# Patient Record
Sex: FEMALE | Race: BLACK OR AFRICAN AMERICAN | Employment: OTHER | URBAN - METROPOLITAN AREA
[De-identification: names, ages, dates, MRNs, and addresses within clinical notes are randomized per-mention and may not be internally consistent; named-entity substitution may affect disease eponyms.]

---

## 2017-01-12 PROBLEM — R76.8 POSITIVE SM/RNP ANTIBODY: Status: ACTIVE | Noted: 2017-01-12

## 2017-01-12 PROBLEM — M35.00 SJOGREN'S SYNDROME (HCC): Status: ACTIVE | Noted: 2017-01-12

## 2017-01-27 PROBLEM — Z79.4 CONTROLLED TYPE 2 DIABETES MELLITUS WITHOUT COMPLICATION, WITH LONG-TERM CURRENT USE OF INSULIN (HCC): Status: ACTIVE | Noted: 2017-01-27

## 2017-01-27 PROBLEM — E11.9 CONTROLLED TYPE 2 DIABETES MELLITUS WITHOUT COMPLICATION, WITH LONG-TERM CURRENT USE OF INSULIN (HCC): Status: ACTIVE | Noted: 2017-01-27

## 2017-02-28 ENCOUNTER — HOSPITAL ENCOUNTER (OUTPATIENT)
Dept: DIABETES SERVICES | Age: 72
Discharge: HOME | End: 2017-02-28
Attending: INTERNAL MEDICINE
Payer: MEDICARE

## 2017-02-28 VITALS — BODY MASS INDEX: 29.84 KG/M2 | HEIGHT: 59 IN | WEIGHT: 148 LBS

## 2017-02-28 PROCEDURE — G0108 DIAB MANAGE TRN  PER INDIV: HCPCS

## 2017-02-28 NOTE — PROGRESS NOTES
Came for diabetes educational assessment today. Provided basic information on carbohydrates, proteins and fats. Educational need/plan: Will attend 2 nutrition/2 diabetes sessions to address the following: diabetes disease process, nutritional management, physical activity, using medications, preventing complications, psychosocial adjustment, goal setting, problem solving, monitoring, behavior change strategies. During appointment pt admits she took inhalers yesterday but Toujeo she was taken  one and half weeks ago. Her cardiac medications and other medications on  2-21-17. Trulicilty injection taken last week as well. She did report a low blood glucose last week of 80 mg/dl. Pt passed the PAT Depression scale despite reporting issues with her ,financial concerns, and a strong desire to have children when younger, but does not have any of her own. Suggested a pill box to help remember medications. Pt admits to memory issues and says she does tend to forget at times to take medications and check her blood sugar. Forgetfulness was mentioned several times by client during the hour assessment. 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 It was noted her diet is poor and reported her last true meal was on Sunday 2-26-17. Reports she was taken out to eat by who she calls an \"adopted son\" to [employer].  She reported she  tends to eat fruit and snack rather than eat meals. Primarily fruit. She admits to eating fifty grapes at a single sitting  or until  satisfied. She has a 6th grade education and reports she is not good with numbers. Initial mini nutritional instruction today was by plate method until her first  nutritional session with dietician and not by number of CHO. Her teachings will all be one on one sessions to reemphasize food and diabetic education not based on a carbohydrate count but plate and appearance of foods with visual instruction.  She will receive entire diabetes program tailored to her learning style.

## 2017-03-10 ENCOUNTER — HOSPITAL ENCOUNTER (OUTPATIENT)
Dept: DIABETES SERVICES | Age: 72
Discharge: HOME | End: 2017-03-10
Attending: INTERNAL MEDICINE
Payer: MEDICARE

## 2017-03-10 PROCEDURE — G0108 DIAB MANAGE TRN  PER INDIV: HCPCS

## 2017-03-10 NOTE — PROGRESS NOTES
Participant attended Diabetes #1 session today. Topics included: Characteristics/pathophysiology type 1/type 2 diabetes; Goal/acceptable blood glucose ranges/Hgb A1C/interpreting/using results; Using medications safely; Sick day management; Prevention/detection/treatment of acute complications. - Verbalized understanding of material covered. -Goal for next session attend Nutrition One   -Anticipated adherence is   Average   -Problems/barriers may be   anticipated comments: Pt mentioned her glucometer is broken. She has changed HCP and will call about getting another one. Not sure which one her insurance will pay for. Reports she could not afford the strips if used the meters we give here. [facility] alternative [facility] that is  self pay and no prescription required. Meter is $9.99, and  $6.99 for a box of 50 strips. She will call her new HCP and see if she can get one ordered.

## 2017-03-14 ENCOUNTER — HOSPITAL ENCOUNTER (OUTPATIENT)
Dept: DIABETES SERVICES | Age: 72
Discharge: HOME | End: 2017-03-14
Attending: INTERNAL MEDICINE
Payer: MEDICARE

## 2017-03-14 PROCEDURE — G0108 DIAB MANAGE TRN  PER INDIV: HCPCS

## 2017-03-14 NOTE — PROGRESS NOTES
Attended nutrition diabetes #1 individual session today. Topics included:  carbohydrate choices (emphasizing high fiber carbohydrates using food models); proteins (emphasizing heart healthy choices) and fat food choices (emphasizing unsaturated fats); free foods;  nutrition tips for persons with diabetes; snack ideas; resources for diabetes management. Plate method for meal planning was discussed. Emphasis was on not going over 5 hours during the waking hours without eating and having a protein with every meal and snack. Reviewed budget friendly foods. Voiced /demonstrated partial understanding of material covered. Goal for next session is: have a protein food with every meal and snack. Anticipated adherence is good. Problems/barriers may be: 6th grade reading level therefore used more food models to show amounts of foods allowed.

## 2017-03-21 ENCOUNTER — HOSPITAL ENCOUNTER (OUTPATIENT)
Dept: DIABETES SERVICES | Age: 72
Discharge: HOME | End: 2017-03-21
Attending: INTERNAL MEDICINE
Payer: MEDICARE

## 2017-03-21 PROCEDURE — G0108 DIAB MANAGE TRN  PER INDIV: HCPCS

## 2017-03-21 NOTE — PROGRESS NOTES
Attended nutrition diabetes #2 1:1 session today. Topics included: plate method for portion control; fiber and sodium guidelines; sugar substitutes; eating out; recipe modification; label reading. Voiced/demonstrated understanding of material covered. Pt could verbalize to have a protein with every meal and snack. Anticipated adherence is good. Pt has already changed to mostly high fiber carbohydrates and unsaturated fats. Problems/barriers: 6th grade education level. Pt's nutrition goal is to not skip meals and eat a protein with meals and snacks. Plan for follow up is attend 1:1 diabetes medical session on 3/30/17 and grocery shopping tour on 4/10/17.

## 2017-03-30 ENCOUNTER — HOSPITAL ENCOUNTER (OUTPATIENT)
Dept: DIABETES SERVICES | Age: 72
Discharge: HOME | End: 2017-03-30
Attending: INTERNAL MEDICINE
Payer: MEDICARE

## 2017-03-30 PROCEDURE — G0108 DIAB MANAGE TRN  PER INDIV: HCPCS

## 2017-03-30 NOTE — PROGRESS NOTES
Participant attended Diabetes #2 session today. Topics included: Prevention/detection/treatment of chronic complications; sleep apnea; Developing strategies to promote health/change behavior/recommended screenings; Developing strategies to address psychosocial issues; Goal setting. Participants goal/support plan includes Nutritional Goal: To improve diet:  I will not skip meals and I will have a protein with most meals and snack beginning 3-30-17 and reevaluate in 2 weeks. Medical Goal:  To treat low blood sugar. I will carry a CHO source to treat low blood sugar. Jelly packets. Beginning 3-10-17 and reevaluate in 2 weeks. Instructed her to make sure she has at least 3 packets with her at all times. Problems/barriers may be:financial comments: Pt mentioned limited budget and her ability to have proteins available is based upon food stamps and when they are gone, she has to do without until she gets more. She can get food at some kind of food bank, but she reports it is not the protein that she needs. Plan for follow up/Recommendations: mail follow up survey in 3 months.

## 2017-11-15 ENCOUNTER — HOSPITAL ENCOUNTER (OUTPATIENT)
Dept: CT IMAGING | Age: 72
Discharge: HOME | End: 2017-11-15
Attending: INTERNAL MEDICINE
Payer: COMMERCIAL

## 2017-11-15 DIAGNOSIS — J32.9 OTHER SINUSITIS, UNSPECIFIED CHRONICITY: ICD-10-CM

## 2017-11-15 PROCEDURE — 70486 CT MAXILLOFACIAL W/O DYE: CPT

## 2017-12-12 ENCOUNTER — HOSPITAL ENCOUNTER (OUTPATIENT)
Dept: MRI IMAGING | Age: 72
Discharge: HOME | End: 2017-12-12
Attending: INTERNAL MEDICINE
Payer: MEDICARE

## 2017-12-12 DIAGNOSIS — R51.9 LEFT TEMPORAL HEADACHE: ICD-10-CM

## 2017-12-12 LAB — CREAT BLD-MCNC: 0.7 MG/DL (ref 0.8–1.5)

## 2017-12-12 PROCEDURE — 74011250636 HC RX REV CODE- 250/636: Performed by: INTERNAL MEDICINE

## 2017-12-12 PROCEDURE — 70553 MRI BRAIN STEM W/O & W/DYE: CPT

## 2017-12-12 PROCEDURE — A9577 INJ MULTIHANCE: HCPCS | Performed by: INTERNAL MEDICINE

## 2017-12-12 PROCEDURE — 82565 ASSAY OF CREATININE: CPT

## 2017-12-12 RX ORDER — SODIUM CHLORIDE 0.9 % (FLUSH) 0.9 %
10 SYRINGE (ML) INJECTION
Status: COMPLETED | OUTPATIENT
Start: 2017-12-12 | End: 2017-12-12

## 2017-12-12 RX ADMIN — GADOBENATE DIMEGLUMINE 14 ML: 529 INJECTION, SOLUTION INTRAVENOUS at 15:00

## 2017-12-12 RX ADMIN — Medication 10 ML: at 15:00

## 2018-01-18 PROBLEM — E11.40 TYPE 2 DIABETES MELLITUS WITH DIABETIC NEUROPATHY (HCC): Status: ACTIVE | Noted: 2018-01-18

## 2018-01-24 ENCOUNTER — HOSPITAL ENCOUNTER (EMERGENCY)
Age: 73
Discharge: HOME | End: 2018-01-24
Attending: EMERGENCY MEDICINE
Payer: MEDICARE

## 2018-01-24 ENCOUNTER — APPOINTMENT (OUTPATIENT)
Dept: GENERAL RADIOLOGY | Age: 73
End: 2018-01-24
Attending: EMERGENCY MEDICINE
Payer: MEDICARE

## 2018-01-24 VITALS
TEMPERATURE: 97.8 F | DIASTOLIC BLOOD PRESSURE: 84 MMHG | HEIGHT: 59 IN | HEART RATE: 79 BPM | BODY MASS INDEX: 29.84 KG/M2 | OXYGEN SATURATION: 96 % | RESPIRATION RATE: 18 BRPM | SYSTOLIC BLOOD PRESSURE: 143 MMHG | WEIGHT: 148 LBS

## 2018-01-24 DIAGNOSIS — M54.31 SCIATICA OF RIGHT SIDE: Primary | ICD-10-CM

## 2018-01-24 PROCEDURE — 96372 THER/PROPH/DIAG INJ SC/IM: CPT | Performed by: EMERGENCY MEDICINE

## 2018-01-24 PROCEDURE — 74011250636 HC RX REV CODE- 250/636: Performed by: EMERGENCY MEDICINE

## 2018-01-24 PROCEDURE — 72100 X-RAY EXAM L-S SPINE 2/3 VWS: CPT

## 2018-01-24 PROCEDURE — 81003 URINALYSIS AUTO W/O SCOPE: CPT | Performed by: EMERGENCY MEDICINE

## 2018-01-24 PROCEDURE — 99282 EMERGENCY DEPT VISIT SF MDM: CPT | Performed by: EMERGENCY MEDICINE

## 2018-01-24 RX ORDER — TRAMADOL HYDROCHLORIDE 50 MG/1
100 TABLET ORAL
Qty: 19 TAB | Refills: 0 | Status: SHIPPED | OUTPATIENT
Start: 2018-01-24 | End: 2018-03-09

## 2018-01-24 RX ORDER — KETOROLAC TROMETHAMINE 30 MG/ML
30 INJECTION, SOLUTION INTRAMUSCULAR; INTRAVENOUS
Status: COMPLETED | OUTPATIENT
Start: 2018-01-24 | End: 2018-01-24

## 2018-01-24 RX ORDER — METHOCARBAMOL 750 MG/1
1500 TABLET, FILM COATED ORAL 4 TIMES DAILY
Qty: 40 TAB | Refills: 0 | Status: SHIPPED | OUTPATIENT
Start: 2018-01-24 | End: 2018-03-09

## 2018-01-24 RX ORDER — PREDNISONE 20 MG/1
40 TABLET ORAL DAILY
Qty: 10 TAB | Refills: 0 | Status: SHIPPED | OUTPATIENT
Start: 2018-01-24 | End: 2018-01-29

## 2018-01-24 RX ADMIN — METHYLPREDNISOLONE SODIUM SUCCINATE 125 MG: 125 INJECTION, POWDER, FOR SOLUTION INTRAMUSCULAR; INTRAVENOUS at 09:05

## 2018-01-24 RX ADMIN — KETOROLAC TROMETHAMINE 30 MG: 30 INJECTION, SOLUTION INTRAMUSCULAR at 09:05

## 2018-01-24 NOTE — ED PROVIDER NOTES
HPI Comments: 65-year-old female presents with low back pain. Ongoing for one week. Saw her primary care doctor review of the chart does not show that any interventions were undertaken after that visit. Pain has been persistent. No fever vomiting or diarrhea  Patient has urinary frequency but this is a persistent problem. She is not having any incontinence. She has no dysuria. Pain distally worse with moving changing positions. Denies any cough or shortness of breath. Patient is a 67 y.o. female presenting with back pain. The history is provided by the patient and a relative. Back Pain    This is a recurrent problem. The current episode started more than 1 week ago. The problem has been gradually worsening. The problem occurs constantly. Patient reports not work related injury. 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 The pain is present in the lower back, right side and left side. The quality of the pain is described as aching and stabbing. The pain radiates to the right thigh. The pain is moderate. The pain is the same all the time. Pertinent negatives include no chest pain, no fever, no weight loss, no headaches, no abdominal pain, no abdominal swelling, no dysuria, no pelvic pain, no paresis, no tingling and no weakness. Treatments tried: patient currently on methotrexate therapy. The treatment provided no relief. Risk factors include a sedentary lifestyle. The patient's surgical history non-contributory        Past Medical History:   Diagnosis Date    Arrhythmia 3 - 4 weeks ago (9/2010)    Dr. Lottie Ritter?  ([facility]) ran tests for heart speeding up/slowing down    Arthritis     Asthma     first told in 2000    Bladder incontinence 10/24/2016    Dermatophytosis     Diabetes ([facility] [address]     checks sugar at home 142 - 0    GERD (gastroesophageal reflux disease) 7/23/2013    Heart failure ([facility]     Hypercholesterolemia     Hypertension     Hypertensive heart disease without HF (heart failure)     Lupus (systemic lupus erythematosus) ([facility] [address] [facility] 2005    Non compliance with medical treatment     Obesity     ANATOLY (obstructive sleep apnea) 7/23/2013    Seizures (HCC)     seizures for 10 years, PATIENT stopped dilantin 1 1/2 yrs ago    Stroke ([facility] [facility] 1970S    MINI STROKE    Systemic lupus erythematosus ([facility] [address] [facility] 7/23/2013    Type 2 diabetes mellitus with hyperglycemia ([facility] [address] [facility] 10/24/2016       Past Surgical History:   Procedure Laterality Date    HX APPENDECTOMY      HX BREAST LUMPECTOMY  1998    benign    HX HAMMER TOE REPAIR  2010    left    HX HEENT      cataract removal right eye and skin removal off of lids    HX HYSTERECTOMY  1974    REV UPPER EYELID W EXCESS SKIN           Family History:   Problem Relation Age of Onset    Heart Disease Father     Bleeding Prob Brother     Diabetes Brother     Heart Disease Brother     Diabetes Brother     Cancer Brother     Heart Disease Mother     Diabetes Sister     Asthma Brother     Cancer Brother     Malignant Hyperthermia Neg Hx     Pseudocholinesterase Deficiency Neg Hx     Delayed Awakening Neg Hx     Post-op Nausea/Vomiting Neg Hx     Emergence Delirium Neg Hx     Post-op Cognitive Dysfunction Neg Hx     Other Neg Hx        Social History     Social History    Marital status: SINGLE     Spouse name: N/A    Number of children: 0    Years of education: N/A     Occupational History    [employer] Not Employed     Denies industrial toxins    Textiles      6 yrs     Social History Main Topics    Smoking status: Former Smoker     Packs/day: 1.00     Years: 12.00     Quit date: 10/14/1977    Smokeless tobacco: Never Used    Alcohol use No    Drug use: No    Sexual activity: No      Comment: sleeps alone for past 30 years,  in seperate bed      Other Topics Concern    Not on file     Social History Narrative    12-pack-year history of cigarette smoking, stopped smoking in 1975. Worked in [place] for 6 years and at [employer] for 2 years where she states she was not exposed to industrial toxins. , no children. Has always live in [address]. No pets. ALLERGIES: Penicillins    Review of Systems   Constitutional: Negative for chills, fever and weight loss. HENT: Positive for nosebleeds. Negative for congestion, ear pain and rhinorrhea. Eyes: Negative for photophobia and discharge. Respiratory: Negative for cough and shortness of breath. Cardiovascular: Negative for chest pain and palpitations. Gastrointestinal: Negative for abdominal pain, constipation, diarrhea and vomiting. Endocrine: Negative for cold intolerance and heat intolerance. Genitourinary: Negative for dysuria, flank pain and pelvic pain. Musculoskeletal: Positive for back pain. Negative for arthralgias, myalgias and neck pain. Skin: Negative for rash and wound. Allergic/Immunologic: Negative for environmental allergies and food allergies. Neurological: Negative for tingling, syncope, weakness and headaches. Hematological: Negative for adenopathy. Does not bruise/bleed easily. Psychiatric/Behavioral: Negative for dysphoric mood. The patient is not nervous/anxious. All other systems reviewed and are negative. Vitals:    01/24/18 0833   BP: (!) 159/98   Pulse: 83   Resp: 18   Temp: 97.8 °F (36.6 °C)   SpO2: 96%   Weight: 67.1 kg (148 lb)   Height: 4' 11\" (1.499 m)            Physical Exam   Constitutional: She is oriented to person, place, and time. She appears well-developed and well-nourished. She appears distressed. HENT:   Head: Normocephalic and atraumatic. Mouth/Throat: Oropharynx is clear and moist.   Eyes: Conjunctivae and EOM are normal. Pupils are equal, round, and reactive to light. Right eye exhibits no discharge. Left eye exhibits no discharge. No scleral icterus. Neck: Normal range of motion. Neck supple. No thyromegaly present. Cardiovascular: Normal rate, regular rhythm, normal heart sounds and intact distal pulses. Exam reveals no gallop and no friction rub. No murmur heard. Pulmonary/Chest: Effort normal and breath sounds normal. No respiratory distress. She has no wheezes. She exhibits no tenderness. Abdominal: Soft. Bowel sounds are normal. She exhibits no distension. There is no hepatosplenomegaly. There is no tenderness. There is no rebound and no guarding. No hernia. Musculoskeletal: Normal range of motion. She exhibits no edema or tenderness. Back:    Neurological: She is alert and oriented to person, place, and time. No cranial nerve deficit. She exhibits normal muscle tone. Skin: Skin is warm. No rash noted. She is not diaphoretic. No erythema. Psychiatric: She has a normal mood and affect. Her behavior is normal. Judgment and thought content normal.   Nursing note and vitals reviewed. MDM  Number of Diagnoses or Management Options  Sciatica of right side: established and worsening  Diagnosis management comments: Differential  Diagnosis:  Lumbar strain, UTI, pyelonephritis, muscle strain, overuse syndrome       Amount and/or Complexity of Data Reviewed  Clinical lab tests: ordered and reviewed  Tests in the radiology section of CPT®: ordered and reviewed  Tests in the medicine section of CPT®: ordered and reviewed  Review and summarize past medical records: yes    Risk of Complications, Morbidity, and/or Mortality  Presenting problems: moderate  Diagnostic procedures: moderate  Management options: moderate  General comments: Elements of this note have been dictated via voice recognition software. Text and phrases may be limited by the accuracy of the software. The chart has been reviewed, but errors may still be present.       Patient Progress  Patient progress: stable    ED Course       Procedures

## 2018-01-24 NOTE — ED NOTES
I have reviewed discharge instructions with the patient and caregiver. The patient and caregiver verbalized understanding. Patient left ED via Discharge Method: ambulatory to Home with (insert name of family/friend, self,). Opportunity for questions and clarification provided. Patient given 3 scripts. To continue your aftercare when you leave the hospital, you may receive an automated call from our care team to check in on how you are doing. This is a free service and part of our promise to provide the best care and service to meet your aftercare needs.  If you have questions, or wish to unsubscribe from this service please call [phone]. Thank you for Choosing our [address] [place] [address] Emergency Department.

## 2018-01-24 NOTE — DISCHARGE INSTRUCTIONS
Back Pain: Care Instructions  Your Care Instructions    Back pain has many possible causes. It is often related to problems with muscles and ligaments of the back. It may also be related to problems with the nerves, discs, or bones of the back. Moving, lifting, standing, sitting, or sleeping in an awkward way can strain the back. Sometimes you don't notice the injury until later. Arthritis is another common cause of back pain. Although it may hurt a lot, back pain usually improves on its own within several weeks. Most people recover in 12 weeks or less. Using good home treatment and being careful not to stress your back can help you feel better sooner. Follow-up care is a key part of your treatment and safety. Be sure to make and go to all appointments, and call your doctor if you are having problems. It's also a good idea to know your test results and keep a list of the medicines you take. How can you care for yourself at home? · Sit or lie in positions that are most comfortable and reduce your pain. Try one of these positions when you lie down:  ¨ Lie on your back with your knees bent and supported by large pillows. ¨ Lie on the floor with your legs on the seat of a sofa or chair. Aureliano Harries on your side with your knees and hips bent and a pillow between your legs. ¨ Lie on your stomach if it does not make pain worse. · Do not sit up in bed, and avoid soft couches and twisted positions. Bed rest can help relieve pain at first, but it delays healing. Avoid bed rest after the first day of back pain. · Change positions every 30 minutes. If you must sit for long periods of time, take breaks from sitting. Get up and walk around, or lie in a comfortable position. · Try using a heating pad on a low or medium setting for 15 to 20 minutes every 2 or 3 hours. Try a warm shower in place of one session with the heating pad. · You can also try an ice pack for 10 to 15 minutes every 2 to 3 hours.  Put a thin cloth between the ice pack and your skin. · Take pain medicines exactly as directed. ¨ If the doctor gave you a prescription medicine for pain, take it as prescribed. ¨ If you are not taking a prescription pain medicine, ask your doctor if you can take an over-the-counter medicine. · Take short walks several times a day. You can start with 5 to 10 minutes, 3 or 4 times a day, and work up to longer walks. Walk on level surfaces and avoid hills and stairs until your back is better. · Return to work and other activities as soon as you can. Continued rest without activity is usually not good for your back. · To prevent future back pain, do exercises to stretch and strengthen your back and stomach. Learn how to use good posture, safe lifting techniques, and proper body mechanics. When should you call for help? Call your doctor now or seek immediate medical care if:  ? · You have new or worsening numbness in your legs. ? · You have new or worsening weakness in your legs. (This could make it hard to stand up.)   ? · You lose control of your bladder or bowels. ? Watch closely for changes in your health, and be sure to contact your doctor if:  ? · Your pain gets worse. ? · You are not getting better after 2 weeks. Where can you learn more? Go to http://shani-sully.info/. Enter G174 in the search box to learn more about \"Back Pain: Care Instructions. \"  Current as of: March 21, 2017  Content Version: 11.4  © 3519-2752 [employer]. Care instructions adapted under license by [employer] (which disclaims liability or warranty for this information). If you have questions about a medical condition or this instruction, always ask your healthcare professional. [place] [address] any warranty or liability for your use of this information.          Sciatica: Care Instructions  Your Care Instructions    Sciatica (say \"bbt-UO-oq-kuh\") is an irritation of one of the sciatic nerves, which come from the spinal cord in the lower back. The sciatic nerves and their branches extend down through the buttock to the foot. Sciatica can develop when an injured disc in the back presses against a spinal nerve root. Its main symptom is pain, numbness, or weakness that is often worse in the leg or foot than in the back. Sciatica often will improve and go away with time. Early treatment usually includes medicines and exercises to relieve pain. Follow-up care is a key part of your treatment and safety. Be sure to make and go to all appointments, and call your doctor if you are having problems. It's also a good idea to know your test results and keep a list of the medicines you take. How can you care for yourself at home? · Take pain medicines exactly as directed. ¨ If the doctor gave you a prescription medicine for pain, take it as prescribed. ¨ If you are not taking a prescription pain medicine, ask your doctor if you can take an over-the-counter medicine. · Use heat or ice to relieve pain. ¨ To apply heat, put a warm water bottle, heating pad set on low, or warm cloth on your back. Do not go to sleep with a heating pad on your skin. ¨ To use ice, put ice or a cold pack on the area for 10 to 20 minutes at a time. Put a thin cloth between the ice and your skin. · Avoid sitting if possible, unless it feels better than standing. · Alternate lying down with short walks. Increase your walking distance as you are able to without making your symptoms worse. · Do not do anything that makes your symptoms worse. When should you call for help? Call 911 anytime you think you may need emergency care. For example, call if:  · You are unable to move a leg at all. Call your doctor now or seek immediate medical care if:  · You have new or worse symptoms in your legs or buttocks. Symptoms may include:  ¨ Numbness or tingling. ¨ Weakness. ¨ Pain. · You lose bladder or bowel control.   Watch closely for changes in your health, and be sure to contact your doctor if:  · You are not getting better as expected. Where can you learn more? Go to http://shani-sully.info/. Enter [phone] in the search box to learn more about \"Sciatica: Care Instructions. \"  Current as of: March 21, 2017  Content Version: 11.4  © 4122-4326 [employer]. Care instructions adapted under license by [employer] (which disclaims liability or warranty for this information). If you have questions about a medical condition or this instruction, always ask your healthcare professional. [place] [address] any warranty or liability for your use of this information.

## 2018-01-24 NOTE — ED TRIAGE NOTES
Pt reports last Monday she was helping a family onto the bedside toilet and hurt her back and now the pain is going around the left side.

## 2018-03-08 ENCOUNTER — HOSPITAL ENCOUNTER (OUTPATIENT)
Dept: SURGERY | Age: 73
Discharge: HOME | End: 2018-03-08

## 2018-03-09 ENCOUNTER — HOSPITAL ENCOUNTER (OUTPATIENT)
Dept: SURGERY | Age: 73
Discharge: HOME | End: 2018-03-09
Attending: OTOLARYNGOLOGY
Payer: MEDICARE

## 2018-03-09 VITALS
BODY MASS INDEX: 29.69 KG/M2 | HEIGHT: 59 IN | OXYGEN SATURATION: 98 % | SYSTOLIC BLOOD PRESSURE: 133 MMHG | WEIGHT: 147.3 LBS | DIASTOLIC BLOOD PRESSURE: 92 MMHG | HEART RATE: 87 BPM | TEMPERATURE: 96.6 F

## 2018-03-09 LAB — GLUCOSE BLD STRIP.AUTO-MCNC: 111 MG/DL (ref 65–100)

## 2018-03-09 PROCEDURE — 82962 GLUCOSE BLOOD TEST: CPT

## 2018-03-09 RX ORDER — BISMUTH SUBSALICYLATE 262 MG
1 TABLET,CHEWABLE ORAL DAILY
COMMUNITY
End: 2018-08-23

## 2018-03-09 NOTE — PERIOP NOTES
Patient verified name, , and surgery as listed in [facility]. Type 1B surgery, walk in assessment complete. Labs per surgeon: none needed. Labs per anesthesia protocol: none needed. [id number]  EKG: not needed per [facility] protocols. Had normal stress test in 2017. Record available in EMR for reference if needed. Hibiclens and instructions given per hospital policy. Patient provided with and instructed on educational handouts including Guide to Surgery, Pain Management, Hand Hygiene, Blood Transfusion Education, and [facility] Brochure. Patient answered medical/surgical history questions at their best of ability. All prior to admission medications documented in [facility]. Original medication prescription bottles not visualized during patient appointment. Patient instructed to hold all vitamins 7 days prior to surgery and NSAIDS 5 days prior to surgery, patient verbalized understanding. Medications to be held: meloxicam, vitamins. Patient instructed to continue previous medications as prescribed prior to surgery and to take the following medications the day of surgery according to anesthesia guidelines with a small sip of water: albuterol inhaler, 81 mg aspirin, flovent, gabapentin, proair if needed. Instructed to use 28 units of Toujeo the night before surgery and to bring inhaler. Patient teach back successful and patient demonstrates knowledge of instructions.

## 2018-03-14 ENCOUNTER — ANESTHESIA EVENT (OUTPATIENT)
Dept: SURGERY | Age: 73
End: 2018-03-14
Payer: MEDICARE

## 2018-03-15 ENCOUNTER — HOSPITAL ENCOUNTER (OUTPATIENT)
Age: 73
Setting detail: OUTPATIENT SURGERY
Discharge: HOME | End: 2018-03-15
Attending: OTOLARYNGOLOGY | Admitting: OTOLARYNGOLOGY
Payer: MEDICARE

## 2018-03-15 ENCOUNTER — ANESTHESIA (OUTPATIENT)
Dept: SURGERY | Age: 73
End: 2018-03-15
Payer: MEDICARE

## 2018-03-15 VITALS
HEART RATE: 82 BPM | SYSTOLIC BLOOD PRESSURE: 134 MMHG | DIASTOLIC BLOOD PRESSURE: 79 MMHG | TEMPERATURE: 97.7 F | OXYGEN SATURATION: 92 % | RESPIRATION RATE: 14 BRPM | WEIGHT: 149.13 LBS | BODY MASS INDEX: 30.12 KG/M2

## 2018-03-15 LAB — GLUCOSE BLD STRIP.AUTO-MCNC: 115 MG/DL (ref 65–100)

## 2018-03-15 PROCEDURE — 74011000250 HC RX REV CODE- 250

## 2018-03-15 PROCEDURE — 77030008477 HC STYL SATN SLP COVD -A: Performed by: ANESTHESIOLOGY

## 2018-03-15 PROCEDURE — 74011250636 HC RX REV CODE- 250/636

## 2018-03-15 PROCEDURE — 74011250636 HC RX REV CODE- 250/636: Performed by: ANESTHESIOLOGY

## 2018-03-15 PROCEDURE — 74011250637 HC RX REV CODE- 250/637: Performed by: OTOLARYNGOLOGY

## 2018-03-15 PROCEDURE — 77030008703 HC TU ET UNCUF COVD -A: Performed by: ANESTHESIOLOGY

## 2018-03-15 PROCEDURE — 76060000032 HC ANESTHESIA 0.5 TO 1 HR: Performed by: OTOLARYNGOLOGY

## 2018-03-15 PROCEDURE — 76010000138 HC OR TIME 0.5 TO 1 HR: Performed by: OTOLARYNGOLOGY

## 2018-03-15 PROCEDURE — 77030018836 HC SOL IRR NACL ICUM -A: Performed by: OTOLARYNGOLOGY

## 2018-03-15 PROCEDURE — 74011000250 HC RX REV CODE- 250: Performed by: ANESTHESIOLOGY

## 2018-03-15 PROCEDURE — 82962 GLUCOSE BLOOD TEST: CPT

## 2018-03-15 PROCEDURE — 74011000250 HC RX REV CODE- 250: Performed by: OTOLARYNGOLOGY

## 2018-03-15 PROCEDURE — 76210000016 HC OR PH I REC 1 TO 1.5 HR: Performed by: OTOLARYNGOLOGY

## 2018-03-15 PROCEDURE — 77030020782 HC GWN BAIR PAWS FLX 3M -B: Performed by: ANESTHESIOLOGY

## 2018-03-15 PROCEDURE — 88305 TISSUE EXAM BY PATHOLOGIST: CPT | Performed by: OTOLARYNGOLOGY

## 2018-03-15 PROCEDURE — 76210000020 HC REC RM PH II FIRST 0.5 HR: Performed by: OTOLARYNGOLOGY

## 2018-03-15 RX ORDER — ONDANSETRON 2 MG/ML
INJECTION INTRAMUSCULAR; INTRAVENOUS AS NEEDED
Status: DISCONTINUED | OUTPATIENT
Start: 2018-03-15 | End: 2018-03-15 | Stop reason: HOSPADM

## 2018-03-15 RX ORDER — LIDOCAINE HYDROCHLORIDE 10 MG/ML
INJECTION INFILTRATION; PERINEURAL AS NEEDED
Status: DISCONTINUED | OUTPATIENT
Start: 2018-03-15 | End: 2018-03-15 | Stop reason: HOSPADM

## 2018-03-15 RX ORDER — OXYCODONE HYDROCHLORIDE 5 MG/1
5 TABLET ORAL
Status: DISCONTINUED | OUTPATIENT
Start: 2018-03-15 | End: 2018-03-15 | Stop reason: HOSPADM

## 2018-03-15 RX ORDER — ROCURONIUM BROMIDE 10 MG/ML
INJECTION, SOLUTION INTRAVENOUS AS NEEDED
Status: DISCONTINUED | OUTPATIENT
Start: 2018-03-15 | End: 2018-03-15 | Stop reason: HOSPADM

## 2018-03-15 RX ORDER — LIDOCAINE HYDROCHLORIDE 20 MG/ML
INJECTION, SOLUTION EPIDURAL; INFILTRATION; INTRACAUDAL; PERINEURAL AS NEEDED
Status: DISCONTINUED | OUTPATIENT
Start: 2018-03-15 | End: 2018-03-15 | Stop reason: HOSPADM

## 2018-03-15 RX ORDER — DIPHENHYDRAMINE HYDROCHLORIDE 50 MG/ML
12.5 INJECTION, SOLUTION INTRAMUSCULAR; INTRAVENOUS
Status: DISCONTINUED | OUTPATIENT
Start: 2018-03-15 | End: 2018-03-15 | Stop reason: HOSPADM

## 2018-03-15 RX ORDER — OXYCODONE HYDROCHLORIDE 5 MG/1
10 TABLET ORAL
Status: DISCONTINUED | OUTPATIENT
Start: 2018-03-15 | End: 2018-03-15 | Stop reason: HOSPADM

## 2018-03-15 RX ORDER — HYDROMORPHONE HYDROCHLORIDE 2 MG/ML
0.5 INJECTION, SOLUTION INTRAMUSCULAR; INTRAVENOUS; SUBCUTANEOUS
Status: DISCONTINUED | OUTPATIENT
Start: 2018-03-15 | End: 2018-03-15 | Stop reason: HOSPADM

## 2018-03-15 RX ORDER — LIDOCAINE HYDROCHLORIDE 10 MG/ML
0.1 INJECTION INFILTRATION; PERINEURAL AS NEEDED
Status: DISCONTINUED | OUTPATIENT
Start: 2018-03-15 | End: 2018-03-15 | Stop reason: HOSPADM

## 2018-03-15 RX ORDER — SUCCINYLCHOLINE CHLORIDE 20 MG/ML
INJECTION INTRAMUSCULAR; INTRAVENOUS AS NEEDED
Status: DISCONTINUED | OUTPATIENT
Start: 2018-03-15 | End: 2018-03-15 | Stop reason: HOSPADM

## 2018-03-15 RX ORDER — FENTANYL CITRATE 50 UG/ML
INJECTION, SOLUTION INTRAMUSCULAR; INTRAVENOUS AS NEEDED
Status: DISCONTINUED | OUTPATIENT
Start: 2018-03-15 | End: 2018-03-15 | Stop reason: HOSPADM

## 2018-03-15 RX ORDER — SODIUM CHLORIDE, SODIUM LACTATE, POTASSIUM CHLORIDE, CALCIUM CHLORIDE 600; 310; 30; 20 MG/100ML; MG/100ML; MG/100ML; MG/100ML
75 INJECTION, SOLUTION INTRAVENOUS CONTINUOUS
Status: DISCONTINUED | OUTPATIENT
Start: 2018-03-15 | End: 2018-03-15 | Stop reason: HOSPADM

## 2018-03-15 RX ORDER — EPINEPHRINE NASAL SOLUTION 1 MG/ML
SOLUTION NASAL AS NEEDED
Status: DISCONTINUED | OUTPATIENT
Start: 2018-03-15 | End: 2018-03-15 | Stop reason: HOSPADM

## 2018-03-15 RX ORDER — NALOXONE HYDROCHLORIDE 0.4 MG/ML
0.1 INJECTION, SOLUTION INTRAMUSCULAR; INTRAVENOUS; SUBCUTANEOUS
Status: DISCONTINUED | OUTPATIENT
Start: 2018-03-15 | End: 2018-03-15 | Stop reason: HOSPADM

## 2018-03-15 RX ORDER — FLUMAZENIL 0.1 MG/ML
0.2 INJECTION INTRAVENOUS AS NEEDED
Status: DISCONTINUED | OUTPATIENT
Start: 2018-03-15 | End: 2018-03-15 | Stop reason: HOSPADM

## 2018-03-15 RX ORDER — PROPOFOL 10 MG/ML
INJECTION, EMULSION INTRAVENOUS AS NEEDED
Status: DISCONTINUED | OUTPATIENT
Start: 2018-03-15 | End: 2018-03-15 | Stop reason: HOSPADM

## 2018-03-15 RX ORDER — LABETALOL HYDROCHLORIDE 5 MG/ML
INJECTION, SOLUTION INTRAVENOUS AS NEEDED
Status: DISCONTINUED | OUTPATIENT
Start: 2018-03-15 | End: 2018-03-15 | Stop reason: HOSPADM

## 2018-03-15 RX ORDER — DEXAMETHASONE SODIUM PHOSPHATE 4 MG/ML
INJECTION, SOLUTION INTRA-ARTICULAR; INTRALESIONAL; INTRAMUSCULAR; INTRAVENOUS; SOFT TISSUE AS NEEDED
Status: DISCONTINUED | OUTPATIENT
Start: 2018-03-15 | End: 2018-03-15 | Stop reason: HOSPADM

## 2018-03-15 RX ADMIN — LIDOCAINE HYDROCHLORIDE 0.1 ML: 10 INJECTION, SOLUTION INFILTRATION; PERINEURAL at 09:48

## 2018-03-15 RX ADMIN — SODIUM CHLORIDE, SODIUM LACTATE, POTASSIUM CHLORIDE, AND CALCIUM CHLORIDE 75 ML/HR: 600; 310; 30; 20 INJECTION, SOLUTION INTRAVENOUS at 09:42

## 2018-03-15 RX ADMIN — PROPOFOL 100 MG: 10 INJECTION, EMULSION INTRAVENOUS at 10:54

## 2018-03-15 RX ADMIN — LIDOCAINE HYDROCHLORIDE 60 MG: 20 INJECTION, SOLUTION EPIDURAL; INFILTRATION; INTRACAUDAL; PERINEURAL at 10:53

## 2018-03-15 RX ADMIN — LABETALOL HYDROCHLORIDE 5 MG: 5 INJECTION, SOLUTION INTRAVENOUS at 11:10

## 2018-03-15 RX ADMIN — ONDANSETRON 4 MG: 2 INJECTION INTRAMUSCULAR; INTRAVENOUS at 11:04

## 2018-03-15 RX ADMIN — SUCCINYLCHOLINE CHLORIDE 120 MG: 20 INJECTION INTRAMUSCULAR; INTRAVENOUS at 10:56

## 2018-03-15 RX ADMIN — DEXAMETHASONE SODIUM PHOSPHATE 10 MG: 4 INJECTION, SOLUTION INTRA-ARTICULAR; INTRALESIONAL; INTRAMUSCULAR; INTRAVENOUS; SOFT TISSUE at 11:03

## 2018-03-15 RX ADMIN — FENTANYL CITRATE 100 MCG: 50 INJECTION, SOLUTION INTRAMUSCULAR; INTRAVENOUS at 10:53

## 2018-03-15 RX ADMIN — ROCURONIUM BROMIDE 5 MG: 10 INJECTION, SOLUTION INTRAVENOUS at 10:53

## 2018-03-15 NOTE — ANESTHESIA PREPROCEDURE EVALUATION
Anesthetic History   No history of anesthetic complications            Review of Systems / Medical History  Patient summary reviewed and pertinent labs reviewed    Pulmonary        Sleep apnea: No treatment    Asthma : well controlled       Neuro/Psych     seizures (no meds)    TIA (bASA)     Cardiovascular    Hypertension: well controlled          Hyperlipidemia    Exercise tolerance: >4 METS     GI/Hepatic/Renal     GERD: well controlled           Endo/Other    Diabetes: well controlled, type 2, using insulin    Obesity and arthritis     Other Findings              Physical Exam    Airway  Mallampati: II  TM Distance: 4 - 6 cm  Neck ROM: normal range of motion   Mouth opening: Normal     Cardiovascular    Rhythm: regular  Rate: normal         Dental    Dentition: Full upper dentures and Full lower dentures     Pulmonary  Breath sounds clear to auscultation               Abdominal         Other Findings            Anesthetic Plan    ASA: 3  Anesthesia type: general            Anesthetic plan and risks discussed with: Patient and Family

## 2018-03-15 NOTE — BRIEF OP NOTE
BRIEF OPERATIVE NOTE    Date of Procedure: 3/15/2018   Preoperative Diagnosis: Hoarseness [R49.0]  Postoperative Diagnosis: Hoarseness [R49.0]    Procedure(s):  DIRECT LARYNGOSCOPY /TELESCOPIC DIRECT  LARYNGOSCOPY  TELESCOPIC BRONCHOSCOPY /ESOPHAGOSCOPY  MICRO DIRECT LARYNGOSCOPY WITH BILATERAL TRUE VOCAL CORD STRIPPING AND RIGHT LATERAL AE FOLD BX   Surgeon(s) and Role:     * Go Treviño MD - Primary         Assistant Staff: None      Surgical Staff:  Circ-1: Ying Murcia RN  Scrub Tech-1: Manuela Loya  Event Time In   Incision Start 1058   Incision Close 1116     Anesthesia: General   Estimated Blood Loss: < 1 cc  Specimens:   ID Type Source Tests Collected by Time Destination   1 : Right true vocal cord bx Preservative Vocal Cord(s)  Go Treviño MD 3/15/2018 1106 Pathology   2 : Left true vocal cord bx Preservative Vocal Cord(s)  Go Treviño MD 3/15/2018 1107 Pathology   3 : 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

## 2018-03-15 NOTE — ANESTHESIA POSTPROCEDURE EVALUATION
Post-Anesthesia Evaluation and Assessment    Patient: Marcial Slaughter MRN: [id number]  SSN: [id number]    YOB: 1945  Age: 67 y.o. Sex: female       Cardiovascular Function/Vital Signs  Visit Vitals    /90 (BP 1 Location: Right arm, BP Patient Position: At rest)    Pulse 81    Temp 36.5 °C (97.7 °F)    Resp 14    Wt 67.6 kg (149 lb 2 oz)    SpO2 94%    BMI 30.12 kg/m2       Patient is status post general anesthesia for Procedure(s):  DIRECT/TELESCOPIC DIRECT  LARYNGOSCOPY  TELESCOPIC BRONCHOSCOPY /ESOPHAGOSCOPY  LARYNGOSCOPY TRUE VOCAL CORD STRIPPING. Nausea/Vomiting: None    Postoperative hydration reviewed and adequate. Pain:  Pain Scale 1: Visual (03/15/18 1134)  Pain Intensity 1: 0 (03/15/18 1134)   Managed    Neurological Status:   Neuro (WDL): Exceptions to WDL (03/15/18 1134)  Neuro  Neurologic State: Drowsy (03/15/18 1134)  Cognition: Follows commands (03/15/18 1134)  LUE Motor Response: Purposeful (03/15/18 1134)  LLE Motor Response: Purposeful (03/15/18 1134)  RUE Motor Response: Purposeful (03/15/18 1134)  RLE Motor Response: Purposeful (03/15/18 1134)   At baseline    Mental Status and Level of Consciousness: Arousable    Pulmonary Status:   O2 Device: 02 face tent (03/15/18 1149)   Adequate oxygenation and airway patent    Complications related to anesthesia: None    Post-anesthesia assessment completed.  No concerns    Signed By: Nasra Archibald MD     March 15, 2018

## 2018-03-15 NOTE — OP NOTES
[facility] REPORT    Mehran Moore  MR#: [id number]  : 1945  ACCOUNT #: [de-identified]   DATE OF SERVICE: 03/15/2018    PREOPERATIVE DIAGNOSES:  Hoarseness, polypoid true vocal cord change, right lateral aryepiglottic fold mass. POSTOPERATIVE DIAGNOSES:  Hoarseness, polypoid true vocal cord change, right lateral aryepiglottic fold mass. PROCEDURES PERFORMED:  Direct laryngoscopy, telescopic direct laryngoscopy, telescopic bronchoscopy, esophagoscopy, micro direct laryngoscopy with bilateral true vocal cord stripping, and biopsy of a right lateral aryepiglottic fold mass. SURGEON:  XANDER Wood MD    ASSISTANT:  none     ANESTHESIA:  General.     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 She also had a cystic mass of the right lateral aryepiglottic fold. Mass was approximately 1 cm. OPERATION:  The patient was placed in supine position. General anesthesia was induced. Direct laryngoscopy was performed with the [facility] scope. There was no lesion of the base of tongue, vallecula, epiglottis, posterior or lateral pharyngeal walls. The laryngeal area was sprinkled with 1% plain Xylocaine and telescopic laryngoscopy was performed. The above noted findings were identified. Then, telescopic bronchoscopy was performed. The trachea appeared normal with no edema, erythema or cobblestoning. The patient was intubated by myself. Upper cervical esophagoscopy was performed without difficulty. No lesions were identified. Then, suspension laryngoscopy was performed. The microlaryngoscopy was then performed and the medial surface of the true vocal cords were stripped. Beginning about 4 mm from the anterior commissure, this was performed bilaterally.   Then, the lesion on the lateral surface of the right aryepiglottic fold was unroofed with multiple biopsies. This appeared to be a cystic type mass. Patient was then awakened and transferred to recovery room in good condition. ESTIMATED BLOOD LOSS:  Less than 1 mL.       MD Clement Willoughby / RN  D: 03/15/2018 11:28     T: 03/15/2018 13:08  JOB #: [id number]

## 2018-03-15 NOTE — DISCHARGE INSTRUCTIONS
Bronchoscopy: What to Expect at [facility]    Bronchoscopy lets your doctor look at your airway through a tube called a bronchoscope. Afterward, you may feel tired for 1 or 2 days. Your mouth may feel very dry for several hours after the procedure. You may also have a sore throat and a hoarse voice for a few days. Sucking on throat lozenges or gargling with warm salt water may help soothe your sore throat. If a sample of tissue (biopsy) was taken, you may spit up a small amount of blood or have bloody saliva. This is normal.  This care sheet gives you a general idea about how long it will take for you to recover. But each person recovers at a different pace. Follow the steps below to get better as quickly as possible. How can you care for yourself at home? Activity  ? · Do not eat anything for 2 hours after the procedure. ? · Rest when you feel tired. Getting enough sleep will help you recover. ? · Avoid strenuous activities, such as bicycle riding, jogging, weight lifting, or aerobic exercise, until your doctor says it is okay. ? · Ask your doctor when you can drive again. Diet  ? · You can eat your normal diet. If your stomach is upset, try bland, low-fat foods like plain rice, broiled chicken, toast, and yogurt. ? · If it is painful to swallow, start out with cold drinks, flavored ice pops, and ice cream. Next, try soft foods like pudding, yogurt, canned or cooked fruit, scrambled eggs, and mashed potatoes. Avoid eating hard or scratchy foods like chips or raw vegetables. Avoid orange or tomato juice and other acidic foods that can sting the throat. ? · Drink plenty of fluids to avoid becoming dehydrated (unless your doctor tells you not to). Medicines  ? · Take pain medicines exactly as directed. ¨ If the doctor gave you a prescription medicine for pain, take it as prescribed.   ¨ If you are not taking a prescription pain medicine, ask your doctor if you can take an over-the-counter medicine. ? · If you think your pain medicine is making you sick to your stomach:  ¨ Take your medicine after meals (unless your doctor has told you not to). ¨ Ask your doctor for a different pain medicine. ? · If your doctor prescribed antibiotics, take them as directed. Do not stop taking them just because you feel better. You need to take the full course of antibiotics. Follow-up care is a key part of your treatment and safety. Be sure to make and go to all appointments, and call your doctor if you are having problems. It's also a good idea to know your test results and keep a list of the medicines you take. When should you call for help? Call 911 anytime you think you may need emergency care. For example, call if:  ? · You passed out (lost consciousness). ? · You have sudden chest pain and shortness of breath. ? · You cough up large amounts of bright red blood. ? · You have severe pain in your chest.   ? · You have severe trouble breathing. ?Call your doctor now or seek immediate medical care if:  ? · You cough up more than a few tablespoons of blood. ? · You have pain that does not get better after you take pain medicine. ? · You have a fever over 100°F.   ? · You still sound hoarse after a few days. ? · You have bubbles under the skin around the collarbone. These may crackle and pop when you press on them. ? Watch closely for changes in your health, and be sure to contact your doctor if you have any problems. Where can you learn more? Go to http://shani-sully.info/. Enter K273 in the search box to learn more about \"Bronchoscopy: What to Expect at Home. \"  Current as of: May 12, 2017  Content Version: 11.4  © 6672-2620 [employer]. Care instructions adapted under license by [employer] (which disclaims liability or warranty for this information).  If you have questions about a medical condition or this instruction, always ask your healthcare professional. [place] [address] any warranty or liability for your use of this information.

## 2018-03-15 NOTE — IP AVS SNAPSHOT
[address] [facility] [address] [address] 
[phone] Patient: Joseph Ibarra MRN: [id number] :1945 About your hospitalization You were admitted on:  March 15, 2018 You last received care in the:  [facility] PACU You were discharged on:  March 15, 2018 Why you were hospitalized Your primary diagnosis was:  Not on File Follow-up Information Follow up With Details Comments Contact Info Brooklyn Odom MD   [address] [address] 
[phone] Latisha Birmingham MD   [address] [address] 
[phone] Your Scheduled Appointments   9:00 AM EDT  
POST OP with MD Manuel Lizama [address] E[facility] [address] [facility] ENT) [address]  
[phone] 2018 11:20 AM EDT  
first return with Liz Terry MD  
[address] ([address] [facility] [address]) [address] [address] [phone] 2018 10:45 AM EDT Follow Up with Brooklyn Odom MD  
[facility] ([address] [facility] [address] [facility] [address]) [address] [facility]  
[phone] Discharge Orders None A check lexie indicates which time of day the medication should be taken. My Medications ASK your doctor about these medications Instructions Each Dose to Equal  
 Morning Noon Evening Bedtime  
 amLODIPine 5 mg tablet Commonly known as:  Suhas Garcia Your last dose was: Your next dose is: Take 1 Tab by mouth daily. 5 mg  
    
   
   
   
  
 aspirin delayed-release 81 mg tablet Your last dose was: Your next dose is: Take 81 mg by mouth daily. Take / use AM day of surgery  per anesthesia protocols. 81 mg  
    
   
   
   
  
 dulaglutide 1.5 mg/0.5 mL sub-q pen Commonly known as:  TRULICITY Your last dose was: Your next dose is: INJECT ONE UNIT DOSE(0.5 ML) SUBCUTANEOUSLY ONCE EVERY 7 DAYS  
     
   
   
   
  
 fluticasone 110 mcg/actuation inhaler Commonly known as:  FLOVENT HFA Your last dose was: Your next dose is: Take 1 Puff by inhalation every twelve (12) hours. 1 Puff  
    
   
   
   
  
 folic acid 1 mg tablet Commonly known as:  Google Your last dose was: Your next dose is: Take 1 Tab by mouth daily. 1 mg  
    
   
   
   
  
 gabapentin 300 mg capsule Commonly known as:  NEURONTIN Your last dose was: Your next dose is: Take 1 Cap by mouth three (3) times daily. 300 mg  
    
   
   
   
  
 insulin glargine 300 unit/mL (1.5 mL) Inpn Commonly known as:  TOUJEO SOLOSTAR U-300 INSULIN Your last dose was: Your next dose is:    
   
   
 35 Units by SubCUTAneous route daily. 35 Units KLOR-CON M20 20 mEq tablet Generic drug:  potassium chloride Your last dose was: Your next dose is: Take 1 Tab by mouth daily. 20 mEq  
    
   
   
   
  
 losartan 50 mg tablet Commonly known as:  COZAAR Your last dose was: Your next dose is: Take 1 Tab by mouth daily. 50 mg  
    
   
   
   
  
 meloxicam 15 mg tablet Commonly known as:  MOBIC Your last dose was: Your next dose is: Take 1 pill once a day after food. multivitamin tablet Commonly known as:  ONE A DAY Your last dose was: Your next dose is: Take 1 Tab by mouth daily. 1 Tab * PROAIR HFA 90 mcg/actuation inhaler Generic drug:  albuterol Your last dose was: Your next dose is: Take 2 Puffs by inhalation every four (4) hours as needed for Wheezing. 2 Puff * albuterol 2.5 mg /3 mL (0.083 %) nebulizer solution Commonly known as:  PROVENTIL VENTOLIN Your last dose was: Your next dose is:    
   
   
 3 mL by Nebulization route every four (4) hours as needed for Wheezing. 2.5 mg  
    
   
   
   
  
 vitamin e 1,000 unit capsule Commonly known as:  E GEMS Your last dose was: Your next dose is: Take 1,000 Units by mouth daily. 1000 Units * Notice: This list has 2 medication(s) that are the same as other medications prescribed for you. Read the directions carefully, and ask your doctor or other care provider to review them with you. Discharge Instructions Bronchoscopy: What to Expect at [facility] Your Recovery Bronchoscopy lets your doctor look at your airway through a tube called a bronchoscope. Afterward, you may feel tired for 1 or 2 days. Your mouth may feel very dry for several hours after the procedure. You may also have a sore throat and a hoarse voice for a few days. Sucking on throat lozenges or gargling with warm salt water may help soothe your sore throat. If a sample of tissue (biopsy) was taken, you may spit up a small amount of blood or have bloody saliva. This is normal. 
This care sheet gives you a general idea about how long it will take for you to recover. But each person recovers at a different pace. Follow the steps below to get better as quickly as possible. How can you care for yourself at home? Activity ? · Do not eat anything for 2 hours after the procedure. ? · Rest when you feel tired. Getting enough sleep will help you recover. ? · Avoid strenuous activities, such as bicycle riding, jogging, weight lifting, or aerobic exercise, until your doctor says it is okay. ? · Ask your doctor when you can drive again. Diet ? · You can eat your normal diet. If your stomach is upset, try bland, low-fat foods like plain rice, broiled chicken, toast, and yogurt. ? · If it is painful to swallow, start out with cold drinks, flavored ice pops, and ice cream. Next, try soft foods like pudding, yogurt, canned or cooked fruit, scrambled eggs, and mashed potatoes. Avoid eating hard or scratchy foods like chips or raw vegetables. Avoid orange or tomato juice and other acidic foods that can sting the throat. ? · Drink plenty of fluids to avoid becoming dehydrated (unless your doctor tells you not to). Medicines ? · Take pain medicines exactly as directed. ¨ If the doctor gave you a prescription medicine for pain, take it as prescribed. ¨ If you are not taking a prescription pain medicine, ask your doctor if you can take an over-the-counter medicine. ? · If you think your pain medicine is making you sick to your stomach: 
¨ Take your medicine after meals (unless your doctor has told you not to). ¨ Ask your doctor for a different pain medicine. ? · If your doctor prescribed antibiotics, take them as directed. Do not stop taking them just because you feel better. You need to take the full course of antibiotics. Follow-up care is a key part of your treatment and safety. Be sure to make and go to all appointments, and call your doctor if you are having problems. It's also a good idea to know your test results and keep a list of the medicines you take. When should you call for help? Call 911 anytime you think you may need emergency care. For example, call if: 
? · You passed out (lost consciousness). ? · You have sudden chest pain and shortness of breath. ? · You cough up large amounts of bright red blood. ? · You have severe pain in your chest.  
? · You have severe trouble breathing. ?Call your doctor now or seek immediate medical care if: 
? · You cough up more than a few tablespoons of blood. ? · You have pain that does not get better after you take pain medicine. ? · You have a fever over 100°F.  
? · You still sound hoarse after a few days. ? · You have bubbles under the skin around the collarbone. These may crackle and pop when you press on them. ? Watch closely for changes in your health, and be sure to contact your doctor if you have any problems. Where can you learn more? Go to http://shani-sully.info/. Enter E899 in the search box to learn more about \"Bronchoscopy: What to Expect at Home. \" Current as of: May 12, 2017 Content Version: 11.4 © 3726-8675 [employer]. Care instructions adapted under license by [employer] (which disclaims liability or warranty for this information). If you have questions about a medical condition or this instruction, always ask your healthcare professional. [place] [address] any warranty or liability for your use of this information. Providers Seen During Your Hospitalization Provider Specialty Primary office phone Baljeet Paredes MD Otolaryngology [phone] Your Primary Care Physician (PCP) Primary Care Physician Office Phone Office Fax [address] [facility] [phone] You are allergic to the following Allergen Reactions Penicillins Rash Recent Documentation Weight BMI OB Status Smoking Status 67.6 kg 30.12 kg/m2 Hysterectomy Former Smoker Emergency Contacts Name Discharge Info Relation Home Work Mobile Abdirashid Matias DISCHARGE CAREGIVER [3] Son [22]   [phone] Gordon Silva CAREGIVER [3] Sister [23]   [phone] Patient Belongings The following personal items are in your possession at time of discharge: 
  Dental Appliances: Uppers, Lowers Please provide this summary of care documentation to your next provider. Signatures-by signing, you are acknowledging that this After Visit Summary has been reviewed with you and you have received a copy. Patient Signature:  ____________________________________________________________ Date:  ____________________________________________________________  
  
Merleen Spire Provider Signature:  ____________________________________________________________ Date:  ____________________________________________________________

## 2018-03-27 ENCOUNTER — TELEPHONE (OUTPATIENT)
Dept: DIABETES SERVICES | Age: 73
End: 2018-03-27

## 2018-03-27 NOTE — TELEPHONE ENCOUNTER
Client asking for a referral for a 2 hr follow up this year for diabetes and diet. If you agree, please send referral to [facility] diabetes for a 2 hr diabetes follow up. Thank you.

## 2018-04-02 PROBLEM — R00.2 PALPITATIONS: Status: ACTIVE | Noted: 2018-04-02

## 2018-06-06 ENCOUNTER — HOSPITAL ENCOUNTER (OUTPATIENT)
Dept: DIABETES SERVICES | Age: 73
Discharge: HOME | End: 2018-06-06
Payer: MEDICARE

## 2018-06-06 PROCEDURE — G0108 DIAB MANAGE TRN  PER INDIV: HCPCS

## 2018-06-06 NOTE — PROGRESS NOTES
Pt completed 2 hour follow up diabetes education today. Topics were client driven. Discussed heart healthy proteins, using high fiber carbohydrates, and unsaturated fats to use. Discussed wt loss strategies. Discussed better choices when eating out, sugar substitutes, medications, exercise guidelines, ways to lower blood pressure. Discussed avoiding blue light before bed, sleeping in a dark, cool room, keeping a pad and pencil by her bed to write down things she may have on her mind. Encouraged pt to discuss with MD if sleep does not improve. Affirmed that pt was using 30 calorie almond milk. Provided pt with a 1200 kcal meal plan. Helped plan a day's menu with pt. Encouraged pt to call her insurance company to find out which glucometer is covered at the highest percent and then call MD to get a prescription called in. Pt reports she was paying for all glucometer supplies. Pt has RD name and phone number for f/u questions. Barriers: Pt taking care of her mother who has alzheimers and has a physical injury preventing much exercise presently and pt not sleeping well. Diet recall shows skipping meals, low fluid intake, drinking regular sodas and sweet tea and going all day on some days without eating or drinking. Pt's glucometer broke 5 weeks ago and therefore not checking blood sugars.

## 2018-07-01 ENCOUNTER — HOSPITAL ENCOUNTER (EMERGENCY)
Age: 73
Discharge: HOME | End: 2018-07-01
Attending: EMERGENCY MEDICINE
Payer: MEDICARE

## 2018-07-01 VITALS
BODY MASS INDEX: 30.44 KG/M2 | TEMPERATURE: 98.4 F | SYSTOLIC BLOOD PRESSURE: 121 MMHG | HEART RATE: 89 BPM | DIASTOLIC BLOOD PRESSURE: 71 MMHG | WEIGHT: 151 LBS | RESPIRATION RATE: 18 BRPM | OXYGEN SATURATION: 98 % | HEIGHT: 59 IN

## 2018-07-01 DIAGNOSIS — T78.40XA ALLERGIC REACTION, INITIAL ENCOUNTER: Primary | ICD-10-CM

## 2018-07-01 LAB
ATRIAL RATE: 88 BPM
CALCULATED P AXIS, ECG09: 64 DEGREES
CALCULATED R AXIS, ECG10: -19 DEGREES
CALCULATED T AXIS, ECG11: 20 DEGREES
DIAGNOSIS, 93000: NORMAL
P-R INTERVAL, ECG05: 188 MS
Q-T INTERVAL, ECG07: 362 MS
QRS DURATION, ECG06: 76 MS
QTC CALCULATION (BEZET), ECG08: 438 MS
VENTRICULAR RATE, ECG03: 88 BPM

## 2018-07-01 PROCEDURE — 93005 ELECTROCARDIOGRAM TRACING: CPT | Performed by: EMERGENCY MEDICINE

## 2018-07-01 PROCEDURE — 74011000250 HC RX REV CODE- 250: Performed by: EMERGENCY MEDICINE

## 2018-07-01 PROCEDURE — 74011250636 HC RX REV CODE- 250/636: Performed by: EMERGENCY MEDICINE

## 2018-07-01 PROCEDURE — 96374 THER/PROPH/DIAG INJ IV PUSH: CPT | Performed by: EMERGENCY MEDICINE

## 2018-07-01 PROCEDURE — 99285 EMERGENCY DEPT VISIT HI MDM: CPT | Performed by: EMERGENCY MEDICINE

## 2018-07-01 PROCEDURE — 96375 TX/PRO/DX INJ NEW DRUG ADDON: CPT | Performed by: EMERGENCY MEDICINE

## 2018-07-01 RX ORDER — DIPHENHYDRAMINE HYDROCHLORIDE 50 MG/ML
25 INJECTION, SOLUTION INTRAMUSCULAR; INTRAVENOUS
Status: COMPLETED | OUTPATIENT
Start: 2018-07-01 | End: 2018-07-01

## 2018-07-01 RX ORDER — PREDNISONE 5 MG/1
TABLET ORAL
Qty: 21 TAB | Refills: 0 | Status: SHIPPED | OUTPATIENT
Start: 2018-07-01 | End: 2018-08-23

## 2018-07-01 RX ORDER — DEXAMETHASONE SODIUM PHOSPHATE 100 MG/10ML
5 INJECTION INTRAMUSCULAR; INTRAVENOUS
Status: COMPLETED | OUTPATIENT
Start: 2018-07-01 | End: 2018-07-01

## 2018-07-01 RX ORDER — FAMOTIDINE 10 MG/ML
20 INJECTION INTRAVENOUS
Status: COMPLETED | OUTPATIENT
Start: 2018-07-01 | End: 2018-07-01

## 2018-07-01 RX ADMIN — DIPHENHYDRAMINE HYDROCHLORIDE 25 MG: 50 INJECTION, SOLUTION INTRAMUSCULAR; INTRAVENOUS at 11:05

## 2018-07-01 RX ADMIN — DEXAMETHASONE SODIUM PHOSPHATE 5 MG: 10 INJECTION INTRAMUSCULAR; INTRAVENOUS at 11:05

## 2018-07-01 RX ADMIN — FAMOTIDINE 20 MG: 10 INJECTION, SOLUTION INTRAVENOUS at 11:06

## 2018-07-01 NOTE — ED NOTES
I have reviewed discharge instructions with the patient and spouse. The patient and spouse verbalized understanding. Patient left ED via Discharge Method: ambulatory to Home with self transport. The patient is ambulatory upon exit and appears in no acute distress. The patient has been provided discharge instructions, prescription, and follow up information. The patient does not have any questions at this time. Opportunity for questions and clarification provided. Patient given 1 scripts. To continue your aftercare when you leave the hospital, you may receive an automated call from our care team to check in on how you are doing. This is a free service and part of our promise to provide the best care and service to meet your aftercare needs.  If you have questions, or wish to unsubscribe from this service please call [phone]. Thank you for Choosing our [employer] [facility].

## 2018-07-01 NOTE — DISCHARGE INSTRUCTIONS

## 2018-07-01 NOTE — ED PROVIDER NOTES
HPI Comments: On Tuesday whet to ENT - they felt as if she had issues related to reflux. she was being begun on Prilosec. After taking Prilosec. She had some diffuse itching and then also some arthralgias to the shoulder and knee joints. Slight generalized body ache as well. No history of similar in the past.   Denies any difficulty swallowing,, wheezing or airway issue. Patient is a 67 y.o. female presenting with allergic reaction. The history is provided by the patient and a relative. Allergic Reaction    This is a new problem. The current episode started more than 2 days ago. The problem has not changed (persisting) since onset. Pertinent negatives include no shortness of breath. Past Medical History:   Diagnosis Date    Arrhythmia 3 - 4 weeks ago (9/2010)    Dr. Roxie Garrison?  ([facility]) ran tests for heart speeding up/slowing down; (has occasional heart palpitations; no cardiologist 3/9/18)   Rogers Eckert Arthritis     Asthma     first told in 2000; inhaler daily; nebs prn    Bladder incontinence 10/24/2016    Chronic pain     Dermatophytosis     Diabetes ([address]     type 2; insulin meds; hasn't been checking bs lately; last A1C=6.8 on 2/19/18    Former smoker     GERD (gastroesophageal reflux disease) 7/23/2013    Heart failure ([address]     no cardiologist     Hypercholesterolemia     Hypertension     on med for control     Hypertensive heart disease without HF (heart failure)     Lupus (systemic lupus erythematosus) ([address] 2005    recent blood work was negative (3/9/18)    Non compliance with medical treatment     Obesity     ANATOLY (obstructive sleep apnea) 07/23/2013    no c-pap; uses oxygen prn at HS 2L/NC    Seizures ([address]     last one about 2.5 yrs ago (noted 3/9/18); no meds    Stroke [facility]) 1979    MINI STROKE    Systemic lupus erythematosus ([address] 07/23/2013    recent blood work negative (3/9/18)    Type 2 diabetes mellitus with hyperglycemia ([address] 10/24/2016       Past Surgical History:   Procedure Laterality Date    HX APPENDECTOMY      HX BREAST LUMPECTOMY  1998    benign    HX HAMMER TOE REPAIR  2010    left    HX HEART CATHETERIZATION  03/22/1999    LHC:LV EF=75%. Normal coronary arteries.  HX HEENT      cataract removal right eye and skin removal off of lids    HX HYSTERECTOMY  1974    HX OTHER SURGICAL      had all teeth removed    HX OTHER SURGICAL  03/15/2018    Throat    REV UPPER EYELID W EXCESS SKIN           Family History:   Problem Relation Age of Onset    Heart Disease Father     Bleeding Prob Brother     Diabetes Brother     Heart Disease Brother     Diabetes Brother     Cancer Brother     Heart Disease Mother     Diabetes Sister     Asthma Brother     Cancer Brother     Malignant Hyperthermia Neg Hx     Pseudocholinesterase Deficiency Neg Hx     Delayed Awakening Neg Hx     Post-op Nausea/Vomiting Neg Hx     Emergence Delirium Neg Hx     Post-op Cognitive Dysfunction Neg Hx     Other Neg Hx        Social History     Social History    Marital status: SINGLE     Spouse name: N/A    Number of children: 0    Years of education: N/A     Occupational History    [employer] Not Employed     Denies industrial toxins    Textiles      6 yrs     Social History Main Topics    Smoking status: Former Smoker     Packs/day: 0.50     Years: 12.00     Quit date: 10/14/1977    Smokeless tobacco: Never Used    Alcohol use No    Drug use: No    Sexual activity: No      Comment: sleeps alone for past 30 years,  in [address]      Other Topics Concern    Not on file     Social History Narrative    12-pack-year history of cigarette smoking, stopped smoking in 1975. Worked in [employer] for 6 years and at [employer] for 2 years where she states she was not exposed to industrial toxins. , no children. Has always live in [facility] [address]. No pets.           ALLERGIES: Prilosec [omeprazole] and Penicillins    Review of Systems Constitutional: Negative for chills and fever. HENT: Negative for sneezing, sore throat and trouble swallowing. Respiratory: Negative for shortness of breath. Hematological: Negative. Psychiatric/Behavioral: Negative. All other systems reviewed and are negative. Vitals:    07/01/18 0953 07/01/18 1012   BP: 123/75 123/71   Pulse: 100 93   Resp: 18    Temp: 98.4 °F (36.9 °C)    SpO2: 97% 98%   Weight: 68.5 kg (151 lb)    Height: 4' 11\" (1.499 m)             Physical Exam   Constitutional: She appears well-developed and well-nourished. No distress. HENT:   Head: Atraumatic. Airway. Oropharynx is normal   Eyes: Conjunctivae are normal. No scleral icterus. Neck: Neck supple. Cardiovascular: Normal rate and intact distal pulses. Pulmonary/Chest: Effort normal. No stridor. No respiratory distress. She has no wheezes. Abdominal: Soft. Musculoskeletal: Normal range of motion. Neurological: She is alert. Skin: Skin is warm and dry. Rash noted. Has some generalized small areas of redness, but history was consistent with prior urticaria   Psychiatric: Thought content normal.   Nursing note and vitals reviewed. MDM  Number of Diagnoses or Management Options  Allergic reaction, initial encounter:   Diagnosis management comments: Allergic reaction to PPI/Prilosec.some improvement, though incomplete. She is diabetic, we'll follow her blood sugars closely while on steroids.   Patient is overall very compliant       Amount and/or Complexity of Data Reviewed  Decide to obtain previous medical records or to obtain history from someone other than the patient: yes  Obtain history from someone other than the patient: yes    Risk of Complications, Morbidity, and/or Mortality  Presenting problems: moderate  Diagnostic procedures: minimal  Management options: low    Patient Progress  Patient progress: improved        ED Course       Procedures

## 2018-07-01 NOTE — Clinical Note
Continue Benadryl (aalternately could use Claritin or Zyrtec) We have added Prilosec to your allergy list,avoid others of that category May use Pepcid or Zantac Prednisone taper, follow blood sugars closely

## 2018-07-01 NOTE — ED TRIAGE NOTES
Pt started prilosec 2 days prior and c\o swelling in both hands and both feet with itching.   Pt states left shoulder pain started last PM.

## 2018-07-04 ENCOUNTER — HOSPITAL ENCOUNTER (EMERGENCY)
Age: 73
Discharge: HOME | End: 2018-07-04
Attending: EMERGENCY MEDICINE
Payer: MEDICARE

## 2018-07-04 VITALS
SYSTOLIC BLOOD PRESSURE: 148 MMHG | TEMPERATURE: 98.3 F | BODY MASS INDEX: 30.84 KG/M2 | OXYGEN SATURATION: 97 % | RESPIRATION RATE: 16 BRPM | WEIGHT: 153 LBS | HEART RATE: 87 BPM | DIASTOLIC BLOOD PRESSURE: 85 MMHG | HEIGHT: 59 IN

## 2018-07-04 DIAGNOSIS — T78.40XS ALLERGIC REACTION, SEQUELA: Primary | ICD-10-CM

## 2018-07-04 PROCEDURE — 99283 EMERGENCY DEPT VISIT LOW MDM: CPT | Performed by: NURSE PRACTITIONER

## 2018-07-04 PROCEDURE — 74011250637 HC RX REV CODE- 250/637: Performed by: NURSE PRACTITIONER

## 2018-07-04 RX ORDER — HYDROXYZINE PAMOATE 25 MG/1
25 CAPSULE ORAL
Status: COMPLETED | OUTPATIENT
Start: 2018-07-04 | End: 2018-07-04

## 2018-07-04 RX ORDER — HYDROXYZINE PAMOATE 25 MG/1
50 CAPSULE ORAL
Qty: 30 CAP | Refills: 0 | Status: SHIPPED | OUTPATIENT
Start: 2018-07-04 | End: 2018-07-04

## 2018-07-04 RX ORDER — HYDROXYZINE PAMOATE 25 MG/1
25 CAPSULE ORAL
Qty: 30 CAP | Refills: 0 | Status: SHIPPED | OUTPATIENT
Start: 2018-07-04 | End: 2019-01-26 | Stop reason: DRUGHIGH

## 2018-07-04 RX ADMIN — HYDROXYZINE PAMOATE 25 MG: 25 CAPSULE ORAL at 13:02

## 2018-07-04 NOTE — ED TRIAGE NOTES
Pt states having swelling to the skin around her ears, and itchy rash and hives on her face and back. Pt was seen in the er 3 days ago and her md office yesterday for the same complaint. Pt states it is not getting any better. Pt was told to keep taking her prednisone and benadryl.

## 2018-07-04 NOTE — ED PROVIDER NOTES
HPI Comments: Patient states on 06/26/2018 she was started on prilosec. She states after taking medication she broke out into a rash. She was seen in this ED 2 days ago and was given IV benadryl and steroids. She states she was started on medrol dose pack and po benadryl. She states she was seen by her pcp yesterday because rash continues to spread. She states today rash has continued to spread and is not improving. The history is provided by the patient. Past Medical History:   Diagnosis Date    Arrhythmia 3 - 4 weeks ago (9/2010)    Dr. Meryl Pradhan? ([facility]) ran tests for heart speeding up/slowing down; (has occasional heart palpitations; no cardiologist 3/9/18)   [address] [place] [address] Arthritis     Asthma     first told in 2000; inhaler daily; nebs prn    Bladder incontinence 10/24/2016    Chronic pain     Dermatophytosis     Diabetes ([address]     type 2; insulin meds; hasn't been checking bs lately; last A1C=6.8 on 2/19/18    Former smoker     GERD (gastroesophageal reflux disease) 7/23/2013    Heart failure ([address]     no cardiologist     Hypercholesterolemia     Hypertension     on med for control     Hypertensive heart disease without HF (heart failure)     Lupus (systemic lupus erythematosus) ([address] 2005    recent blood work was negative (3/9/18)    Non compliance with medical treatment     Obesity     ANATOLY (obstructive sleep apnea) 07/23/2013    no c-pap; uses oxygen prn at HS 2L/NC    Seizures ([address]     last one about 2.5 yrs ago (noted 3/9/18); no meds    Stroke ([address] 1979    MINI STROKE    Systemic lupus erythematosus ([address] 07/23/2013    recent blood work negative (3/9/18)    Type 2 diabetes mellitus with hyperglycemia ([address] 10/24/2016       Past Surgical History:   Procedure Laterality Date    HX APPENDECTOMY      HX BREAST LUMPECTOMY  1998    benign    HX HAMMER TOE REPAIR  2010    left    HX HEART CATHETERIZATION  03/22/1999    LHC:LV EF=75%. Normal coronary arteries.     HX HEENT cataract removal right eye and skin removal off of lids    HX HYSTERECTOMY  1974    HX OTHER SURGICAL      had all teeth removed    HX OTHER SURGICAL  03/15/2018    Throat    REV UPPER EYELID W EXCESS SKIN           Family History:   Problem Relation Age of Onset    Heart Disease Father     Bleeding Prob Brother     Diabetes Brother     Heart Disease Brother     Diabetes Brother     Cancer Brother     Heart Disease Mother     Diabetes Sister     Asthma Brother     Cancer Brother     Malignant Hyperthermia Neg Hx     Pseudocholinesterase Deficiency Neg Hx     Delayed Awakening Neg Hx     Post-op Nausea/Vomiting Neg Hx     Emergence Delirium Neg Hx     Post-op Cognitive Dysfunction Neg Hx     Other Neg Hx        Social History     Social History    Marital status: SINGLE     Spouse name: N/A    Number of children: 0    Years of education: N/A     Occupational History    [employer] Not Employed     Denies industrial toxins    Textiles      6 yrs     Social History Main Topics    Smoking status: Former Smoker     Packs/day: 0.50     Years: 12.00     Quit date: 10/14/1977    Smokeless tobacco: Never Used    Alcohol use No    Drug use: No    Sexual activity: No      Comment: sleeps alone for past 30 years,  in [address]      Other Topics Concern    Not on file     Social History Narrative    12-pack-year history of cigarette smoking, stopped smoking in 1975. Worked in [employer] for 6 years and at [employer] for 2 years where she states she was not exposed to industrial toxins. , no children. Has always live in [address] [place] [address]. No pets. ALLERGIES: Prilosec [omeprazole] and Penicillins    Review of Systems   Constitutional: Negative for chills and fever. Respiratory: Negative for cough and shortness of breath. Cardiovascular: Negative for chest pain.    Gastrointestinal: Negative for abdominal pain, constipation, diarrhea, nausea and vomiting. Skin: Positive for rash. Vitals:    07/04/18 1208   BP: 148/85   Pulse: 87   Resp: 16   Temp: 98.3 °F (36.8 °C)   SpO2: 97%   Weight: 69.4 kg (153 lb)   Height: 4' 11\" (1.499 m)            Physical Exam   Constitutional: She is oriented to person, place, and time. She appears well-developed and well-nourished. No distress. Cardiovascular: Normal rate. Pulmonary/Chest: Effort normal. No respiratory distress. Neurological: She is alert and oriented to person, place, and time. Skin: Rash noted. Rash is urticarial. She is not diaphoretic. Psychiatric: She has a normal mood and affect. Her behavior is normal.   Nursing note and vitals reviewed. MDM  Number of Diagnoses or Management Options  Allergic reaction, sequela:   Diagnosis management comments: Patient encouraged to continue steroid dose pack. Patient given prescription for vistaril and to follow up with pcp.  Patient given po vistaril prior to discharge       Amount and/or Complexity of Data Reviewed  Tests in the medicine section of CPT®: ordered    Patient Progress  Patient progress: stable        ED Course       Procedures

## 2018-07-04 NOTE — ED NOTES
I have reviewed discharge instructions with the patient. The patient verbalized understanding. Patient left ED via Discharge Method: ambulatory to Home with son  Opportunity for questions and clarification provided. Patient given 1 scripts. To continue your aftercare when you leave the hospital, you may receive an automated call from our care team to check in on how you are doing. This is a free service and part of our promise to provide the best care and service to meet your aftercare needs.  If you have questions, or wish to unsubscribe from this service please call [phone]. Thank you for Choosing our [facility] Emergency Department.

## 2018-07-04 NOTE — DISCHARGE INSTRUCTIONS

## 2019-01-26 ENCOUNTER — APPOINTMENT (OUTPATIENT)
Dept: GENERAL RADIOLOGY | Age: 74
DRG: 202 | End: 2019-01-26
Attending: EMERGENCY MEDICINE
Payer: MEDICARE

## 2019-01-26 ENCOUNTER — HOSPITAL ENCOUNTER (INPATIENT)
Age: 74
LOS: 4 days | Discharge: HOME | DRG: 202 | End: 2019-01-30
Attending: EMERGENCY MEDICINE | Admitting: INTERNAL MEDICINE
Payer: MEDICARE

## 2019-01-26 DIAGNOSIS — M35.00 SJOGREN'S SYNDROME, WITH UNSPECIFIED ORGAN INVOLVEMENT (HCC): ICD-10-CM

## 2019-01-26 DIAGNOSIS — Z79.4 CONTROLLED TYPE 2 DIABETES MELLITUS WITHOUT COMPLICATION, WITH LONG-TERM CURRENT USE OF INSULIN (HCC): ICD-10-CM

## 2019-01-26 DIAGNOSIS — R09.02 HYPOXEMIA: ICD-10-CM

## 2019-01-26 DIAGNOSIS — E66.09 OBESITY DUE TO EXCESS CALORIES, UNSPECIFIED CLASSIFICATION, UNSPECIFIED WHETHER SERIOUS COMORBIDITY PRESENT: ICD-10-CM

## 2019-01-26 DIAGNOSIS — J21.9 ACUTE BRONCHIOLITIS DUE TO UNSPECIFIED ORGANISM: ICD-10-CM

## 2019-01-26 DIAGNOSIS — J45.51 SEVERE PERSISTENT ASTHMA WITH EXACERBATION: ICD-10-CM

## 2019-01-26 DIAGNOSIS — J44.1 COPD EXACERBATION (HCC): Primary | ICD-10-CM

## 2019-01-26 DIAGNOSIS — E11.9 CONTROLLED TYPE 2 DIABETES MELLITUS WITHOUT COMPLICATION, WITH LONG-TERM CURRENT USE OF INSULIN (HCC): ICD-10-CM

## 2019-01-26 PROBLEM — L50.9 HIVES: Status: ACTIVE | Noted: 2019-01-26

## 2019-01-26 PROBLEM — J45.901 ASTHMA EXACERBATION: Status: ACTIVE | Noted: 2019-01-26

## 2019-01-26 LAB
ALBUMIN SERPL-MCNC: 3.5 G/DL (ref 3.2–4.6)
ALBUMIN/GLOB SERPL: 1 {RATIO} (ref 1.2–3.5)
ALP SERPL-CCNC: 64 U/L (ref 50–136)
ALT SERPL-CCNC: 17 U/L (ref 12–65)
ANION GAP SERPL CALC-SCNC: 7 MMOL/L (ref 7–16)
AST SERPL-CCNC: 12 U/L (ref 15–37)
BASOPHILS # BLD: 0 K/UL (ref 0–0.2)
BASOPHILS NFR BLD: 0 % (ref 0–2)
BILIRUB SERPL-MCNC: 0.5 MG/DL (ref 0.2–1.1)
BUN SERPL-MCNC: 11 MG/DL (ref 8–23)
CALCIUM SERPL-MCNC: 8.7 MG/DL (ref 8.3–10.4)
CHLORIDE SERPL-SCNC: 105 MMOL/L (ref 98–107)
CO2 SERPL-SCNC: 27 MMOL/L (ref 21–32)
CREAT SERPL-MCNC: 0.85 MG/DL (ref 0.6–1)
DIFFERENTIAL METHOD BLD: NORMAL
EOSINOPHIL # BLD: 0.1 K/UL (ref 0–0.8)
EOSINOPHIL NFR BLD: 2 % (ref 0.5–7.8)
ERYTHROCYTE [DISTWIDTH] IN BLOOD BY AUTOMATED COUNT: 13.3 % (ref 11.9–14.6)
FLUAV AG NPH QL IA: NEGATIVE
FLUBV AG NPH QL IA: NEGATIVE
GLOBULIN SER CALC-MCNC: 3.6 G/DL (ref 2.3–3.5)
GLUCOSE BLD STRIP.AUTO-MCNC: 225 MG/DL (ref 65–100)
GLUCOSE BLD STRIP.AUTO-MCNC: 331 MG/DL (ref 65–100)
GLUCOSE SERPL-MCNC: 215 MG/DL (ref 65–100)
HCT VFR BLD AUTO: 43.2 % (ref 35.8–46.3)
HGB BLD-MCNC: 13.9 G/DL (ref 11.7–15.4)
IMM GRANULOCYTES # BLD AUTO: 0 K/UL (ref 0–0.5)
IMM GRANULOCYTES NFR BLD AUTO: 0 % (ref 0–5)
LYMPHOCYTES # BLD: 1.1 K/UL (ref 0.5–4.6)
LYMPHOCYTES NFR BLD: 16 % (ref 13–44)
MCH RBC QN AUTO: 27 PG (ref 26.1–32.9)
MCHC RBC AUTO-ENTMCNC: 32.2 G/DL (ref 31.4–35)
MCV RBC AUTO: 84 FL (ref 79.6–97.8)
MONOCYTES # BLD: 0.4 K/UL (ref 0.1–1.3)
MONOCYTES NFR BLD: 5 % (ref 4–12)
NEUTS SEG # BLD: 5.5 K/UL (ref 1.7–8.2)
NEUTS SEG NFR BLD: 77 % (ref 43–78)
NRBC # BLD: 0 K/UL (ref 0–0.2)
PLATELET # BLD AUTO: 261 K/UL (ref 150–450)
PMV BLD AUTO: 11.6 FL (ref 9.4–12.3)
POTASSIUM SERPL-SCNC: 3.1 MMOL/L (ref 3.5–5.1)
PROT SERPL-MCNC: 7.1 G/DL (ref 6.3–8.2)
RBC # BLD AUTO: 5.14 M/UL (ref 4.05–5.2)
SODIUM SERPL-SCNC: 139 MMOL/L (ref 136–145)
SPECIMEN SOURCE: NORMAL
TROPONIN I SERPL-MCNC: <0.02 NG/ML (ref 0.02–0.05)
WBC # BLD AUTO: 7.2 K/UL (ref 4.3–11.1)

## 2019-01-26 PROCEDURE — 71045 X-RAY EXAM CHEST 1 VIEW: CPT

## 2019-01-26 PROCEDURE — 82962 GLUCOSE BLOOD TEST: CPT

## 2019-01-26 PROCEDURE — 77030032490 HC SLV COMPR SCD KNE COVD -B

## 2019-01-26 PROCEDURE — 84484 ASSAY OF TROPONIN QUANT: CPT

## 2019-01-26 PROCEDURE — 93005 ELECTROCARDIOGRAM TRACING: CPT | Performed by: EMERGENCY MEDICINE

## 2019-01-26 PROCEDURE — 85025 COMPLETE CBC W/AUTO DIFF WBC: CPT

## 2019-01-26 PROCEDURE — 80053 COMPREHEN METABOLIC PANEL: CPT

## 2019-01-26 PROCEDURE — 96374 THER/PROPH/DIAG INJ IV PUSH: CPT | Performed by: EMERGENCY MEDICINE

## 2019-01-26 PROCEDURE — 77010033678 HC OXYGEN DAILY

## 2019-01-26 PROCEDURE — 94640 AIRWAY INHALATION TREATMENT: CPT

## 2019-01-26 PROCEDURE — 74011250636 HC RX REV CODE- 250/636: Performed by: EMERGENCY MEDICINE

## 2019-01-26 PROCEDURE — 65270000029 HC RM PRIVATE

## 2019-01-26 PROCEDURE — 74011000302 HC RX REV CODE- 302: Performed by: INTERNAL MEDICINE

## 2019-01-26 PROCEDURE — 74011000250 HC RX REV CODE- 250: Performed by: INTERNAL MEDICINE

## 2019-01-26 PROCEDURE — 99218 HC RM OBSERVATION: CPT

## 2019-01-26 PROCEDURE — 87804 INFLUENZA ASSAY W/OPTIC: CPT

## 2019-01-26 PROCEDURE — 74011250636 HC RX REV CODE- 250/636: Performed by: INTERNAL MEDICINE

## 2019-01-26 PROCEDURE — 94760 N-INVAS EAR/PLS OXIMETRY 1: CPT

## 2019-01-26 PROCEDURE — 99284 EMERGENCY DEPT VISIT MOD MDM: CPT | Performed by: EMERGENCY MEDICINE

## 2019-01-26 PROCEDURE — 74011636637 HC RX REV CODE- 636/637: Performed by: INTERNAL MEDICINE

## 2019-01-26 PROCEDURE — 74011000250 HC RX REV CODE- 250

## 2019-01-26 PROCEDURE — 94644 CONT INHLJ TX 1ST HOUR: CPT

## 2019-01-26 PROCEDURE — 74011250637 HC RX REV CODE- 250/637: Performed by: INTERNAL MEDICINE

## 2019-01-26 PROCEDURE — 86580 TB INTRADERMAL TEST: CPT | Performed by: INTERNAL MEDICINE

## 2019-01-26 RX ORDER — FOLIC ACID 1 MG/1
1 TABLET ORAL DAILY
Status: DISCONTINUED | OUTPATIENT
Start: 2019-01-27 | End: 2019-01-30 | Stop reason: HOSPADM

## 2019-01-26 RX ORDER — ACETAMINOPHEN 325 MG/1
650 TABLET ORAL
Status: DISCONTINUED | OUTPATIENT
Start: 2019-01-26 | End: 2019-01-30 | Stop reason: HOSPADM

## 2019-01-26 RX ORDER — NEBIVOLOL 5 MG/1
5 TABLET ORAL DAILY
Status: DISCONTINUED | OUTPATIENT
Start: 2019-01-27 | End: 2019-01-30 | Stop reason: HOSPADM

## 2019-01-26 RX ORDER — EPINEPHRINE 0.3 MG/.3ML
0.3 INJECTION INTRAMUSCULAR
COMMUNITY

## 2019-01-26 RX ORDER — MELOXICAM 7.5 MG/1
15 TABLET ORAL DAILY
Status: DISCONTINUED | OUTPATIENT
Start: 2019-01-27 | End: 2019-01-30 | Stop reason: HOSPADM

## 2019-01-26 RX ORDER — SODIUM CHLORIDE 0.9 % (FLUSH) 0.9 %
5-40 SYRINGE (ML) INJECTION EVERY 8 HOURS
Status: DISCONTINUED | OUTPATIENT
Start: 2019-01-26 | End: 2019-01-30 | Stop reason: HOSPADM

## 2019-01-26 RX ORDER — INSULIN LISPRO 100 [IU]/ML
INJECTION, SOLUTION INTRAVENOUS; SUBCUTANEOUS
Status: DISCONTINUED | OUTPATIENT
Start: 2019-01-26 | End: 2019-01-30 | Stop reason: HOSPADM

## 2019-01-26 RX ORDER — HYDROXYZINE HYDROCHLORIDE 10 MG/1
30 TABLET, FILM COATED ORAL
Status: DISCONTINUED | OUTPATIENT
Start: 2019-01-26 | End: 2019-01-30 | Stop reason: HOSPADM

## 2019-01-26 RX ORDER — ALBUTEROL SULFATE 0.83 MG/ML
2.5 SOLUTION RESPIRATORY (INHALATION)
Status: DISCONTINUED | OUTPATIENT
Start: 2019-01-26 | End: 2019-01-27

## 2019-01-26 RX ORDER — INSULIN GLARGINE 100 [IU]/ML
30 INJECTION, SOLUTION SUBCUTANEOUS
Status: DISCONTINUED | OUTPATIENT
Start: 2019-01-26 | End: 2019-01-28

## 2019-01-26 RX ORDER — LEVOFLOXACIN 500 MG/1
500 TABLET, FILM COATED ORAL EVERY 24 HOURS
Status: DISCONTINUED | OUTPATIENT
Start: 2019-01-26 | End: 2019-01-27

## 2019-01-26 RX ORDER — HYDROXYZINE HYDROCHLORIDE 10 MG/1
30 TABLET, FILM COATED ORAL
COMMUNITY
End: 2019-05-28 | Stop reason: SDUPTHER

## 2019-01-26 RX ORDER — FAMOTIDINE 20 MG/1
20 TABLET, FILM COATED ORAL 2 TIMES DAILY
Status: DISCONTINUED | OUTPATIENT
Start: 2019-01-26 | End: 2019-01-28

## 2019-01-26 RX ORDER — IPRATROPIUM BROMIDE AND ALBUTEROL SULFATE 2.5; .5 MG/3ML; MG/3ML
SOLUTION RESPIRATORY (INHALATION)
Status: DISCONTINUED
Start: 2019-01-26 | End: 2019-01-26 | Stop reason: WASHOUT

## 2019-01-26 RX ORDER — AMLODIPINE BESYLATE 10 MG/1
10 TABLET ORAL DAILY
Status: DISCONTINUED | OUTPATIENT
Start: 2019-01-27 | End: 2019-01-30 | Stop reason: HOSPADM

## 2019-01-26 RX ORDER — POTASSIUM CHLORIDE 20 MEQ/1
40 TABLET, EXTENDED RELEASE ORAL
Status: COMPLETED | OUTPATIENT
Start: 2019-01-26 | End: 2019-01-26

## 2019-01-26 RX ORDER — ATORVASTATIN CALCIUM 40 MG/1
40 TABLET, FILM COATED ORAL DAILY
Status: DISCONTINUED | OUTPATIENT
Start: 2019-01-27 | End: 2019-01-30 | Stop reason: HOSPADM

## 2019-01-26 RX ORDER — BUDESONIDE 0.5 MG/2ML
1000 INHALANT ORAL
Status: DISCONTINUED | OUTPATIENT
Start: 2019-01-26 | End: 2019-01-30 | Stop reason: HOSPADM

## 2019-01-26 RX ORDER — ALBUTEROL SULFATE 0.83 MG/ML
SOLUTION RESPIRATORY (INHALATION)
Status: COMPLETED
Start: 2019-01-26 | End: 2019-01-26

## 2019-01-26 RX ORDER — ASPIRIN 81 MG/1
81 TABLET ORAL DAILY
Status: DISCONTINUED | OUTPATIENT
Start: 2019-01-27 | End: 2019-01-30 | Stop reason: HOSPADM

## 2019-01-26 RX ORDER — ALBUTEROL SULFATE 2.5 MG/.5ML
10 SOLUTION RESPIRATORY (INHALATION)
Status: ACTIVE | OUTPATIENT
Start: 2019-01-26 | End: 2019-01-27

## 2019-01-26 RX ORDER — SODIUM CHLORIDE 0.9 % (FLUSH) 0.9 %
5-40 SYRINGE (ML) INJECTION AS NEEDED
Status: DISCONTINUED | OUTPATIENT
Start: 2019-01-26 | End: 2019-01-30 | Stop reason: HOSPADM

## 2019-01-26 RX ORDER — MONTELUKAST SODIUM 10 MG/1
10 TABLET ORAL
Status: DISCONTINUED | OUTPATIENT
Start: 2019-01-26 | End: 2019-01-30 | Stop reason: HOSPADM

## 2019-01-26 RX ORDER — IPRATROPIUM BROMIDE AND ALBUTEROL SULFATE 2.5; .5 MG/3ML; MG/3ML
3 SOLUTION RESPIRATORY (INHALATION)
Status: DISCONTINUED | OUTPATIENT
Start: 2019-01-26 | End: 2019-01-26

## 2019-01-26 RX ORDER — RANITIDINE 150 MG/1
150 TABLET, FILM COATED ORAL 2 TIMES DAILY
COMMUNITY
End: 2019-07-09 | Stop reason: SDUPTHER

## 2019-01-26 RX ORDER — HYDROXYCHLOROQUINE SULFATE 200 MG/1
200 TABLET, FILM COATED ORAL
Status: DISCONTINUED | OUTPATIENT
Start: 2019-01-26 | End: 2019-01-30 | Stop reason: HOSPADM

## 2019-01-26 RX ORDER — LORATADINE 10 MG/1
10 TABLET ORAL DAILY
Status: DISCONTINUED | OUTPATIENT
Start: 2019-01-27 | End: 2019-01-30 | Stop reason: HOSPADM

## 2019-01-26 RX ORDER — ALBUTEROL SULFATE 0.83 MG/ML
2.5 SOLUTION RESPIRATORY (INHALATION)
Status: DISCONTINUED | OUTPATIENT
Start: 2019-01-26 | End: 2019-01-30 | Stop reason: HOSPADM

## 2019-01-26 RX ADMIN — METHYLPREDNISOLONE SODIUM SUCCINATE 40 MG: 40 INJECTION, POWDER, FOR SOLUTION INTRAMUSCULAR; INTRAVENOUS at 21:05

## 2019-01-26 RX ADMIN — Medication 10 ML: at 21:13

## 2019-01-26 RX ADMIN — TUBERCULIN PURIFIED PROTEIN DERIVATIVE 5 UNITS: 5 INJECTION, SOLUTION INTRADERMAL at 20:57

## 2019-01-26 RX ADMIN — ALBUTEROL SULFATE 2.5 MG: 2.5 SOLUTION RESPIRATORY (INHALATION) at 18:10

## 2019-01-26 RX ADMIN — Medication 5 ML: at 18:24

## 2019-01-26 RX ADMIN — INSULIN LISPRO 8 UNITS: 100 INJECTION, SOLUTION INTRAVENOUS; SUBCUTANEOUS at 21:48

## 2019-01-26 RX ADMIN — ALBUTEROL SULFATE 10 MG: 2.5 SOLUTION RESPIRATORY (INHALATION) at 13:23

## 2019-01-26 RX ADMIN — INSULIN GLARGINE 30 UNITS: 100 INJECTION, SOLUTION SUBCUTANEOUS at 21:05

## 2019-01-26 RX ADMIN — BUDESONIDE 1000 MCG: 0.5 INHALANT RESPIRATORY (INHALATION) at 20:32

## 2019-01-26 RX ADMIN — POTASSIUM CHLORIDE 40 MEQ: 20 TABLET, EXTENDED RELEASE ORAL at 15:55

## 2019-01-26 RX ADMIN — FAMOTIDINE 20 MG: 20 TABLET ORAL at 18:24

## 2019-01-26 RX ADMIN — HYDROXYCHLOROQUINE SULFATE 200 MG: 200 TABLET, FILM COATED ORAL at 18:24

## 2019-01-26 RX ADMIN — ALBUTEROL SULFATE 2.5 MG: 2.5 SOLUTION RESPIRATORY (INHALATION) at 20:32

## 2019-01-26 RX ADMIN — MONTELUKAST SODIUM 10 MG: 10 TABLET, FILM COATED ORAL at 21:04

## 2019-01-26 RX ADMIN — LEVOFLOXACIN 500 MG: 500 TABLET, FILM COATED ORAL at 18:24

## 2019-01-26 RX ADMIN — HYDROXYZINE HYDROCHLORIDE 30 MG: 10 TABLET, FILM COATED ORAL at 21:04

## 2019-01-26 RX ADMIN — METHYLPREDNISOLONE SODIUM SUCCINATE 125 MG: 125 INJECTION, POWDER, FOR SOLUTION INTRAMUSCULAR; INTRAVENOUS at 14:02

## 2019-01-26 NOTE — ROUTINE PROCESS
TRANSFER - OUT REPORT: 
 
Verbal report given to Kyra Townsend on [employer]  being transferred to room 615 for routine progression of care Report consisted of patients Situation, Background, Assessment and  
Recommendations(SBAR). Information from the following report(s) ED Summary was reviewed with the receiving nurse. Opportunity for questions and clarification was provided.

## 2019-01-26 NOTE — H&P
HOSPITALIST H&PNAME:  Ramakrishna Babcock Age:  75 y.o. 
:   1945 MRN:   [id number] PCP: Jalen Chambers MD 
Consulting MD: Treatment Team: Primary Nurse: Miguel Seo RN 
HPI:  
[place] is a 76 yo F with history of asthma on prn nocturnal oxygen (uses on average 3x/week), Sjogren's syndrome (on hydroxychloroquine), hives (started last  after being placed on prilosec), and T2DM, who presents to the ED with worsening shortness of breath and cough. She is accompanied by her son, Tammy Grace. She reports she started coughing about 5 days ago. Cough productive of yellow sputum at times. Was using her nebulizer some, but her nebulizer machine broke 3 days ago and was only using her albuterol inhaler and flovent inhaler over the last few days. Overnight, her breathing significantly worsened. She reports subjective chills, but no definitive fevers. Has some chest discomfort that worsens with coughing. She presented to the ER with significant wheezing. She was given solumedrol 125 mg IV x 1 and albuterol nebs without improvement. Wheezing continued and she continued to appear dyspneic. Hospitalist asked to admit for asthma exacerbation. In the ED, CXR clear. WBC count 7K. Complete ROS done and is as stated in HPI or otherwise negative Past Medical History:  
Diagnosis Date  Arrhythmia 3 - 4 weeks ago (2010) Dr. Yony Valderrama? ([facility]) ran tests for heart speeding up/slowing down; (has occasional heart palpitations; no cardiologist 3/9/18)  Arthritis  Asthma   
 first told in ; inhaler daily; nebs prn  Bladder incontinence 10/24/2016  Chronic pain  Dermatophytosis  Diabetes ([facility] type 2; insulin meds; hasn't been checking bs lately; last A1C=6.8 on 18  Former smoker  GERD (gastroesophageal reflux disease) 2013  Heart failure ([facility]   
 no cardiologist   
 Hypercholesterolemia  Hypertension on med for control  Hypertensive heart disease without HF (heart failure)  Lupus (systemic lupus erythematosus) ([facility]   
 recent blood work was negative (3/9/18)  Non compliance with medical treatment  Obesity  ANATOLY (obstructive sleep apnea) 2013  
 no c-pap; uses oxygen prn at HS 2L/NC  
 Seizures ([facility]   
 last one about 2.5 yrs ago (noted 3/9/18); no meds  Stroke [facility]) [address] MINI STROKE  Systemic lupus erythematosus ([facility] 2013  
 recent blood work negative (3/9/18)  Type 2 diabetes mellitus with hyperglycemia ([facility] 10/24/2016 Past Surgical History:  
Procedure Laterality Date  HX APPENDECTOMY  HX BREAST LUMPECTOMY    
 benign  HX HAMMER TOE REPAIR  2010  
 left  HX HEART CATHETERIZATION  1999 LHC:LV EF=75%. Normal coronary arteries.  HX HEENT    
 cataract removal right eye and skin removal off of lids [address]  HX OTHER SURGICAL    
 had all teeth removed  HX OTHER SURGICAL  03/15/2018 Throat  REV UPPER EYELID W EXCESS SKIN Prior to Admission Medications Prescriptions Last Dose Informant Patient Reported? Taking? EPINEPHrine (EPIPEN) 0.3 mg/0.3 mL injection   Yes Yes Si.3 mg by IntraMUSCular route once as needed for Anaphylaxis. Insulin Needles, Disposable, (LETI PEN NEEDLE) 32 gauge x 5/32\" ndle   No Yes Sig: Inject insulin once daily  DX E 11.9 PROAIR HFA 90 mcg/actuation inhaler   No Yes Sig: Take 2 Puffs by inhalation every four (4) hours as needed for Wheezing. albuterol (PROVENTIL VENTOLIN) 2.5 mg /3 mL (0.083 %) nebulizer solution   No Yes Sig: 3 mL by Nebulization route every four (4) hours as needed for Wheezing. amLODIPine (NORVASC) 10 mg tablet   No Yes Sig: Take 1 Tab by mouth daily. aspirin delayed-release 81 mg tablet   Yes Yes Sig: Take 81 mg by mouth daily. Take / use AM day of surgery  per anesthesia protocols. atorvastatin (LIPITOR) 40 mg tablet   No Yes Sig: Take 1 Tab by mouth daily. cetirizine (ZYRTEC) 10 mg tablet   Yes Yes Sig: Take 10 mg by mouth daily. dulaglutide (TRULICITY) 1.5 XH/2.8 mL sub-q pen   No Yes Sig: INJECT ONE UNIT DOSE(0.5 ML) SUBCUTANEOUSLY ONCE EVERY 7 DAYS  
fexofenadine (ALLEGRA) 180 mg tablet   Yes Yes Sig: Take 180 mg by mouth daily. fluticasone (FLOVENT HFA) 110 mcg/actuation inhaler   No Yes Sig: Take 1 Puff by inhalation every twelve (12) hours. folic acid (FOLVITE) 1 mg tablet   No Yes Sig: Take 1 Tab by mouth daily. hydrOXYzine HCl (ATARAX) 10 mg tablet   Yes Yes Sig: Take 30 mg by mouth nightly. hydroxychloroquine (PLAQUENIL) 200 mg tablet   No Yes Sig: Take 1 Tab by mouth two (2) times daily (after meals). insulin glargine (TOUJEO SOLOSTAR U-300 INSULIN) 300 unit/mL (1.5 mL) inpn   No Yes Si Units by SubCUTAneous route nightly. losartan-hydroCHLOROthiazide (HYZAAR) 100-12.5 mg per tablet   No Yes Sig: Take 1 Tab by mouth daily. meloxicam (MOBIC) 15 mg tablet   No Yes Sig: Take 1 pill once a day after food. nebivolol (BYSTOLIC) 5 mg tablet   No Yes Sig: Take 1 Tab by mouth daily. Indications: hypertension  
raNITIdine (ZANTAC) 150 mg tablet   Yes Yes Sig: Take 150 mg by mouth two (2) times a day. Facility-Administered Medications: None Allergies Allergen Reactions  Prilosec [Omeprazole] Rash  Penicillins Rash Social History Tobacco Use  Smoking status: Former Smoker Packs/day: 0.50 Years: 12.00 Pack years: 6.00 Last attempt to quit: 10/14/1977 Years since quittin.3  Smokeless tobacco: Never Used Substance Use Topics  Alcohol use: No  
  
Family History Problem Relation Age of Onset  Heart Disease Father  Bleeding Prob Brother  Diabetes Brother  Heart Disease Brother  Diabetes Brother  Cancer Brother  Heart Disease Mother  Diabetes Sister  Asthma Brother  Cancer Brother  Malignant Hyperthermia Neg Hx  Pseudocholinesterase Deficiency Neg Hx  Delayed Awakening Neg Hx  Post-op Nausea/Vomiting Neg Hx  Emergence Delirium Neg Hx  Post-op Cognitive Dysfunction Neg Hx   
 Other Neg Hx Objective:  
 
Visit Vitals /79 Pulse (!) 119 Temp 99.1 °F (37.3 °C) Resp 22 Ht 4' 11\" (1.499 m) Wt 69.9 kg (154 lb) SpO2 95% BMI 31.10 kg/m² Temp (24hrs), Av.1 °F (37.3 °C), Min:99.1 °F (37.3 °C), Max:99.1 °F (37.3 °C) Oxygen Therapy O2 Sat (%): 95 % (19 1441) Pulse via Oximetry: 119 beats per minute (19 1441) O2 Device: Nasal cannula (19 1444) O2 Flow Rate (L/min): 2 l/min (19 1444) Physical Exam: 
General:    Alert, cooperative, appears dyspneic in conversation Head:   Normocephalic, without obvious abnormality, atraumatic. Nose:  Nares normal. No drainage or sinus tenderness. Lungs:   Bilateral wheeze, reduced air movement Heart:   Regular rhythm, slightly tachycardic Abdomen:   Soft, non-tender. Not distended. Bowel sounds normal.  
Extremities: No cyanosis. No edema. No clubbing Skin:     Texture, turgor normal. No rashes or lesions. Not Jaundiced Neurologic: Alert and oriented x 3, no focal deficits Data Review:  
Recent Results (from the past 24 hour(s)) EKG, 12 LEAD, INITIAL Collection Time: 19  1:08 PM  
Result Value Ref Range Ventricular Rate 87 BPM  
 Atrial Rate 87 BPM  
 P-R Interval 166 ms  
 QRS Duration 84 ms Q-T Interval 372 ms QTC Calculation (Bezet) 447 ms Calculated P Axis 41 degrees Calculated R Axis -22 degrees Calculated T Axis 16 degrees Diagnosis Normal sinus rhythm Possible Left atrial enlargement Borderline ECG When compared with ECG of 2018 12:02, No significant change was found CBC WITH AUTOMATED DIFF Collection Time: 19  1:15 PM  
Result Value Ref Range WBC 7.2 4.3 - 11.1 K/uL  
 RBC 5.14 4.05 - 5.2 M/uL  
 HGB 13.9 11.7 - 15.4 g/dL HCT 43.2 35.8 - 46.3 % MCV 84.0 79.6 - 97.8 FL  
 MCH 27.0 26.1 - 32.9 PG  
 MCHC 32.2 31.4 - 35.0 g/dL  
 RDW 13.3 11.9 - 14.6 % PLATELET 631 276 - 130 K/uL MPV 11.6 9.4 - 12.3 FL ABSOLUTE NRBC 0.00 0.0 - 0.2 K/uL  
 DF AUTOMATED NEUTROPHILS 77 43 - 78 % LYMPHOCYTES 16 13 - 44 % MONOCYTES 5 4.0 - 12.0 % EOSINOPHILS 2 0.5 - 7.8 % BASOPHILS 0 0.0 - 2.0 % IMMATURE GRANULOCYTES 0 0.0 - 5.0 %  
 ABS. NEUTROPHILS 5.5 1.7 - 8.2 K/UL  
 ABS. LYMPHOCYTES 1.1 0.5 - 4.6 K/UL  
 ABS. MONOCYTES 0.4 0.1 - 1.3 K/UL  
 ABS. EOSINOPHILS 0.1 0.0 - 0.8 K/UL  
 ABS. BASOPHILS 0.0 0.0 - 0.2 K/UL  
 ABS. IMM. GRANS. 0.0 0.0 - 0.5 K/UL METABOLIC PANEL, COMPREHENSIVE Collection Time: 01/26/19  1:15 PM  
Result Value Ref Range Sodium 139 136 - 145 mmol/L Potassium 3.1 (L) 3.5 - 5.1 mmol/L Chloride 105 98 - 107 mmol/L  
 CO2 27 21 - 32 mmol/L Anion gap 7 7 - 16 mmol/L Glucose 215 (H) 65 - 100 mg/dL BUN 11 8 - 23 MG/DL Creatinine 0.85 0.6 - 1.0 MG/DL  
 GFR est AA >60 >60 ml/min/1.73m2 GFR est non-AA >60 >60 ml/min/1.73m2 Calcium 8.7 8.3 - 10.4 MG/DL Bilirubin, total 0.5 0.2 - 1.1 MG/DL  
 ALT (SGPT) 17 12 - 65 U/L  
 AST (SGOT) 12 (L) 15 - 37 U/L Alk. phosphatase 64 50 - 136 U/L Protein, total 7.1 6.3 - 8.2 g/dL Albumin 3.5 3.2 - 4.6 g/dL Globulin 3.6 (H) 2.3 - 3.5 g/dL A-G Ratio 1.0 (L) 1.2 - 3.5    
TROPONIN I Collection Time: 01/26/19  1:15 PM  
Result Value Ref Range Troponin-I, Qt. <0.02 (L) 0.02 - 0.05 NG/ML Imaging [facility] XR CHEST PORT Final Result Impression: Unremarkable portable chest radiograph Assessment and Plan: Active Hospital Problems Diagnosis Date Noted  Asthma exacerbation 01/26/2019  Type 2 diabetes mellitus with diabetic neuropathy ([facility] 01/18/2018  Sjogren's syndrome ([facility] 01/12/2017  Asthma 10/24/2016  Obesity  Hypertension 07/23/2013 PLAN 
·  Given failure of outpatient treatment and worsening symptoms requiring IV steroids and frequent nebulizer treatments, will admit inpatient. · Asthma exacerbation · Albuterol and budesonide nebs · IV Solumedrol 40 mg q 8 hours. · Add singulair qHS · Check flu swab · PO levaquin given yellow sputum and worsening symptoms · Consult pulmonology as she will need outpatient follow up · Supplemental oxygen for respiratory distress; wean as tolerated. · T2DM- Lantus and SSI. · HTN- continue home dose meds · Hives- continue anti-histamine therapy Admission plan discussed with Ms. Kylah Turner and her son, Ibeth Menchaca, at bedside. Code Status: Full Anticipated length of stay: > 2 midnights Signed By: Mike Jackson MD   
 January 26, 2019

## 2019-01-26 NOTE — ED PROVIDER NOTES
Patient presents with acute dyspnea onset last night. She reports a history of asthma but denies any previous hospitalizations for exacerbations. She has been on steroids in the past but not recently. She also does have home oxygen that she uses when necessary. She reports multiple nebulizer use at home without improvement and a cough productive of clear sputum. She denies any fever or chills. She does report intermittent chest pain over the past several weeks. She did have a negative stress test done last year. The history is provided by the patient. Shortness of Breath This is a recurrent problem. Duration: 6-12 hours. The problem occurs intermittently. The current episode started 6 to 12 hours ago. The problem has not changed since onset. Associated symptoms include cough and sputum production. Pertinent negatives include no fever, no headaches, no coryza, no rhinorrhea, no sore throat, no swollen glands, no ear pain, no neck pain, no hemoptysis, no wheezing, no PND, no orthopnea, no chest pain, no syncope, no vomiting, no abdominal pain, no rash, no leg pain, no leg swelling and no claudication. It is unknown what precipitated the problem. She has tried beta-agonist inhalers for the symptoms. The treatment provided no relief. She has had no prior hospitalizations. She has had prior ED visits. She has had no prior ICU admissions. Associated medical issues include asthma. Past Medical History:  
Diagnosis Date  Arrhythmia 3 - 4 weeks ago (9/2010) Dr. Glade Hammans? ([facility]) ran tests for heart speeding up/slowing down; (has occasional heart palpitations; no cardiologist 3/9/18)  Arthritis  Asthma   
 first told in 2000; inhaler daily; nebs prn  Bladder incontinence 10/24/2016  Chronic pain  Dermatophytosis  Diabetes ([facility] type 2; insulin meds; hasn't been checking bs lately; last A1C=6.8 on 2/19/18  Former smoker  GERD (gastroesophageal reflux disease) 7/23/2013  Heart failure ([facility]   
 no cardiologist   
 Hypercholesterolemia  Hypertension   
 on med for control  Hypertensive heart disease without HF (heart failure)  Lupus (systemic lupus erythematosus) ([facility] 2005  
 recent blood work was negative (3/9/18)  Non compliance with medical treatment  Obesity  ANATOLY (obstructive sleep apnea) 07/23/2013  
 no c-pap; uses oxygen prn at HS 2L/NC  
 Seizures ([facility]   
 last one about 2.5 yrs ago (noted 3/9/18); no meds  Stroke [facility]) [address]  Systemic lupus erythematosus ([facility] [address] 07/23/2013  
 recent blood work negative (3/9/18)  Type 2 diabetes mellitus with hyperglycemia ([facility] [address] 10/24/2016 Past Surgical History:  
Procedure Laterality Date  HX APPENDECTOMY  HX BREAST LUMPECTOMY  1998  
 benign  HX HAMMER TOE REPAIR  2010  
 left  HX HEART CATHETERIZATION  03/22/1999 LHC:LV EF=75%. Normal coronary arteries.  HX HEENT    
 cataract removal right eye and skin removal off of lids [facility]  HX OTHER SURGICAL    
 had all teeth removed  HX OTHER SURGICAL  03/15/2018 Throat  REV UPPER EYELID W EXCESS SKIN Family History:  
Problem Relation Age of Onset  Heart Disease Father  Bleeding Prob Brother  Diabetes Brother  Heart Disease Brother  Diabetes Brother  Cancer Brother  Heart Disease Mother  Diabetes Sister  Asthma Brother  Cancer Brother  Malignant Hyperthermia Neg Hx  Pseudocholinesterase Deficiency Neg Hx  Delayed Awakening Neg Hx  Post-op Nausea/Vomiting Neg Hx  Emergence Delirium Neg Hx  Post-op Cognitive Dysfunction Neg Hx   
 Other Neg Hx Social History Socioeconomic History  Marital status: SINGLE Spouse name: Not on file  Number of children: 0  
 Years of education: Not on file  Highest education level: Not on file Social Needs  Financial resource strain: Not on file  Food insecurity - worry: Not on file  Food insecurity - inability: Not on file  Transportation needs - medical: Not on file  Transportation needs - non-medical: Not on file Occupational History  Occupation: [employer] Employer: NOT EMPLOYED Comment: Denies industrial toxins  Occupation: Textiles Comment: 6 yrs Tobacco Use  Smoking status: Former Smoker Packs/day: 0.50 Years: 12.00 Pack years: 6.00 Last attempt to quit: 10/14/1977 Years since quittin.3  Smokeless tobacco: Never Used Substance and Sexual Activity  Alcohol use: No  
 Drug use: No  
 Sexual activity: No  
  Partners: Male Birth control/protection: Surgical  
  Comment: sleeps alone for past 30 years,  in [address] Other Topics Concern  Not on file Social History Narrative 12-pack-year history of cigarette smoking, stopped smoking in . Worked in [employer] for 6 years and at [employer] for 2 years where she states she was not exposed to industrial toxins. , no children. Has always live in [address] No pets. ALLERGIES: Prilosec [omeprazole] and Penicillins Review of Systems Constitutional: Negative for fever. HENT: Negative for ear pain, rhinorrhea and sore throat. Respiratory: Positive for cough, sputum production and shortness of breath. Negative for hemoptysis and wheezing. Cardiovascular: Negative for chest pain, orthopnea, claudication, leg swelling, syncope and PND. Gastrointestinal: Negative for abdominal pain and vomiting. Musculoskeletal: Negative for neck pain. Skin: Negative for rash. Neurological: Negative for headaches. All other systems reviewed and are negative. Vitals:  
 19 1306 19 1323 BP: 138/72 Pulse: 91   
Resp: 22 Temp: 99.1 °F (37.3 °C) SpO2: 96% 96% Weight: 69.9 kg (154 lb) Height: 4' 11\" (1.499 m) Physical Exam  
Constitutional: She is oriented to person, place, and time. She appears well-developed and well-nourished. Non-toxic appearance. She does not appear ill. No distress. HENT:  
Head: Normocephalic and atraumatic. Mouth/Throat: Oropharynx is clear and moist.  
Eyes: EOM are normal. Pupils are equal, round, and reactive to light. Neck: Normal range of motion. Neck supple. Cardiovascular: Normal rate and regular rhythm. Pulmonary/Chest: She is in respiratory distress. She has wheezes. Abdominal: Soft. Bowel sounds are normal.  
Musculoskeletal: Normal range of motion. Neurological: She is alert and oriented to person, place, and time. Skin: Skin is warm. Psychiatric: She has a normal mood and affect. Her behavior is normal.  
Nursing note and vitals reviewed. MDM Number of Diagnoses or Management Options Diagnosis management comments: After about 10 mg albuterol treatments and of evaluation the patient remains dyspneic and tachypnea with audible wheezes throughout. Oxygen saturation on room air is at 90%. She was placed on supplemental oxygen will discuss case with hospitalist for admission for asthma exacerbation and hypoxemia. Amount and/or Complexity of Data Reviewed Clinical lab tests: ordered and reviewed Tests in the radiology section of CPT®: ordered and reviewed Risk of Complications, Morbidity, and/or Mortality Presenting problems: high Diagnostic procedures: high Management options: moderate Patient Progress Patient progress: stable Procedures

## 2019-01-26 NOTE — ED TRIAGE NOTES
Pt states shob since last night. Wheezing noted in triage. Pt has hx of asthma, states inhalers have not been helping. Reports chest pain as well, going on for the whole week. States bad cold for the week as well.

## 2019-01-27 PROBLEM — J21.9 ACUTE BRONCHIOLITIS: Status: ACTIVE | Noted: 2019-01-27

## 2019-01-27 LAB
ANION GAP SERPL CALC-SCNC: 9 MMOL/L (ref 7–16)
ATRIAL RATE: 87 BPM
BUN SERPL-MCNC: 14 MG/DL (ref 8–23)
CALCIUM SERPL-MCNC: 8.7 MG/DL (ref 8.3–10.4)
CALCULATED P AXIS, ECG09: 41 DEGREES
CALCULATED R AXIS, ECG10: -22 DEGREES
CALCULATED T AXIS, ECG11: 16 DEGREES
CHLORIDE SERPL-SCNC: 106 MMOL/L (ref 98–107)
CO2 SERPL-SCNC: 25 MMOL/L (ref 21–32)
CREAT SERPL-MCNC: 0.98 MG/DL (ref 0.6–1)
DIAGNOSIS, 93000: NORMAL
ERYTHROCYTE [DISTWIDTH] IN BLOOD BY AUTOMATED COUNT: 13.4 % (ref 11.9–14.6)
GLUCOSE BLD STRIP.AUTO-MCNC: 218 MG/DL (ref 65–100)
GLUCOSE BLD STRIP.AUTO-MCNC: 277 MG/DL (ref 65–100)
GLUCOSE BLD STRIP.AUTO-MCNC: 282 MG/DL (ref 65–100)
GLUCOSE BLD STRIP.AUTO-MCNC: 361 MG/DL (ref 65–100)
GLUCOSE SERPL-MCNC: 275 MG/DL (ref 65–100)
HCT VFR BLD AUTO: 42.4 % (ref 35.8–46.3)
HGB BLD-MCNC: 13.3 G/DL (ref 11.7–15.4)
MCH RBC QN AUTO: 26.6 PG (ref 26.1–32.9)
MCHC RBC AUTO-ENTMCNC: 31.4 G/DL (ref 31.4–35)
MCV RBC AUTO: 84.8 FL (ref 79.6–97.8)
MM INDURATION POC: NORMAL MM (ref 0–5)
NRBC # BLD: 0 K/UL (ref 0–0.2)
P-R INTERVAL, ECG05: 166 MS
PLATELET # BLD AUTO: 260 K/UL (ref 150–450)
PMV BLD AUTO: 11.7 FL (ref 9.4–12.3)
POTASSIUM SERPL-SCNC: 3.9 MMOL/L (ref 3.5–5.1)
PPD POC: NORMAL NEGATIVE
Q-T INTERVAL, ECG07: 372 MS
QRS DURATION, ECG06: 84 MS
QTC CALCULATION (BEZET), ECG08: 447 MS
RBC # BLD AUTO: 5 M/UL (ref 4.05–5.2)
SODIUM SERPL-SCNC: 140 MMOL/L (ref 136–145)
VENTRICULAR RATE, ECG03: 87 BPM
WBC # BLD AUTO: 7.1 K/UL (ref 4.3–11.1)

## 2019-01-27 PROCEDURE — 85027 COMPLETE CBC AUTOMATED: CPT

## 2019-01-27 PROCEDURE — 94640 AIRWAY INHALATION TREATMENT: CPT

## 2019-01-27 PROCEDURE — 65270000029 HC RM PRIVATE

## 2019-01-27 PROCEDURE — 74011250637 HC RX REV CODE- 250/637: Performed by: INTERNAL MEDICINE

## 2019-01-27 PROCEDURE — 80048 BASIC METABOLIC PNL TOTAL CA: CPT

## 2019-01-27 PROCEDURE — 36415 COLL VENOUS BLD VENIPUNCTURE: CPT

## 2019-01-27 PROCEDURE — 74011000250 HC RX REV CODE- 250: Performed by: INTERNAL MEDICINE

## 2019-01-27 PROCEDURE — 82962 GLUCOSE BLOOD TEST: CPT

## 2019-01-27 PROCEDURE — 74011250636 HC RX REV CODE- 250/636: Performed by: INTERNAL MEDICINE

## 2019-01-27 PROCEDURE — 94760 N-INVAS EAR/PLS OXIMETRY 1: CPT

## 2019-01-27 PROCEDURE — 74011636637 HC RX REV CODE- 636/637: Performed by: INTERNAL MEDICINE

## 2019-01-27 PROCEDURE — 99223 1ST HOSP IP/OBS HIGH 75: CPT | Performed by: INTERNAL MEDICINE

## 2019-01-27 PROCEDURE — 77010033678 HC OXYGEN DAILY

## 2019-01-27 RX ORDER — GUAIFENESIN 600 MG/1
1200 TABLET, EXTENDED RELEASE ORAL EVERY 12 HOURS
Status: DISCONTINUED | OUTPATIENT
Start: 2019-01-27 | End: 2019-01-30 | Stop reason: HOSPADM

## 2019-01-27 RX ORDER — ALBUTEROL SULFATE 0.83 MG/ML
2.5 SOLUTION RESPIRATORY (INHALATION)
Status: DISCONTINUED | OUTPATIENT
Start: 2019-01-27 | End: 2019-01-30 | Stop reason: HOSPADM

## 2019-01-27 RX ADMIN — ATORVASTATIN CALCIUM 40 MG: 40 TABLET, FILM COATED ORAL at 08:32

## 2019-01-27 RX ADMIN — Medication 10 ML: at 05:52

## 2019-01-27 RX ADMIN — FAMOTIDINE 20 MG: 20 TABLET ORAL at 08:32

## 2019-01-27 RX ADMIN — HYDROXYCHLOROQUINE SULFATE 200 MG: 200 TABLET, FILM COATED ORAL at 17:47

## 2019-01-27 RX ADMIN — GUAIFENESIN 1200 MG: 600 TABLET, EXTENDED RELEASE ORAL at 22:13

## 2019-01-27 RX ADMIN — ASPIRIN 81 MG: 81 TABLET, COATED ORAL at 08:32

## 2019-01-27 RX ADMIN — MONTELUKAST SODIUM 10 MG: 10 TABLET, FILM COATED ORAL at 22:13

## 2019-01-27 RX ADMIN — ALBUTEROL SULFATE 2.5 MG: 2.5 SOLUTION RESPIRATORY (INHALATION) at 01:42

## 2019-01-27 RX ADMIN — ALBUTEROL SULFATE 2.5 MG: 2.5 SOLUTION RESPIRATORY (INHALATION) at 14:41

## 2019-01-27 RX ADMIN — INSULIN LISPRO 4 UNITS: 100 INJECTION, SOLUTION INTRAVENOUS; SUBCUTANEOUS at 07:55

## 2019-01-27 RX ADMIN — METHYLPREDNISOLONE SODIUM SUCCINATE 40 MG: 40 INJECTION, POWDER, FOR SOLUTION INTRAMUSCULAR; INTRAVENOUS at 05:52

## 2019-01-27 RX ADMIN — LORATADINE 10 MG: 10 TABLET ORAL at 08:32

## 2019-01-27 RX ADMIN — INSULIN LISPRO 6 UNITS: 100 INJECTION, SOLUTION INTRAVENOUS; SUBCUTANEOUS at 17:47

## 2019-01-27 RX ADMIN — ALBUTEROL SULFATE 2.5 MG: 2.5 SOLUTION RESPIRATORY (INHALATION) at 20:40

## 2019-01-27 RX ADMIN — ALBUTEROL SULFATE 2.5 MG: 2.5 SOLUTION RESPIRATORY (INHALATION) at 07:29

## 2019-01-27 RX ADMIN — BUDESONIDE 500 MCG: 0.5 INHALANT RESPIRATORY (INHALATION) at 07:29

## 2019-01-27 RX ADMIN — Medication 10 ML: at 22:18

## 2019-01-27 RX ADMIN — INSULIN LISPRO 10 UNITS: 100 INJECTION, SOLUTION INTRAVENOUS; SUBCUTANEOUS at 11:47

## 2019-01-27 RX ADMIN — HYDROCHLOROTHIAZIDE: 12.5 CAPSULE ORAL at 08:32

## 2019-01-27 RX ADMIN — FAMOTIDINE 20 MG: 20 TABLET ORAL at 17:47

## 2019-01-27 RX ADMIN — MELOXICAM 15 MG: 7.5 TABLET ORAL at 08:32

## 2019-01-27 RX ADMIN — Medication 10 ML: at 14:21

## 2019-01-27 RX ADMIN — BUDESONIDE 1000 MCG: 0.5 INHALANT RESPIRATORY (INHALATION) at 20:40

## 2019-01-27 RX ADMIN — HYDROXYZINE HYDROCHLORIDE 30 MG: 10 TABLET, FILM COATED ORAL at 22:13

## 2019-01-27 RX ADMIN — AMLODIPINE BESYLATE 10 MG: 10 TABLET ORAL at 08:32

## 2019-01-27 RX ADMIN — FOLIC ACID 1 MG: 1 TABLET ORAL at 08:32

## 2019-01-27 RX ADMIN — METHYLPREDNISOLONE SODIUM SUCCINATE 40 MG: 40 INJECTION, POWDER, FOR SOLUTION INTRAMUSCULAR; INTRAVENOUS at 14:21

## 2019-01-27 RX ADMIN — NEBIVOLOL HYDROCHLORIDE 5 MG: 5 TABLET ORAL at 08:32

## 2019-01-27 RX ADMIN — INSULIN GLARGINE 30 UNITS: 100 INJECTION, SOLUTION SUBCUTANEOUS at 22:14

## 2019-01-27 RX ADMIN — INSULIN LISPRO 6 UNITS: 100 INJECTION, SOLUTION INTRAVENOUS; SUBCUTANEOUS at 22:14

## 2019-01-27 RX ADMIN — HYDROXYCHLOROQUINE SULFATE 200 MG: 200 TABLET, FILM COATED ORAL at 08:32

## 2019-01-27 RX ADMIN — METHYLPREDNISOLONE SODIUM SUCCINATE 40 MG: 40 INJECTION, POWDER, FOR SOLUTION INTRAMUSCULAR; INTRAVENOUS at 22:14

## 2019-01-27 NOTE — PROGRESS NOTES
TRANSFER - IN REPORT: 
 
Verbal report received from ISHAN Gatica on [place]  being received from ER for routine progression of care Report consisted of patients Situation, Background, Assessment and  
Recommendations(SBAR). Information from the following report(s) SBAR was reviewed with the receiving nurse. Opportunity for questions and clarification was provided. Assessment completed upon patients arrival to unit and care assumed.

## 2019-01-27 NOTE — PROGRESS NOTES
Hospitalist Progress Note 2019 Admit Date: 2019  1:08 PM  
NAME: Sindhu Miles :  1945 MRN:  [id number] Attending: Sonia Leblanc MD 
PCP:  Miriam Shannon MD 
 
SUBJECTIVE:  
Thelma Alvarado is a 74 yo F with history of asthma on prn nocturnal oxygen (uses on average 3x/week), Sjogren's syndrome (on hydroxychloroquine), hives (started last  after being placed on prilosec), and T2DM, admitted  with asthma exacerbation/acute bronchitis. States still short of breath and about the same as yesterday. Coughing up thick mucus. Denies other new concerns Review of Systems negative with exception of pertinent positives noted above PHYSICAL EXAM  
 
Visit Vitals /76 Pulse 100 Temp 99 °F (37.2 °C) Resp 18 Ht 4' 11\" (1.499 m) Wt 69.9 kg (154 lb) SpO2 92% BMI 31.10 kg/m² Temp (24hrs), Av.8 °F (37.1 °C), Min:98.4 °F (36.9 °C), Max:99.1 °F (37.3 °C) Oxygen Therapy O2 Sat (%): 92 % (19 0804) Pulse via Oximetry: 84 beats per minute (19 0729) O2 Device: Nasal cannula (19 07) O2 Flow Rate (L/min): 2 l/min (19 0729) FIO2 (%): 28 % (19 1810) No intake or output data in the 24 hours ending 19 09 General: Slight dyspnea in conversation, elderly  
Lungs:  Bilateral wheeze. Heart:  Regular rate and rhythm,  No murmur, rub, or gallop Abdomen: Soft, Non distended, Non tender, Positive bowel sounds Extremities: No cyanosis, clubbing or edema Neurologic:  No focal deficits Recent Results (from the past 24 hour(s)) EKG, 12 LEAD, INITIAL Collection Time: 19  1:08 PM  
Result Value Ref Range Ventricular Rate 87 BPM  
 Atrial Rate 87 BPM  
 P-R Interval 166 ms  
 QRS Duration 84 ms Q-T Interval 372 ms QTC Calculation (Bezet) 447 ms Calculated P Axis 41 degrees Calculated R Axis -22 degrees Calculated T Axis 16 degrees Diagnosis Normal sinus rhythm Possible Left atrial enlargement Borderline ECG When compared with ECG of 01-JUL-2018 12:02, No significant change was found CBC WITH AUTOMATED DIFF Collection Time: 01/26/19  1:15 PM  
Result Value Ref Range WBC 7.2 4.3 - 11.1 K/uL  
 RBC 5.14 4.05 - 5.2 M/uL  
 HGB 13.9 11.7 - 15.4 g/dL HCT 43.2 35.8 - 46.3 % MCV 84.0 79.6 - 97.8 FL  
 MCH 27.0 26.1 - 32.9 PG  
 MCHC 32.2 31.4 - 35.0 g/dL  
 RDW 13.3 11.9 - 14.6 % PLATELET 667 995 - 937 K/uL MPV 11.6 9.4 - 12.3 FL ABSOLUTE NRBC 0.00 0.0 - 0.2 K/uL  
 DF AUTOMATED NEUTROPHILS 77 43 - 78 % LYMPHOCYTES 16 13 - 44 % MONOCYTES 5 4.0 - 12.0 % EOSINOPHILS 2 0.5 - 7.8 % BASOPHILS 0 0.0 - 2.0 % IMMATURE GRANULOCYTES 0 0.0 - 5.0 %  
 ABS. NEUTROPHILS 5.5 1.7 - 8.2 K/UL  
 ABS. LYMPHOCYTES 1.1 0.5 - 4.6 K/UL  
 ABS. MONOCYTES 0.4 0.1 - 1.3 K/UL  
 ABS. EOSINOPHILS 0.1 0.0 - 0.8 K/UL  
 ABS. BASOPHILS 0.0 0.0 - 0.2 K/UL  
 ABS. IMM. GRANS. 0.0 0.0 - 0.5 K/UL METABOLIC PANEL, COMPREHENSIVE Collection Time: 01/26/19  1:15 PM  
Result Value Ref Range Sodium 139 136 - 145 mmol/L Potassium 3.1 (L) 3.5 - 5.1 mmol/L Chloride 105 98 - 107 mmol/L  
 CO2 27 21 - 32 mmol/L Anion gap 7 7 - 16 mmol/L Glucose 215 (H) 65 - 100 mg/dL BUN 11 8 - 23 MG/DL Creatinine 0.85 0.6 - 1.0 MG/DL  
 GFR est AA >60 >60 ml/min/1.73m2 GFR est non-AA >60 >60 ml/min/1.73m2 Calcium 8.7 8.3 - 10.4 MG/DL Bilirubin, total 0.5 0.2 - 1.1 MG/DL  
 ALT (SGPT) 17 12 - 65 U/L  
 AST (SGOT) 12 (L) 15 - 37 U/L Alk. phosphatase 64 50 - 136 U/L Protein, total 7.1 6.3 - 8.2 g/dL Albumin 3.5 3.2 - 4.6 g/dL Globulin 3.6 (H) 2.3 - 3.5 g/dL A-G Ratio 1.0 (L) 1.2 - 3.5    
TROPONIN I Collection Time: 01/26/19  1:15 PM  
Result Value Ref Range Troponin-I, Qt. <0.02 (L) 0.02 - 0.05 NG/ML  
INFLUENZA A & B AG (RAPID TEST) Collection Time: 01/26/19  4:33 PM  
Result Value Ref Range  Influenza A Ag NEGATIVE  NEG    
 Influenza B Ag NEGATIVE  NEG Source NASOPHARYNGEAL    
GLUCOSE, POC Collection Time: 01/26/19  5:35 PM  
Result Value Ref Range Glucose (POC) 225 (H) 65 - 100 mg/dL GLUCOSE, POC Collection Time: 01/26/19  9:21 PM  
Result Value Ref Range Glucose (POC) 331 (H) 65 - 100 mg/dL METABOLIC PANEL, BASIC Collection Time: 01/27/19  4:59 AM  
Result Value Ref Range Sodium 140 136 - 145 mmol/L Potassium 3.9 3.5 - 5.1 mmol/L Chloride 106 98 - 107 mmol/L  
 CO2 25 21 - 32 mmol/L Anion gap 9 7 - 16 mmol/L Glucose 275 (H) 65 - 100 mg/dL BUN 14 8 - 23 MG/DL Creatinine 0.98 0.6 - 1.0 MG/DL  
 GFR est AA >60 >60 ml/min/1.73m2 GFR est non-AA 59 (L) >60 ml/min/1.73m2 Calcium 8.7 8.3 - 10.4 MG/DL  
CBC W/O DIFF Collection Time: 01/27/19  4:59 AM  
Result Value Ref Range WBC 7.1 4.3 - 11.1 K/uL  
 RBC 5.00 4.05 - 5.2 M/uL  
 HGB 13.3 11.7 - 15.4 g/dL HCT 42.4 35.8 - 46.3 % MCV 84.8 79.6 - 97.8 FL  
 MCH 26.6 26.1 - 32.9 PG  
 MCHC 31.4 31.4 - 35.0 g/dL  
 RDW 13.4 11.9 - 14.6 % PLATELET 715 181 - 576 K/uL MPV 11.7 9.4 - 12.3 FL ABSOLUTE NRBC 0.00 0.0 - 0.2 K/uL GLUCOSE, POC Collection Time: 01/27/19  7:52 AM  
Result Value Ref Range Glucose (POC) 218 (H) 65 - 100 mg/dL Imaging: XR CHEST PORT Final Result Impression: Unremarkable portable chest radiograph ASSESSMENT Hospital Problems as of 1/27/2019 Date Reviewed: 12/13/2018 Codes Class Noted - Resolved POA Acute bronchiolitis ICD-10-CM: J21.9 ICD-9-CM: 466.19  1/27/2019 - Present Yes * (Principal) Asthma exacerbation ICD-10-CM: J45.901 ICD-9-CM: 493.92  1/26/2019 - Present Yes Hives ICD-10-CM: L50.9 ICD-9-CM: 708.9  1/26/2019 - Present Yes Type 2 diabetes mellitus with diabetic neuropathy (HCC) ICD-10-CM: E11.40 ICD-9-CM: 250.60, 357.2  1/18/2018 - Present Yes  Sjogren's syndrome ([facility] ICD-10-CM: M35.00 
 ICD-9-CM: 710.2  1/12/2017 - Present Yes Asthma ICD-10-CM: Q88.811 ICD-9-CM: 493.90  10/24/2016 - Present Yes Obesity ICD-10-CM: E66.9 ICD-9-CM: 278.00  Unknown - Present Yes Hypertension ICD-10-CM: I10 
ICD-9-CM: 401.9  7/23/2013 - Present Yes Plan: · Asthma exacerbation with acute bronchitis · Continue solumedrol, nebs · Levaquin day 2 of 5 
· Add mucinex. · Supplemental oxygen as needed- wean as tolerated. · Get oxygen qualifier as symptoms improve. · Pulm consult pending · T2DM- blood sugars elevated likely due to solumedrol. · Continue lantus/SSI. · Sjogren's- continue plaquenil. · HTN- controlled. Continue hyzaar. DVT Prophylaxis: SCDs Signed By: Piedad Sancehs MD   
 January 27, 2019

## 2019-01-27 NOTE — PROGRESS NOTES
Problem: Nutrition Deficit Goal: *Optimize nutritional status Nutrition Reason for assessment: BPA - Malnutrition Screening Tool positive for unintended weight loss. Assessment:  
Admitted with asthma exacerbation, acute bronchitis. PMH remarkable for DM, Sjogren's, HTN, Asthma. Food/Nutrition Patient History:  Pt reports hx of taste alterations and loss of appetite. She indicates that she generally does not feel well enough to prep meals so she eats out. Frequency of po has decreased  Consuming po 0-1 times per day. She reports a UBW of 154# stating her weight really hasn't changed that much just the amount of food she consumes and her interest in eating. Unable to obtain a quantitative baseline intake. She has not tried glucerna secondary flavor delivered was vanilla and she does not care for vanilla. Receptive to trying a chocolate glucerna during my visit. Diet order(s): Consistent CHO. Anthropometrics:Height: 4' 11\" (149.9 cm),  Weight: 69.9 kg (154 lb), Body mass index is 31.1 kg/m². BMI class of overweight. WT / BMI 1/26/2019 12/13/2018 12/5/2018 10/31/2018 10/4/2018 WEIGHT 154 lb 154 lb 3.2 oz 153 lb 12.8 oz 152 lb 152 lb WT / BMI 9/6/2018 8/23/2018 8/2/2018 7/4/2018 7/3/2018 WEIGHT 151 lb 151 lb 151 lb 153 lb 152 lb 3.2 oz  
 
WT / BMI 7/1/2018 WEIGHT 151 lb No significant weight loss identified. Macronutrient needs: EER:  8493-6221 kcal /day (20-22 kcal/kg current BW) EPR:  45-54 grams protein/day (1-1.2 grams/kg ideal BW) Intake/Comparative Standards: Average intake for past 1 recorded meal(s): 75%. Pt recalls at noon meal she consumed the soup from the trays and her son ate her egg salad sandwich. Insufficient data to fully assess intake at this time. Nutrition Diagnosis: Predicted inadequate oral intake related to compromised appetite, taste alterations as evidenced by self report of barriers, recalls eating 0-1 meals per day at baseline recently. Intervention: 
Meals and snacks: Continue current diet. Nutrition supplement therapy: Glucerna TID - no vanilla. Coordination of nutrition care: Simeon Stark RN. Discharge Nutrition Plan: Too soon to determine. [place], [address], [place] [address]

## 2019-01-27 NOTE — CONSULTS
CONSULT NOTE    Sindhu Miles    1/27/2019    Date of Admission:  1/26/2019    The patient's chart is reviewed and the patient is discussed with the staff. Subjective: The patient is a 75 y.o.  female seen and evaluated at the request of Dr. Marlena Gibbs for evaluation of asthma/COPD. The pt is known to [facility] from a hospitalization and office appt in 2014. She was treated with decadron for an exacerbation of asthma but failed to follow up. She has also been diagnosed with Sjogrens syndrome, ANATOLY treated with nocturnal oxygen, HTN, DM, GERD, and obesity. She presented with increased wheezing, dyspnea, and cough associated with body aches and chest wall pain. Her joints also hurt but felt like her usual arthritis. She has been coughing up more mucus today that is not overtly purulent. She feels a little better today. Her influenza studies were negative. At one point she vomited while sitting up with no definite history to suggest aspiration. Review of Systems    Denies: fevers, chills, sweats,  anorexia, weight loss   Denies: blurry vision, loss of vision, eye pain, photophobia  Denies: hearing loss, ringing in the ears, earache, epistaxis  Denies: chest pain, palpitations, syncope, orthopnea, paroxysmal nocturnal dyspnea, claudication  Denies: dysphagia, odynophagia, nausea, vomiting, diarrhea, constipation, abdominal pain, jaundice, melena   Denies: frequency, dysuria, nocturia, urinary incontinence, stones, hematuria  Denies: polydipsia/polyuria, skin changes, temperature intolerance, unexpected weight gain  Denies: back pain, joint pain, joint swelling, muscle pain, muscle weakness  Denies: bleeding problems, blood transfusions, bruising, pallor, swollen lymph nodes  Denies: headache, dysarthria, blurred vision, diplopia, seizure, focal deficits.     Admits to: cough, wheezing, body aches, fatigue, malaise,          Patient Active Problem List Diagnosis Code    Hypercholesterolemia E78.00    ANATOLY (obstructive sleep apnea) G47.33    Hypertension I10    Asthma, intrinsic, without status asthmaticus J45.20    GERD (gastroesophageal reflux disease) K21.9    Contact dermatitis L25.9    Hoarseness R49.0    Chronic cough R05    Chest pain R07.9    Personal history of tobacco use, presenting hazards to health Z87.891    Bladder incontinence R32    Asthma J45.909    Arthritis M19.90    Obesity E66.9    Hypertensive heart disease without HF (heart failure) I11.9    Dermatophytosis B35.9    Peripheral polyneuropathy G62.9    Sjogren's syndrome (HCC) M35.00    Positive sm/RNP antibody R76.8    Controlled type 2 diabetes mellitus without complication, with long-term current use of insulin (HCC) E11.9, Z79.4    Type 2 diabetes mellitus with diabetic neuropathy (HCC) E11.40    Palpitations R00.2    Asthma exacerbation J45. Raymond D 25 L50.9    Acute bronchiolitis J21.9       Prior to Admission Medications   Prescriptions Last Dose Informant Patient Reported? Taking? EPINEPHrine (EPIPEN) 0.3 mg/0.3 mL injection   Yes Yes   Si.3 mg by IntraMUSCular route once as needed for Anaphylaxis. Insulin Needles, Disposable, (LETI PEN NEEDLE) 32 gauge x \" ndle   No Yes   Sig: Inject insulin once daily  DX E 11.9   PROAIR HFA 90 mcg/actuation inhaler   No Yes   Sig: Take 2 Puffs by inhalation every four (4) hours as needed for Wheezing. albuterol (PROVENTIL VENTOLIN) 2.5 mg /3 mL (0.083 %) nebulizer solution   No Yes   Sig: 3 mL by Nebulization route every four (4) hours as needed for Wheezing. amLODIPine (NORVASC) 10 mg tablet   No Yes   Sig: Take 1 Tab by mouth daily. aspirin delayed-release 81 mg tablet   Yes Yes   Sig: Take 81 mg by mouth daily. Take / use AM day of surgery  per anesthesia protocols. atorvastatin (LIPITOR) 40 mg tablet   No Yes   Sig: Take 1 Tab by mouth daily.    cetirizine (ZYRTEC) 10 mg tablet   Yes Yes   Sig: Take 10 mg by mouth daily. dulaglutide (TRULICITY) 1.5 EQ/4.7 mL sub-q pen   No Yes   Sig: INJECT ONE UNIT DOSE(0.5 ML) SUBCUTANEOUSLY ONCE EVERY 7 DAYS   fexofenadine (ALLEGRA) 180 mg tablet   Yes Yes   Sig: Take 180 mg by mouth daily. fluticasone (FLOVENT HFA) 110 mcg/actuation inhaler   No Yes   Sig: Take 1 Puff by inhalation every twelve (12) hours. folic acid (FOLVITE) 1 mg tablet   No Yes   Sig: Take 1 Tab by mouth daily. hydrOXYzine HCl (ATARAX) 10 mg tablet   Yes Yes   Sig: Take 30 mg by mouth nightly. hydroxychloroquine (PLAQUENIL) 200 mg tablet   No Yes   Sig: Take 1 Tab by mouth two (2) times daily (after meals). insulin glargine (TOUJEO SOLOSTAR U-300 INSULIN) 300 unit/mL (1.5 mL) inpn   No Yes   Si Units by SubCUTAneous route nightly. losartan-hydroCHLOROthiazide (HYZAAR) 100-12.5 mg per tablet   No Yes   Sig: Take 1 Tab by mouth daily. meloxicam (MOBIC) 15 mg tablet   No Yes   Sig: Take 1 pill once a day after food. nebivolol (BYSTOLIC) 5 mg tablet   No Yes   Sig: Take 1 Tab by mouth daily. Indications: hypertension   raNITIdine (ZANTAC) 150 mg tablet   Yes Yes   Sig: Take 150 mg by mouth two (2) times a day. Facility-Administered Medications: None       Past Medical History:   Diagnosis Date    Arrhythmia 3 - 4 weeks ago (2010)    Dr. Yony Valderrama?  ([address]) ran tests for heart speeding up/slowing down; (has occasional heart palpitations; no cardiologist 3/9/18)   [facility] Arthritis     Asthma     first told in ; inhaler daily; nebs prn    Bladder incontinence 10/24/2016    Chronic pain     Dermatophytosis     Diabetes ([facility] [address]     type 2; insulin meds; hasn't been checking bs lately; last A1C=6.8 on 18    Former smoker     GERD (gastroesophageal reflux disease) 2013    Heart failure ([address]     no cardiologist     Hypercholesterolemia     Hypertension     on med for control     Hypertensive heart disease without HF (heart failure)     [facility]mat[facility]     recent blood work was negative (3/9/18)    Non compliance with medical treatment     Obesity     ANATOLY (obstructive sleep apnea) 2013    no c-pap; uses oxygen prn at HS 2L/NC    Seizures ([facility]     last one about 2.5 yrs ago (noted 3/9/18); no meds    Stroke [facility]) 1979    MINI STROKE    Systemic lupus erythematosus ([facility] 2013    recent blood work negative (3/9/18)    Type 2 diabetes mellitus with hyperglycemia ([facility] 10/24/2016     Past Surgical History:   Procedure Laterality Date    HX APPENDECTOMY      HX BREAST LUMPECTOMY      benign    HX HAMMER TOE REPAIR      left    HX HEART CATHETERIZATION  1999    LHC:LV EF=75%. Normal coronary arteries.     HX HEENT      cataract removal right eye and skin removal off of lids    HX HYSTERECTOMY      HX OTHER SURGICAL      had all teeth removed    HX OTHER SURGICAL  03/15/2018    Throat    REV UPPER EYELID W EXCESS SKIN       Social History     Socioeconomic History    Marital status: SINGLE     Spouse name: Not on file    Number of children: 0    Years of education: Not on file    Highest education level: Not on file   Social Needs    Financial resource strain: Not on file    Food insecurity - worry: Not on file    Food insecurity - inability: Not on file   Edgar needs - medical: Not on file   Edgar needs - non-medical: Not on file   Occupational History    Occupation: [employer] [address] [employer]     Employer: NOT EMPLOYED     Comment: Denies industrial toxins    Occupation: Textiles     Comment: 6 yrs   Tobacco Use    Smoking status: Former Smoker     Packs/day: 0.50     Years: 12.00     Pack years: 6.00     Last attempt to quit: 10/14/1977     Years since quittin.3    Smokeless tobacco: Never Used   Substance and Sexual Activity    Alcohol use: No    Drug use: No    Sexual activity: No     Partners: Male     Birth control/protection: Surgical     Comment: sleeps alone for past 30 years,  in [address]    Other Topics Concern    Not on file   Social History Narrative    12-pack-year history of cigarette smoking, stopped smoking in 1975. Worked in [address] for 6 years and at [employer] for 2 years where she states she was not exposed to industrial toxins. , no children. Has always live in [facility]. No pets.       Family History   Problem Relation Age of Onset    Heart Disease Father     Bleeding Prob Brother     Diabetes Brother     Heart Disease Brother     Diabetes Brother     Cancer Brother     Heart Disease Mother     Diabetes Sister     Asthma Brother     Cancer Brother     Malignant Hyperthermia Neg Hx     Pseudocholinesterase Deficiency Neg Hx     Delayed Awakening Neg Hx     Post-op Nausea/Vomiting Neg Hx     Emergence Delirium Neg Hx     Post-op Cognitive Dysfunction Neg Hx     Other Neg Hx      Allergies   Allergen Reactions    Prilosec [Omeprazole] Rash    Penicillins Rash       Current Facility-Administered Medications   Medication Dose Route Frequency    [START ON 1/28/2019] levoFLOXacin (LEVAQUIN) tablet 750 mg  750 mg Oral Q48H    albuterol (PROVENTIL VENTOLIN) nebulizer solution 2.5 mg  2.5 mg Nebulization Q6H RT    amLODIPine (NORVASC) tablet 10 mg  10 mg Oral DAILY    aspirin delayed-release tablet 81 mg  81 mg Oral DAILY    atorvastatin (LIPITOR) tablet 40 mg  40 mg Oral DAILY    loratadine (CLARITIN) tablet 10 mg  10 mg Oral DAILY    budesonide (PULMICORT) 500 mcg/2 ml nebulizer suspension  1,000 mcg Inhalation BID RT    folic acid (FOLVITE) tablet 1 mg  1 mg Oral DAILY    hydroxychloroquine (PLAQUENIL) tablet 200 mg  200 mg Oral BIDPC    hydrOXYzine HCl (ATARAX) tablet 30 mg  30 mg Oral QHS    insulin glargine (LANTUS) injection 30 Units  30 Units SubCUTAneous QHS    meloxicam (MOBIC) tablet 15 mg  15 mg Oral DAILY    nebivolol (BYSTOLIC) tablet 5 mg  5 mg Oral DAILY    famotidine (PEPCID) tablet 20 mg  20 mg Oral BID    albuterol (PROVENTIL VENTOLIN) nebulizer solution 2.5 mg  2.5 mg Nebulization Q4H PRN    methylPREDNISolone (PF) (SOLU-MEDROL) injection 40 mg  40 mg IntraVENous Q8H    insulin lispro (HUMALOG) injection   SubCUTAneous AC&HS    sodium chloride (NS) flush 5-40 mL  5-40 mL IntraVENous Q8H    sodium chloride (NS) flush 5-40 mL  5-40 mL IntraVENous PRN    tuberculin injection 5 Units  5 Units IntraDERMal ONCE    acetaminophen (TYLENOL) tablet 650 mg  650 mg Oral Q4H PRN    montelukast (SINGULAIR) tablet 10 mg  10 mg Oral QHS    losartan/hydroCHLOROthiazide (HYZAAR) 100/12.5 mg   Oral DAILY         Objective:     Vitals:    01/27/19 0804 01/27/19 1157 01/27/19 1354 01/27/19 1442   BP: 132/76 124/78 124/77    Pulse: 100 87 88    Resp: 18 18 18    Temp: 99 °F (37.2 °C) 98.6 °F (37 °C) 98.4 °F (36.9 °C)    SpO2: 92% 93% 96% 97%   Weight:       Height:           PHYSICAL EXAM     Constitutional:  the patient is overweight and in no acute distress  EENMT:  Sclera clear, pupils equal, oral mucosa moist  Respiratory: decreased BS bilaterally with scattered fine wheezes  Cardiovascular:  RRR without M,G,R  Gastrointestinal: soft and non-tender; with positive bowel sounds. Musculoskeletal: warm without cyanosis. There is no lower leg edema. Skin:  no jaundice or rashes  Neurologic: no gross neuro deficits     Psychiatric:  alert and oriented x 3    CXR:            Recent Labs     01/27/19  0459 01/26/19  1315   WBC 7.1 7.2   HGB 13.3 13.9   HCT 42.4 43.2    261     Recent Labs     01/27/19  0459 01/26/19  1315    139   K 3.9 3.1*    105   * 215*   CO2 25 27   BUN 14 11   CREA 0.98 0.85   CA 8.7 8.7   TROIQ  --  <0.02*   ALB  --  3.5   TBILI  --  0.5   ALT  --  17   SGOT  --  12*     No results for input(s): PH, PCO2, PO2, HCO3 in the last 72 hours. No results for input(s): LCAD, LAC in the last 72 hours.     Assessment:  (Medical Decision Making) Hospital Problems  Date Reviewed: 12/13/2018          Codes Class Noted POA    Acute bronchiolitis ICD-10-CM: J21.9  ICD-9-CM: 466.19  1/27/2019 Yes    Now on steroids. Likely viral origin    * (Principal) Asthma exacerbation ICD-10-CM: J45.901  ICD-9-CM: 493.92  1/26/2019 Yes    Mild improvement since admit. Hives ICD-10-CM: L50.9  ICD-9-CM: 708.9  1/26/2019 Yes        Type 2 diabetes mellitus with diabetic neuropathy ([facility] ICD-10-CM: E11.40  ICD-9-CM: 250.60, 357.2  1/18/2018 Yes        Sjogren's syndrome ([facility] ICD-10-CM: M35.00  ICD-9-CM: 710.2  1/12/2017 Yes        Asthma ICD-10-CM: J45.909  ICD-9-CM: 493.90  10/24/2016 Yes    Still wheezing    Obesity ICD-10-CM: E66.9  ICD-9-CM: 278.00  Unknown Yes    Follow    Hypertension ICD-10-CM: I10  ICD-9-CM: 401.9  7/23/2013 Yes    Controlled. ANATOLY- has not been on CPAP. Plan:  (Medical Decision Making)     Continue steroids. Change nebs to QID to allow improved sleep. Control BS. Add mucinex. --    More than 50% of the time documented was spent in face-to-face contact with the patient and in the care of the patient on the floor/unit where the patient is located. Thank you very much for this referral.  We appreciate the opportunity to participate in this patient's care. Will follow along with above stated plan.     Pearl Bojorquez MD

## 2019-01-28 PROBLEM — E11.40 TYPE 2 DIABETES MELLITUS WITH DIABETIC NEUROPATHY (HCC): Chronic | Status: ACTIVE | Noted: 2018-01-18

## 2019-01-28 PROBLEM — M35.00 SJOGREN'S SYNDROME (HCC): Chronic | Status: ACTIVE | Noted: 2017-01-12

## 2019-01-28 LAB
ANION GAP SERPL CALC-SCNC: 7 MMOL/L (ref 7–16)
BUN SERPL-MCNC: 27 MG/DL (ref 8–23)
CALCIUM SERPL-MCNC: 9.1 MG/DL (ref 8.3–10.4)
CHLORIDE SERPL-SCNC: 107 MMOL/L (ref 98–107)
CO2 SERPL-SCNC: 27 MMOL/L (ref 21–32)
CREAT SERPL-MCNC: 1.14 MG/DL (ref 0.6–1)
ERYTHROCYTE [DISTWIDTH] IN BLOOD BY AUTOMATED COUNT: 13.9 % (ref 11.9–14.6)
GLUCOSE BLD STRIP.AUTO-MCNC: 284 MG/DL (ref 65–100)
GLUCOSE BLD STRIP.AUTO-MCNC: 293 MG/DL (ref 65–100)
GLUCOSE BLD STRIP.AUTO-MCNC: 296 MG/DL (ref 65–100)
GLUCOSE BLD STRIP.AUTO-MCNC: 301 MG/DL (ref 65–100)
GLUCOSE BLD STRIP.AUTO-MCNC: 375 MG/DL (ref 65–100)
GLUCOSE BLD STRIP.AUTO-MCNC: 423 MG/DL (ref 65–100)
GLUCOSE SERPL-MCNC: 336 MG/DL (ref 65–100)
HCT VFR BLD AUTO: 43.6 % (ref 35.8–46.3)
HGB BLD-MCNC: 13.6 G/DL (ref 11.7–15.4)
MCH RBC QN AUTO: 26.8 PG (ref 26.1–32.9)
MCHC RBC AUTO-ENTMCNC: 31.2 G/DL (ref 31.4–35)
MCV RBC AUTO: 86 FL (ref 79.6–97.8)
MM INDURATION POC: NORMAL MM (ref 0–5)
NRBC # BLD: 0 K/UL (ref 0–0.2)
PLATELET # BLD AUTO: 278 K/UL (ref 150–450)
PMV BLD AUTO: 12.2 FL (ref 9.4–12.3)
POTASSIUM SERPL-SCNC: 4.5 MMOL/L (ref 3.5–5.1)
PPD POC: NORMAL NEGATIVE
RBC # BLD AUTO: 5.07 M/UL (ref 4.05–5.2)
SODIUM SERPL-SCNC: 141 MMOL/L (ref 136–145)
WBC # BLD AUTO: 13.8 K/UL (ref 4.3–11.1)

## 2019-01-28 PROCEDURE — 82962 GLUCOSE BLOOD TEST: CPT

## 2019-01-28 PROCEDURE — 97535 SELF CARE MNGMENT TRAINING: CPT

## 2019-01-28 PROCEDURE — 97165 OT EVAL LOW COMPLEX 30 MIN: CPT

## 2019-01-28 PROCEDURE — 94640 AIRWAY INHALATION TREATMENT: CPT

## 2019-01-28 PROCEDURE — 77030012341 HC CHMB SPCR OPTC MDI VYRM -A

## 2019-01-28 PROCEDURE — 86738 MYCOPLASMA ANTIBODY: CPT

## 2019-01-28 PROCEDURE — 74011000250 HC RX REV CODE- 250: Performed by: INTERNAL MEDICINE

## 2019-01-28 PROCEDURE — 80048 BASIC METABOLIC PNL TOTAL CA: CPT

## 2019-01-28 PROCEDURE — 74011250636 HC RX REV CODE- 250/636: Performed by: INTERNAL MEDICINE

## 2019-01-28 PROCEDURE — 77010033678 HC OXYGEN DAILY

## 2019-01-28 PROCEDURE — 85027 COMPLETE CBC AUTOMATED: CPT

## 2019-01-28 PROCEDURE — 74011636637 HC RX REV CODE- 636/637: Performed by: INTERNAL MEDICINE

## 2019-01-28 PROCEDURE — 74011250637 HC RX REV CODE- 250/637: Performed by: INTERNAL MEDICINE

## 2019-01-28 PROCEDURE — 99232 SBSQ HOSP IP/OBS MODERATE 35: CPT | Performed by: INTERNAL MEDICINE

## 2019-01-28 PROCEDURE — 36415 COLL VENOUS BLD VENIPUNCTURE: CPT

## 2019-01-28 PROCEDURE — 94760 N-INVAS EAR/PLS OXIMETRY 1: CPT

## 2019-01-28 PROCEDURE — 65270000029 HC RM PRIVATE

## 2019-01-28 RX ORDER — FAMOTIDINE 20 MG/1
20 TABLET, FILM COATED ORAL DAILY
Status: DISCONTINUED | OUTPATIENT
Start: 2019-01-29 | End: 2019-01-30 | Stop reason: HOSPADM

## 2019-01-28 RX ORDER — INSULIN GLARGINE 100 [IU]/ML
40 INJECTION, SOLUTION SUBCUTANEOUS
Status: DISCONTINUED | OUTPATIENT
Start: 2019-01-28 | End: 2019-01-30 | Stop reason: HOSPADM

## 2019-01-28 RX ORDER — INSULIN LISPRO 100 [IU]/ML
10 INJECTION, SOLUTION INTRAVENOUS; SUBCUTANEOUS
Status: DISCONTINUED | OUTPATIENT
Start: 2019-01-28 | End: 2019-01-30 | Stop reason: HOSPADM

## 2019-01-28 RX ADMIN — LORATADINE 10 MG: 10 TABLET ORAL at 09:15

## 2019-01-28 RX ADMIN — FAMOTIDINE 20 MG: 20 TABLET ORAL at 09:16

## 2019-01-28 RX ADMIN — METHYLPREDNISOLONE SODIUM SUCCINATE 40 MG: 40 INJECTION, POWDER, FOR SOLUTION INTRAMUSCULAR; INTRAVENOUS at 22:48

## 2019-01-28 RX ADMIN — INSULIN LISPRO 6 UNITS: 100 INJECTION, SOLUTION INTRAVENOUS; SUBCUTANEOUS at 09:16

## 2019-01-28 RX ADMIN — NEBIVOLOL HYDROCHLORIDE 5 MG: 5 TABLET ORAL at 09:15

## 2019-01-28 RX ADMIN — GUAIFENESIN 1200 MG: 600 TABLET, EXTENDED RELEASE ORAL at 09:15

## 2019-01-28 RX ADMIN — ALBUTEROL SULFATE 2.5 MG: 2.5 SOLUTION RESPIRATORY (INHALATION) at 16:17

## 2019-01-28 RX ADMIN — Medication 10 ML: at 22:52

## 2019-01-28 RX ADMIN — AMLODIPINE BESYLATE 10 MG: 10 TABLET ORAL at 09:16

## 2019-01-28 RX ADMIN — ALBUTEROL SULFATE 2.5 MG: 2.5 SOLUTION RESPIRATORY (INHALATION) at 19:49

## 2019-01-28 RX ADMIN — ALBUTEROL SULFATE 2.5 MG: 2.5 SOLUTION RESPIRATORY (INHALATION) at 11:25

## 2019-01-28 RX ADMIN — HYDROXYCHLOROQUINE SULFATE 200 MG: 200 TABLET, FILM COATED ORAL at 09:16

## 2019-01-28 RX ADMIN — ASPIRIN 81 MG: 81 TABLET, COATED ORAL at 09:16

## 2019-01-28 RX ADMIN — MONTELUKAST SODIUM 10 MG: 10 TABLET, FILM COATED ORAL at 22:48

## 2019-01-28 RX ADMIN — HYDROXYZINE HYDROCHLORIDE 30 MG: 10 TABLET, FILM COATED ORAL at 22:48

## 2019-01-28 RX ADMIN — HYDROCHLOROTHIAZIDE: 12.5 CAPSULE ORAL at 09:15

## 2019-01-28 RX ADMIN — Medication 10 ML: at 14:00

## 2019-01-28 RX ADMIN — ALBUTEROL SULFATE 2.5 MG: 2.5 SOLUTION RESPIRATORY (INHALATION) at 07:59

## 2019-01-28 RX ADMIN — INSULIN LISPRO 20 UNITS: 100 INJECTION, SOLUTION INTRAVENOUS; SUBCUTANEOUS at 22:49

## 2019-01-28 RX ADMIN — HYDROXYCHLOROQUINE SULFATE 200 MG: 200 TABLET, FILM COATED ORAL at 18:01

## 2019-01-28 RX ADMIN — INSULIN GLARGINE 40 UNITS: 100 INJECTION, SOLUTION SUBCUTANEOUS at 22:49

## 2019-01-28 RX ADMIN — BUDESONIDE 500 MCG: 0.5 INHALANT RESPIRATORY (INHALATION) at 19:49

## 2019-01-28 RX ADMIN — INSULIN LISPRO 10 UNITS: 100 INJECTION, SOLUTION INTRAVENOUS; SUBCUTANEOUS at 17:57

## 2019-01-28 RX ADMIN — INSULIN LISPRO 6 UNITS: 100 INJECTION, SOLUTION INTRAVENOUS; SUBCUTANEOUS at 12:51

## 2019-01-28 RX ADMIN — ATORVASTATIN CALCIUM 40 MG: 40 TABLET, FILM COATED ORAL at 09:15

## 2019-01-28 RX ADMIN — INSULIN LISPRO 6 UNITS: 100 INJECTION, SOLUTION INTRAVENOUS; SUBCUTANEOUS at 17:57

## 2019-01-28 RX ADMIN — BUDESONIDE 1000 MCG: 0.5 INHALANT RESPIRATORY (INHALATION) at 07:59

## 2019-01-28 RX ADMIN — METHYLPREDNISOLONE SODIUM SUCCINATE 40 MG: 40 INJECTION, POWDER, FOR SOLUTION INTRAMUSCULAR; INTRAVENOUS at 15:19

## 2019-01-28 RX ADMIN — GUAIFENESIN 1200 MG: 600 TABLET, EXTENDED RELEASE ORAL at 22:48

## 2019-01-28 RX ADMIN — FOLIC ACID 1 MG: 1 TABLET ORAL at 09:15

## 2019-01-28 RX ADMIN — Medication 10 ML: at 06:16

## 2019-01-28 RX ADMIN — MELOXICAM 15 MG: 7.5 TABLET ORAL at 09:15

## 2019-01-28 RX ADMIN — METHYLPREDNISOLONE SODIUM SUCCINATE 40 MG: 40 INJECTION, POWDER, FOR SOLUTION INTRAMUSCULAR; INTRAVENOUS at 06:14

## 2019-01-28 RX ADMIN — LEVOFLOXACIN 750 MG: 500 TABLET, FILM COATED ORAL at 17:57

## 2019-01-28 NOTE — PROGRESS NOTES
Tessa Rich Admission Date: 1/26/2019 Daily Progress Note: 1/28/2019 The patient's chart is reviewed and the patient is discussed with the staff. Tessa Rich is a 64yoF with a PMH of Sjogren syndrome, ANATOLY, HTN, DM, GERD, obesity who was hospitalized in 2014 for asthma exacerbation. She was admitted to [facility] on 1/26 with dyspnea, cough, wheezing. Subjective:  
 
Currently 92% on 1lpm, 91% on RA. Leukocytosis increasing on steroids, currently at 40mg q8h of solumedrol Current Facility-Administered Medications Medication Dose Route Frequency  [START ON 1/29/2019] famotidine (PEPCID) tablet 20 mg  20 mg Oral DAILY  levoFLOXacin (LEVAQUIN) tablet 750 mg  750 mg Oral Q48H  
 guaiFENesin ER (MUCINEX) tablet 1,200 mg  1,200 mg Oral Q12H  
 albuterol (PROVENTIL VENTOLIN) nebulizer solution 2.5 mg  2.5 mg Nebulization QID RT  
 amLODIPine (NORVASC) tablet 10 mg  10 mg Oral DAILY  aspirin delayed-release tablet 81 mg  81 mg Oral DAILY  atorvastatin (LIPITOR) tablet 40 mg  40 mg Oral DAILY  loratadine (CLARITIN) tablet 10 mg  10 mg Oral DAILY  budesonide (PULMICORT) 500 mcg/2 ml nebulizer suspension  1,000 mcg Inhalation BID RT  
 folic acid (FOLVITE) tablet 1 mg  1 mg Oral DAILY  hydroxychloroquine (PLAQUENIL) tablet 200 mg  200 mg Oral BIDPC  hydrOXYzine HCl (ATARAX) tablet 30 mg  30 mg Oral QHS  insulin glargine (LANTUS) injection 30 Units  30 Units SubCUTAneous QHS  meloxicam (MOBIC) tablet 15 mg  15 mg Oral DAILY  nebivolol (BYSTOLIC) tablet 5 mg  5 mg Oral DAILY  albuterol (PROVENTIL VENTOLIN) nebulizer solution 2.5 mg  2.5 mg Nebulization Q4H PRN  
 methylPREDNISolone (PF) (SOLU-MEDROL) injection 40 mg  40 mg IntraVENous Q8H  
 insulin lispro (HUMALOG) injection   SubCUTAneous AC&HS  sodium chloride (NS) flush 5-40 mL  5-40 mL IntraVENous Q8H  
  sodium chloride (NS) flush 5-40 mL  5-40 mL IntraVENous PRN  
 acetaminophen (TYLENOL) tablet 650 mg  650 mg Oral Q4H PRN  
 montelukast (SINGULAIR) tablet 10 mg  10 mg Oral QHS  losartan/hydroCHLOROthiazide (HYZAAR) 100/12.5 mg   Oral DAILY Review of Systems Constitutional: negative for fever, chills, sweats Cardiovascular: negative for chest pain, palpitations, syncope, edema Gastrointestinal:  negative for dysphagia, reflux, vomiting, diarrhea, abdominal pain, or melena Neurologic:  negative for focal weakness, numbness, headache Objective:  
 
Vitals:  
 01/28/19 0744 01/28/19 0759 01/28/19 1043 01/28/19 1125 BP: 139/71  125/63 Pulse: 75  90 Resp: 20  18 Temp: 97.9 °F (36.6 °C)  98.7 °F (37.1 °C) SpO2: 96% 91% 96% 92% Weight:      
Height:      
 
Intake and Output:  
01/26 1901 - 01/28 0700 In: 480 [P.O.:480] Out: - No intake/output data recorded. Physical Exam:  
Constitution:  the patient is well developed and in no acute distress EENMT:  Sclera clear, pupils equal, oral mucosa moist 
Respiratory: No wheezing currently Cardiovascular:  RRR without M,G,R 
Gastrointestinal: soft and non-tender; with positive bowel sounds. Musculoskeletal: warm without cyanosis. There is no lower leg edema. Skin:  no jaundice or rashes, no wounds Neurologic: no gross neuro deficits Psychiatric:  alert and oriented x 3 CXR:  
 
 
LAB Recent Labs  
  01/28/19 
1107 01/28/19 
0744 01/28/19 
0625 01/27/19 2029 01/27/19 
1556 GLUCPOC 296* 284* 301* 277* 282* Recent Labs  
  01/28/19 
0554 01/27/19 
0459 01/26/19 
1315 WBC 13.8* 7.1 7.2 HGB 13.6 13.3 13.9 HCT 43.6 42.4 43.2  260 261 Recent Labs  
  01/28/19 
0554 01/27/19 
0459 01/26/19 
1315  140 139  
K 4.5 3.9 3.1*  
 106 105 CO2 27 25 27 * 275* 215* BUN 27* 14 11 CREA 1.14* 0.98 0.85 CA 9.1 8.7 8.7  
TROIQ  --   --  <0.02* ALB  --   --  3.5 TBILI  --   --  0.5 ALT  --   --  17 SGOT  --   --  12* No results for input(s): PH, PCO2, PO2, HCO3, PHI, PCO2I, PO2I, HCO3I in the last 72 hours. No results for input(s): LCAD, LAC in the last 72 hours. Assessment:  (Medical Decision Making) Hospital Problems  Date Reviewed: 1/28/2019 Codes Class Noted POA Acute bronchiolitis ICD-10-CM: J21.9 ICD-9-CM: 466.19  1/27/2019 Yes CXR suggestive of atypical pneumonia. Check mycoplasma ab, BNP  
 * (Principal) Asthma exacerbation ICD-10-CM: J45.901 ICD-9-CM: 493.92  1/26/2019 Yes Continue steroids, bronchodilators Hives ICD-10-CM: L50.9 ICD-9-CM: 708.9  1/26/2019 Yes Type 2 diabetes mellitus with diabetic neuropathy (HCC) (Chronic) ICD-10-CM: E11.40 ICD-9-CM: 250.60, 357.2  1/18/2018 Yes Taper steroids to help with hyperglycemia Sjogren's syndrome ([facility] [address] (Chronic) ICD-10-CM: M35.00 ICD-9-CM: 710.2  1/12/2017 Yes Obesity (Chronic) ICD-10-CM: C58.1 ICD-9-CM: 278.00  Unknown Yes ANATOLY (obstructive sleep apnea) (Chronic) ICD-10-CM: G47.33 
ICD-9-CM: 327.23  7/23/2013 Yes Hypertension (Chronic) ICD-10-CM: I10 
ICD-9-CM: 401.9  7/23/2013 Yes Personal history of tobacco use, presenting hazards to health (Chronic) ICD-10-CM: N28.684 ICD-9-CM: V15.82  7/23/2013 Yes Smoking cessation education Hypoxemia:  Currently on 1lpm 
 
Plan:  (Medical Decision Making) --check mycoplasma Ab, BNP based on CXR 
--continue bronchodilators, steroids which I tapered slightly 
--wean O2 as able. More than 50% of the time documented was spent in face-to-face contact with the patient and in the care of the patient on the floor/unit where the patient is located.  
 
Jose Sumner MD

## 2019-01-28 NOTE — PROGRESS NOTES
Per patient, her nebulizer is broken and she is needing a new one. Referral sent to [facility]. Awaiting response.

## 2019-01-28 NOTE — PROGRESS NOTES
Hospitalist Progress Note 2019 Admit Date: 2019  1:08 PM  
NAME: Romy Montgomery :  1945 MRN:  [id number] Attending: Omaira Parra MD 
PCP:  Ministerio Gardiner MD 
 
SUBJECTIVE:  
Patsy Barroso is a 76 yo F with history of asthma on prn nocturnal oxygen (uses on average 3x/week), Sjogren's syndrome (on hydroxychloroquine), hives (started last  after being placed on prilosec), and T2DM, admitted  with asthma exacerbation/acute bronchitis. States still short of breath and about the same as yesterday. Coughing up thick mucus. Denies other new concerns - seen with  and cousin at bedside. She reports feeling a little better. Still coughing, but less. Denies new concerns. Blood glucose elevated, likely from corticosteroids. Review of Systems negative with exception of pertinent positives noted above PHYSICAL EXAM  
 
Visit Vitals /71 (BP 1 Location: Left arm, BP Patient Position: At rest) Pulse 85 Temp 98.5 °F (36.9 °C) Resp 20 Ht 4' 11\" (1.499 m) Wt 69.9 kg (154 lb) SpO2 93% BMI 31.10 kg/m² Temp (24hrs), Av.3 °F (36.8 °C), Min:97.8 °F (36.6 °C), Max:98.7 °F (37.1 °C) Patient Vitals for the past 24 hrs: 
 Temp Pulse Resp BP SpO2  
19 1617     93 % 19 1555 98.5 °F (36.9 °C) 85 20 127/71 91 % 19 1125     92 % 19 1043 98.7 °F (37.1 °C) 90 18 125/63 96 % 19 0759     91 % 19 0744 97.9 °F (36.6 °C) 75 20 139/71 96 % 19 0504 97.8 °F (36.6 °C) 68 18 100/57 95 % 19 2318 98.5 °F (36.9 °C) 92 18 111/63 95 % 19     94 % 19 98.4 °F (36.9 °C) 88 18 117/75 93 % Oxygen Therapy O2 Sat (%): 93 % (19) Pulse via Oximetry: 81 beats per minute (19) O2 Device: Nasal cannula (19) O2 Flow Rate (L/min): 1 l/min (19) FIO2 (%): 28 % (19) No intake or output data in the 24 hours ending 01/28/19 0513 General: Less dyspneic appearing, elderly  
Lungs:  Bilateral wheeze but improved. Heart:  Regular rate and rhythm,  No murmur, rub, or gallop Abdomen: Soft, Non distended, Non tender, Positive bowel sounds Extremities: No cyanosis, clubbing or edema Neurologic:  No focal deficits Recent Results (from the past 24 hour(s)) GLUCOSE, POC Collection Time: 01/27/19  8:29 PM  
Result Value Ref Range Glucose (POC) 277 (H) 65 - 100 mg/dL PLEASE READ & DOCUMENT PPD TEST IN 24 HRS Collection Time: 01/27/19 10:00 PM  
Result Value Ref Range PPD  Negative  
 mm Induration  mm METABOLIC PANEL, BASIC Collection Time: 01/28/19  5:54 AM  
Result Value Ref Range Sodium 141 136 - 145 mmol/L Potassium 4.5 3.5 - 5.1 mmol/L Chloride 107 98 - 107 mmol/L  
 CO2 27 21 - 32 mmol/L Anion gap 7 7 - 16 mmol/L Glucose 336 (H) 65 - 100 mg/dL BUN 27 (H) 8 - 23 MG/DL Creatinine 1.14 (H) 0.6 - 1.0 MG/DL  
 GFR est AA 60 (L) >60 ml/min/1.73m2 GFR est non-AA 50 (L) >60 ml/min/1.73m2 Calcium 9.1 8.3 - 10.4 MG/DL  
CBC W/O DIFF Collection Time: 01/28/19  5:54 AM  
Result Value Ref Range WBC 13.8 (H) 4.3 - 11.1 K/uL  
 RBC 5.07 4.05 - 5.2 M/uL  
 HGB 13.6 11.7 - 15.4 g/dL HCT 43.6 35.8 - 46.3 % MCV 86.0 79.6 - 97.8 FL  
 MCH 26.8 26.1 - 32.9 PG  
 MCHC 31.2 (L) 31.4 - 35.0 g/dL  
 RDW 13.9 11.9 - 14.6 % PLATELET 028 990 - 893 K/uL MPV 12.2 9.4 - 12.3 FL ABSOLUTE NRBC 0.00 0.0 - 0.2 K/uL GLUCOSE, POC Collection Time: 01/28/19  6:25 AM  
Result Value Ref Range Glucose (POC) 301 (H) 65 - 100 mg/dL GLUCOSE, POC Collection Time: 01/28/19  7:44 AM  
Result Value Ref Range Glucose (POC) 284 (H) 65 - 100 mg/dL GLUCOSE, POC Collection Time: 01/28/19 11:07 AM  
Result Value Ref Range Glucose (POC) 296 (H) 65 - 100 mg/dL GLUCOSE, POC Collection Time: 01/28/19  4:35 PM  
Result Value Ref Range Glucose (POC) 293 (H) 65 - 100 mg/dL Imaging: XR CHEST PORT Final Result Impression: Unremarkable portable chest radiograph ASSESSMENT Hospital Problems as of 1/28/2019 Date Reviewed: 1/28/2019 Codes Class Noted - Resolved POA Acute bronchiolitis ICD-10-CM: J21.9 ICD-9-CM: 466.19  1/27/2019 - Present Yes * (Principal) Asthma exacerbation ICD-10-CM: J45.901 ICD-9-CM: 493.92  1/26/2019 - Present Yes Hives ICD-10-CM: L50.9 ICD-9-CM: 708.9  1/26/2019 - Present Yes Type 2 diabetes mellitus with diabetic neuropathy (HCC) (Chronic) ICD-10-CM: E11.40 ICD-9-CM: 250.60, 357.2  1/18/2018 - Present Yes Sjogren's syndrome ([address] (Chronic) ICD-10-CM: M35.00 ICD-9-CM: 710.2  1/12/2017 - Present Yes Asthma (Chronic) ICD-10-CM: J45.909 ICD-9-CM: 493.90  10/24/2016 - Present Obesity (Chronic) ICD-10-CM: D28.6 ICD-9-CM: 278.00  Unknown - Present Yes  
   
 ANATOLY (obstructive sleep apnea) (Chronic) ICD-10-CM: I50.32 
ICD-9-CM: 327.23  7/23/2013 - Present Yes Hypertension (Chronic) ICD-10-CM: I10 
ICD-9-CM: 401.9  7/23/2013 - Present Yes Personal history of tobacco use, presenting hazards to health (Chronic) ICD-10-CM: L91.833 ICD-9-CM: V15.82  7/23/2013 - Present Yes Plan: · Asthma exacerbation with acute bronchitis · Continue nebs. · Solumedrol weaned to q12. · Levaquin day 2 of 5 
· Continue mucinex. · Supplemental oxygen as needed- wean as tolerated. · Get oxygen qualifier as symptoms improve. · Appreciate pulm input- checking for mycoplasma. · T2DM- blood sugars elevated likely due to solumedrol. · Increase lantus to 40 units. · Start humalog 10 units with meals; continue SSI. · Sjogren's- continue plaquenil. · HTN- controlled. Continue hyzaar. Dispo- hopefully home within 48 hours. DVT Prophylaxis: SCDs Signed By: Alex Blanco MD   
 January 28, 2019

## 2019-01-28 NOTE — PROGRESS NOTES
Problem: Self Care Deficits Care Plan (Adult) Goal: *Acute Goals and Plan of Care (Insert Text) 1. Pt will toilet with SBA 2. Pt will complete functional mobility for ADLs with SBA 3. Pt will complete lower body dressing with SBA using AE as needed 4. Pt will complete grooming and hygiene at sink with SBA 5. Pt will demonstrate independence with HEP to promote increased BUE strength and functional use for ADLs 6. Pt will tolerate 23 minutes functional activity with min or fewer rest breaks to promote increased endurance for ADLs 7. Pt will independently demonstrate/ verbalize independence with 2+ Energy conservation techniques to promote increased endurance for ADLs Timeframe: 7 days OCCUPATIONAL THERAPY: Initial Assessment 1/28/2019INPATIENT: OT Visit Days: 1 Payor: [employer]US / Plan[place] [employer] / Product Type: [employer] Medicare /  
  
NAME/AGE/GENDER: Jannifer Boas is a 75 y.o. female PRIMARY DIAGNOSIS:  Asthma exacerbation Asthma exacerbation Asthma exacerbation Asthma exacerbation ICD-10: Treatment Diagnosis:  
 · Generalized Muscle Weakness (M62.81) Precautions/Allergies: 
   Prilosec [omeprazole] and Penicillins ASSESSMENT:  
Ms. Idris Valecnia was admitted from home with asthma/ COPD exacerbation, is on 2L NC home O2. Pt lives with her ex- and is independent at baseline, occasionally uses a cane but generally does not use an AD for mobility. Pt reports that she also has supportive friends/ neighbors. Pt endorses one fall within the last 6 months. This session, pt presented with deficits in endurance, mobility, balance, and strength impacting ADLs. Pt was initially unsteady with mobility and required CGA-min assistance for mobility around room and bathroom for ADLs w/ HHA, educated on use of RW and demonstrated mobility with SBA-CGA with RW.  Pt practiced using RW for ADLs with cues for functional approaches and hand placement during ADLs for fall prevention, demonstrated and verbalized good carryover. Pt donned socks with SBA, completed hygiene at sink with SBA. Pt reported mild fatigue with all activity. BUE strength is generally decreased, grossly 4- to 4/5 with < strength in shoulders d/t underlying arthritis. Pt is below her functional baseline and would benefit from skilled OT services to address deficits. This section established at most recent assessment PROBLEM LIST (Impairments causing functional limitations): 1. Decreased Strength 2. Decreased ADL/Functional Activities 3. Decreased Transfer Abilities 4. Decreased Balance 5. Decreased Activity Tolerance INTERVENTIONS PLANNED: (Benefits and precautions of occupational therapy have been discussed with the patient.) 1. Activities of daily living training 2. Adaptive equipment training 3. Balance training 4. Therapeutic activity 5. Therapeutic exercise TREATMENT PLAN: Frequency/Duration: Follow patient 3 times/ week to address above goals. Rehabilitation Potential For Stated Goals: Good RECOMMENDED REHABILITATION/EQUIPMENT: (at time of discharge pending progress): Due to the probability of continued deficits (see above) this patient will likely need continued skilled occupational therapy after discharge. Equipment:  
? rollator OCCUPATIONAL PROFILE AND HISTORY:  
History of Present Injury/Illness (Reason for Referral): 
See H&P Past Medical History/Comorbidities: [facility]  has a past medical history of Arrhythmia (3 - 4 weeks ago (9/2010)), Arthritis, Asthma, Bladder incontinence (10/24/2016), Chronic pain, Dermatophytosis, Diabetes ([address] Former smoker, GERD (gastroesophageal reflux disease) (7/23/2013), Heart failure ([address] Hypercholesterolemia, Hypertension, Hypertensive heart disease without HF (heart failure), Lupus (systemic lupus erythematosus) ([address] (2005), Non compliance with medical treatment, Obesity, ANATOLY (obstructive sleep apnea) (07/23/2013), Seizures ([facility] Stroke ([facility] (1979), Systemic lupus erythematosus ([facility] (07/23/2013), and Type 2 diabetes mellitus with hyperglycemia ([facility] (10/24/2016). She also has no past medical history of Adverse effect of anesthesia, Aneurysm ([address] CAD (coronary artery disease), Cancer ([address] Chronic kidney disease, Chronic obstructive pulmonary disease ([address] Coagulation disorder ([address] Difficult intubation, Endocarditis, Ill-defined condition, Liver disease, Malignant hyperthermia due to anesthesia, Nausea & vomiting, Nicotine vapor product user, Non-nicotine vapor product user, Pseudocholinesterase deficiency, Psychiatric disorder, PUD (peptic ulcer disease), Rheumatic fever, Thromboembolus ([address] or Thyroid disease. Ms. Scot Benjamin  has a past surgical history that includes hx hysterectomy (1974); hx breast lumpectomy (1998); hx appendectomy; hx hammer toe repair (2010); pr rev upper eyelid w excess skin; hx heent; hx other surgical; hx other surgical (03/15/2018); and hx heart catheterization (03/22/1999). Social History/Living Environment:  
Home Environment: Private residence # Steps to Enter: 2 One/Two Story Residence: One story Living Alone: No 
Support Systems: Family member(s), Friends \ neighbors Patient Expects to be Discharged to[de-identified] Private residence Current DME Used/Available at Home: Cane, straight, Shower chair, Safety frame [employer] Tub or Shower Type: Tub/Shower combination Prior Level of Function/Work/Activity: 
Independent, lives with ex-, occasionally uses cane for mobility Number of Personal Factors/Comorbidities that affect the Plan of Care: Brief history (0):  LOW COMPLEXITY ASSESSMENT OF OCCUPATIONAL PERFORMANCE[de-identified]  
Activities of Daily Living:  
Basic ADLs (From Assessment) Complex ADLs (From Assessment) Feeding: Independent Oral Facial Hygiene/Grooming: Stand-by assistance Bathing: Contact guard assistance Upper Body Dressing: Setup Lower Body Dressing: Contact guard assistance Toileting: Contact guard assistance Instrumental ADL Meal Preparation: Minimum assistance Homemaking: Minimum assistance Medication Management: Independent Financial Management: Independent Grooming/Bathing/Dressing Activities of Daily Living Grooming Grooming Assistance: Stand-by assistance Cognitive Retraining Safety/Judgement: Awareness of environment; Fall prevention Functional Transfers Bathroom Mobility: Contact guard assistance Lower Body Dressing Assistance Socks: Stand-by assistance Bed/Mat Mobility Supine to Sit: Stand-by assistance Sit to Stand: Contact guard assistance Bed to Chair: Contact guard assistance Scooting: Stand-by assistance Most Recent Physical Functioning:  
Gross Assessment: 
AROM: Within functional limits Strength: Generally decreased, functional 
Coordination: Within functional limits Posture: 
  
Balance: 
Sitting: Intact Standing: Impaired Standing - Static: Fair(+) Standing - Dynamic : Fair Bed Mobility: 
Supine to Sit: Stand-by assistance Scooting: Stand-by assistance Wheelchair Mobility: 
  
Transfers: 
Sit to Stand: Contact guard assistance Stand to Sit: Contact guard assistance Bed to Chair: Contact guard assistance Patient Vitals for the past 6 hrs: 
 BP BP Patient Position SpO2 O2 Flow Rate (L/min) Pulse 01/28/19 1043 125/63 At rest 96 %  90  
01/28/19 1125   92 % 1 l/min  Mental Status Neurologic State: Alert Orientation Level: Oriented X4 Cognition: Follows commands, Appropriate decision making, Appropriate for age attention/concentration Perception: Appears intact Perseveration: No perseveration noted Safety/Judgement: Awareness of environment, Fall prevention Physical Skills Involved: 
1. Balance 2. Strength 3.  Activity Tolerance Cognitive Skills Affected (resulting in the inability to perform in a timely and safe manner): 1. none Psychosocial Skills Affected: 1. Habits/Routines Number of elements that affect the Plan of Care: 3-5:  MODERATE COMPLEXITY CLINICAL DECISION MAKING:  
[employer] AM-PAC 6 Clicks Daily Activity Inpatient Short Form How much help from another person does the patient currently need. .. Total A Lot A Little None 1. Putting on and taking off regular lower body clothing? [] 1   [] 2   [x] 3   [] 4  
2. Bathing (including washing, rinsing, drying)? [] 1   [] 2   [x] 3   [] 4  
3. Toileting, which includes using toilet, bedpan or urinal?   [] 1   [] 2   [x] 3   [] 4  
4. Putting on and taking off regular upper body clothing? [] 1   [] 2   [] 3   [x] 4  
5. Taking care of personal grooming such as brushing teeth? [] 1   [] 2   [] 3   [x] 4  
6. Eating meals? [] 1   [] 2   [] 3   [x] 4  
© 2007, Trustees of [facility] [employer], under license to [employer]. All rights reserved Score:  Initial: 21 Most Recent: X (Date: -- ) Interpretation of Tool:  Represents activities that are increasingly more difficult (i.e. Bed mobility, Transfers, Gait). Medical Necessity:    
· Patient demonstrates good rehab potential due to higher previous functional level. Reason for Services/Other Comments: 
· Patient continues to require skilled intervention due to decreased ADLs and functional performance from baseline. Use of outcome tool(s) and clinical judgement create a POC that gives a: LOW COMPLEXITY  
 
 
 
TREATMENT:  
(In addition to Assessment/Re-Assessment sessions the following treatments were rendered) Pre-treatment Symptoms/Complaints:   
Pain: Initial:  
Pain Intensity 1: 0  Post Session:  0 Self Care: (8 min): Procedure(s) (per grid) utilized to improve and/or restore self-care/home management as related to dressing and grooming and functional mobility, fall prevention for ADLs.  Required minimal visual and verbal cueing to facilitate activities of daily living skills and compensatory activities. Braces/Orthotics/Lines/Etc:  
· O2 Device: Nasal cannula Treatment/Session Assessment:   
· Response to Treatment:  No adverse reaction · Interdisciplinary Collaboration:  
o Occupational Therapist 
o Registered Nurse · After treatment position/precautions:  
o Up in chair 
o Bed/Chair-wheels locked 
o Call light within reach 
o RN notified 
o Family at bedside · Compliance with Program/Exercises: Compliant most of the time. · Recommendations/Intent for next treatment session: \"Next visit will focus on advancements to more challenging activities and reduction in assistance provided\". Total Treatment Duration: OT Patient Time In/Time Out Time In: 6889 Time Out: 1430 Ivanna Schneider OT

## 2019-01-28 NOTE — PROGRESS NOTES
Initial visit by  to convey care and concern and encourage patient that  services are available if desired. Ms. Joanna Mcgowan welcomed the visit. Offered spiritual interventions, including affirmation of caio and prayer as requested. Provided business card for future reference. Ema Bernabe MDiv Board Certified [employer]

## 2019-01-28 NOTE — PROGRESS NOTES
Ilya Palm Admission Date: 1/26/2019 Daily Progress Note: 1/28/2019 The patient is a 75 y.o.  female seen and evaluated at the request of Dr. Fatoumata Mazariegos for evaluation of asthma/COPD. 
  
The pt is known to [facility] from a hospitalization and office appt in 2014. She was treated with decadron for an exacerbation of asthma but failed to follow up. She has also been diagnosed with Sjogrens syndrome, ANATOLY treated with nocturnal oxygen, HTN, DM, GERD, and obesity.  
  
She presented with increased wheezing, dyspnea, and cough associated with body aches and chest wall pain. Her joints also hurt but felt like her usual arthritis. Her influenza studies were negative. Subjective: On 1 lpm -sat 92%. Coughing, continues to wheeze but feels better. On NC Q HS. Tells me she hasn't seen pulmonary in years. Review of Systems Respiratory: positive for cough or dyspnea on exertion Current Facility-Administered Medications Medication Dose Route Frequency  [START ON 1/29/2019] famotidine (PEPCID) tablet 20 mg  20 mg Oral DAILY  levoFLOXacin (LEVAQUIN) tablet 750 mg  750 mg Oral Q48H  
 guaiFENesin ER (MUCINEX) tablet 1,200 mg  1,200 mg Oral Q12H  
 albuterol (PROVENTIL VENTOLIN) nebulizer solution 2.5 mg  2.5 mg Nebulization QID RT  
 amLODIPine (NORVASC) tablet 10 mg  10 mg Oral DAILY  aspirin delayed-release tablet 81 mg  81 mg Oral DAILY  atorvastatin (LIPITOR) tablet 40 mg  40 mg Oral DAILY  loratadine (CLARITIN) tablet 10 mg  10 mg Oral DAILY  budesonide (PULMICORT) 500 mcg/2 ml nebulizer suspension  1,000 mcg Inhalation BID RT  
 folic acid (FOLVITE) tablet 1 mg  1 mg Oral DAILY  hydroxychloroquine (PLAQUENIL) tablet 200 mg  200 mg Oral BIDPC  hydrOXYzine HCl (ATARAX) tablet 30 mg  30 mg Oral QHS  insulin glargine (LANTUS) injection 30 Units  30 Units SubCUTAneous QHS  meloxicam (MOBIC) tablet 15 mg  15 mg Oral DAILY  nebivolol (BYSTOLIC) tablet 5 mg  5 mg Oral DAILY  albuterol (PROVENTIL VENTOLIN) nebulizer solution 2.5 mg  2.5 mg Nebulization Q4H PRN  
 methylPREDNISolone (PF) (SOLU-MEDROL) injection 40 mg  40 mg IntraVENous Q8H  
 insulin lispro (HUMALOG) injection   SubCUTAneous AC&HS  sodium chloride (NS) flush 5-40 mL  5-40 mL IntraVENous Q8H  
 sodium chloride (NS) flush 5-40 mL  5-40 mL IntraVENous PRN  
 acetaminophen (TYLENOL) tablet 650 mg  650 mg Oral Q4H PRN  
 montelukast (SINGULAIR) tablet 10 mg  10 mg Oral QHS  losartan/hydroCHLOROthiazide (HYZAAR) 100/12.5 mg   Oral DAILY Objective:  
 
Vitals:  
 01/28/19 0744 01/28/19 0759 01/28/19 1043 01/28/19 1125 BP: 139/71  125/63 Pulse: 75  90 Resp: 20  18 Temp: 97.9 °F (36.6 °C)  98.7 °F (37.1 °C) SpO2: 96% 91% 96% 92% Weight:      
Height:      
 
Intake and Output:  
01/26 1901 - 01/28 0700 In: 480 [P.O.:480] Out: - No intake/output data recorded. Physical Exam:         
Constitutional: the patient is well develop HEENT: Sclera clear, pupils equal, oral mucosa moist 
Lungs: few scattered diffuse wheezes on NC Cardiovascular: RRR without M,G,R 
Abd/GI: soft and non-tender; with positive bowel sounds. rounded Ext: warm without cyanosis. There is no lower leg edema. Musculoskeletal: moves all four extremities with equal strength Skin: no jaundice or rashes, no wounds Neuro: no gross neuro deficits Lines/Drains: peripheral IV Nutrition:  ADA CHEST XRAY:  
 
 
LAB Recent Labs  
  01/28/19 
0554 01/27/19 
0459 01/26/19 
1315 WBC 13.8* 7.1 7.2 HGB 13.6 13.3 13.9 HCT 43.6 42.4 43.2  260 261 Recent Labs  
  01/28/19 
0554 01/27/19 
0459 01/26/19 
1315  140 139  
K 4.5 3.9 3.1*  
 106 105 CO2 27 25 27 * 275* 215* BUN 27* 14 11 CREA 1.14* 0.98 0.85 TROIQ  --   --  <0.02* Assessment:  
 
Patient Active Problem List  
Diagnosis Code  Hypercholesterolemia E78.00  
 ANATOLY (obstructive sleep apnea) G47.33  
 Hypertension I10  
 Asthma, intrinsic, without status asthmaticus J45.20  GERD (gastroesophageal reflux disease) K21.9  Contact dermatitis L25.9  
 Hoarseness R49.0  Chronic cough R05  Chest pain R07.9  Personal history of tobacco use, presenting hazards to health S02.956  Bladder incontinence R32  Asthma J45.909  Arthritis M19.90  Obesity E66.9  Hypertensive heart disease without HF (heart failure) I11.9  Dermatophytosis B35.9  Peripheral polyneuropathy G62.9  Sjogren's syndrome ([facility] M35.00  Positive sm/RNP antibody R76.8  Controlled type 2 diabetes mellitus without complication, with long-term current use of insulin (HCC) E11.9, Z79.4  Type 2 diabetes mellitus with diabetic neuropathy (HCC) E11.40  Palpitations R00.2  Asthma exacerbation J45. [address]  Hives L50.9  Acute bronchiolitis J21.9 Plan Hospital Problems  Date Reviewed: 12/13/2018 Codes Class Noted POA Acute bronchiolitis ICD-10-CM: J21.9 ICD-9-CM: 466.19  1/27/2019 Yes * (Principal) Asthma exacerbation ICD-10-CM: J45.901 ICD-9-CM: 493.92  1/26/2019 Yes Possible COPD with hx tobacco abuse Eosinophils WNL Hives ICD-10-CM: L50.9 ICD-9-CM: 708.9  1/26/2019 Yes Type 2 diabetes mellitus with diabetic neuropathy (HCC) ICD-10-CM: E11.40 ICD-9-CM: 250.60, 357.2  1/18/2018 Yes Sjogren's syndrome ([facility] ICD-10-CM: M35.00 ICD-9-CM: 710.2  1/12/2017 Yes Obesity ICD-10-CM: E66.9 ICD-9-CM: 278.00  Unknown Yes ANATOLY (obstructive sleep apnea) ICD-10-CM: G47.33 
ICD-9-CM: 327.23  7/23/2013 Yes ANATOLY- has not been on CPAP. Hypertension ICD-10-CM: I10 
ICD-9-CM: 401.9  7/23/2013 Yes  
   
  
 
-- solumedrol 40 mg Q 8 hours -- claritin, atarax, singulair all ordered 
-- albuterol and pulmicort 
-- also on plaquenil -- levaquin with bronchitis -- will need outpatient PFT and follow up in our office YANNA Nelson More than 50% of time documented was spent in face-to-face contact with the patient and in the care of the patient on the floor/unit where the patient is located.

## 2019-01-28 NOTE — PROGRESS NOTES
Chart screened by  for discharge planning. No needs identified at this time but CM will follow therapy recommendations. Please consult  if any new issues arise. Care Management Interventions PCP Verified by CM: Yes Transition of Care Consult (CM Consult): Discharge Planning Physical Therapy Consult: Yes Occupational Therapy Consult: Yes Current Support Network: Own Home Confirm Follow Up Transport: Family Plan discussed with Pt/Family/Caregiver: Yes Freedom of Choice Offered: Yes Discharge Location Discharge Placement: Home

## 2019-01-28 NOTE — PROGRESS NOTES
Problem: Asthma: Day 1 Goal: Medications Outcome: Progressing Towards Goal 
Reviewed side effects of Pulmicort, reminded to rinse mouth after administration

## 2019-01-29 ENCOUNTER — HOME HEALTH ADMISSION (OUTPATIENT)
Dept: HOME HEALTH SERVICES | Facility: HOME HEALTH | Age: 74
End: 2019-01-29
Payer: MEDICARE

## 2019-01-29 LAB
ANION GAP SERPL CALC-SCNC: 8 MMOL/L (ref 7–16)
BNP SERPL-MCNC: 66 PG/ML
BUN SERPL-MCNC: 28 MG/DL (ref 8–23)
CALCIUM SERPL-MCNC: 8.9 MG/DL (ref 8.3–10.4)
CHLORIDE SERPL-SCNC: 105 MMOL/L (ref 98–107)
CO2 SERPL-SCNC: 28 MMOL/L (ref 21–32)
CREAT SERPL-MCNC: 0.99 MG/DL (ref 0.6–1)
ERYTHROCYTE [DISTWIDTH] IN BLOOD BY AUTOMATED COUNT: 14 % (ref 11.9–14.6)
EST. AVERAGE GLUCOSE BLD GHB EST-MCNC: 189 MG/DL
GLUCOSE BLD STRIP.AUTO-MCNC: 196 MG/DL (ref 65–100)
GLUCOSE BLD STRIP.AUTO-MCNC: 260 MG/DL (ref 65–100)
GLUCOSE BLD STRIP.AUTO-MCNC: 267 MG/DL (ref 65–100)
GLUCOSE BLD STRIP.AUTO-MCNC: 304 MG/DL (ref 65–100)
GLUCOSE SERPL-MCNC: 194 MG/DL (ref 65–100)
HBA1C MFR BLD: 8.2 % (ref 4.8–6)
HCT VFR BLD AUTO: 43.5 % (ref 35.8–46.3)
HGB BLD-MCNC: 13.8 G/DL (ref 11.7–15.4)
MCH RBC QN AUTO: 27.2 PG (ref 26.1–32.9)
MCHC RBC AUTO-ENTMCNC: 31.7 G/DL (ref 31.4–35)
MCV RBC AUTO: 85.8 FL (ref 79.6–97.8)
NRBC # BLD: 0 K/UL (ref 0–0.2)
PLATELET # BLD AUTO: 283 K/UL (ref 150–450)
PMV BLD AUTO: 11.9 FL (ref 9.4–12.3)
POTASSIUM SERPL-SCNC: 4.2 MMOL/L (ref 3.5–5.1)
RBC # BLD AUTO: 5.07 M/UL (ref 4.05–5.2)
SODIUM SERPL-SCNC: 141 MMOL/L (ref 136–145)
WBC # BLD AUTO: 12.7 K/UL (ref 4.3–11.1)

## 2019-01-29 PROCEDURE — 94760 N-INVAS EAR/PLS OXIMETRY 1: CPT

## 2019-01-29 PROCEDURE — 97161 PT EVAL LOW COMPLEX 20 MIN: CPT

## 2019-01-29 PROCEDURE — 80048 BASIC METABOLIC PNL TOTAL CA: CPT

## 2019-01-29 PROCEDURE — 36415 COLL VENOUS BLD VENIPUNCTURE: CPT

## 2019-01-29 PROCEDURE — 94761 N-INVAS EAR/PLS OXIMETRY MLT: CPT

## 2019-01-29 PROCEDURE — 97530 THERAPEUTIC ACTIVITIES: CPT

## 2019-01-29 PROCEDURE — 74011000250 HC RX REV CODE- 250: Performed by: INTERNAL MEDICINE

## 2019-01-29 PROCEDURE — 82962 GLUCOSE BLOOD TEST: CPT

## 2019-01-29 PROCEDURE — 94640 AIRWAY INHALATION TREATMENT: CPT

## 2019-01-29 PROCEDURE — 83036 HEMOGLOBIN GLYCOSYLATED A1C: CPT

## 2019-01-29 PROCEDURE — 74011636637 HC RX REV CODE- 636/637: Performed by: INTERNAL MEDICINE

## 2019-01-29 PROCEDURE — 74011250636 HC RX REV CODE- 250/636: Performed by: INTERNAL MEDICINE

## 2019-01-29 PROCEDURE — 74011250637 HC RX REV CODE- 250/637: Performed by: INTERNAL MEDICINE

## 2019-01-29 PROCEDURE — 99232 SBSQ HOSP IP/OBS MODERATE 35: CPT | Performed by: INTERNAL MEDICINE

## 2019-01-29 PROCEDURE — 77010033678 HC OXYGEN DAILY

## 2019-01-29 PROCEDURE — 65270000029 HC RM PRIVATE

## 2019-01-29 PROCEDURE — 85027 COMPLETE CBC AUTOMATED: CPT

## 2019-01-29 PROCEDURE — 83880 ASSAY OF NATRIURETIC PEPTIDE: CPT

## 2019-01-29 RX ORDER — PREDNISONE 20 MG/1
40 TABLET ORAL
Status: DISCONTINUED | OUTPATIENT
Start: 2019-01-30 | End: 2019-01-30 | Stop reason: HOSPADM

## 2019-01-29 RX ADMIN — HYDROXYZINE HYDROCHLORIDE 30 MG: 10 TABLET, FILM COATED ORAL at 22:14

## 2019-01-29 RX ADMIN — GUAIFENESIN 1200 MG: 600 TABLET, EXTENDED RELEASE ORAL at 08:49

## 2019-01-29 RX ADMIN — ASPIRIN 81 MG: 81 TABLET, COATED ORAL at 08:49

## 2019-01-29 RX ADMIN — METHYLPREDNISOLONE SODIUM SUCCINATE 40 MG: 40 INJECTION, POWDER, FOR SOLUTION INTRAMUSCULAR; INTRAVENOUS at 08:49

## 2019-01-29 RX ADMIN — MELOXICAM 15 MG: 7.5 TABLET ORAL at 08:48

## 2019-01-29 RX ADMIN — HYDROXYCHLOROQUINE SULFATE 200 MG: 200 TABLET, FILM COATED ORAL at 17:11

## 2019-01-29 RX ADMIN — Medication 10 ML: at 06:27

## 2019-01-29 RX ADMIN — INSULIN LISPRO 6 UNITS: 100 INJECTION, SOLUTION INTRAVENOUS; SUBCUTANEOUS at 11:30

## 2019-01-29 RX ADMIN — HYDROXYCHLOROQUINE SULFATE 200 MG: 200 TABLET, FILM COATED ORAL at 08:47

## 2019-01-29 RX ADMIN — HYDROCHLOROTHIAZIDE: 12.5 CAPSULE ORAL at 08:47

## 2019-01-29 RX ADMIN — INSULIN LISPRO 10 UNITS: 100 INJECTION, SOLUTION INTRAVENOUS; SUBCUTANEOUS at 08:50

## 2019-01-29 RX ADMIN — INSULIN LISPRO 2 UNITS: 100 INJECTION, SOLUTION INTRAVENOUS; SUBCUTANEOUS at 08:50

## 2019-01-29 RX ADMIN — ALBUTEROL SULFATE 2.5 MG: 2.5 SOLUTION RESPIRATORY (INHALATION) at 11:21

## 2019-01-29 RX ADMIN — ALBUTEROL SULFATE 2.5 MG: 2.5 SOLUTION RESPIRATORY (INHALATION) at 19:26

## 2019-01-29 RX ADMIN — BUDESONIDE 500 MCG: 0.5 INHALANT RESPIRATORY (INHALATION) at 19:25

## 2019-01-29 RX ADMIN — ALBUTEROL SULFATE 2.5 MG: 2.5 SOLUTION RESPIRATORY (INHALATION) at 16:06

## 2019-01-29 RX ADMIN — INSULIN LISPRO 8 UNITS: 100 INJECTION, SOLUTION INTRAVENOUS; SUBCUTANEOUS at 22:14

## 2019-01-29 RX ADMIN — NEBIVOLOL HYDROCHLORIDE 5 MG: 5 TABLET ORAL at 08:49

## 2019-01-29 RX ADMIN — Medication 10 ML: at 22:34

## 2019-01-29 RX ADMIN — LORATADINE 10 MG: 10 TABLET ORAL at 08:49

## 2019-01-29 RX ADMIN — INSULIN GLARGINE 40 UNITS: 100 INJECTION, SOLUTION SUBCUTANEOUS at 22:14

## 2019-01-29 RX ADMIN — Medication: at 22:14

## 2019-01-29 RX ADMIN — ATORVASTATIN CALCIUM 40 MG: 40 TABLET, FILM COATED ORAL at 08:49

## 2019-01-29 RX ADMIN — FAMOTIDINE 20 MG: 20 TABLET ORAL at 08:49

## 2019-01-29 RX ADMIN — METHYLPREDNISOLONE SODIUM SUCCINATE 40 MG: 40 INJECTION, POWDER, FOR SOLUTION INTRAMUSCULAR; INTRAVENOUS at 22:14

## 2019-01-29 RX ADMIN — AMLODIPINE BESYLATE 10 MG: 10 TABLET ORAL at 08:49

## 2019-01-29 RX ADMIN — INSULIN LISPRO 6 UNITS: 100 INJECTION, SOLUTION INTRAVENOUS; SUBCUTANEOUS at 17:12

## 2019-01-29 RX ADMIN — FOLIC ACID 1 MG: 1 TABLET ORAL at 08:48

## 2019-01-29 RX ADMIN — BUDESONIDE 500 MCG: 0.5 INHALANT RESPIRATORY (INHALATION) at 07:33

## 2019-01-29 RX ADMIN — INSULIN LISPRO 10 UNITS: 100 INJECTION, SOLUTION INTRAVENOUS; SUBCUTANEOUS at 11:29

## 2019-01-29 RX ADMIN — INSULIN LISPRO 10 UNITS: 100 INJECTION, SOLUTION INTRAVENOUS; SUBCUTANEOUS at 17:11

## 2019-01-29 RX ADMIN — GUAIFENESIN 1200 MG: 600 TABLET, EXTENDED RELEASE ORAL at 22:14

## 2019-01-29 RX ADMIN — MONTELUKAST SODIUM 10 MG: 10 TABLET, FILM COATED ORAL at 22:14

## 2019-01-29 RX ADMIN — ALBUTEROL SULFATE 2.5 MG: 2.5 SOLUTION RESPIRATORY (INHALATION) at 07:34

## 2019-01-29 NOTE — PROGRESS NOTES
Hospitalist Progress Note 2019 Admit Date: 2019  1:08 PM  
NAME: Renuka Brower :  1945 MRN:  [id number] Attending: Adams Sesay MD 
PCP:  Slava Pritchard MD 
 
SUBJECTIVE:  
Jannelle Leyden is a 76 yo F with history of asthma on prn nocturnal oxygen (uses on average 3x/week), Sjogren's syndrome (on hydroxychloroquine), hives (started last  after being placed on prilosec), and T2DM, admitted  with asthma exacerbation/acute bronchitis. She was started on levaquin on admission. Slow to improve. Pulm following and has ordered mycoplasm antibodies. - seen with  and son at bedside. She reports feeling better overall. Less coughing today. Room air sats ambulating okay. Pulmonology planning on solumedrol through today and transitioning to prednisone tomorrow. Review of Systems negative with exception of pertinent positives noted above PHYSICAL EXAM  
 
Visit Vitals /73 (BP 1 Location: Left arm, BP Patient Position: At rest) Pulse 74 Temp 98.6 °F (37 °C) Resp 18 Ht 4' 11\" (1.499 m) Wt 69.9 kg (154 lb) SpO2 99% BMI 31.10 kg/m² Temp (24hrs), Av.5 °F (36.9 °C), Min:98.3 °F (36.8 °C), Max:98.6 °F (37 °C) Patient Vitals for the past 24 hrs: 
 Temp Pulse Resp BP SpO2  
19 1517 98.6 °F (37 °C) 74 18 117/73 99 % 19 1123     97 % 19 1052 98.4 °F (36.9 °C) 71 20 122/80 98 % 19 0938  82   96 % 19 0735     97 % 19 0726 98.4 °F (36.9 °C) 66 18 129/77 99 % 19 0402 98.6 °F (37 °C) 65 20 117/73 96 % 19 2255 98.5 °F (36.9 °C) 83 20 133/78 93 % 19 1949     95 % 19 1902 98.3 °F (36.8 °C) 88 20 128/65 94 % Oxygen Therapy O2 Sat (%): 99 % (19 1517) Pulse via Oximetry: 79 beats per minute (19 1123) O2 Device: Room air (19 1607) O2 Flow Rate (L/min): 2 l/min (19 1123) FIO2 (%): 28 % (19 1810) No intake or output data in the 24 hours ending 01/29/19 1802 General: Appears significantly improved.  
Lungs:  Slight wheeze but much improved. Heart:  Regular rate and rhythm,  No murmur, rub, or gallop Abdomen: Soft, Non distended, Non tender, Positive bowel sounds Extremities: No cyanosis, clubbing or edema Neurologic:  No focal deficits Recent Results (from the past 24 hour(s)) GLUCOSE, POC Collection Time: 01/28/19  8:26 PM  
Result Value Ref Range Glucose (POC) 423 (H) 65 - 100 mg/dL PLEASE READ & DOCUMENT PPD TEST IN 48 HRS Collection Time: 01/28/19 10:56 PM  
Result Value Ref Range PPD  Negative  
 mm Induration  mm GLUCOSE, POC Collection Time: 01/28/19 11:40 PM  
Result Value Ref Range Glucose (POC) 375 (H) 65 - 100 mg/dL CBC W/O DIFF Collection Time: 01/29/19  6:06 AM  
Result Value Ref Range WBC 12.7 (H) 4.3 - 11.1 K/uL  
 RBC 5.07 4.05 - 5.2 M/uL  
 HGB 13.8 11.7 - 15.4 g/dL HCT 43.5 35.8 - 46.3 % MCV 85.8 79.6 - 97.8 FL  
 MCH 27.2 26.1 - 32.9 PG  
 MCHC 31.7 31.4 - 35.0 g/dL  
 RDW 14.0 11.9 - 14.6 % PLATELET 605 017 - 131 K/uL MPV 11.9 9.4 - 12.3 FL ABSOLUTE NRBC 0.00 0.0 - 0.2 K/uL METABOLIC PANEL, BASIC Collection Time: 01/29/19  6:06 AM  
Result Value Ref Range Sodium 141 136 - 145 mmol/L Potassium 4.2 3.5 - 5.1 mmol/L Chloride 105 98 - 107 mmol/L  
 CO2 28 21 - 32 mmol/L Anion gap 8 7 - 16 mmol/L Glucose 194 (H) 65 - 100 mg/dL BUN 28 (H) 8 - 23 MG/DL Creatinine 0.99 0.6 - 1.0 MG/DL  
 GFR est AA >60 >60 ml/min/1.73m2 GFR est non-AA 58 (L) >60 ml/min/1.73m2 Calcium 8.9 8.3 - 10.4 MG/DL  
HEMOGLOBIN A1C WITH EAG Collection Time: 01/29/19  6:06 AM  
Result Value Ref Range Hemoglobin A1c 8.2 (H) 4.8 - 6.0 % Est. average glucose 189 mg/dL BNP Collection Time: 01/29/19  6:06 AM  
Result Value Ref Range BNP 66 (H) 0 pg/mL GLUCOSE, POC  Collection Time: 01/29/19  7:48 AM  
 Result Value Ref Range Glucose (POC) 196 (H) 65 - 100 mg/dL GLUCOSE, POC Collection Time: 01/29/19 11:15 AM  
Result Value Ref Range Glucose (POC) 260 (H) 65 - 100 mg/dL GLUCOSE, POC Collection Time: 01/29/19  4:49 PM  
Result Value Ref Range Glucose (POC) 267 (H) 65 - 100 mg/dL Imaging: XR CHEST PORT Final Result Impression: Unremarkable portable chest radiograph ASSESSMENT Hospital Problems as of 1/29/2019 Date Reviewed: 1/28/2019 Codes Class Noted - Resolved POA Acute bronchiolitis ICD-10-CM: J21.9 ICD-9-CM: 466.19  1/27/2019 - Present Yes * (Principal) Asthma exacerbation ICD-10-CM: J45.901 ICD-9-CM: 493.92  1/26/2019 - Present Yes Hives ICD-10-CM: L50.9 ICD-9-CM: 708.9  1/26/2019 - Present Yes Type 2 diabetes mellitus with diabetic neuropathy (HCC) (Chronic) ICD-10-CM: E11.40 ICD-9-CM: 250.60, 357.2  1/18/2018 - Present Yes Sjogren's syndrome ([address] (Chronic) ICD-10-CM: M35.00 ICD-9-CM: 710.2  1/12/2017 - Present Yes Asthma (Chronic) ICD-10-CM: J45.909 ICD-9-CM: 493.90  10/24/2016 - Present Obesity (Chronic) ICD-10-CM: R91.9 ICD-9-CM: 278.00  Unknown - Present Yes  
   
 ANATOLY (obstructive sleep apnea) (Chronic) ICD-10-CM: S89.78 
ICD-9-CM: 327.23  7/23/2013 - Present Yes Hypertension (Chronic) ICD-10-CM: I10 
ICD-9-CM: 401.9  7/23/2013 - Present Yes Personal history of tobacco use, presenting hazards to health (Chronic) ICD-10-CM: Z60.402 ICD-9-CM: V15.82  7/23/2013 - Present Yes Plan: · Asthma exacerbation with acute bronchitis · Continue nebs. · Solumedrol weaned to q12. · Levaquin day q 48 hours (EOT 2/1) · Continue mucinex. · Supplemental oxygen as needed- wean as tolerated. · Oxygen qualifier today okay. · Appreciate pulm input- mycoplasma pending · T2DM- blood sugars elevated likely due to solumedrol. · Continue lantus 40 units. · Start humalog 10 units with meals; continue SSI. · Anticipate blood sugars with improve with steroid taper. · Sjogren's- continue plaquenil. · HTN- controlled. Continue hyzaar. Dispo- hopefully home 1/30. DVT Prophylaxis: SCDs Signed By: Amelia Fletcher MD   
 January 29, 2019

## 2019-01-29 NOTE — PROGRESS NOTES
Problem: Falls - Risk of 
Goal: *Absence of Falls Document Emmanuel Shows Fall Risk and appropriate interventions in the flowsheet. Outcome: Progressing Towards Goal 
Fall Risk Interventions: 
Mobility Interventions: Bed/chair exit alarm, OT consult for ADLs, Patient to call before getting OOB, PT Consult for mobility concerns Medication Interventions: Bed/chair exit alarm, Patient to call before getting OOB, Teach patient to arise slowly Elimination Interventions: Bed/chair exit alarm, Call light in reach, Patient to call for help with toileting needs, Toileting schedule/hourly rounds History of Falls Interventions: Consult care management for discharge planning, Door open when patient unattended, Room close to nurse's station

## 2019-01-29 NOTE — PROGRESS NOTES
[facility] Face to Face Encounter Patients Name: Richard Dominique    YOB: 1945 Ordering Physician: Dr. Vee Waldrop Primary Diagnosis: Asthma exacerbation Asthma exacerbation Date of Face to Face:   1/29/2019 Face to Face Encounter findings are related to primary reason for home care:   yes. 1. I certify that the patient needs intermittent care as follows: skilled nursing care:  skilled observation/assessment, patient education 
physical therapy: strengthening, stretching/ROM, transfer training, gait/stair training, balance training and pt/caregiver education 
occupational therapy:  ADL safety (ie. cooking, bathing, dressing), ROM and pt/caregiver education 2. I certify that this patient is homebound, that is: 1) patient requires the use of a walker device, special transportation, or assistance of another to leave the home; or 2) patient's condition makes leaving the home medically contraindicated; and 3) patient has a normal inability to leave the home and leaving the home requires considerable and taxing effort. Patient may leave the home for infrequent and short duration for medical reasons, and occasional absences for non-medical reasons. Homebound status is due to the following functional limitations: Patient with strength deficits limiting the performance of all ADL's without caregiver assistance or the use of an assistive device. 3. I certify that this patient is under my care and that I, or a nurse practitioner or  [id number], or clinical nurse specialist, or certified nurse midwife, working with me, had a Face-to-Face Encounter that meets the physician Face-to-Face Encounter requirements. The following are the clinical findings from the [address] [place] [address] encounter that support the need for skilled services and is a summary of the encounter: see hospital chart See summary of the patient's illness Sangeetha Nicholson, ARIS 
1/29/2019 THE FOLLOWING TO BE COMPLETED BY THE COMMUNITY PHYSICIAN: 
 
I concur with the findings described above from the F2F encounter that this patient is homebound and in need of a skilled service. Certifying Physician: _____________________________________ Printed Certifying Physician Name: _____________________________________ Date: _________________

## 2019-01-29 NOTE — PROGRESS NOTES
Problem: Mobility Impaired (Adult and Pediatric) Goal: *Acute Goals and Plan of Care (Insert Text) LTG: 
(1.)Ms. Darrall Kocher will move from supine to sit and sit to supine , scoot up and down and roll side to side INDEPENDENTLY with bed flat within 7 treatment day(s). (2.)Ms. Darrall Kocher will transfer from bed to chair and chair to bed with MODIFIED INDEPENDENCE using the least restrictive device within 7 treatment day(s). (3.)Ms. Darrall Kocher will ambulate with MODIFIED INDEPENDENCE for 300+ feet with the least restrictive device within 7 treatment day(s). (4.)Ms. Darrall Kocher will perform exercises per HEP independently for 15+ minutes to improve strength and mobility within 7 days. ________________________________________________________________________________________________ PHYSICAL THERAPY: Initial Assessment and AM 1/29/2019INPATIENT: PT Visit Days : 1 Payor: [employer] / Plan: [address] / Product Type: [employer] Care Medicare /   
  
NAME/AGE/GENDER: Jasmin Delacruz is a 75 y.o. female PRIMARY DIAGNOSIS: Asthma exacerbation Asthma exacerbation Asthma exacerbation Asthma exacerbation ICD-10: Treatment Diagnosis:  
 · Generalized Muscle Weakness (M62.81) · Difficulty in walking, Not elsewhere classified (R26.2) · Other abnormalities of gait and mobility (R26.89) Precaution/Allergies: 
Prilosec [omeprazole] and Penicillins ASSESSMENT:  
Ms. Darrall Kocher is a very pleasant 75year old female admitted from home for asthma exacerbation. At baseline pt is independent to modified independent with occasional use of cane. Lives with ex , drives occasionally, and helps take care of her elderly mother. She is agreeable to therapy assessment this morning; denies pain and states breathing is somewhat better. Transfers to sitting independently. LE assessment shows AROM WFL bilaterally with generalized weakness noted.  Performs sit-stand transfers and mobility in room, bathroom with SBA and verbal cues for gait safety. Independent with toilet transfers and seated/standing hygiene. Back to room for seated rest break, then ambulates 200 ft more in room and hallway with walker, SBA, and min verbal cues for activity pacing/pursed lip breathing. O2 sats during activity on 2L are 96%, HR 82 bpm. Returned to room and up to chair for an additional break then performs below LE exercises with great participation. Ilya Palm is functioning slightly below baseline with strength, balance, and activity tolerance and will benefit from continued therapy to address deficits/maximize independence with mobility. Will benefit from [facility] PT at discharge and may benefit from rollator at discharge if she is unable to borrow. This section established at most recent assessment PROBLEM LIST (Impairments causing functional limitations): 1. Decreased Strength 2. Decreased Transfer Abilities 3. Decreased Ambulation Ability/Technique 4. Decreased Balance 5. Decreased Activity Tolerance 6. Decreased Cognition INTERVENTIONS PLANNED: (Benefits and precautions of physical therapy have been discussed with the patient.) 1. Balance Exercise 2. Bed Mobility 3. Gait Training 4. Home Exercise Program (HEP) 5. Therapeutic Activites 6. Therapeutic Exercise/Strengthening 7. Transfer Training TREATMENT PLAN: Frequency/Duration: 3 times a week for duration of hospital stay Rehabilitation Potential For Stated Goals: Good RECOMMENDED REHABILITATION/EQUIPMENT: (at time of discharge pending progress): Due to the probability of continued deficits (see above) this patient will likely need continued skilled physical therapy after discharge. Equipment:  
? may benefit from rollator if unable to borrow from sister HISTORY:  
History of Present Injury/Illness (Reason for Referral): 
Per H&P, Beryle Kins is a 74 yo F with history of asthma on prn nocturnal oxygen (uses on average 3x/week), Sjogren's syndrome (on hydroxychloroquine), hives (started last June after being placed on prilosec), and T2DM, who presents to the ED with worsening shortness of breath and cough. She is accompanied by her son, Ibeth Menchaca. She reports she started coughing about 5 days ago. Cough productive of yellow sputum at times. Was using her nebulizer some, but her nebulizer machine broke 3 days ago and was only using her albuterol inhaler and flovent inhaler over the last few days. Overnight, her breathing significantly worsened. She reports subjective chills, but no definitive fevers. Has some chest discomfort that worsens with coughing. She presented to the ER with significant wheezing. She was given solumedrol 125 mg IV x 1 and albuterol nebs without improvement. Wheezing continued and she continued to appear dyspneic. Hospitalist asked to admit for asthma exacerbation\" Past Medical History/Comorbidities: Ms. Kylah Turner  has a past medical history of Arrhythmia (3 - 4 weeks ago (9/2010)), Arthritis, Asthma, Bladder incontinence (10/24/2016), Chronic pain, Dermatophytosis, Diabetes ([address] Former smoker, GERD (gastroesophageal reflux disease) (7/23/2013), Heart failure ([address] Hypercholesterolemia, Hypertension, Hypertensive heart disease without HF (heart failure), Lupus (systemic lupus erythematosus) ([address] (2005), Non compliance with medical treatment, Obesity, ANATOLY (obstructive sleep apnea) (07/23/2013), Seizures ([address] Stroke ([address] (1979), Systemic lupus erythematosus ([address] (07/23/2013), and Type 2 diabetes mellitus with hyperglycemia ([address] (10/24/2016).  She also has no past medical history of Adverse effect of anesthesia, Aneurysm ([address] CAD (coronary artery disease), Cancer ([address] Chronic kidney disease, Chronic obstructive pulmonary disease ([address] Coagulation disorder ([address] Difficult intubation, Endocarditis, Ill-defined condition, Liver disease, Malignant hyperthermia due to anesthesia, Nausea & vomiting, Nicotine vapor product user, Non-nicotine vapor product user, Pseudocholinesterase deficiency, Psychiatric disorder, PUD (peptic ulcer disease), Rheumatic fever, Thromboembolus ([facility] or Thyroid disease. Ms. Solange Toribio  has a past surgical history that includes hx hysterectomy (1974); hx breast lumpectomy (1998); hx appendectomy; hx hammer toe repair (2010); pr rev upper eyelid w excess skin; hx heent; hx other surgical; hx other surgical (03/15/2018); and hx heart catheterization (03/22/1999). Social History/Living Environment:  
Home Environment: Private residence # Steps to Enter: 2 Rails to Enter: Yes Wheelchair Ramp: No 
One/Two Story Residence: One story Living Alone: No 
Support Systems: Family member(s), Parent Patient Expects to be Discharged to[de-identified] Private residence Current DME Used/Available at Home: Cane, straight, Shower chair Tub or Shower Type: Shower Prior Level of Function/Work/Activity: 
Pt lives with ex  in single story home with 2 steps to enter. Independent to mod I with cane at times. No recent falls. States supposed to be wearing oxygen but concentrator is broken? Drives occasionally and helps take care of elderly mother. Number of Personal Factors/Comorbidities that affect the Plan of Care: 1-2: MODERATE COMPLEXITY EXAMINATION:  
Most Recent Physical Functioning:  
Gross Assessment: 
AROM: Within functional limits Strength: Generally decreased, functional 
Coordination: Within functional limits Posture: 
Posture (WDL): Exceptions to [facility] Posture Assessment: Forward head, Rounded shoulders Balance: 
Sitting: Intact Standing: Impaired Standing - Static: Fair Standing - Dynamic : Fair Bed Mobility: 
Rolling: Independent Supine to Sit: Independent Scooting: Independent Wheelchair Mobility: 
  
Transfers: 
Sit to Stand: Stand-by assistance Stand to Sit: Stand-by assistance Bed to Chair: Stand-by assistance Interventions: Verbal cues; Safety awareness training; Tactile cues Duration: 10 Minutes Gait: 
  
Speed/Leen: Pace decreased (<100 feet/min) Step Length: Left shortened;Right shortened Gait Abnormalities: Trunk sway increased Distance (ft): 200 Feet (ft) Assistive Device: Walker, rolling Ambulation - Level of Assistance: Stand-by assistance Interventions: Verbal cues; Safety awareness training Body Structures Involved: 1. Lungs 2. Muscles Body Functions Affected: 1. Respiratory 2. Movement Related Activities and Participation Affected: 1. General Tasks and Demands 2. Mobility 3. Domestic Life 4. Community, Social and [place] Number of elements that affect the Plan of Care: 4+: HIGH COMPLEXITY CLINICAL PRESENTATION:  
Presentation: Stable and uncomplicated: LOW COMPLEXITY CLINICAL DECISION MAKIN[address] [place] [id number] AM-PAC 6 Clicks Basic Mobility Inpatient Short Form How much difficulty does the patient currently have. .. Unable A Lot A Little None 1. Turning over in bed (including adjusting bedclothes, sheets and blankets)? [] 1   [] 2   [] 3   [x] 4  
2. Sitting down on and standing up from a chair with arms ( e.g., wheelchair, bedside commode, etc.)   [] 1   [] 2   [] 3   [x] 4  
3. Moving from lying on back to sitting on the side of the bed? [] 1   [] 2   [] 3   [x] 4 How much help from another person does the patient currently need. .. Total A Lot A Little None 4. Moving to and from a bed to a chair (including a wheelchair)? [] 1   [] 2   [x] 3   [] 4  
5. Need to walk in hospital room? [] 1   [] 2   [x] 3   [] 4  
6. Climbing 3-5 steps with a railing? [] 1   [] 2   [x] 3   [] 4  
© , Trustees of [address] [place] [id number], under license to [employer]. All rights reserved Score:  Initial: 21 Most Recent: X (Date: -- ) Interpretation of Tool:  Represents activities that are increasingly more difficult (i.e. Bed mobility, Transfers, Gait). Medical Necessity:    
· Patient demonstrates good rehab potential due to higher previous functional level. Reason for Services/Other Comments: 
· Patient continues to demonstrate capacity to improve strength, balance, activity tolerance which will increase independence and increase safety. Use of outcome tool(s) and clinical judgement create a POC that gives a: Clear prediction of patient's progress: LOW COMPLEXITY  
  
 
 
 
TREATMENT:  
(In addition to Assessment/Re-Assessment sessions the following treatments were rendered) Pre-treatment Symptoms/Complaints:  \"thank you\" Pain: Initial:  
Pain Intensity 1: 0  Post Session:  0/10 Therapeutic Activity: (  10 Minutes ):  Therapeutic activities including Bed transfers, Chair transfers, toilet transfers, standing functional activities, Ambulation on level ground, and LE exercises to improve mobility, strength, balance and activity tolerance. Required minimal Verbal cues; Safety awareness training to promote static and dynamic balance in standing and promote motor control of bilateral, upper extremity(s), lower extremity(s). Date: 
1/29/19 Date: 
 Date: Activity/Exercise Parameters Parameters Parameters LAQ 10x AB Seated marching 10x AB Ankle pumps 10x AB Seated hip aBd 10x AB Braces/Orthotics/Lines/Etc:  
· O2 Device: Nasal cannula Treatment/Session Assessment:   
· Response to Treatment:  Pt performs mobility with CGA-SBA · Interdisciplinary Collaboration:  
o Physical Therapist 
o Registered Nurse · After treatment position/precautions:  
o Up in chair 
o Bed/Chair-wheels locked 
o Bed in low position 
o Call light within reach 
o RN notified · Compliance with Program/Exercises: Will assess as treatment progresses · Recommendations/Intent for next treatment session: \"Next visit will focus on advancements to more challenging activities and reduction in assistance provided\". Total Treatment Duration: PT Patient Time In/Time Out Time In: 2754 Time Out: 9160 Sathya Beatty DPT

## 2019-01-29 NOTE — PROGRESS NOTES
Interdisciplinary Rounds completed 01/29/19. Nursing, Case Management, Physician and PT present. Plan of care reviewed and updated.  
 
Probable d/c in am

## 2019-01-29 NOTE — PROGRESS NOTES
Iraida Villalobos Admission Date: 1/26/2019 Daily Progress Note: 1/29/2019 The patient is a 75 y.o.  female seen and evaluated at the request of Dr. Radhika Currie for evaluation of asthma/COPD. 
  
The pt is known to [facility] from a hospitalization and office appt in 2014. She was treated with decadron for an exacerbation of asthma but failed to follow up. She has also been diagnosed with Sjogrens syndrome, ANATOLY treated with nocturnal oxygen, HTN, DM, GERD, and obesity.  
  
She presented with increased wheezing, dyspnea, and cough associated with body aches and chest wall pain. Her joints also hurt but felt like her usual arthritis. Her influenza studies were negative. On NC Q HS. Tells me she hasn't seen pulmonary in years. Subjective:  
 
97% on 2 lpm, coughing but nonproductive. Much better today Discussed blood glucose and control at home- needs more education Review of Systems Respiratory: positive for cough or dyspnea on exertion Current Facility-Administered Medications Medication Dose Route Frequency  famotidine (PEPCID) tablet 20 mg  20 mg Oral DAILY  methylPREDNISolone (PF) (SOLU-MEDROL) injection 40 mg  40 mg IntraVENous Q12H  
 insulin glargine (LANTUS) injection 40 Units  40 Units SubCUTAneous QHS  insulin lispro (HUMALOG) injection 10 Units  10 Units SubCUTAneous TIDAC  levoFLOXacin (LEVAQUIN) tablet 750 mg  750 mg Oral Q48H  
 guaiFENesin ER (MUCINEX) tablet 1,200 mg  1,200 mg Oral Q12H  
 albuterol (PROVENTIL VENTOLIN) nebulizer solution 2.5 mg  2.5 mg Nebulization QID RT  
 amLODIPine (NORVASC) tablet 10 mg  10 mg Oral DAILY  aspirin delayed-release tablet 81 mg  81 mg Oral DAILY  atorvastatin (LIPITOR) tablet 40 mg  40 mg Oral DAILY  loratadine (CLARITIN) tablet 10 mg  10 mg Oral DAILY  budesonide (PULMICORT) 500 mcg/2 ml nebulizer suspension  1,000 mcg Inhalation BID RT  
  folic acid (FOLVITE) tablet 1 mg  1 mg Oral DAILY  hydroxychloroquine (PLAQUENIL) tablet 200 mg  200 mg Oral BIDPC  hydrOXYzine HCl (ATARAX) tablet 30 mg  30 mg Oral QHS  meloxicam (MOBIC) tablet 15 mg  15 mg Oral DAILY  nebivolol (BYSTOLIC) tablet 5 mg  5 mg Oral DAILY  albuterol (PROVENTIL VENTOLIN) nebulizer solution 2.5 mg  2.5 mg Nebulization Q4H PRN  
 insulin lispro (HUMALOG) injection   SubCUTAneous AC&HS  sodium chloride (NS) flush 5-40 mL  5-40 mL IntraVENous Q8H  
 sodium chloride (NS) flush 5-40 mL  5-40 mL IntraVENous PRN  
 acetaminophen (TYLENOL) tablet 650 mg  650 mg Oral Q4H PRN  
 montelukast (SINGULAIR) tablet 10 mg  10 mg Oral QHS  losartan/hydroCHLOROthiazide (HYZAAR) 100/12.5 mg   Oral DAILY Objective:  
 
Vitals:  
 01/29/19 0735 01/29/19 0938 01/29/19 1052 01/29/19 1123 BP:   122/80 Pulse:  82 71 Resp:   20 Temp:   98.4 °F (36.9 °C) SpO2: 97% 96% 98% 97% Weight:      
Height:      
 
Intake and Output:  
No intake/output data recorded. No intake/output data recorded. Physical Exam:         
Constitutional: the patient is well develop HEENT: Sclera clear, pupils equal, oral mucosa moist 
Lungs: few scattered diffuse wheezes on NC Cardiovascular: RRR without M,G,R 
Abd/GI: soft and non-tender; with positive bowel sounds. rounded Ext: warm without cyanosis. There is no lower leg edema. Musculoskeletal: moves all four extremities with equal strength Skin: no jaundice or rashes, no wounds Neuro: no gross neuro deficits Lines/Drains: peripheral IV Nutrition:  ADA CHEST XRAY:  
 
 
LAB Recent Labs  
  01/29/19 
0606 01/28/19 
0554 01/27/19 
0459 WBC 12.7* 13.8* 7.1 HGB 13.8 13.6 13.3 HCT 43.5 43.6 42.4  278 260 Recent Labs  
  01/29/19 
0606 01/28/19 
0554 01/27/19 
0459  141 140  
K 4.2 4.5 3.9  107 106 CO2 28 27 25 * 336* 275* BUN 28* 27* 14  
CREA 0.99 1.14* 0.98  
 
 
 Assessment:  
 
Patient Active Problem List  
Diagnosis Code  Hypercholesterolemia E78.00  
 ANATOLY (obstructive sleep apnea) G47.33  
 Hypertension I10  
 Asthma, intrinsic, without status asthmaticus J45.20  GERD (gastroesophageal reflux disease) K21.9  Contact dermatitis L25.9  
 Hoarseness R49.0  Chronic cough R05  Chest pain R07.9  Personal history of tobacco use, presenting hazards to health Q46.279  Bladder incontinence R32  Asthma J45.909  Arthritis M19.90  Obesity E66.9  Hypertensive heart disease without HF (heart failure) I11.9  Dermatophytosis B35.9  Peripheral polyneuropathy G62.9  Sjogren's syndrome ([facility] [address] M35.00  Positive sm/RNP antibody R76.8  Controlled type 2 diabetes mellitus without complication, with long-term current use of insulin (HCC) E11.9, Z79.4  Type 2 diabetes mellitus with diabetic neuropathy (HCC) E11.40  Palpitations R00.2  Asthma exacerbation J45. [address]  Hives L50.9  Acute bronchiolitis J21.9 Plan Hospital Problems  Date Reviewed: 12/13/2018 Codes Class Noted POA Acute bronchiolitis ICD-10-CM: J21.9 ICD-9-CM: 466.19  1/27/2019 Yes * (Principal) Asthma exacerbation ICD-10-CM: J45.901 ICD-9-CM: 493.92  1/26/2019 Yes Possible COPD with hx tobacco abuse Eosinophils WNL Hives ICD-10-CM: L50.9 ICD-9-CM: 708.9  1/26/2019 Yes Type 2 diabetes mellitus with diabetic neuropathy (HCC) ICD-10-CM: E11.40 ICD-9-CM: 250.60, 357.2  1/18/2018 Yes Poorly controlled with A1C 8.5 Sjogren's syndrome ([facility] [address] ICD-10-CM: M35.00 ICD-9-CM: 710.2  1/12/2017 Yes Obesity ICD-10-CM: E66.9 ICD-9-CM: 278.00  Unknown Yes ANATOLY (obstructive sleep apnea) ICD-10-CM: G47.33 
ICD-9-CM: 327.23  7/23/2013 Yes ANATOLY- has not been on CPAP. Hypertension ICD-10-CM: I10 
ICD-9-CM: 401.9  7/23/2013 Yes  
   
  
 
-- solumedrol 40 mg Q 8 hours- decrease 
-- DM education -- claritin, atarax, singulair, plaquenil all ordered. On albuterol and pulmicort -- levaquin with bronchitis --check BNP 
-- assess RA sat -- will need outpatient PFT and follow up in our office MARICRUZ RodriguezC More than 50% of time documented was spent in face-to-face contact with the patient and in the care of the patient on the floor/unit where the patient is located. I have spoken with and examined the patient. I agree with the above assessment and plan as documented. Gen: pleasant. 93% on RA Lungs:  Few basilar crackles Heart:  RRR with no Murmur/Rubs/Gallops Abd;+BS Ext:trace L edema Levaquin day #4/5 F/u BNP and mycoplasma ab Wean O2 Taper to prednisone tomorrow Jose Sumner MD

## 2019-01-29 NOTE — PROGRESS NOTES
Oxygen Qualifier Room air: SpO2 with O2 and liter flow Resting SpO2 94% Ambulating SpO2  90% Completed by: 
 
Bk Sebastian, RT

## 2019-01-30 VITALS
HEIGHT: 59 IN | TEMPERATURE: 98.4 F | WEIGHT: 154 LBS | DIASTOLIC BLOOD PRESSURE: 66 MMHG | OXYGEN SATURATION: 92 % | SYSTOLIC BLOOD PRESSURE: 110 MMHG | BODY MASS INDEX: 31.04 KG/M2 | RESPIRATION RATE: 18 BRPM | HEART RATE: 74 BPM

## 2019-01-30 LAB
ANION GAP SERPL CALC-SCNC: 7 MMOL/L (ref 7–16)
BNP SERPL-MCNC: 39 PG/ML
BUN SERPL-MCNC: 27 MG/DL (ref 8–23)
CALCIUM SERPL-MCNC: 8.7 MG/DL (ref 8.3–10.4)
CHLORIDE SERPL-SCNC: 105 MMOL/L (ref 98–107)
CO2 SERPL-SCNC: 28 MMOL/L (ref 21–32)
CREAT SERPL-MCNC: 0.99 MG/DL (ref 0.6–1)
GLUCOSE BLD STRIP.AUTO-MCNC: 196 MG/DL (ref 65–100)
GLUCOSE BLD STRIP.AUTO-MCNC: 208 MG/DL (ref 65–100)
GLUCOSE SERPL-MCNC: 220 MG/DL (ref 65–100)
POTASSIUM SERPL-SCNC: 4 MMOL/L (ref 3.5–5.1)
SODIUM SERPL-SCNC: 140 MMOL/L (ref 136–145)

## 2019-01-30 PROCEDURE — 83880 ASSAY OF NATRIURETIC PEPTIDE: CPT

## 2019-01-30 PROCEDURE — 74011636637 HC RX REV CODE- 636/637: Performed by: INTERNAL MEDICINE

## 2019-01-30 PROCEDURE — 74011250637 HC RX REV CODE- 250/637: Performed by: INTERNAL MEDICINE

## 2019-01-30 PROCEDURE — 99232 SBSQ HOSP IP/OBS MODERATE 35: CPT | Performed by: INTERNAL MEDICINE

## 2019-01-30 PROCEDURE — 80048 BASIC METABOLIC PNL TOTAL CA: CPT

## 2019-01-30 PROCEDURE — 74011000250 HC RX REV CODE- 250: Performed by: INTERNAL MEDICINE

## 2019-01-30 PROCEDURE — 82962 GLUCOSE BLOOD TEST: CPT

## 2019-01-30 PROCEDURE — 36415 COLL VENOUS BLD VENIPUNCTURE: CPT

## 2019-01-30 PROCEDURE — 94760 N-INVAS EAR/PLS OXIMETRY 1: CPT

## 2019-01-30 PROCEDURE — 97535 SELF CARE MNGMENT TRAINING: CPT

## 2019-01-30 PROCEDURE — 94640 AIRWAY INHALATION TREATMENT: CPT

## 2019-01-30 RX ORDER — MONTELUKAST SODIUM 10 MG/1
10 TABLET ORAL
Qty: 30 TAB | Refills: 0 | Status: SHIPPED | OUTPATIENT
Start: 2019-01-30 | End: 2019-04-30 | Stop reason: SDUPTHER

## 2019-01-30 RX ORDER — PREDNISONE 10 MG/1
TABLET ORAL
Qty: 30 TAB | Refills: 0 | Status: SHIPPED | OUTPATIENT
Start: 2019-01-30 | End: 2019-02-18

## 2019-01-30 RX ORDER — BUDESONIDE 0.5 MG/2ML
1000 INHALANT ORAL 2 TIMES DAILY
Qty: 30 EACH | Refills: 3 | Status: SHIPPED | OUTPATIENT
Start: 2019-01-30 | End: 2019-02-19 | Stop reason: DRUGHIGH

## 2019-01-30 RX ORDER — LOSARTAN POTASSIUM AND HYDROCHLOROTHIAZIDE 12.5; 1 MG/1; MG/1
1 TABLET ORAL DAILY
Qty: 30 TAB | Refills: 0 | Status: SHIPPED | OUTPATIENT
Start: 2019-01-30 | End: 2019-04-30 | Stop reason: SDUPTHER

## 2019-01-30 RX ADMIN — FAMOTIDINE 20 MG: 20 TABLET ORAL at 08:50

## 2019-01-30 RX ADMIN — INSULIN LISPRO 2 UNITS: 100 INJECTION, SOLUTION INTRAVENOUS; SUBCUTANEOUS at 11:49

## 2019-01-30 RX ADMIN — ALBUTEROL SULFATE 2.5 MG: 2.5 SOLUTION RESPIRATORY (INHALATION) at 08:56

## 2019-01-30 RX ADMIN — GUAIFENESIN 1200 MG: 600 TABLET, EXTENDED RELEASE ORAL at 08:49

## 2019-01-30 RX ADMIN — INSULIN LISPRO 10 UNITS: 100 INJECTION, SOLUTION INTRAVENOUS; SUBCUTANEOUS at 08:50

## 2019-01-30 RX ADMIN — INSULIN LISPRO 4 UNITS: 100 INJECTION, SOLUTION INTRAVENOUS; SUBCUTANEOUS at 08:50

## 2019-01-30 RX ADMIN — FOLIC ACID 1 MG: 1 TABLET ORAL at 08:49

## 2019-01-30 RX ADMIN — HYDROXYCHLOROQUINE SULFATE 200 MG: 200 TABLET, FILM COATED ORAL at 08:50

## 2019-01-30 RX ADMIN — ALBUTEROL SULFATE 2.5 MG: 2.5 SOLUTION RESPIRATORY (INHALATION) at 11:14

## 2019-01-30 RX ADMIN — PREDNISONE 40 MG: 20 TABLET ORAL at 08:49

## 2019-01-30 RX ADMIN — INSULIN LISPRO 10 UNITS: 100 INJECTION, SOLUTION INTRAVENOUS; SUBCUTANEOUS at 11:48

## 2019-01-30 RX ADMIN — MELOXICAM 15 MG: 7.5 TABLET ORAL at 08:49

## 2019-01-30 RX ADMIN — ATORVASTATIN CALCIUM 40 MG: 40 TABLET, FILM COATED ORAL at 08:49

## 2019-01-30 RX ADMIN — AMLODIPINE BESYLATE 10 MG: 10 TABLET ORAL at 08:49

## 2019-01-30 RX ADMIN — BUDESONIDE 500 MCG: 0.5 INHALANT RESPIRATORY (INHALATION) at 08:56

## 2019-01-30 RX ADMIN — ASPIRIN 81 MG: 81 TABLET, COATED ORAL at 08:49

## 2019-01-30 RX ADMIN — HYDROCHLOROTHIAZIDE: 12.5 CAPSULE ORAL at 08:49

## 2019-01-30 RX ADMIN — LORATADINE 10 MG: 10 TABLET ORAL at 08:49

## 2019-01-30 RX ADMIN — NEBIVOLOL HYDROCHLORIDE 5 MG: 5 TABLET ORAL at 08:50

## 2019-01-30 NOTE — INTERDISCIPLINARY ROUNDS
Interdisciplinary Rounds completed 1/30/19. Nursing, Case Management, Physician and PT present. Plan of care reviewed and updated.

## 2019-01-30 NOTE — DISCHARGE INSTRUCTIONS
Patient Education     DISCHARGE SUMMARY from Nurse    PATIENT INSTRUCTIONS:    After general anesthesia or intravenous sedation, for 24 hours or while taking prescription Narcotics:  · Limit your activities  · Do not drive and operate hazardous machinery  · Do not make important personal or business decisions  · Do  not drink alcoholic beverages  · If you have not urinated within 8 hours after discharge, please contact your surgeon on call. Report the following to your surgeon:  · Excessive pain, swelling, redness or odor of or around the surgical area  · Temperature over 100.5  · Nausea and vomiting lasting longer than 4 hours or if unable to take medications  · Any signs of decreased circulation or nerve impairment to extremity: change in color, persistent  numbness, tingling, coldness or increase pain  · Any questions    What to do at Home:  Recommended activity: Activity as tolerated,     If you experience any of the following symptoms fever, new or unrelieved pain, shortness of breath not relieved by rest or any other worrisome symptoms, please follow up with Pulmonary . *  Please give a list of your current medications to your Primary Care Provider. *  Please update this list whenever your medications are discontinued, doses are      changed, or new medications (including over-the-counter products) are added. *  Please carry medication information at all times in case of emergency situations. These are general instructions for a healthy lifestyle:    No smoking/ No tobacco products/ Avoid exposure to second hand smoke  Surgeon General's Warning:  Quitting smoking now greatly reduces serious risk to your health.     Obesity, smoking, and sedentary lifestyle greatly increases your risk for illness    A healthy diet, regular physical exercise & weight monitoring are important for maintaining a healthy lifestyle    You may be retaining fluid if you have a history of heart failure or if you experience any of the following symptoms:  Weight gain of 3 pounds or more overnight or 5 pounds in a week, increased swelling in our hands or feet or shortness of breath while lying flat in bed. Please call your doctor as soon as you notice any of these symptoms; do not wait until your next office visit. Recognize signs and symptoms of STROKE:    F-face looks uneven    A-arms unable to move or move unevenly    S-speech slurred or non-existent    T-time-call 911 as soon as signs and symptoms begin-DO NOT go       Back to bed or wait to see if you get better-TIME IS BRAIN. Warning Signs of HEART ATTACK     Call 911 if you have these symptoms:   Chest discomfort. Most heart attacks involve discomfort in the center of the chest that lasts more than a few minutes, or that goes away and comes back. It can feel like uncomfortable pressure, squeezing, fullness, or pain.  Discomfort in other areas of the upper body. Symptoms can include pain or discomfort in one or both arms, the back, neck, jaw, or stomach.  Shortness of breath with or without chest discomfort.  Other signs may include breaking out in a cold sweat, nausea, or lightheadedness. Don't wait more than five minutes to call 911 - MINUTES MATTER! Fast action can save your life. Calling 911 is almost always the fastest way to get lifesaving treatment. Emergency Medical Services staff can begin treatment when they arrive -- up to an hour sooner than if someone gets to the hospital by car. The discharge information has been reviewed with the patient. The patient verbalized understanding. Discharge medications reviewed with the patient and appropriate educational materials and side effects teaching were provided.   ___________________________________________________________________________________________________________________________________     Asthma: Your Action Plan  Sample Action Plan    Controller medicine action plan  Fill in the blank spaces and boxes that apply for all sections. · Name of your controller medicine:  ? ____________________________________________  · How much of this medicine do you take? ? ____________________________________________  · How often do you take this medicine? ? ____________________________________________  · Other instructions? ? ____________________________________________  Quick-relief medicine action plan  · Name of your quick-relief medicine:  ? ____________________________________________  · How much of this medicine do you take? ? ____________________________________________  · How often do you take this medicine? ? ____________________________________________  Asthma Zones  GREEN ZONE: This is where you want to be! Green zone symptoms  · You have no shortness of breath or chest tightness. You are not coughing or wheezing. · You can do all of your usual activities. · You sleep well at night. Green zone peak flow (if you use a peak flow meter)  · ______ or more (80% or more of your personal best)  Green zone actions (Check the boxes and fill in the blank spaces that apply.)  [ ] You take your controller medicine(s) every day. [ ] Delphine Winters are staying away from your asthma triggers. [ ] You take quick-relief medicine (called _____________________) ______ minutes before exercise. YELLOW ZONE: Your asthma is getting worse. Yellow zone symptoms  · You are short of breath or have chest tightness. You are coughing or wheezing. · You have symptoms that keep you up at night. · You can do some, but not all, of your usual activities. Yellow zone peak flow (if you use a peak flow meter)  · ______ to ______ (50% to 79% of your personal best)  Yellow zone actions (Check the boxes and fill in the blank spaces that apply.)  [ ] Take _____ puff(s) of quick-relief medicine called ______________________. Repeat _____ times.   [ ] If your symptoms don't get better or your peak flow has not returned to the green zone in 1 hour, then:  · [ ] Take _____ puff(s) of medicine called ______________________. Take it ____ times a day. · [ ] Begin or increase treatment with corticosteroid pills. Take ______ mg of medicine called ____________________________ every __________. · [ ] Call your doctor at this number: ____________________. RED ZONE: Danger! Red zone symptoms  · You are very short of breath. · You can't do your usual activities. · Quick-relief medicine doesn't help. Or your symptoms don't get better after 24 hours in the yellow zone. Red zone peak flow (if you use a peak flow meter)  · Less than _______ (less than 50% of your personal best)  Red zone actions (Check the boxes and fill in the blank spaces that apply.)  [ ] Take _____ puff(s) of quick-relief medicine called ____________________________. Repeat ______ times. [ ] Begin or increase treatment with corticosteroid pills. Take ________ mg now. [ ] Call your doctor at this number: _________________. If you can't contact your doctor, go to the emergency department. Call 911 or ___________________. [ ] Other numbers you might call are: ___________________________________. When should you call for help? Call 911 anytime you think you may need emergency care. For example, call if:  · You have severe trouble breathing. Call your doctor now or seek immediate medical care if:  · You are in the red zone of your asthma action plan. · You've used your quick-relief medicine but are still having trouble breathing. · You cough up blood. · You have new or worse trouble breathing. · You cough up dark brown or bloody mucus (sputum). Watch closely for changes in your health, and be sure to contact your doctor if:  · You need to use quick-relief medicine more than 2 days each week (unless it's just for exercise). · Your coughing and wheezing get worse. Follow-up care is a key part of your treatment and safety.  Be sure to make and go to all appointments, and call your doctor if you are having problems. It's also a good idea to know your test results and keep a list of the medicines you take. Where can you learn more? Go to http://shani-sully.info/. Enter 29 [phone] 20 in the search box to learn more about \"Asthma: Your Action Plan. \"  Current as of: September 5, 2018  Content Version: 11.9  © 0190-1470 [employer]. Care instructions adapted under license by [employer] (which disclaims liability or warranty for this information). If you have questions about a medical condition or this instruction, always ask your healthcare professional. [place] [address] any warranty or liability for your use of this information.

## 2019-01-30 NOTE — PROGRESS NOTES
Hourly rounds complete this shift, patient c/o: bed in low, locked position, call light and bedside table within reach,  all needs met. Will continue to monitor Report to day shift nurse.

## 2019-01-30 NOTE — PROGRESS NOTES
Problem: Nutrition Deficit Goal: *Optimize nutritional status Nutrition follow-up: 
Reason for initial assessment: BPA - Malnutrition Screening Tool positive for unintended weight loss.  
  
Assessment:  
Admitted with asthma exacerbation, acute bronchitis. PMH remarkable for DM, Sjogren's, HTN, Asthma. Food/Nutrition Patient History:  Pt reports her appetite and intake have started to improve. She is eating more food and using Glucerna at times. Asks appropriate questions about intake once she is home.   
  
Diet order(s): Consistent CHO. Glucerna TID.  
  
Anthropometrics:Height: 4' 11\" (149.9 cm),  Weight: 69.9 kg (154 lb), Body mass index is 31.1 kg/m². BMI class of overweight.  
  
WT / BMI 1/26/2019 12/13/2018 12/5/2018 10/31/2018 10/4/2018 WEIGHT 154 lb 154 lb 3.2 oz 153 lb 12.8 oz 152 lb 152 lb  
  
WT / BMI 9/6/2018 8/23/2018 8/2/2018 7/4/2018 7/3/2018 WEIGHT 151 lb 151 lb 151 lb 153 lb 152 lb 3.2 oz  
  
WT / BMI 7/1/2018 WEIGHT 151 lb No significant weight loss identified. Macronutrient needs: EER:  8847-8089 kcal /day (20-22 kcal/kg current BW) EPR:  45-54 grams protein/day (1-1.2 grams/kg ideal BW) 
  
Intake/Comparative Standards: Po intake per pt recall 25-75% of meals. Glucerna 1-2 per day. Pt is potentially meeting 50-75% of estimated needs.   
  
Nutrition Diagnosis: Inadequate oral intake related to compromised appetite, taste alterations as evidenced by self report of barriers, recalls eating 0-1 meals per day at baseline recently, po intake inpt meeting <75% needs with supplementation.   
  
Intervention: 
Meals and snacks: Continue current diet. Nutrition supplement therapy: Continue Glucerna TID - no vanilla. Pt to use as meal replacement or if she eats 25% or less. Discharge Nutrition Plan: Continue Oral Nutrition Supplement (ONS) at discharge.  Recommend Glucerna or a comparable/similar product Twice daily for 30 days unless otherwise directed by your Primary Care Physician. [place], [place], [place] [address]

## 2019-01-30 NOTE — PROGRESS NOTES
Attempted follow-up visit requested by Ms. Darrall Kocher. She seemed to have difficulty talking; therefore, I abbreviated our dialogue to allow rest. 
 
Manuela Ku MDiv Board Certified [employer]

## 2019-01-30 NOTE — PROGRESS NOTES
Renuka Crane Admission Date: 1/26/2019 Daily Progress Note: 1/30/2019  
 
75 y.o. AAF evaluated at the request of Dr. Bessie Harrison for evaluation of asthma/COPD. She is known to [facility] from a hospitalization and office appt in 2014. She was treated with decadron for an exacerbation of asthma but failed to follow up. Has also been diagnosed with Sjogrens syndrome, ANATOLY treated with nocturnal oxygen, HTN, DM, GERD, and obesity.  
  
She presented with increased wheezing, dyspnea, and cough associated with body aches and chest wall pain. Her joints also hurt but felt like her usual arthritis. Influenza studies were negative. On NC Q HS. Tells me she hasn't seen pulmonary in years. Subjective:  
 
Sitting up in chair at the bedside, states is breathing better. Has occasional productive cough and has been weaned to room air. Review of Systems Respiratory: positive for cough or dyspnea on exertion Current Facility-Administered Medications Medication Dose Route Frequency  predniSONE (DELTASONE) tablet 40 mg  40 mg Oral DAILY WITH BREAKFAST  lip protectant (BLISTEX) ointment   Topical PRN  
 famotidine (PEPCID) tablet 20 mg  20 mg Oral DAILY  insulin glargine (LANTUS) injection 40 Units  40 Units SubCUTAneous QHS  insulin lispro (HUMALOG) injection 10 Units  10 Units SubCUTAneous TIDAC  levoFLOXacin (LEVAQUIN) tablet 750 mg  750 mg Oral Q48H  
 guaiFENesin ER (MUCINEX) tablet 1,200 mg  1,200 mg Oral Q12H  
 albuterol (PROVENTIL VENTOLIN) nebulizer solution 2.5 mg  2.5 mg Nebulization QID RT  
 amLODIPine (NORVASC) tablet 10 mg  10 mg Oral DAILY  aspirin delayed-release tablet 81 mg  81 mg Oral DAILY  atorvastatin (LIPITOR) tablet 40 mg  40 mg Oral DAILY  loratadine (CLARITIN) tablet 10 mg  10 mg Oral DAILY  budesonide (PULMICORT) 500 mcg/2 ml nebulizer suspension  1,000 mcg Inhalation BID RT  
 folic acid (FOLVITE) tablet 1 mg  1 mg Oral DAILY  hydroxychloroquine (PLAQUENIL) tablet 200 mg  200 mg Oral BIDPC  hydrOXYzine HCl (ATARAX) tablet 30 mg  30 mg Oral QHS  meloxicam (MOBIC) tablet 15 mg  15 mg Oral DAILY  nebivolol (BYSTOLIC) tablet 5 mg  5 mg Oral DAILY  albuterol (PROVENTIL VENTOLIN) nebulizer solution 2.5 mg  2.5 mg Nebulization Q4H PRN  
 insulin lispro (HUMALOG) injection   SubCUTAneous AC&HS  sodium chloride (NS) flush 5-40 mL  5-40 mL IntraVENous Q8H  
 sodium chloride (NS) flush 5-40 mL  5-40 mL IntraVENous PRN  
 acetaminophen (TYLENOL) tablet 650 mg  650 mg Oral Q4H PRN  
 montelukast (SINGULAIR) tablet 10 mg  10 mg Oral QHS  losartan/hydroCHLOROthiazide (HYZAAR) 100/12.5 mg   Oral DAILY Objective:  
 
Vitals:  
 01/30/19 0343 01/30/19 0756 01/30/19 0857 01/30/19 1051 BP: 128/74 124/76  123/76 Pulse: 66 69  83 Resp: 20 18 19 Temp: 98.2 °F (36.8 °C) 98.4 °F (36.9 °C)  98.6 °F (37 °C) SpO2: 100% 90% 92% 92% Weight:      
Height:      
 
Intake and Output:  
No intake/output data recorded. No intake/output data recorded. Physical Exam:         
Constitutional: the patient is obese, in no acute distress, room air sat 92% HEENT: Sclera clear, pupils equal, oral mucosa moist 
Lungs: few scattered crackles, no wheezing Cardiovascular: RRR without M,G,R 
Abd/GI: soft and non-tender; with positive bowel sounds. rounded Ext: warm without cyanosis. There is no lower leg edema. Musculoskeletal: moves all four extremities with equal strength Skin: no jaundice or rashes, no wounds Neuro: no gross neuro deficits CXR: None today CXR 1/26/19: Unremarkable LAB Recent Labs  
  01/29/19 
0606 01/28/19 
9599 WBC 12.7* 13.8* HGB 13.8 13.6 HCT 43.5 43.6  278 Recent Labs  
  01/30/19 
9330 01/29/19 
0606 01/28/19 
8386  141 141  
K 4.0 4.2 4.5  
 105 107 CO2 28 28 27 * 194* 336* BUN 27* 28* 27* CREA 0.99 0.99 1.14* Assessment: Patient Active Problem List  
Diagnosis Code  Hypercholesterolemia E78.00  
 ANATOLY (obstructive sleep apnea) G47.33  
 Hypertension I10  
 Asthma, intrinsic, without status asthmaticus J45.20  GERD (gastroesophageal reflux disease) K21.9  Contact dermatitis L25.9  
 Hoarseness R49.0  Chronic cough R05  Chest pain R07.9  Personal history of tobacco use, presenting hazards to health X60.977  Bladder incontinence R32  Asthma J45.909  Arthritis M19.90  Obesity E66.9  Hypertensive heart disease without HF (heart failure) I11.9  Dermatophytosis B35.9  Peripheral polyneuropathy G62.9  Sjogren's syndrome ([facility] [address] M35.00  Positive sm/RNP antibody R76.8  Controlled type 2 diabetes mellitus without complication, with long-term current use of insulin (HCC) E11.9, Z79.4  Type 2 diabetes mellitus with diabetic neuropathy (HCC) E11.40  Palpitations R00.2  Asthma exacerbation J45. [address] 65  Hives L50.9  Acute bronchiolitis J21.9 Plan Hospital Problems  Date Reviewed: 1/30/2019 Codes Class Noted POA Acute bronchiolitis ICD-10-CM: J21.9 ICD-9-CM: 466.19  1/27/2019 Yes Continue current * (Principal) Asthma exacerbation ICD-10-CM: J45.901 ICD-9-CM: 493.92  1/26/2019 Yes On PO Prednisone Hives ICD-10-CM: L50.9 ICD-9-CM: 708.9  1/26/2019 Yes  
 resolved Type 2 diabetes mellitus with diabetic neuropathy (HCC) (Chronic) ICD-10-CM: E11.40 ICD-9-CM: 250.60, 357.2  1/18/2018 Yes Chronic--poorly controlled--HgA1C 8.2, ranges affected by steroids, 196-304 Sjogren's syndrome ([facility] [address] (Chronic) ICD-10-CM: M35.00 ICD-9-CM: 710.2  1/12/2017 Yes  
 chronic Obesity (Chronic) ICD-10-CM: C98.7 ICD-9-CM: 278.00  Unknown Yes  
 chronic ANATOLY (obstructive sleep apnea) (Chronic) ICD-10-CM: G47.33 
ICD-9-CM: 327.23  7/23/2013 Yes Uses NC 2L at HS Hypertension (Chronic) ICD-10-CM: I10 
ICD-9-CM: 401.9  7/23/2013 Yes Chronic Personal history of tobacco use, presenting hazards to health (Chronic) ICD-10-CM: L53.218 ICD-9-CM: V15.82  7/23/2013 Yes  
 chronic  
  
 
--Prednisone 40 mg daily 
--Albuterol, Pulmicort, Mucinex, Singulair, Claritin --Levaquin day 5 
--WBC down to 12.7 >>13.8 
--BNP 39 
--Mycoplasma ab:  pending 
--RA sat 94% at rest, 90% with ambulation 
--Will arrange follow up in our office in 2-3 weeks with PFTs--order placed. Paco Moseley NP I have spoken with and examined the patient. I agree with the above assessment and plan as documented. Gen: pleasant on RA Lungs:  CTA anteriorly Heart:  RRR with no Murmur/Rubs/Gallops Ext: no edema 
 
--discontinue levaquin now.  5 day course sufficient 
--continue albuterol inhaler at home 
--taper prednisone as outpatient 
--f/u at [facility] will be arranged. Please let us know if we can be of further assistance.  
 
 
Moon Pardo MD

## 2019-01-30 NOTE — DISCHARGE SUMMARY
Hospitalist Discharge Summary     Admit Date:  2019  1:08 PM   Name:  Danny Cortes   Age:  75 y.o.  :  1945   MRN:  [id number]   PCP:  Mata Dye MD  Treatment Team: Attending Provider: Myra Mesa MD; Utilization Review: Matilda Edwards RN; Care Manager: Tej Guzman LMSW    Problem List for this Hospitalization:  Hospital Problems as of 2019 Date Reviewed: 2019          Codes Class Noted - Resolved POA    Acute bronchiolitis ICD-10-CM: J21.9  ICD-9-CM: 466.19  2019 - Present Yes        * (Principal) Asthma exacerbation ICD-10-CM: J45.901  ICD-9-CM: 493.92  2019 - Present Yes        Hives ICD-10-CM: L50.9  ICD-9-CM: 708.9  2019 - Present Yes        Type 2 diabetes mellitus with diabetic neuropathy (HCC) (Chronic) ICD-10-CM: E11.40  ICD-9-CM: 250.60, 357.2  2018 - Present Yes        Sjogren's syndrome ([address] (Chronic) ICD-10-CM: M35.00  ICD-9-CM: 710.2  2017 - Present Yes        Asthma (Chronic) ICD-10-CM: J45.909  ICD-9-CM: 493.90  10/24/2016 - Present         Obesity (Chronic) ICD-10-CM: E66.9  ICD-9-CM: 278.00  Unknown - Present Yes        ANATOLY (obstructive sleep apnea) (Chronic) ICD-10-CM: G47.33  ICD-9-CM: 327.23  2013 - Present Yes        Hypertension (Chronic) ICD-10-CM: I10  ICD-9-CM: 401.9  2013 - Present Yes        Personal history of tobacco use, presenting hazards to health (Chronic) ICD-10-CM: C98.218  ICD-9-CM: V15.82  2013 - Present Yes                Admission HPI from 2019: Spencer Gomez is a 74 yo F with history of asthma on prn nocturnal oxygen (uses on average 3x/week), Sjogren's syndrome (on hydroxychloroquine), hives (started last  after being placed on prilosec), and T2DM, who presents to the ED with worsening shortness of breath and cough. She is accompanied by her son, Mohit Chapman. She reports she started coughing about 5 days ago.   Cough productive of yellow sputum at times. Was using her nebulizer some, but her nebulizer machine broke 3 days ago and was only using her albuterol inhaler and flovent inhaler over the last few days. Overnight, her breathing significantly worsened. She reports subjective chills, but no definitive fevers. Has some chest discomfort that worsens with coughing. She presented to the ER with significant wheezing. She was given solumedrol 125 mg IV x 1 and albuterol nebs without improvement. Wheezing continued and she continued to appear dyspneic. Hospitalist asked to admit for asthma exacerbation.     In the ED, CXR clear. WBC count 7K. Hospital Course:  As noted above pt is a 74 yo F with history of asthma on prn nocturnal oxygen (uses on average 3x/week), Sjogren's syndrome (on hydroxychloroquine), hives (started last June after being placed on prilosec), and T2DM, admitted 1/26 with asthma exacerbation/acute bronchitis. She was started on levaquin on admission. Slow to improve. Pulm following and has ordered mycoplasm antibodies. She reports feeling better overall. Less coughing today. Room air sats ambulating okay. Pulmonology transitioned to prednisone today and cleared pt for d/c home to follow up with them in the office. Follow up instructions and discharge meds at bottom of this note. Plan was discussed with patient and care team.  All questions answered. Patient was stable at time of discharge. 10 systems reviewed and negative except as noted in HPI. Diagnostic Imaging/Tests:   Xr Chest Port    Result Date: 1/26/2019  Portable chest: History: Shortness of breath since last night. Comparison: 09/08/2016 Findings: A single view of the chest was obtained at 1337 hours. The cardiac and mediastinal silhouette are normal in size and configuration. The lungs and pleural spaces are clear. The pulmonary vascularity is within normal limits.      Impression: Unremarkable portable chest radiograph       Echocardiogram results:  No results found for this visit on 01/26/19. All Micro Results     Procedure Component Value Units Date/Time    MYCOPLASMA AB, IGG/IGM [[id number]] Collected:  01/28/19 1807    Order Status:  Completed Specimen:  Serum Updated:  01/28/19 1842    INFLUENZA A & B AG (RAPID TEST) [[id number]] Collected:  01/26/19 1633    Order Status:  Completed Specimen:  Nasopharyngeal from Nasal washing Updated:  01/26/19 1652     Influenza A Ag NEGATIVE         Comment: NEGATIVE FOR THE PRESENCE OF INFLUENZA A ANTIGEN  INFECTION DUE TO INFLUENZA A CANNOT BE RULED OUT. BECAUSE THE ANTIGEN PRESENT IN THE SAMPLE MAY BE BELOW  THE DETECTION LIMIT OF THE TEST. A NEGATIVE TEST IS PRESUMPTIVE AND IT IS RECOMMENDED THAT THESE RESULTS BE CONFIRMED BY VIRAL CULTURE OR AN FDA-CLEARED INFLUENZA A AND B MOLECULAR ASSAY. Influenza B Ag NEGATIVE         Comment: NEGATIVE FOR THE PRESENCE OF INFLUENZA B ANTIGEN  INFECTION DUE TO INFLUENZA B CANNOT BE RULED OUT. BECAUSE THE ANTIGEN PRESENT IN THE SAMPLE MAY BE BELOW  THE DETECTION LIMIT OF THE TEST. A NEGATIVE TEST IS PRESUMPTIVE AND IT IS RECOMMENDED THAT THESE RESULTS BE CONFIRMED BY VIRAL CULTURE OR AN FDA-CLEARED INFLUENZA A AND B MOLECULAR ASSAY. Source NASOPHARYNGEAL             Labs: Results:       BMP, Mg, Phos Recent Labs     01/30/19  0621 01/29/19  0606 01/28/19  0554    141 141   K 4.0 4.2 4.5    105 107   CO2 28 28 27   AGAP 7 8 7   BUN 27* 28* 27*   CREA 0.99 0.99 1.14*   CA 8.7 8.9 9.1   * 194* 336*      CBC Recent Labs     01/29/19  0606 01/28/19  0554   WBC 12.7* 13.8*   RBC 5.07 5.07   HGB 13.8 13.6   HCT 43.5 43.6    278      LFT No results for input(s): SGOT, ALT, TBIL, AP, TP, ALB, GLOB, AGRAT, GPT in the last 72 hours.    Cardiac Testing Lab Results   Component Value Date/Time    BNP 39 (H) 01/30/2019 06:21 AM    BNP 66 (H) 01/29/2019 06:06 AM    CK 98 07/22/2013 10:45 AM    CK - MB 1.0 07/22/2013 10:45 AM    CK-MB Index 1.0 07/22/2013 10:45 AM    Troponin-I, Qt. <0.02 (L) 01/26/2019 01:15 PM      Coagulation Tests Lab Results   Component Value Date/Time    Prothrombin time 10.3 07/22/2013 10:45 AM    INR 1.0 07/22/2013 10:45 AM    aPTT 25.0 07/22/2013 10:45 AM      A1c Lab Results   Component Value Date/Time    Hemoglobin A1c 8.2 (H) 01/29/2019 06:06 AM    Hemoglobin A1c, External 6.5 08/02/2016      Lipid Panel Lab Results   Component Value Date/Time    Cholesterol, total 244 (H) 09/11/2018 01:06 PM    HDL Cholesterol 48 09/11/2018 01:06 PM    LDL, calculated 163 (H) 09/11/2018 01:06 PM    VLDL, calculated 33 09/11/2018 01:06 PM    Triglyceride 165 (H) 09/11/2018 01:06 PM      Thyroid Panel Lab Results   Component Value Date/Time    TSH 3.020 12/12/2016 08:42 AM    T4, Total 7.5 12/12/2016 08:42 AM        Most Recent UA No results found for: COLOR, APPRN, REFSG, RAGHAVENDRA, PROTU, GLUCU, KETU, BILU, BLDU, UROU, DORETHA, LEUKU     Allergies   Allergen Reactions    Prilosec [Omeprazole] Rash    Penicillins Rash     Immunization History   Administered Date(s) Administered    Influenza Vaccine 10/01/2012, 09/02/2016    Influenza Vaccine (Quad) Mdck Pf 10/16/2017    Influenza Vaccine (Quad) PF 10/04/2018    Pneumococcal Polysaccharide (PPSV-23) 04/23/2015    Pneumococcal Vaccine (Unspecified Type) 01/01/2009    TB Skin Test (PPD) Intradermal 01/26/2019    Tdap 04/21/2017    Zoster Vaccine, Live 04/21/2017       All Labs from Last 24 Hrs:  Recent Results (from the past 24 hour(s))   GLUCOSE, POC    Collection Time: 01/29/19  4:49 PM   Result Value Ref Range    Glucose (POC) 267 (H) 65 - 100 mg/dL   GLUCOSE, POC    Collection Time: 01/29/19  8:28 PM   Result Value Ref Range    Glucose (POC) 304 (H) 65 - 923 mg/dL   METABOLIC PANEL, BASIC    Collection Time: 01/30/19  6:21 AM   Result Value Ref Range    Sodium 140 136 - 145 mmol/L    Potassium 4.0 3.5 - 5.1 mmol/L    Chloride 105 98 - 107 mmol/L    CO2 28 21 - 32 mmol/L    Anion gap 7 7 - 16 mmol/L    Glucose 220 (H) 65 - 100 mg/dL    BUN 27 (H) 8 - 23 MG/DL    Creatinine 0.99 0.6 - 1.0 MG/DL    GFR est AA >60 >60 ml/min/1.73m2    GFR est non-AA 58 (L) >60 ml/min/1.73m2    Calcium 8.7 8.3 - 10.4 MG/DL   BNP    Collection Time: 01/30/19  6:21 AM   Result Value Ref Range    BNP 39 (H) 0 pg/mL   GLUCOSE, POC    Collection Time: 01/30/19  7:57 AM   Result Value Ref Range    Glucose (POC) 208 (H) 65 - 100 mg/dL   GLUCOSE, POC    Collection Time: 01/30/19 11:16 AM   Result Value Ref Range    Glucose (POC) 196 (H) 65 - 100 mg/dL       Discharge Exam:  Patient Vitals for the past 24 hrs:   Temp Pulse Resp BP SpO2   01/30/19 1115     93 %   01/30/19 1051 98.6 °F (37 °C) 83 19 123/76 92 %   01/30/19 0857     92 %   01/30/19 0756 98.4 °F (36.9 °C) 69 18 124/76 90 %   01/30/19 0343 98.2 °F (36.8 °C) 66 20 128/74 100 %   01/29/19 2321 98.1 °F (36.7 °C) 85 20 130/79 96 %   01/29/19 2025 98 °F (36.7 °C) 87 18 130/70 98 %   01/29/19 1927     93 %   01/29/19 1517 98.6 °F (37 °C) 74 18 117/73 99 %     Oxygen Therapy  O2 Sat (%): 93 % (01/30/19 1115)  Pulse via Oximetry: 79 beats per minute (01/30/19 1115)  O2 Device: Room air (01/30/19 1115)  O2 Flow Rate (L/min): 2 l/min (01/29/19 1123)  FIO2 (%): 28 % (01/26/19 1810)  No intake or output data in the 24 hours ending 01/30/19 1459      Physical exam:  General:    Well nourished. Alert. No distress. Eyes:   Normal sclera. Extraocular movements intact. ENT:  Normocephalic, atraumatic. Moist mucous membranes  CV:   Regular rate and rhythm. No murmur, rub, or gallop. Lungs:  Clear to auscultation bilaterally. No wheezing, rhonchi, or rales. Abdomen: Soft, nontender, nondistended. Bowel sounds normal.   Extremities: Warm and dry. No cyanosis or edema. Neurologic: No focal deficits  Skin:     No rashes or jaundice. Psych:  Normal mood and affect.     Discharge Info:   Current Discharge Medication List      START taking these medications Details   montelukast (SINGULAIR) 10 mg tablet Take 1 Tab by mouth nightly. Qty: 30 Tab, Refills: 0      predniSONE (DELTASONE) 10 mg tablet Pt to take 4 tabs x 2 days, 3 tabs x 3 days, 2 tabs x 3 days, 1 tab x 3 days, a half tab x 3 days  Qty: 30 Tab, Refills: 0      budesonide (PULMICORT) 0.5 mg/2 mL nbsp Take 4 mL by inhalation two (2) times a day. Qty: 30 Each, Refills: 3         CONTINUE these medications which have CHANGED    Details   losartan-hydroCHLOROthiazide (HYZAAR) 100-12.5 mg per tablet Take 1 Tab by mouth daily. Qty: 30 Tab, Refills: 0         CONTINUE these medications which have NOT CHANGED    Details   hydrOXYzine HCl (ATARAX) 10 mg tablet Take 30 mg by mouth nightly. EPINEPHrine (EPIPEN) 0.3 mg/0.3 mL injection 0.3 mg by IntraMUSCular route once as needed for Anaphylaxis. raNITIdine (ZANTAC) 150 mg tablet Take 150 mg by mouth two (2) times a day. amLODIPine (NORVASC) 10 mg tablet Take 1 Tab by mouth daily. Qty: 90 Tab, Refills: 2    Associated Diagnoses: Benign essential HTN      atorvastatin (LIPITOR) 40 mg tablet Take 1 Tab by mouth daily. Qty: 90 Tab, Refills: 2    Associated Diagnoses: Pure hypercholesterolemia      Insulin Needles, Disposable, (LETI PEN NEEDLE) 32 gauge x 5/32\" ndle Inject insulin once daily  DX E 11.9  Qty: 100 Pen Needle, Refills: 1      fexofenadine (ALLEGRA) 180 mg tablet Take 180 mg by mouth daily. cetirizine (ZYRTEC) 10 mg tablet Take 10 mg by mouth daily. folic acid (FOLVITE) 1 mg tablet Take 1 Tab by mouth daily. Qty: 90 Tab, Refills: 3    Associated Diagnoses: Arthralgia, unspecified joint; Positive sm/RNP antibody      fluticasone (FLOVENT HFA) 110 mcg/actuation inhaler Take 1 Puff by inhalation every twelve (12) hours. Qty: 2 Inhaler, Refills: 2    Associated Diagnoses: Exacerbation of asthma, unspecified asthma severity, unspecified whether persistent      nebivolol (BYSTOLIC) 5 mg tablet Take 1 Tab by mouth daily.  Indications: hypertension  Qty: 90 Tab, Refills: 3    Associated Diagnoses: Essential hypertension      meloxicam (MOBIC) 15 mg tablet Take 1 pill once a day after food. Qty: 30 Tab, Refills: 3    Associated Diagnoses: Degeneration of lumbar intervertebral disc; Primary osteoarthritis of right foot      hydroxychloroquine (PLAQUENIL) 200 mg tablet Take 1 Tab by mouth two (2) times daily (after meals). Qty: 180 Tab, Refills: 3    Associated Diagnoses: Sjogren's syndrome, with unspecified organ involvement ([facility])      dulaglutide (TRULICITY) 1.5 QL/0.7 mL sub-q pen INJECT ONE UNIT DOSE(0.5 ML) SUBCUTANEOUSLY ONCE EVERY 7 DAYS  Qty: 12 Pen, Refills: 3    Associated Diagnoses: Controlled type 2 diabetes mellitus without complication, with long-term current use of insulin ([facility])      PROAIR HFA 90 mcg/actuation inhaler Take 2 Puffs by inhalation every four (4) hours as needed for Wheezing. Qty: 3 Inhaler, Refills: 3    Associated Diagnoses: Mild intermittent asthma without complication      albuterol (PROVENTIL VENTOLIN) 2.5 mg /3 mL (0.083 %) nebulizer solution 3 mL by Nebulization route every four (4) hours as needed for Wheezing. Qty: 180 Each, Refills: 6    Associated Diagnoses: Mild intermittent asthma without complication      insulin glargine (TOUJEO SOLOSTAR U-300 INSULIN) 300 unit/mL (1.5 mL) inpn 35 Units by SubCUTAneous route nightly. Qty: 15 Adjustable Dose Pre-filled Pen Syringe, Refills: 3    Associated Diagnoses: Controlled type 2 diabetes mellitus without complication, with long-term current use of insulin ([facility])      aspirin delayed-release 81 mg tablet Take 81 mg by mouth daily. Take / use AM day of surgery  per anesthesia protocols.            Disposition: home    Activity: PT/OT per Home Health  Diet: DIET DIABETIC CONSISTENT CARB Regular  DIET NUTRITIONAL SUPPLEMENTS All Meals; Glucerna Shake    Follow-up Appointments   Procedures    FOLLOW UP VISIT Appointment in: Other (Specify) Follow up appointment with [facility] in 2-3 weeks with PFTs. Follow up appointment with [facility] in 2-3 weeks with PFTs. Standing Status:   Standing     Number of Occurrences:   1     Order Specific Question:   Appointment in     Answer: Other (Specify)         Follow-up Information     Follow up With Specialties Details Why Anam Chase MD Internal Medicine   [address]  [phone]            Case reviewed with supervising physician - NAVEED Adan MD    Time spent in patient discharge planning and coordination 35 minutes.     Signed:  MARICRUZ GravesC

## 2019-01-30 NOTE — PROGRESS NOTES
Problem: Self Care Deficits Care Plan (Adult) Goal: *Acute Goals and Plan of Care (Insert Text) 1. Pt will toilet with SBA 2. Pt will complete functional mobility for ADLs with SBA 3. Pt will complete lower body dressing with SBA using AE as needed 4. Pt will complete grooming and hygiene at sink with SBA 5. Pt will demonstrate independence with HEP to promote increased BUE strength and functional use for ADLs 6. Pt will tolerate 23 minutes functional activity with min or fewer rest breaks to promote increased endurance for ADLs 7. Pt will independently demonstrate/ verbalize independence with 2+ Energy conservation techniques to promote increased endurance for ADLs Timeframe: 7 days OCCUPATIONAL THERAPY: Daily Note and AM 1/30/2019INPATIENT: OT Visit Days: 2 Payor: Alea Louis / Plan: Doctor kinetic / Product Type: Managed Care Medicare /  
  
NAME/AGE/GENDER: Richard Dominique is a 75 y.o. female PRIMARY DIAGNOSIS:  Asthma exacerbation Asthma exacerbation Asthma exacerbation Asthma exacerbation ICD-10: Treatment Diagnosis:  
 · Generalized Muscle Weakness (M62.81) Precautions/Allergies: 
   Prilosec [omeprazole] and Penicillins ASSESSMENT:  
Ms. Helena Garcia was admitted from home with asthma/ COPD exacerbation, is on 2L NC home O2. Pt lives with her ex- and is independent at baseline, occasionally uses a cane but generally does not use an AD for mobility. Pt was sitting in chair. Pt completed sponge bath and dressing with the assistance listed below. Pt is progressing towards goals. Continue POC. This section established at most recent assessment PROBLEM LIST (Impairments causing functional limitations): 1. Decreased Strength 2. Decreased ADL/Functional Activities 3. Decreased Transfer Abilities 4. Decreased Balance 5. Decreased Activity Tolerance  INTERVENTIONS PLANNED: (Benefits and precautions of occupational therapy have been discussed with the patient.) 1. Activities of daily living training 2. Adaptive equipment training 3. Balance training 4. Therapeutic activity 5. Therapeutic exercise TREATMENT PLAN: Frequency/Duration: Follow patient 3 times/ week to address above goals. Rehabilitation Potential For Stated Goals: Good RECOMMENDED REHABILITATION/EQUIPMENT: (at time of discharge pending progress): Due to the probability of continued deficits (see above) this patient will likely need continued skilled occupational therapy after discharge. Equipment:  
? rollator OCCUPATIONAL PROFILE AND HISTORY:  
History of Present Injury/Illness (Reason for Referral): 
See H&P Past Medical History/Comorbidities: Ms. Li Burns  has a past medical history of Arrhythmia (3 - 4 weeks ago (9/2010)), Arthritis, Asthma, Bladder incontinence (10/24/2016), Chronic pain, Dermatophytosis, Diabetes ([address] Former smoker, GERD (gastroesophageal reflux disease) (7/23/2013), Heart failure ([address] Hypercholesterolemia, Hypertension, Hypertensive heart disease without HF (heart failure), Lupus (systemic lupus erythematosus) ([address] (2005), Non compliance with medical treatment, Obesity, ANATOLY (obstructive sleep apnea) (07/23/2013), Seizures ([address] Stroke ([address] (1979), Systemic lupus erythematosus ([address] (07/23/2013), and Type 2 diabetes mellitus with hyperglycemia ([address] (10/24/2016).  She also has no past medical history of Adverse effect of anesthesia, Aneurysm ([address] CAD (coronary artery disease), Cancer ([address] Chronic kidney disease, Chronic obstructive pulmonary disease ([address] Coagulation disorder ([address] Difficult intubation, Endocarditis, Ill-defined condition, Liver disease, Malignant hyperthermia due to anesthesia, Nausea & vomiting, Nicotine vapor product user, Non-nicotine vapor product user, Pseudocholinesterase deficiency, Psychiatric disorder, PUD (peptic ulcer disease), Rheumatic fever, Thromboembolus ([facility] or Thyroid disease. Ms. Idris Valencia  has a past surgical history that includes hx hysterectomy (1974); hx breast lumpectomy (1998); hx appendectomy; hx hammer toe repair (2010); pr rev upper eyelid w excess skin; hx heent; hx other surgical; hx other surgical (03/15/2018); and hx heart catheterization (03/22/1999). Social History/Living Environment:  
Home Environment: Private residence # Steps to Enter: 2 Rails to Enter: Yes Wheelchair Ramp: No 
One/Two Story Residence: One story Living Alone: No 
Support Systems: Family member(s), Parent Patient Expects to be Discharged to[de-identified] Private residence Current DME Used/Available at Home: Cane, straight, Shower chair Tub or Shower Type: Shower Prior Level of Function/Work/Activity: 
Independent, lives with ex-, occasionally uses cane for mobility Number of Personal Factors/Comorbidities that affect the Plan of Care: Brief history (0):  LOW COMPLEXITY ASSESSMENT OF OCCUPATIONAL PERFORMANCE[de-identified]  
Activities of Daily Living:  
Basic ADLs (From Assessment) Complex ADLs (From Assessment) Feeding: Independent Oral Facial Hygiene/Grooming: Stand-by assistance Bathing: Contact guard assistance Upper Body Dressing: Setup Lower Body Dressing: Contact guard assistance Toileting: Contact guard assistance Instrumental ADL Meal Preparation: Minimum assistance Homemaking: Minimum assistance Medication Management: Independent Financial Management: Independent Grooming/Bathing/Dressing Activities of Daily Living Grooming Washing Face: Supervision/set-up Cognitive Retraining Safety/Judgement: Fall prevention Upper Body Bathing Bathing Assistance: Supervision/set-up Position Performed: Seated in chair Lower Body Bathing Bathing Assistance: Minimum assistance Perineal  : Supervision/set-up Lower Body : Minimum assistance Upper Body Dressing Assistance Dressing Assistance: Supervision/set-up Hospital Gown: Supervision/ set-up Lower Body Dressing Assistance Socks: Minimum assistance Most Recent Physical Functioning:  
Gross Assessment: 
  
         
  
Posture: 
Posture (WDL): Exceptions to [facility] Posture Assessment: Forward head, Rounded shoulders Balance: 
Sitting: Intact Bed Mobility: 
  
Wheelchair Mobility: 
  
Transfers: 
   
 
    
 
Patient Vitals for the past 6 hrs: 
 BP BP Patient Position SpO2 Pulse 01/30/19 0756 124/76 At rest 90 % 69  
01/30/19 0857   92 %   
01/30/19 1051 123/76 At rest 92 % 83  
01/30/19 1115   93 %  Mental Status Neurologic State: Alert Orientation Level: Oriented X4 Cognition: Follows commands Perception: Verbal, Tactile Perseveration: No perseveration noted Safety/Judgement: Fall prevention Physical Skills Involved: 
1. Balance 2. Strength 3. Activity Tolerance Cognitive Skills Affected (resulting in the inability to perform in a timely and safe manner): 1. none Psychosocial Skills Affected: 1. Habits/Routines Number of elements that affect the Plan of Care: 3-5:  MODERATE COMPLEXITY CLINICAL DECISION MAKING:  
[employer] AM-PAC 6 Clicks Daily Activity Inpatient Short Form How much help from another person does the patient currently need. .. Total A Lot A Little None 1. Putting on and taking off regular lower body clothing? [] 1   [] 2   [x] 3   [] 4  
2. Bathing (including washing, rinsing, drying)? [] 1   [] 2   [x] 3   [] 4  
3. Toileting, which includes using toilet, bedpan or urinal?   [] 1   [] 2   [x] 3   [] 4  
4. Putting on and taking off regular upper body clothing? [] 1   [] 2   [] 3   [x] 4  
5. Taking care of personal grooming such as brushing teeth? [] 1   [] 2   [] 3   [x] 4  
6. Eating meals? [] 1   [] 2   [] 3   [x] 4  
© 2007, Trustees of [facility] [employer], under license to [employer]. All rights reserved Score:  Initial: 21 Most Recent: X (Date: -- ) Interpretation of Tool:  Represents activities that are increasingly more difficult (i.e. Bed mobility, Transfers, Gait). Medical Necessity:    
· Patient demonstrates good rehab potential due to higher previous functional level. Reason for Services/Other Comments: 
· Patient continues to require skilled intervention due to decreased ADLs and functional performance from baseline. Use of outcome tool(s) and clinical judgement create a POC that gives a: LOW COMPLEXITY  
 
 
 
TREATMENT:  
(In addition to Assessment/Re-Assessment sessions the following treatments were rendered) Pre-treatment Symptoms/Complaints:   
Pain: Initial:  
Pain Intensity 1: 0  Post Session:  0 Self Care: (25 min): Procedure(s) (per grid) utilized to improve and/or restore self-care/home management as related to dressing, bathing and grooming and functional mobility, fall prevention for ADLs. Required minimal verbal and manual cueing to facilitate activities of daily living skills. Braces/Orthotics/Lines/Etc:  
· O2 Device: Nasal cannula Treatment/Session Assessment:   
· Response to Treatment:  No adverse reaction · Interdisciplinary Collaboration:  
o Certified Occupational Therapy Assistant 
o Registered Nurse · After treatment position/precautions:  
o Up in chair 
o Bed/Chair-wheels locked 
o Call light within reach 
o RN notified · Compliance with Program/Exercises: Compliant most of the time. · Recommendations/Intent for next treatment session: \"Next visit will focus on advancements to more challenging activities and reduction in assistance provided\". Total Treatment Duration: OT Patient Time In/Time Out Time In: 7840 Time Out: 1110 [address] [facility]

## 2019-01-31 ENCOUNTER — PATIENT OUTREACH (OUTPATIENT)
Dept: CASE MANAGEMENT | Age: 74
End: 2019-01-31

## 2019-01-31 LAB
M PNEUMO IGG SER IA-ACNC: <100 U/ML (ref 0–99)
M PNEUMO IGM SER IA-ACNC: <770 U/ML (ref 0–769)

## 2019-01-31 NOTE — PROGRESS NOTES
This note will not be viewable in [address] Initial ISMAEL outreach attempt to patient's home/cell number was unsuccessful. Left message to return call. Will attempt second outreach within 24 hours.

## 2019-01-31 NOTE — PROGRESS NOTES
This note will not be viewable in [address] Second ISMAEL outreach attempt made to patient's home and/or cell number was unsuccessful. Left message to return call. Will attempt third outreach within 5 business days.

## 2019-02-01 ENCOUNTER — HOME CARE VISIT (OUTPATIENT)
Dept: SCHEDULING | Facility: HOME HEALTH | Age: 74
End: 2019-02-01
Payer: MEDICARE

## 2019-02-01 VITALS
HEART RATE: 68 BPM | DIASTOLIC BLOOD PRESSURE: 85 MMHG | SYSTOLIC BLOOD PRESSURE: 158 MMHG | OXYGEN SATURATION: 94 % | TEMPERATURE: 98 F | RESPIRATION RATE: 15 BRPM

## 2019-02-01 PROCEDURE — G0299 HHS/HOSPICE OF RN EA 15 MIN: HCPCS

## 2019-02-01 PROCEDURE — 400013 HH SOC

## 2019-02-03 ENCOUNTER — HOME CARE VISIT (OUTPATIENT)
Dept: HOME HEALTH SERVICES | Facility: HOME HEALTH | Age: 74
End: 2019-02-03
Payer: MEDICARE

## 2019-02-04 ENCOUNTER — HOME CARE VISIT (OUTPATIENT)
Dept: SCHEDULING | Facility: HOME HEALTH | Age: 74
End: 2019-02-04
Payer: MEDICARE

## 2019-02-04 VITALS
DIASTOLIC BLOOD PRESSURE: 82 MMHG | HEART RATE: 76 BPM | OXYGEN SATURATION: 96 % | SYSTOLIC BLOOD PRESSURE: 124 MMHG | RESPIRATION RATE: 20 BRPM | TEMPERATURE: 97.8 F

## 2019-02-04 VITALS
DIASTOLIC BLOOD PRESSURE: 87 MMHG | TEMPERATURE: 97.6 F | RESPIRATION RATE: 18 BRPM | HEART RATE: 78 BPM | SYSTOLIC BLOOD PRESSURE: 127 MMHG | OXYGEN SATURATION: 98 %

## 2019-02-04 PROCEDURE — G0151 HHCP-SERV OF PT,EA 15 MIN: HCPCS

## 2019-02-04 PROCEDURE — G0299 HHS/HOSPICE OF RN EA 15 MIN: HCPCS

## 2019-02-06 ENCOUNTER — HOME CARE VISIT (OUTPATIENT)
Dept: SCHEDULING | Facility: HOME HEALTH | Age: 74
End: 2019-02-06
Payer: MEDICARE

## 2019-02-06 ENCOUNTER — PATIENT OUTREACH (OUTPATIENT)
Dept: CASE MANAGEMENT | Age: 74
End: 2019-02-06

## 2019-02-06 PROCEDURE — G0299 HHS/HOSPICE OF RN EA 15 MIN: HCPCS

## 2019-02-06 PROCEDURE — G0151 HHCP-SERV OF PT,EA 15 MIN: HCPCS

## 2019-02-06 NOTE — PROGRESS NOTES
This note will not be viewable in [address] Transition of Care Discharge Follow-up Questionnaire Date/Time of Call: 
 2/6/19 1:05pm  
What was the patient hospitalized for? Asthma exacerbation Does the patient understand his/her diagnosis and/or treatment and what happened during the hospitalization? Yes, spoke with patient, she states understanding of diagnosis and treatment; and is agreeable to call. Patient states she is doing ok, gets lightheaded but sits down when needed. Did the patient receive discharge instructions? Yes   
CM Assessed Risk for Readmission:  
 
 
Patient stated Risk for Readmission: Low r/t diagnosis and/or comorbidities Hard to breathe Review any discharge instructions (see discharge instructions/AVS in [facility]). Ask patient if they understand these. Do they have any questions? Reviewed, understanding is stated, no questions at this time Were home services ordered (nursing, PT, OT, ST, etc.)? Yes, [facility] RN/PT/OT If so, has the first visit occurred? If not, why? (Assist with coordination of services if necessary.) Yes Was any DME ordered? Yes, nebulizer (poinsett) If so, has it been received? If not, why?  (Assist patient in obtaining DME orders &/or equipment if necessary.) yes Complete a review of all medications (new, continued and discontinued meds per the D/C instructions and medication tab in [facility]). Completed START taking: 
budesonide 0.5 mg/2 mL Nbsp (PULMICORT) 
montelukast 10 mg tablet (SINGULAIR) predniSONE 10 mg tablet (DELTASONE) STOP taking: 
hydrOXYzine pamoate 25 mg capsule (VISTARIL) Were all new prescriptions filled? If not, why?  (Assist patient in obtaining medications if necessary  escalate for CCM &/or SW if ongoing issues are verbalized by pt or anticipated) Yes Does the patient understand the purpose and dosing instructions for all medications? (If patient has questions, provide explanation and education.) Yes Does the patient have any problems in performing ADLs? (If patient is unable to perform ADLs  what is the limiting factor(s)? Do they have a support system that can assist? If no support system is present, discuss possible assistance that they may be able to obtain. Escalate for CCM/SW if ongoing issues are verbalized by pt or anticipated) Independent with ADLs, sits down when needed. Patient indicated she is having trouble with personal care/bathing and has not \"been able to clean up real well\" since hospitalization. Spoke with [facility] and message was sent to [facility] for evaluation re: need for aide services. Does the patient have all follow-up appointments scheduled? 7 day f/up with PCP?  
(f/up with PCP may be w/in 14 days if patient has a f/up with their specialist w/in 7 days) 7-14 day f/up with specialist?  
(or per discharge instructions) If f/up has not been made  what actions has the care coordinator made to accomplish this? Has transportation been arranged? Yes Dr. Doug Powell, 2/18/19, provider is out of the office patient wants to wait for this visit when provider returns Theone RINA Duran 2/19/19 Yes, no transportation needs were identified at this time Any other questions or concerns expressed by the patient? No further needs or concerns identified. Patient states her gratitude for follow up. Contact information for [facility] was given, instructed to call with new questions or concerns. Schedule next appointment with LUCITA BOWLING Coordinator or refer to RN Case Manager/ per the workflow guidelines. When is care coordinators next follow-up call scheduled? If referred for CCM  what RN care manager was the referral assigned? Community Care Coordinator will follow per workflow guidelines. Within 30 days ISMAEL Call Completed By: Chris Villalobos LPN Community Care Coordinator

## 2019-02-07 ENCOUNTER — HOME CARE VISIT (OUTPATIENT)
Dept: SCHEDULING | Facility: HOME HEALTH | Age: 74
End: 2019-02-07
Payer: MEDICARE

## 2019-02-07 VITALS
RESPIRATION RATE: 18 BRPM | HEART RATE: 78 BPM | SYSTOLIC BLOOD PRESSURE: 118 MMHG | DIASTOLIC BLOOD PRESSURE: 80 MMHG | OXYGEN SATURATION: 96 % | TEMPERATURE: 97.8 F

## 2019-02-07 VITALS
RESPIRATION RATE: 20 BRPM | OXYGEN SATURATION: 98 % | HEART RATE: 81 BPM | DIASTOLIC BLOOD PRESSURE: 70 MMHG | TEMPERATURE: 98.1 F | SYSTOLIC BLOOD PRESSURE: 118 MMHG

## 2019-02-07 PROCEDURE — G0155 HHCP-SVS OF CSW,EA 15 MIN: HCPCS

## 2019-02-08 ENCOUNTER — HOME CARE VISIT (OUTPATIENT)
Dept: HOME HEALTH SERVICES | Facility: HOME HEALTH | Age: 74
End: 2019-02-08
Payer: MEDICARE

## 2019-02-11 ENCOUNTER — HOME CARE VISIT (OUTPATIENT)
Dept: SCHEDULING | Facility: HOME HEALTH | Age: 74
End: 2019-02-11
Payer: MEDICARE

## 2019-02-11 PROCEDURE — G0157 HHC PT ASSISTANT EA 15: HCPCS

## 2019-02-12 ENCOUNTER — HOME CARE VISIT (OUTPATIENT)
Dept: SCHEDULING | Facility: HOME HEALTH | Age: 74
End: 2019-02-12
Payer: MEDICARE

## 2019-02-12 PROCEDURE — G0299 HHS/HOSPICE OF RN EA 15 MIN: HCPCS

## 2019-02-13 VITALS
SYSTOLIC BLOOD PRESSURE: 130 MMHG | TEMPERATURE: 98.6 F | DIASTOLIC BLOOD PRESSURE: 92 MMHG | HEART RATE: 76 BPM | RESPIRATION RATE: 20 BRPM

## 2019-02-14 ENCOUNTER — HOME CARE VISIT (OUTPATIENT)
Dept: SCHEDULING | Facility: HOME HEALTH | Age: 74
End: 2019-02-14
Payer: MEDICARE

## 2019-02-14 VITALS — RESPIRATION RATE: 20 BRPM

## 2019-02-14 PROCEDURE — G0157 HHC PT ASSISTANT EA 15: HCPCS

## 2019-02-14 PROCEDURE — G0299 HHS/HOSPICE OF RN EA 15 MIN: HCPCS

## 2019-02-15 VITALS
HEART RATE: 82 BPM | TEMPERATURE: 97.2 F | DIASTOLIC BLOOD PRESSURE: 81 MMHG | RESPIRATION RATE: 18 BRPM | SYSTOLIC BLOOD PRESSURE: 132 MMHG

## 2019-02-18 VITALS
HEART RATE: 82 BPM | TEMPERATURE: 97.4 F | DIASTOLIC BLOOD PRESSURE: 82 MMHG | RESPIRATION RATE: 20 BRPM | SYSTOLIC BLOOD PRESSURE: 134 MMHG

## 2019-02-19 ENCOUNTER — HOME CARE VISIT (OUTPATIENT)
Dept: SCHEDULING | Facility: HOME HEALTH | Age: 74
End: 2019-02-19
Payer: MEDICARE

## 2019-02-19 VITALS
SYSTOLIC BLOOD PRESSURE: 102 MMHG | DIASTOLIC BLOOD PRESSURE: 70 MMHG | RESPIRATION RATE: 16 BRPM | TEMPERATURE: 97.9 F | HEART RATE: 84 BPM | OXYGEN SATURATION: 98 %

## 2019-02-19 VITALS
TEMPERATURE: 97.9 F | OXYGEN SATURATION: 98 % | RESPIRATION RATE: 16 BRPM | DIASTOLIC BLOOD PRESSURE: 70 MMHG | SYSTOLIC BLOOD PRESSURE: 102 MMHG | HEART RATE: 84 BPM

## 2019-02-19 PROBLEM — J45.901 ASTHMA EXACERBATION: Status: RESOLVED | Noted: 2019-01-26 | Resolved: 2019-02-19

## 2019-02-19 PROBLEM — J21.9 ACUTE BRONCHIOLITIS: Status: RESOLVED | Noted: 2019-01-27 | Resolved: 2019-02-19

## 2019-02-19 PROCEDURE — G0157 HHC PT ASSISTANT EA 15: HCPCS

## 2019-02-19 PROCEDURE — G0299 HHS/HOSPICE OF RN EA 15 MIN: HCPCS

## 2019-02-22 ENCOUNTER — HOME CARE VISIT (OUTPATIENT)
Dept: SCHEDULING | Facility: HOME HEALTH | Age: 74
End: 2019-02-22
Payer: MEDICARE

## 2019-02-22 VITALS
DIASTOLIC BLOOD PRESSURE: 80 MMHG | TEMPERATURE: 98.1 F | SYSTOLIC BLOOD PRESSURE: 124 MMHG | HEART RATE: 72 BPM | RESPIRATION RATE: 16 BRPM | OXYGEN SATURATION: 99 %

## 2019-02-22 PROCEDURE — G0157 HHC PT ASSISTANT EA 15: HCPCS

## 2019-02-22 PROCEDURE — G0155 HHCP-SVS OF CSW,EA 15 MIN: HCPCS

## 2019-02-22 PROCEDURE — G0299 HHS/HOSPICE OF RN EA 15 MIN: HCPCS

## 2019-02-23 VITALS
SYSTOLIC BLOOD PRESSURE: 124 MMHG | RESPIRATION RATE: 18 BRPM | HEART RATE: 92 BPM | TEMPERATURE: 98.1 F | DIASTOLIC BLOOD PRESSURE: 80 MMHG | OXYGEN SATURATION: 98 %

## 2019-02-26 ENCOUNTER — HOME CARE VISIT (OUTPATIENT)
Dept: SCHEDULING | Facility: HOME HEALTH | Age: 74
End: 2019-02-26
Payer: MEDICARE

## 2019-02-26 PROCEDURE — G0157 HHC PT ASSISTANT EA 15: HCPCS

## 2019-02-27 ENCOUNTER — PATIENT OUTREACH (OUTPATIENT)
Dept: CASE MANAGEMENT | Age: 74
End: 2019-02-27

## 2019-02-27 NOTE — PROGRESS NOTES
This note will not be viewable in [address] Transitions of Care  Follow up Outreach Note Outreach type Phone call: spoke with patient Home visit:  
Date/Time of Outreach: 2/27/19  2:22pm  
 
Has patient attended PCP or specialist follow-up appointments since last contact? What was outcome of appointment? When is next follow-up scheduled? Patient states she is doing well, c/o some mild dizziness since Sunday and she did discuss that with her pcp at visit today, next visit 4/30/19. She reports follow up with Shreyas Ledesma NP at [facility], next visit 6/25/19 Review medications. Any medication changes since last outreach? Does patient have any questions or issues related to their medications? Yes, stop flovent; start pulmicort in nebulizer Yes, copay for pulmicort is high, as well as cost for other medications recently and she had to send some prescriptions back to [facility]. Home health active? If yes  any issue? Progress? Yes, [facility], patient states they will be discharging soon Referrals needed? 
(CM, JORDAN, HH, etc. ) Yes, SW CCM assist with options to help afford medications and follow up on MOW referral.  
  
Other issues/Miscellaneous? (Transportation, access to meals, ability to perform ADLs, adequate caregiver support, etc.) No other needs or concerns at this time. Patient states her gratitude for follow up. Next Outreach Scheduled? Graduation from program? 
 N/A No    
 
Next Steps/Goals (if applicable): Information sent to JORD[facility] CCM Outreach completed by: 
 Zarina Smith LPN Community Care Coordinator

## 2019-02-27 NOTE — Clinical Note
Patient needing assistance with obtaining medications and following on MOW referral.  [facility] MSW recently discharged and may have initiated.   Patient needing guidance and support

## 2019-02-28 ENCOUNTER — HOME CARE VISIT (OUTPATIENT)
Dept: SCHEDULING | Facility: HOME HEALTH | Age: 74
End: 2019-02-28
Payer: MEDICARE

## 2019-02-28 ENCOUNTER — PATIENT OUTREACH (OUTPATIENT)
Dept: CASE MANAGEMENT | Age: 74
End: 2019-02-28

## 2019-02-28 VITALS
TEMPERATURE: 97.1 F | OXYGEN SATURATION: 97 % | SYSTOLIC BLOOD PRESSURE: 120 MMHG | RESPIRATION RATE: 18 BRPM | HEART RATE: 76 BPM | DIASTOLIC BLOOD PRESSURE: 90 MMHG

## 2019-02-28 VITALS
TEMPERATURE: 97.2 F | HEART RATE: 82 BPM | RESPIRATION RATE: 18 BRPM | DIASTOLIC BLOOD PRESSURE: 82 MMHG | SYSTOLIC BLOOD PRESSURE: 132 MMHG

## 2019-02-28 PROCEDURE — G0151 HHCP-SERV OF PT,EA 15 MIN: HCPCS

## 2019-02-28 PROCEDURE — G0299 HHS/HOSPICE OF RN EA 15 MIN: HCPCS

## 2019-03-01 ENCOUNTER — PATIENT OUTREACH (OUTPATIENT)
Dept: CASE MANAGEMENT | Age: 74
End: 2019-03-01

## 2019-03-01 NOTE — PROGRESS NOTES
TC to [facility]. They reported that the pt's son had picked up the testing strips and the lancets today. They cost $16.97 and $5.59 respectively. They confirmed that the pt's RX plan is through her Medicare part D and not medicaid. The pt could purchase a Reli-on glucometer for approximately $10 and the test strips are $5.  CM will share this information with the pt. TC to Diane at [employer]. She stated that she has attempted numerous times to reach the pt and her emergency contact with no success. She stated that she has also left multiple messages and has not received return calls. She requests that the pt/family call her to complete the assessment process if MOW services are still wanted. TC to pt. Left voice mail message requesting a return call to discuss the above issues. PLAN:  Follow up with pt next week if no return call is received. This note will not be viewable in [address]

## 2019-03-01 NOTE — PROGRESS NOTES
Ambulatory Care Coordination  Social Work Assessment Date of referral? 
 
Referral from which RN CM/other source? 2/27/19 ISMAEL coordinator Previously referred? If so, reason and brief outcome no Reason for current referral: \"Patient needing assistance with obtaining medications and following on MOW referral. [facility], [place] MSW recently discharged and may have initiated.  Patient needing guidance and support. \"  
Living situation: Pt lives in her own home with her spouse. Insurance type? Prescription drug plan? Affordable meds? Obtained where? Medicare and Medicaid. Pt reports that most RX's are affordable but that this week she did not have the money to purchase the lancets and test strips. Pt has a new glucometer and the test strips are more expensive than the one she had before. Pt has RX's filled at [facility] [address]. Income information (if needed): Food stamps? Financial resources are typically adequate to meet their needs. Transportation ? Pt normally drives but when she is unable to drive due to weakness, her son assists with transportation. [facility] SW has provided pt with the information to schedule medicaid van transportation - pt confirmed she has this information available. Housing situation? Housing assistance needed? Housing is stable. No Assistance needed. Sources of Support: Family? Family, home health staff, [facility] involved? CLTC aides/pvt  pay aides? [facility] services are ending soon. Pt has RN/PT/SW services. PT goals were met and she was discharged [facility] PT on 2/28/19. SW services also discharged. DME? Rollator, cane Ambulatory? ADLs  assistance required? Assistive device needed for ambulation? Pt is ambulatory and uses either a cane or a rollator. Per PT discharge note, pt requires minimal assistance with ADL's at this time. Pt is typically fully independent with her ADL's. Referral to CLTC/Medicaid needed?  No  
 Referral to Medicare Extra Help/LIS program needed? No  
Small home repair needed? No  
MOW referral?  Adequate nutrition? Patrizia Toney has initiated a MOW referral.  Pt states that she has not heard from them about services. Falls Risk Assessment? 1 fall reported in last 12 months per PCP OV progress note from 2/27/19. Depression screening? PHQ2=0 per PCP OV progress note from 2/27/19. ACP set up? Needs information? Not addressed at initial outreach Any other concerns/questions? Pt's main concern is medication/diabetic testing supplies affordability and the status of the [employer] referral.  
Next steps: -CM will contact Lu Gamboa to determine if another brand of glucometer/test strips would be more affordable for the pt. 
-CM will contact MOW to determine the status of the pt's referral. 
-CM will follow up with the pt at least 1x in the next 30 days to monitor progress towards CCM goals and to assist as needed. This note will not be viewable in [address]

## 2019-03-02 VITALS
TEMPERATURE: 87.2 F | SYSTOLIC BLOOD PRESSURE: 115 MMHG | HEART RATE: 82 BPM | RESPIRATION RATE: 20 BRPM | DIASTOLIC BLOOD PRESSURE: 70 MMHG

## 2019-03-05 ENCOUNTER — PATIENT OUTREACH (OUTPATIENT)
Dept: CASE MANAGEMENT | Age: 74
End: 2019-03-05

## 2019-03-05 NOTE — PROGRESS NOTES
TC to pt for CCM outreach. Pt confirmed that her son did  the diabetic testing supplies for her last week. SW informed her about the Reli-On meter and strips that are available at [facility] for $9 and $5 respectively. SW suggested that she discuss this with her MD if this would be a viable option as the testing strips are very affordable. She verbalized understanding. She stated she has not talked with anyone from [address] [place] [address]. SW informed her that they have been trying to reach her and she will need to call them to initiate the services. She verbalized understanding and confirmed that she has the MOW number. PLAN:  SW will monitor pt's chart and follow up with the pt at least one time in the next 3 weeks. SW remains available as needed. This note will not be viewable in [address]

## 2019-03-07 ENCOUNTER — HOME CARE VISIT (OUTPATIENT)
Dept: SCHEDULING | Facility: HOME HEALTH | Age: 74
End: 2019-03-07
Payer: MEDICARE

## 2019-03-07 VITALS
RESPIRATION RATE: 18 BRPM | TEMPERATURE: 98 F | DIASTOLIC BLOOD PRESSURE: 80 MMHG | HEART RATE: 74 BPM | SYSTOLIC BLOOD PRESSURE: 132 MMHG | OXYGEN SATURATION: 97 %

## 2019-03-07 PROCEDURE — G0299 HHS/HOSPICE OF RN EA 15 MIN: HCPCS

## 2019-03-13 ENCOUNTER — HOME CARE VISIT (OUTPATIENT)
Dept: SCHEDULING | Facility: HOME HEALTH | Age: 74
End: 2019-03-13
Payer: MEDICARE

## 2019-03-13 PROCEDURE — G0299 HHS/HOSPICE OF RN EA 15 MIN: HCPCS

## 2019-03-14 VITALS
TEMPERATURE: 98.2 F | DIASTOLIC BLOOD PRESSURE: 82 MMHG | HEART RATE: 79 BPM | SYSTOLIC BLOOD PRESSURE: 140 MMHG | RESPIRATION RATE: 20 BRPM

## 2019-03-21 ENCOUNTER — HOME CARE VISIT (OUTPATIENT)
Dept: SCHEDULING | Facility: HOME HEALTH | Age: 74
End: 2019-03-21
Payer: MEDICARE

## 2019-03-21 VITALS
SYSTOLIC BLOOD PRESSURE: 148 MMHG | RESPIRATION RATE: 20 BRPM | HEART RATE: 91 BPM | DIASTOLIC BLOOD PRESSURE: 92 MMHG | TEMPERATURE: 96.2 F | OXYGEN SATURATION: 97 %

## 2019-03-21 PROCEDURE — G0299 HHS/HOSPICE OF RN EA 15 MIN: HCPCS

## 2019-03-27 ENCOUNTER — HOSPITAL ENCOUNTER (OUTPATIENT)
Dept: MAMMOGRAPHY | Age: 74
Discharge: HOME | End: 2019-03-27
Attending: INTERNAL MEDICINE
Payer: MEDICARE

## 2019-03-27 ENCOUNTER — PATIENT OUTREACH (OUTPATIENT)
Dept: CASE MANAGEMENT | Age: 74
End: 2019-03-27

## 2019-03-27 DIAGNOSIS — Z12.31 ENCOUNTER FOR SCREENING MAMMOGRAM FOR HIGH-RISK PATIENT: ICD-10-CM

## 2019-03-27 PROCEDURE — 77067 SCR MAMMO BI INCL CAD: CPT

## 2019-03-28 ENCOUNTER — HOME CARE VISIT (OUTPATIENT)
Dept: SCHEDULING | Facility: HOME HEALTH | Age: 74
End: 2019-03-28
Payer: MEDICARE

## 2019-03-28 PROCEDURE — G0299 HHS/HOSPICE OF RN EA 15 MIN: HCPCS

## 2019-03-29 VITALS
SYSTOLIC BLOOD PRESSURE: 128 MMHG | HEART RATE: 99 BPM | TEMPERATURE: 98.3 F | DIASTOLIC BLOOD PRESSURE: 82 MMHG | RESPIRATION RATE: 20 BRPM | OXYGEN SATURATION: 95 %

## 2019-04-08 ENCOUNTER — PATIENT OUTREACH (OUTPATIENT)
Dept: CASE MANAGEMENT | Age: 74
End: 2019-04-08

## 2019-04-08 NOTE — PROGRESS NOTES
Pt has met her CCM goal and will be graduated from CCM services at this time. Episode resolved, goal completed and SW removed from treatment team.  MD notified via in-basket message. No further CCM outreaches are planned but SW remains available to assist if needs arise in the future. This note will not be viewable in [address]

## 2019-04-10 ENCOUNTER — HOSPITAL ENCOUNTER (OUTPATIENT)
Dept: SLEEP MEDICINE | Age: 74
Discharge: HOME | End: 2019-04-10
Payer: MEDICARE

## 2019-04-10 PROCEDURE — 95811 POLYSOM 6/>YRS CPAP 4/> PARM: CPT

## 2020-04-28 ENCOUNTER — TELEPHONE (OUTPATIENT)
Dept: CASE MANAGEMENT | Age: 75
End: 2020-04-28

## 2020-08-04 ENCOUNTER — HOSPITAL ENCOUNTER (INPATIENT)
Age: 75
LOS: 7 days | Discharge: HOME HEALTH | DRG: 871 | End: 2020-08-11
Attending: EMERGENCY MEDICINE | Admitting: INTERNAL MEDICINE
Payer: MEDICARE

## 2020-08-04 ENCOUNTER — APPOINTMENT (OUTPATIENT)
Dept: GENERAL RADIOLOGY | Age: 75
DRG: 871 | End: 2020-08-04
Attending: EMERGENCY MEDICINE
Payer: MEDICARE

## 2020-08-04 DIAGNOSIS — J12.82 PNEUMONIA DUE TO COVID-19 VIRUS: Primary | ICD-10-CM

## 2020-08-04 DIAGNOSIS — U07.1 PNEUMONIA DUE TO COVID-19 VIRUS: Primary | ICD-10-CM

## 2020-08-04 DIAGNOSIS — J45.20 MILD INTERMITTENT ASTHMA WITHOUT COMPLICATION: ICD-10-CM

## 2020-08-04 DIAGNOSIS — R05.9 COUGH: ICD-10-CM

## 2020-08-04 PROBLEM — J96.01 ACUTE RESPIRATORY FAILURE WITH HYPOXIA (HCC): Status: ACTIVE | Noted: 2020-08-04

## 2020-08-04 LAB
ABO + RH BLD: NORMAL
ALBUMIN SERPL-MCNC: 3.1 G/DL (ref 3.2–4.6)
ALBUMIN/GLOB SERPL: 0.6 {RATIO} (ref 1.2–3.5)
ALP SERPL-CCNC: 54 U/L (ref 50–136)
ALT SERPL-CCNC: 21 U/L (ref 12–65)
ANION GAP SERPL CALC-SCNC: 4 MMOL/L (ref 7–16)
APPEARANCE UR: ABNORMAL
AST SERPL-CCNC: 66 U/L (ref 15–37)
ATRIAL RATE: 90 BPM
BACTERIA URNS QL MICRO: 0 /HPF
BASOPHILS # BLD: 0 K/UL (ref 0–0.2)
BASOPHILS NFR BLD: 0 % (ref 0–2)
BILIRUB SERPL-MCNC: 0.9 MG/DL (ref 0.2–1.1)
BILIRUB UR QL: ABNORMAL
BLOOD GROUP ANTIBODIES SERPL: NORMAL
BUN SERPL-MCNC: 15 MG/DL (ref 8–23)
CALCIUM SERPL-MCNC: 8.1 MG/DL (ref 8.3–10.4)
CALCULATED P AXIS, ECG09: 55 DEGREES
CALCULATED R AXIS, ECG10: -32 DEGREES
CALCULATED T AXIS, ECG11: 14 DEGREES
CASTS URNS QL MICRO: ABNORMAL /LPF
CHLORIDE SERPL-SCNC: 103 MMOL/L (ref 98–107)
CO2 SERPL-SCNC: 28 MMOL/L (ref 21–32)
COLOR UR: ABNORMAL
COVID-19 RAPID TEST, COVR: DETECTED
CREAT SERPL-MCNC: 1 MG/DL (ref 0.6–1)
CRP SERPL-MCNC: 3.2 MG/DL (ref 0–0.9)
DIAGNOSIS, 93000: NORMAL
DIFFERENTIAL METHOD BLD: ABNORMAL
EOSINOPHIL # BLD: 0 K/UL (ref 0–0.8)
EOSINOPHIL NFR BLD: 0 % (ref 0.5–7.8)
EPI CELLS #/AREA URNS HPF: ABNORMAL /HPF
ERYTHROCYTE [DISTWIDTH] IN BLOOD BY AUTOMATED COUNT: 14 % (ref 11.9–14.6)
FERRITIN SERPL-MCNC: 260 NG/ML (ref 8–388)
GLOBULIN SER CALC-MCNC: 4.8 G/DL (ref 2.3–3.5)
GLUCOSE BLD STRIP.AUTO-MCNC: 248 MG/DL (ref 65–100)
GLUCOSE BLD STRIP.AUTO-MCNC: 282 MG/DL (ref 65–100)
GLUCOSE SERPL-MCNC: 119 MG/DL (ref 65–100)
GLUCOSE UR STRIP.AUTO-MCNC: NEGATIVE MG/DL
HCT VFR BLD AUTO: 43.8 % (ref 35.8–46.3)
HGB BLD-MCNC: 13.8 G/DL (ref 11.7–15.4)
HGB UR QL STRIP: NEGATIVE
IMM GRANULOCYTES # BLD AUTO: 0 K/UL (ref 0–0.5)
IMM GRANULOCYTES NFR BLD AUTO: 0 % (ref 0–5)
KETONES UR QL STRIP.AUTO: >80 MG/DL
LACTATE SERPL-SCNC: 1.2 MMOL/L (ref 0.4–2)
LDH SERPL L TO P-CCNC: 756 U/L (ref 110–210)
LEUKOCYTE ESTERASE UR QL STRIP.AUTO: NEGATIVE
LYMPHOCYTES # BLD: 1.1 K/UL (ref 0.5–4.6)
LYMPHOCYTES NFR BLD: 20 % (ref 13–44)
MCH RBC QN AUTO: 26.6 PG (ref 26.1–32.9)
MCHC RBC AUTO-ENTMCNC: 31.5 G/DL (ref 31.4–35)
MCV RBC AUTO: 84.4 FL (ref 79.6–97.8)
MONOCYTES # BLD: 0.3 K/UL (ref 0.1–1.3)
MONOCYTES NFR BLD: 5 % (ref 4–12)
NEUTS SEG # BLD: 4 K/UL (ref 1.7–8.2)
NEUTS SEG NFR BLD: 74 % (ref 43–78)
NITRITE UR QL STRIP.AUTO: NEGATIVE
NRBC # BLD: 0 K/UL (ref 0–0.2)
P-R INTERVAL, ECG05: 136 MS
PH UR STRIP: 6 [PH] (ref 5–9)
PLATELET # BLD AUTO: 221 K/UL (ref 150–450)
PMV BLD AUTO: 11.5 FL (ref 9.4–12.3)
POTASSIUM SERPL-SCNC: 5.6 MMOL/L (ref 3.5–5.1)
PROT SERPL-MCNC: 7.9 G/DL (ref 6.3–8.2)
PROT UR STRIP-MCNC: 30 MG/DL
Q-T INTERVAL, ECG07: 374 MS
QRS DURATION, ECG06: 86 MS
QTC CALCULATION (BEZET), ECG08: 457 MS
RBC # BLD AUTO: 5.19 M/UL (ref 4.05–5.2)
RBC #/AREA URNS HPF: ABNORMAL /HPF
SODIUM SERPL-SCNC: 135 MMOL/L (ref 136–145)
SOURCE, COVRS: ABNORMAL
SP GR UR REFRACTOMETRY: 1.02 (ref 1–1.02)
SPECIMEN EXP DATE BLD: NORMAL
UROBILINOGEN UR QL STRIP.AUTO: 1 EU/DL (ref 0.2–1)
VENTRICULAR RATE, ECG03: 90 BPM
WBC # BLD AUTO: 5.3 K/UL (ref 4.3–11.1)
WBC URNS QL MICRO: ABNORMAL /HPF

## 2020-08-04 PROCEDURE — 94640 AIRWAY INHALATION TREATMENT: CPT

## 2020-08-04 PROCEDURE — 74011250636 HC RX REV CODE- 250/636: Performed by: EMERGENCY MEDICINE

## 2020-08-04 PROCEDURE — 83615 LACTATE (LD) (LDH) ENZYME: CPT

## 2020-08-04 PROCEDURE — 83605 ASSAY OF LACTIC ACID: CPT

## 2020-08-04 PROCEDURE — 74011250636 HC RX REV CODE- 250/636: Performed by: INTERNAL MEDICINE

## 2020-08-04 PROCEDURE — 99285 EMERGENCY DEPT VISIT HI MDM: CPT

## 2020-08-04 PROCEDURE — 87040 BLOOD CULTURE FOR BACTERIA: CPT

## 2020-08-04 PROCEDURE — 82728 ASSAY OF FERRITIN: CPT

## 2020-08-04 PROCEDURE — 74011000250 HC RX REV CODE- 250: Performed by: EMERGENCY MEDICINE

## 2020-08-04 PROCEDURE — 74011250637 HC RX REV CODE- 250/637: Performed by: INTERNAL MEDICINE

## 2020-08-04 PROCEDURE — 87635 SARS-COV-2 COVID-19 AMP PRB: CPT

## 2020-08-04 PROCEDURE — 36415 COLL VENOUS BLD VENIPUNCTURE: CPT

## 2020-08-04 PROCEDURE — 65270000029 HC RM PRIVATE

## 2020-08-04 PROCEDURE — 81001 URINALYSIS AUTO W/SCOPE: CPT

## 2020-08-04 PROCEDURE — 86900 BLOOD TYPING SEROLOGIC ABO: CPT

## 2020-08-04 PROCEDURE — 71045 X-RAY EXAM CHEST 1 VIEW: CPT

## 2020-08-04 PROCEDURE — 86140 C-REACTIVE PROTEIN: CPT

## 2020-08-04 PROCEDURE — 93005 ELECTROCARDIOGRAM TRACING: CPT | Performed by: EMERGENCY MEDICINE

## 2020-08-04 PROCEDURE — 74011636637 HC RX REV CODE- 636/637: Performed by: INTERNAL MEDICINE

## 2020-08-04 PROCEDURE — 96374 THER/PROPH/DIAG INJ IV PUSH: CPT

## 2020-08-04 PROCEDURE — 82962 GLUCOSE BLOOD TEST: CPT

## 2020-08-04 PROCEDURE — 80053 COMPREHEN METABOLIC PANEL: CPT

## 2020-08-04 PROCEDURE — 85025 COMPLETE CBC W/AUTO DIFF WBC: CPT

## 2020-08-04 RX ORDER — SODIUM CHLORIDE 0.9 % (FLUSH) 0.9 %
5-40 SYRINGE (ML) INJECTION EVERY 8 HOURS
Status: DISCONTINUED | OUTPATIENT
Start: 2020-08-04 | End: 2020-08-11 | Stop reason: HOSPADM

## 2020-08-04 RX ORDER — ALBUTEROL SULFATE 90 UG/1
1 AEROSOL, METERED RESPIRATORY (INHALATION)
Status: DISCONTINUED | OUTPATIENT
Start: 2020-08-04 | End: 2020-08-05

## 2020-08-04 RX ORDER — DEXAMETHASONE SODIUM PHOSPHATE 100 MG/10ML
10 INJECTION INTRAMUSCULAR; INTRAVENOUS
Status: COMPLETED | OUTPATIENT
Start: 2020-08-04 | End: 2020-08-04

## 2020-08-04 RX ORDER — LOSARTAN POTASSIUM 50 MG/1
100 TABLET ORAL DAILY
Status: DISCONTINUED | OUTPATIENT
Start: 2020-08-05 | End: 2020-08-11 | Stop reason: HOSPADM

## 2020-08-04 RX ORDER — DEXAMETHASONE SODIUM PHOSPHATE 100 MG/10ML
6 INJECTION INTRAMUSCULAR; INTRAVENOUS DAILY
Status: DISCONTINUED | OUTPATIENT
Start: 2020-08-05 | End: 2020-08-04

## 2020-08-04 RX ORDER — MONTELUKAST SODIUM 10 MG/1
10 TABLET ORAL
Status: DISCONTINUED | OUTPATIENT
Start: 2020-08-04 | End: 2020-08-11 | Stop reason: HOSPADM

## 2020-08-04 RX ORDER — SODIUM CHLORIDE 0.9 % (FLUSH) 0.9 %
5-40 SYRINGE (ML) INJECTION AS NEEDED
Status: DISCONTINUED | OUTPATIENT
Start: 2020-08-04 | End: 2020-08-11 | Stop reason: HOSPADM

## 2020-08-04 RX ORDER — POLYETHYLENE GLYCOL 3350 17 G/17G
17 POWDER, FOR SOLUTION ORAL DAILY PRN
Status: DISCONTINUED | OUTPATIENT
Start: 2020-08-04 | End: 2020-08-11 | Stop reason: HOSPADM

## 2020-08-04 RX ORDER — HYDROXYCHLOROQUINE SULFATE 200 MG/1
200 TABLET, FILM COATED ORAL
Status: DISCONTINUED | OUTPATIENT
Start: 2020-08-04 | End: 2020-08-11 | Stop reason: HOSPADM

## 2020-08-04 RX ORDER — FAMOTIDINE 20 MG/1
20 TABLET, FILM COATED ORAL DAILY
Status: DISCONTINUED | OUTPATIENT
Start: 2020-08-05 | End: 2020-08-11 | Stop reason: HOSPADM

## 2020-08-04 RX ORDER — INSULIN GLARGINE 100 [IU]/ML
30 INJECTION, SOLUTION SUBCUTANEOUS
Status: DISCONTINUED | OUTPATIENT
Start: 2020-08-04 | End: 2020-08-07

## 2020-08-04 RX ORDER — INSULIN LISPRO 100 [IU]/ML
INJECTION, SOLUTION INTRAVENOUS; SUBCUTANEOUS
Status: DISCONTINUED | OUTPATIENT
Start: 2020-08-04 | End: 2020-08-11 | Stop reason: HOSPADM

## 2020-08-04 RX ORDER — ACETAMINOPHEN 650 MG/1
650 SUPPOSITORY RECTAL
Status: DISCONTINUED | OUTPATIENT
Start: 2020-08-04 | End: 2020-08-11 | Stop reason: HOSPADM

## 2020-08-04 RX ORDER — IPRATROPIUM BROMIDE AND ALBUTEROL SULFATE 2.5; .5 MG/3ML; MG/3ML
3 SOLUTION RESPIRATORY (INHALATION)
Status: COMPLETED | OUTPATIENT
Start: 2020-08-04 | End: 2020-08-04

## 2020-08-04 RX ORDER — FAMOTIDINE 20 MG/1
20 TABLET, FILM COATED ORAL 2 TIMES DAILY
Status: DISCONTINUED | OUTPATIENT
Start: 2020-08-04 | End: 2020-08-04 | Stop reason: DRUGHIGH

## 2020-08-04 RX ORDER — ALBUTEROL SULFATE 2.5 MG/.5ML
5 SOLUTION RESPIRATORY (INHALATION)
Status: COMPLETED | OUTPATIENT
Start: 2020-08-04 | End: 2020-08-04

## 2020-08-04 RX ORDER — PROMETHAZINE HYDROCHLORIDE 25 MG/1
12.5 TABLET ORAL
Status: DISCONTINUED | OUTPATIENT
Start: 2020-08-04 | End: 2020-08-11 | Stop reason: HOSPADM

## 2020-08-04 RX ORDER — HYDROCHLOROTHIAZIDE 25 MG/1
12.5 TABLET ORAL DAILY
Status: DISCONTINUED | OUTPATIENT
Start: 2020-08-05 | End: 2020-08-11 | Stop reason: HOSPADM

## 2020-08-04 RX ORDER — ASCORBIC ACID 500 MG
500 TABLET ORAL 3 TIMES DAILY
Status: DISCONTINUED | OUTPATIENT
Start: 2020-08-04 | End: 2020-08-11 | Stop reason: HOSPADM

## 2020-08-04 RX ORDER — ENOXAPARIN SODIUM 100 MG/ML
40 INJECTION SUBCUTANEOUS
Status: DISCONTINUED | OUTPATIENT
Start: 2020-08-04 | End: 2020-08-06

## 2020-08-04 RX ORDER — AMLODIPINE BESYLATE 10 MG/1
10 TABLET ORAL DAILY
Status: DISCONTINUED | OUTPATIENT
Start: 2020-08-05 | End: 2020-08-11 | Stop reason: HOSPADM

## 2020-08-04 RX ORDER — ACETAMINOPHEN 325 MG/1
650 TABLET ORAL
Status: DISCONTINUED | OUTPATIENT
Start: 2020-08-04 | End: 2020-08-11 | Stop reason: HOSPADM

## 2020-08-04 RX ORDER — SODIUM CHLORIDE 9 MG/ML
250 INJECTION, SOLUTION INTRAVENOUS AS NEEDED
Status: DISCONTINUED | OUTPATIENT
Start: 2020-08-04 | End: 2020-08-11 | Stop reason: HOSPADM

## 2020-08-04 RX ORDER — ASPIRIN 81 MG/1
81 TABLET ORAL DAILY
Status: DISCONTINUED | OUTPATIENT
Start: 2020-08-05 | End: 2020-08-11 | Stop reason: HOSPADM

## 2020-08-04 RX ORDER — DEXAMETHASONE SODIUM PHOSPHATE 100 MG/10ML
6 INJECTION INTRAMUSCULAR; INTRAVENOUS DAILY
Status: DISCONTINUED | OUTPATIENT
Start: 2020-08-04 | End: 2020-08-04

## 2020-08-04 RX ORDER — ONDANSETRON 2 MG/ML
4 INJECTION INTRAMUSCULAR; INTRAVENOUS
Status: DISCONTINUED | OUTPATIENT
Start: 2020-08-04 | End: 2020-08-11 | Stop reason: HOSPADM

## 2020-08-04 RX ORDER — UREA 10 %
220 LOTION (ML) TOPICAL DAILY
Status: DISCONTINUED | OUTPATIENT
Start: 2020-08-05 | End: 2020-08-11 | Stop reason: HOSPADM

## 2020-08-04 RX ORDER — DEXAMETHASONE 4 MG/1
6 TABLET ORAL DAILY
Status: DISCONTINUED | OUTPATIENT
Start: 2020-08-05 | End: 2020-08-11 | Stop reason: HOSPADM

## 2020-08-04 RX ORDER — NEBIVOLOL 5 MG/1
5 TABLET ORAL DAILY
Status: DISCONTINUED | OUTPATIENT
Start: 2020-08-05 | End: 2020-08-11 | Stop reason: HOSPADM

## 2020-08-04 RX ORDER — FOLIC ACID 1 MG/1
1 TABLET ORAL DAILY
Status: DISCONTINUED | OUTPATIENT
Start: 2020-08-05 | End: 2020-08-11 | Stop reason: HOSPADM

## 2020-08-04 RX ADMIN — INSULIN LISPRO 4 UNITS: 100 INJECTION, SOLUTION INTRAVENOUS; SUBCUTANEOUS at 16:53

## 2020-08-04 RX ADMIN — HYDROXYCHLOROQUINE SULFATE 200 MG: 200 TABLET ORAL at 16:54

## 2020-08-04 RX ADMIN — Medication 500 MG: at 16:53

## 2020-08-04 RX ADMIN — Medication 500 MG: at 22:05

## 2020-08-04 RX ADMIN — IPRATROPIUM BROMIDE AND ALBUTEROL SULFATE 3 ML: .5; 3 SOLUTION RESPIRATORY (INHALATION) at 10:46

## 2020-08-04 RX ADMIN — AZITHROMYCIN MONOHYDRATE 500 MG: 500 INJECTION, POWDER, LYOPHILIZED, FOR SOLUTION INTRAVENOUS at 11:51

## 2020-08-04 RX ADMIN — Medication 10 ML: at 22:34

## 2020-08-04 RX ADMIN — ENOXAPARIN SODIUM 40 MG: 40 INJECTION SUBCUTANEOUS at 22:33

## 2020-08-04 RX ADMIN — MONTELUKAST 10 MG: 10 TABLET, FILM COATED ORAL at 22:06

## 2020-08-04 RX ADMIN — INSULIN LISPRO 6 UNITS: 100 INJECTION, SOLUTION INTRAVENOUS; SUBCUTANEOUS at 22:05

## 2020-08-04 RX ADMIN — Medication 10 ML: at 13:59

## 2020-08-04 RX ADMIN — INSULIN GLARGINE 30 UNITS: 100 INJECTION, SOLUTION SUBCUTANEOUS at 22:33

## 2020-08-04 RX ADMIN — ALBUTEROL SULFATE 5 MG: 2.5 SOLUTION RESPIRATORY (INHALATION) at 10:46

## 2020-08-04 RX ADMIN — DEXAMETHASONE SODIUM PHOSPHATE 10 MG: 10 INJECTION INTRAMUSCULAR; INTRAVENOUS at 10:39

## 2020-08-04 NOTE — ED TRIAGE NOTES
Pt brought by wheelchair to room. States she is having a hard time breathing with palpitations. Hx of a fib. States she feels poorly as well. Pt states productive cough for 2 weeks. Pt states she was outside in the rain recently at her mothers . 88% RA, wears 2L NC at night only per patient. Increased RR in triage.  masked

## 2020-08-04 NOTE — ED PROVIDER NOTES
76-year-old female with a history of COPD and asthma presenting for worsening shortness of breath, fatigue, productive cough. She reached out to her primary care doctor who wanted her to get COVID-19 swab but she was not able to make it to a screening center. She was too weak and short of breath today so her sister brought her in for further evaluation. She reports shortness of breath upon exertion. She wears oxygen at night but never needs it during the day. Now she is been needing it constantly. She can barely get up and do anything without becoming short of breath. Her cough is worsened. She speaks 3-4 words and loses her breath. She denies known fevers and muscle aches but does report diffuse weakness and decreased appetite. The history is provided by the patient. Shortness of Breath   This is a recurrent problem. The current episode started 2 days ago. The problem has been gradually worsening. Associated symptoms include cough, sputum production and wheezing. Pertinent negatives include no fever, no headaches, no coryza, no rhinorrhea, no sore throat, no swollen glands, no ear pain, no neck pain, no hemoptysis, no PND, no orthopnea, no chest pain, no syncope, no vomiting, no abdominal pain, no rash, no leg pain, no leg swelling and no claudication. It is unknown what precipitated the problem. She has tried nothing for the symptoms. The treatment provided no relief. She has had prior hospitalizations. She has had prior ED visits. She has had no prior ICU admissions. Associated medical issues include asthma and COPD. Past Medical History:   Diagnosis Date    Arrhythmia 3 - 4 weeks ago (9/2010)    Dr. Farhat Osborn?  ([facility] ) ran tests for heart speeding up/slowing down; (has occasional heart palpitations; no cardiologist 3/9/18)   Abdi Arthritis     Asthma     first told in 2000; inhaler daily; nebs prn    Bladder incontinence 10/24/2016    Chronic pain     Dermatophytosis     Diabetes ([address]     type 2; insulin meds; hasn't been checking bs lately; last A1C=6.8 on 2/19/18    Former smoker     GERD (gastroesophageal reflux disease) 7/23/2013    Heart failure ([address]     no cardiologist     Hypercholesterolemia     Hypertension     on med for control     Hypertensive heart disease without HF (heart failure)     Lupus (systemic lupus erythematosus) ([address] 2005    recent blood work was negative (3/9/18)    Non compliance with medical treatment     Obesity     ANATOLY (obstructive sleep apnea) 07/23/2013    no c-pap; uses oxygen prn at HS 2L/NC    Seizures ([address]     last one about 2.5 yrs ago (noted 3/9/18); no meds    Stroke ([address] 1979    MINI STROKE    Systemic lupus erythematosus ([address] 07/23/2013    recent blood work negative (3/9/18)    Type 2 diabetes mellitus with hyperglycemia ([address] 10/24/2016       Past Surgical History:   Procedure Laterality Date    HX APPENDECTOMY      HX BREAST BIOPSY Left     HX BREAST LUMPECTOMY  1998    benign    HX HAMMER TOE REPAIR  2010    left    HX HEART CATHETERIZATION  03/22/1999    LHC:LV EF=75%. Normal coronary arteries.     HX HEENT      cataract removal right eye and skin removal off of lids    HX HYSTERECTOMY  1974    HX OTHER SURGICAL      had all teeth removed    HX OTHER SURGICAL  03/15/2018    Throat    HI REV UPPER EYELID W EXCESS SKIN           Family History:   Problem Relation Age of Onset    Heart Disease Father     Bleeding Prob Brother     Diabetes Brother     Heart Disease Brother     Diabetes Brother     Cancer Brother     Heart Disease Mother     Dementia Mother     Diabetes Sister     Asthma Brother     Cancer Brother     Stroke Brother     Malignant Hyperthermia Neg Hx     Pseudocholinesterase Deficiency Neg Hx     Delayed Awakening Neg Hx     Post-op Nausea/Vomiting Neg Hx     Emergence Delirium Neg Hx     Post-op Cognitive Dysfunction Neg Hx     Other Neg Hx        Social History     Socioeconomic History    Marital status: SINGLE     Spouse name: Not on file    Number of children: 0    Years of education: Not on file    Highest education level: Not on file   Occupational History    Occupation: [address] [place] [address]     Employer: NOT EMPLOYED     Comment: Denies industrial toxins    Occupation: Textiles     Comment: 6 yrs   Social Needs    Financial resource strain: Not on file    Food insecurity     Worry: Not on file     Inability: Not on file   [employer] needs     Medical: Not on file     Non-medical: Not on file   Tobacco Use    Smoking status: Former Smoker     Packs/day: 0.50     Years: 12.00     Pack years: 6.00     Last attempt to quit: 10/14/1977     Years since quittin.8    Smokeless tobacco: Never Used   Substance and Sexual Activity    Alcohol use: No    Drug use: No    Sexual activity: Never     Partners: Male     Birth control/protection: Surgical     Comment: sleeps alone for past 30 years,  in seperate bed    Lifestyle    Physical activity     Days per week: Not on file     Minutes per session: Not on file    Stress: Not on file   Relationships    Social connections     Talks on phone: Not on file     Gets together: Not on file     Attends Nondenominational service: Not on file     Active member of club or organization: Not on file     Attends meetings of clubs or organizations: Not on file     Relationship status: Not on file    Intimate partner violence     Fear of current or ex partner: Not on file     Emotionally abused: Not on file     Physically abused: Not on file     Forced sexual activity: Not on file   Other Topics Concern    Not on file   Social History Narrative        Worked in [address] [place] [address] for 6 years and at [address] [place] [address] for 2 years where she states she was not exposed to industrial toxins. , no children. Has always live in [address] [place] [address] Road. No pets.           ALLERGIES: Prilosec [omeprazole] and Penicillins    Review of Systems Constitutional: Negative for fever. HENT: Negative for ear pain, rhinorrhea and sore throat. Respiratory: Positive for cough, sputum production, shortness of breath and wheezing. Negative for hemoptysis. Cardiovascular: Negative for chest pain, orthopnea, claudication, leg swelling, syncope and PND. Gastrointestinal: Negative for abdominal pain and vomiting. Musculoskeletal: Negative for neck pain. Skin: Negative for rash. Neurological: Negative for headaches. All other systems reviewed and are negative. Vitals:    08/04/20 0945 08/04/20 0946   BP: 151/71    Pulse: 95    Resp: 28    Temp: 99.6 °F (37.6 °C)    SpO2: (!) 88% 96%   Weight: 70.3 kg (155 lb)    Height: 4' 11\" (1.499 m)             Physical Exam  Vitals signs and nursing note reviewed. Constitutional:       Appearance: She is well-developed. HENT:      Head: Normocephalic and atraumatic. Eyes:      Conjunctiva/sclera: Conjunctivae normal.      Pupils: Pupils are equal, round, and reactive to light. Neck:      Musculoskeletal: Neck supple. Cardiovascular:      Rate and Rhythm: Normal rate and regular rhythm. Pulmonary:      Effort: Pulmonary effort is normal.      Breath sounds: Examination of the right-upper field reveals decreased breath sounds and wheezing. Examination of the left-upper field reveals decreased breath sounds and wheezing. Examination of the right-middle field reveals decreased breath sounds and wheezing. Examination of the left-middle field reveals decreased breath sounds and wheezing. Examination of the right-lower field reveals decreased breath sounds, wheezing and rhonchi. Examination of the left-lower field reveals decreased breath sounds, wheezing and rhonchi. Decreased breath sounds, wheezing and rhonchi present. Abdominal:      General: Bowel sounds are normal.      Palpations: Abdomen is soft. Musculoskeletal: Normal range of motion. Skin:     General: Skin is warm and dry. Neurological:      Mental Status: She is alert and oriented to person, place, and time. MDM  Number of Diagnoses or Management Options  Pneumonia due to COVID-19 virus:   Diagnosis management comments: 56-year-old female presenting for worsening shortness of breath productive cough. Concern for COVID-19, acute on chronic bronchitis exacerbation, new onset CHF, pneumonia, asthma exacerbation. Amount and/or Complexity of Data Reviewed  Clinical lab tests: ordered and reviewed (Results for orders placed or performed during the hospital encounter of 08/04/20  -CBC WITH AUTOMATED DIFF       Result                      Value             Ref Range           WBC                         5.3               4.3 - 11.1 K*       RBC                         5.19              4.05 - 5.2 M*       HGB                         13.8              11.7 - 15.4 *       HCT                         43.8              35.8 - 46.3 %       MCV                         84.4              79.6 - 97.8 *       MCH                         26.6              26.1 - 32.9 *       MCHC                        31.5              31.4 - 35.0 *       RDW                         14.0              11.9 - 14.6 %       PLATELET                    221               150 - 450 K/*       MPV                         11.5              9.4 - 12.3 FL       ABSOLUTE NRBC               0.00              0.0 - 0.2 K/*       DF                          AUTOMATED                             NEUTROPHILS                 74                43 - 78 %           LYMPHOCYTES                 20                13 - 44 %           MONOCYTES                   5                 4.0 - 12.0 %        EOSINOPHILS                 0 (L)             0.5 - 7.8 %         BASOPHILS                   0                 0.0 - 2.0 %         IMMATURE GRANULOCYTES       0                 0.0 - 5.0 %         ABS. NEUTROPHILS            4.0               1.7 - 8.2 K/*       ABS.  LYMPHOCYTES 1.1               0.5 - 4.6 K/*       ABS. MONOCYTES              0.3               0.1 - 1.3 K/*       ABS. EOSINOPHILS            0.0               0.0 - 0.8 K/*       ABS. BASOPHILS              0.0               0.0 - 0.2 K/*       ABS. IMM. GRANS.            0.0               0.0 - 0.5 K/*  -METABOLIC PANEL, COMPREHENSIVE       Result                      Value             Ref Range           Sodium                      135 (L)           136 - 145 mm*       Potassium                   5.6 (H)           3.5 - 5.1 mm*       Chloride                    103               98 - 107 mmo*       CO2                         28                21 - 32 mmol*       Anion gap                   4 (L)             7 - 16 mmol/L       Glucose                     119 (H)           65 - 100 mg/*       BUN                         15                8 - 23 MG/DL        Creatinine                  1.00              0.6 - 1.0 MG*       GFR est AA                  >60               >60 ml/min/1*       GFR est non-AA              57 (L)            >60 ml/min/1*       Calcium                     8.1 (L)           8.3 - 10.4 M*       Bilirubin, total            0.9               0.2 - 1.1 MG*       ALT (SGPT)                  21                12 - 65 U/L         AST (SGOT)                  66 (H)            15 - 37 U/L         Alk.  phosphatase            54                50 - 136 U/L        Protein, total              7.9               6.3 - 8.2 g/*       Albumin                     3.1 (L)           3.2 - 4.6 g/*       Globulin                    4.8 (H)           2.3 - 3.5 g/*       A-G Ratio                   0.6 (L)           1.2 - 3.5      -LACTIC ACID       Result                      Value             Ref Range           Lactic acid                 1.2               0.4 - 2.0 MM*  -SARS-COV-2       Result                      Value             Ref Range           SARS-CoV-2                  PENDING Specimen source             NASAL                                 COVID-19 rapid test         Detected (A)      NOTD                SARS CoV-2                  PENDING                          )  Tests in the radiology section of CPT®: ordered and reviewed (Xr Chest Port    Result Date: 8/4/2020  CHEST X-RAY, one view. HISTORY:  Suspected COVID, results pending; cough. TECHNIQUE:  AP upright portable view. COMPARISON: August 2019     FINDINGS/IMPRESSION: Small focus of atelectasis or infiltrate right midlung zone. Mild interstitial prominence. Costophrenic angles are sharp. Heart and pulmonary vasculature is unremarkable.     )  Tests in the medicine section of CPT®: ordered and reviewed  Discuss the patient with other providers: yes (Discussed case with hospitalist who will assume care)  Independent visualization of images, tracings, or specimens: yes (Reviewed chest x-ray)    Risk of Complications, Morbidity, and/or Mortality  Presenting problems: high  Diagnostic procedures: high  Management options: high  General comments: I personally reviewed the patient's vital signs, laboratory tests, and/or radiological findings. I discussed these findings with the patient and their significance. I answered all questions and explained that given these findings there is significant concern for increased morbidity and/or mortality without immediate intervention.   As a result, I recommended admission to the hospital, consulted the appropriate service, and transitioned care to that service in improved condition      Patient Progress  Patient progress: improved    ED Course as of Aug 04 1023   Tue Aug 04, 2020   6130 EKG was performed shows a normal sinus rhythm rate 90, left axis deviation, normal intervals no acute ischemic change    [JS]      ED Course User Index  [JS] Haley Leon MD       Procedures

## 2020-08-04 NOTE — PROGRESS NOTES
Pt is sleeping in bed at this time. Pt is on 2l NC. Pt has no s/sx of acute distress at this time. Safety measures in place. Pt has call light within reach and is encouraged to call for assistance if needed. Will continue to monitor.

## 2020-08-04 NOTE — ED NOTES
TRANSFER - OUT REPORT:    Verbal report given to ISHAN Villeda on [place]  being transferred to [facility]  for routine progression of care       Report consisted of patients Situation, Background, Assessment and   Recommendations(SBAR). Information from the following report(s) SBAR, Kardex, ED Summary, Procedure Summary, MAR, Accordion and Recent Results was reviewed with the receiving nurse. Lines:   Peripheral IV 08/04/20 Right Wrist (Active)   Site Assessment Clean, dry, & intact 08/04/20 0948   Phlebitis Assessment 0 08/04/20 0948   Infiltration Assessment 0 08/04/20 0948   Dressing Status Clean, dry, & intact 08/04/20 0948   Hub Color/Line Status Pink 08/04/20 0948       Peripheral IV 08/04/20 Left Hand (Active)   Site Assessment Clean, dry, & intact 08/04/20 0950   Phlebitis Assessment 0 08/04/20 0950   Infiltration Assessment 0 08/04/20 0950   Dressing Status Clean, dry, & intact 08/04/20 0950   Hub Color/Line Status Pink 08/04/20 0950        Opportunity for questions and clarification was provided.       Patient transported with:     Transport

## 2020-08-04 NOTE — PROGRESS NOTES
TRANSFER - IN REPORT:    Verbal report received from ysabel(name) on [place]  being received from er(unit) for routine progression of care      Report consisted of patients Situation, Background, Assessment and   Recommendations(SBAR). Information from the following report(s) ED Summary was reviewed with the receiving nurse. Opportunity for questions and clarification was provided. Assessment completed upon patients arrival to unit and care assumed.  Report to [facility] rn awaiting on arrival

## 2020-08-04 NOTE — H&P
Hospitalist H&P Note     Admit Date:  2020  9:40 AM   Name:  Eric Stevenson  Age: 75 y.o.  : 1945   MRN:  [id number]   PCP:  Dorcus Oppenheim, MD    HPI/Subjective: Eric Stevenson is a 75 y.o. female with medical history significant for intermittent moderate persistent asthma, diabetes type 2, hypertension, hyperlipidemia,Sjogren's syndrome / Positive sm/RNP antibody who presented to the ED with worsening shortness of breath, productive cough. Reports that her symptoms started about 2 to 3 days ago and has progressively gotten worse. She reports having asthma without any acute exacerbations since 2018. She wears O2 at night time only but has been using it during the day in the past 2 days. She denies any fever, chills, chest pains, abdominal pain, n/v. In the ED, patient was tachycardic and hypoxic(88%) on room air. Rapid COVID-19 test in the ED is positive. CXR CXR with possible data like this is worsening infiltrate in the right midlung zone. Labs are fairly unremarkable. 10 systems reviewed and negative except as noted in HPI. Past Medical History:   Diagnosis Date    Arrhythmia 3 - 4 weeks ago (2010)    Dr. Ziggy Easton?  ([facility]) ran tests for heart speeding up/slowing down; (has occasional heart palpitations; no cardiologist 3/9/18)   [address] [place] [address] Arthritis     Asthma     first told in ; inhaler daily; nebs prn    Bladder incontinence 10/24/2016    Chronic pain     Dermatophytosis     Diabetes ([facility] [address]     type 2; insulin meds; hasn't been checking bs lately; last A1C=6.8 on 18    Former smoker     GERD (gastroesophageal reflux disease) 2013    Heart failure ([address]     no cardiologist     Hypercholesterolemia     Hypertension     on med for control     Hypertensive heart disease without HF (heart failure)     Lupus (systemic lupus erythematosus) ([address]     recent blood work was negative (3/9/18)    Non compliance with medical treatment     Obesity     ANATOLY (obstructive sleep apnea) 2013    no c-pap; uses oxygen prn at HS 2L/NC    Seizures ([facility] [address]     last one about 2.5 yrs ago (noted 3/9/18); no meds    Stroke [facility]) 1979    MINI STROKE    Systemic lupus erythematosus ([facility] [address] 2013    recent blood work negative (3/9/18)    Type 2 diabetes mellitus with hyperglycemia ([facility] [address] 10/24/2016      Past Surgical History:   Procedure Laterality Date    HX APPENDECTOMY      HX BREAST BIOPSY Left     HX BREAST LUMPECTOMY      benign    HX HAMMER TOE REPAIR  2010    left    HX HEART CATHETERIZATION  1999    LHC:LV EF=75%. Normal coronary arteries.     HX HEENT      cataract removal right eye and skin removal off of lids    HX HYSTERECTOMY      HX OTHER SURGICAL      had all teeth removed    HX OTHER SURGICAL  03/15/2018    Throat    CA REV UPPER EYELID W EXCESS SKIN        Allergies   Allergen Reactions    Prilosec [Omeprazole] Rash    Penicillins Rash      Social History     Tobacco Use    Smoking status: Former Smoker     Packs/day: 0.50     Years: 12.00     Pack years: 6.00     Last attempt to quit: 10/14/1977     Years since quittin.8    Smokeless tobacco: Never Used   Substance Use Topics    Alcohol use: No      Family History   Problem Relation Age of Onset    Heart Disease Father     Bleeding Prob Brother     Diabetes Brother     Heart Disease Brother     Diabetes Brother     Cancer Brother     Heart Disease Mother     Dementia Mother     Diabetes Sister     Asthma Brother     Cancer Brother     Stroke Brother     Malignant Hyperthermia Neg Hx     Pseudocholinesterase Deficiency Neg Hx     Delayed Awakening Neg Hx     Post-op Nausea/Vomiting Neg Hx     Emergence Delirium Neg Hx     Post-op Cognitive Dysfunction Neg Hx     Other Neg Hx       Immunization History   Administered Date(s) Administered    Influenza Vaccine 10/01/2012, 2016, 10/01/2018    Influenza Vaccine (Quad) Mdck Pf 10/16/2017    Influenza Vaccine (Quad) PF 10/04/2018    Influenza Vaccine (Tri) Adjuvanted 11/15/2019    Pneumococcal Polysaccharide (PPSV-23) 2015    Pneumococcal Vaccine (Unspecified Type) 2009    TB Skin Test (PPD) Intradermal 2019    Tdap 2017    Zoster Vaccine, Live 2017     PTA Medications:  Prior to Admission Medications   Prescriptions Last Dose Informant Patient Reported? Taking? Blood-Glucose Meter (ACCU-CHEK GUIDE GLUCOSE METER) misc   No No   Si Units by Does Not Apply route three (3) times daily. EPINEPHrine (EPIPEN) 0.3 mg/0.3 mL injection   Yes No   Si.3 mg by IntraMUSCular route once as needed for Anaphylaxis. Insulin Needles, Disposable, (LETI PEN NEEDLE) 32 gauge x 532\" ndle   No No   Sig: Inject insulin once daily  DX E 11.9   albuterol (PROVENTIL VENTOLIN) 2.5 mg /3 mL (0.083 %) nebu   No No   Sig: 3 mL by Nebulization route every four (4) hours as needed (prn). amLODIPine (NORVASC) 10 mg tablet   No No   Sig: TAKE ONE TABLET BY MOUTH DAILY   aspirin delayed-release 81 mg tablet   Yes No   Sig: Take 81 mg by mouth daily. atorvastatin (LIPITOR) 40 mg tablet   No No   Sig: Take 1 Tab by mouth daily. budesonide (PULMICORT) 0.5 mg/2 mL nbsp   No No   Si mL by Nebulization route two (2) times a day. cefdinir (OMNICEF) 300 mg capsule   No No   Sig: Take 1 Cap by mouth two (2) times a day. cetirizine (ZYRTEC) 10 mg tablet   No No   Sig: Take 1 Tab by mouth daily. dulaglutide (Trulicity) 1.5 TP/9.5 mL sub-q pen   No No   Sig: inject ONE syringe subcutaneously ONCE EVERY WEEK   folic acid (FOLVITE) 1 mg tablet   No No   Sig: Take 1 Tab by mouth daily. glucose blood VI test strips (ACCU-CHEK GUIDE) strip   No No   Sig: by Does Not Apply route See Admin Instructions. Pt checks BS tid. Dx: E11.9   hydrOXYzine HCL (ATARAX) 10 mg tablet   No No   Sig: Take 1 Tab by mouth every six (6) hours as needed for Itching.  Indications: hives, itching   hydroCHLOROthiazide (HYDRODIURIL) 12.5 mg tablet   No No   Sig: TAKE ONE TABLET BY MOUTH DAILY   hydroxychloroquine (PLAQUENIL) 200 mg tablet   No No   Sig: Take 1 Tab by mouth two (2) times daily (after meals). insulin glargine U-300 conc (TOUJEO SOLOSTAR U-300 INSULIN) 300 unit/mL (1.5 mL) inpn   No No   Si Units by SubCUTAneous route nightly. lancets (ACCU-CHEK FASTCLIX LANCET DRUM) misc   No No   Sig: Pt checks BS tid. Dx: E11.9   losartan (COZAAR) 100 mg tablet   No No   Sig: TAKE ONE TABLET BY MOUTH DAILY   losartan-hydroCHLOROthiazide (HYZAAR) 100-12.5 mg per tablet   No No   Sig: Take 1 Tab by mouth daily. meloxicam (MOBIC) 15 mg tablet   No No   Sig: Take 1 pill once a day after food. montelukast (SINGULAIR) 10 mg tablet   No No   Sig: TAKE ONE TABLET BY MOUTH NIGHTLY   nebivoloL (BYSTOLIC) 5 mg tablet   No No   Sig: Take 1 Tab by mouth daily. Indications: high blood pressure   raNITIdine (ZANTAC) 150 mg tablet   No No   Sig: TAKE ONE TABLET BY MOUTH TWICE DAILY   triamcinolone acetonide (KENALOG) 0.1 % topical cream   Yes No   Sig: Apply  to affected area two (2) times a day. use thin layer   umeclidinium-vilanterol (ANORO ELLIPTA) 62.5-25 mcg/actuation inhaler   No No   Sig: Take 1 Puff by inhalation daily.       Facility-Administered Medications: None       Objective:     Patient Vitals for the past 24 hrs:   Temp Pulse Resp BP SpO2   20 1249 99.7 °F (37.6 °C) (!) 116 25 120/72 90 %   20 1210 99.2 °F (37.3 °C)       20 1202  (!) 116 30 112/58 92 %   20 1131  (!) 117  136/67 99 %   20 1116  (!) 113  132/66 100 %   20 1101  (!) 105  131/65 96 %   20 1048     99 %   20 1046  79  129/61 95 %   20 1032  89  126/60 96 %   20 1028  88  143/59 95 %   20 0947  93  151/71 96 %   20 0946     96 %   20 0945 99.6 °F (37.6 °C) 95 28 151/71 (!) 88 %     Oxygen Therapy  O2 Sat (%): 90 % (08/04/20 1249)  Pulse via Oximetry: 117 beats per minute (08/04/20 1202)  O2 Device: Nasal cannula (08/04/20 1048)  O2 Flow Rate (L/min): 2 l/min (08/04/20 1048)    Estimated body mass index is 31.31 kg/m² as calculated from the following:    Height as of this encounter: 4' 11\" (1.499 m). Weight as of this encounter: 70.3 kg (155 lb). No intake or output data in the 24 hours ending 08/04/20 1410    *Note that automatically entered I/Os may not be accurate; dependent on patient compliance with collection and accurate  by assistants. Physical Exam:  General:     alert, awake, no acute distress. Well nourished  Head:   normocephalic, atraumatic  Eyes, Ears, nose: PERRL, EOMI. Normal Conjunctiva. Neck:    supple, non-tender. Trachea midline. Lungs:   Decreased air entry, slight wheezing  Cardiac:   RRR, Normal S1 and S2. Abdomen:   Soft, non distended, nontender, +BS  Extremities:   Warm, dry. No edema  Skin:   No rashes, no jaundice  Neuro:  AAOx 3 . No gross focal neurological deficit  Psychiatric:  No anxiety, calm, cooperative      Data Review:   Recent Results (from the past 24 hour(s))   CBC WITH AUTOMATED DIFF    Collection Time: 08/04/20  9:50 AM   Result Value Ref Range    WBC 5.3 4.3 - 11.1 K/uL    RBC 5.19 4.05 - 5.2 M/uL    HGB 13.8 11.7 - 15.4 g/dL    HCT 43.8 35.8 - 46.3 %    MCV 84.4 79.6 - 97.8 FL    MCH 26.6 26.1 - 32.9 PG    MCHC 31.5 31.4 - 35.0 g/dL    RDW 14.0 11.9 - 14.6 %    PLATELET 566 377 - 125 K/uL    MPV 11.5 9.4 - 12.3 FL    ABSOLUTE NRBC 0.00 0.0 - 0.2 K/uL    DF AUTOMATED      NEUTROPHILS 74 43 - 78 %    LYMPHOCYTES 20 13 - 44 %    MONOCYTES 5 4.0 - 12.0 %    EOSINOPHILS 0 (L) 0.5 - 7.8 %    BASOPHILS 0 0.0 - 2.0 %    IMMATURE GRANULOCYTES 0 0.0 - 5.0 %    ABS. NEUTROPHILS 4.0 1.7 - 8.2 K/UL    ABS. LYMPHOCYTES 1.1 0.5 - 4.6 K/UL    ABS. MONOCYTES 0.3 0.1 - 1.3 K/UL    ABS. EOSINOPHILS 0.0 0.0 - 0.8 K/UL    ABS. BASOPHILS 0.0 0.0 - 0.2 K/UL    ABS. IMM.  GRANS. 0.0 0.0 - 0.5 K/UL   METABOLIC PANEL, COMPREHENSIVE    Collection Time: 08/04/20  9:50 AM   Result Value Ref Range    Sodium 135 (L) 136 - 145 mmol/L    Potassium 5.6 (H) 3.5 - 5.1 mmol/L    Chloride 103 98 - 107 mmol/L    CO2 28 21 - 32 mmol/L    Anion gap 4 (L) 7 - 16 mmol/L    Glucose 119 (H) 65 - 100 mg/dL    BUN 15 8 - 23 MG/DL    Creatinine 1.00 0.6 - 1.0 MG/DL    GFR est AA >60 >60 ml/min/1.73m2    GFR est non-AA 57 (L) >60 ml/min/1.73m2    Calcium 8.1 (L) 8.3 - 10.4 MG/DL    Bilirubin, total 0.9 0.2 - 1.1 MG/DL    ALT (SGPT) 21 12 - 65 U/L    AST (SGOT) 66 (H) 15 - 37 U/L    Alk. phosphatase 54 50 - 136 U/L    Protein, total 7.9 6.3 - 8.2 g/dL    Albumin 3.1 (L) 3.2 - 4.6 g/dL    Globulin 4.8 (H) 2.3 - 3.5 g/dL    A-G Ratio 0.6 (L) 1.2 - 3.5     LACTIC ACID    Collection Time: 08/04/20  9:50 AM   Result Value Ref Range    Lactic acid 1.2 0.4 - 2.0 MMOL/L   EKG, 12 LEAD, INITIAL    Collection Time: 08/04/20  9:57 AM   Result Value Ref Range    Ventricular Rate 90 BPM    Atrial Rate 90 BPM    P-R Interval 136 ms    QRS Duration 86 ms    Q-T Interval 374 ms    QTC Calculation (Bezet) 457 ms    Calculated P Axis 55 degrees    Calculated R Axis -32 degrees    Calculated T Axis 14 degrees    Diagnosis       !! AGE AND GENDER SPECIFIC ECG ANALYSIS !!   Normal sinus rhythm  Left axis deviation  Abnormal ECG  When compared with ECG of 26-JAN-2019 13:08,  No significant change was found  Confirmed by BEATRIZ GOLD (), Reza Parson ([id number]) on 8/4/2020 12:18:12 PM     SARS-COV-2    Collection Time: 08/04/20 10:28 AM   Result Value Ref Range    Specimen source NASAL      COVID-19 rapid test Detected (A) NOTD     URINALYSIS W/ RFLX MICROSCOPIC    Collection Time: 08/04/20 11:46 AM   Result Value Ref Range    Color RICHARD      Appearance CLOUDY      Specific gravity 1.019 1.001 - 1.023      pH (UA) 6.0 5.0 - 9.0      Protein 30 (A) NEG mg/dL    Glucose Negative mg/dL    Ketone >80 (A) NEG mg/dL    Bilirubin SMALL (A) NEG      Blood Negative NEG      Urobilinogen 1.0 0.2 - 1.0 EU/dL    Nitrites Negative NEG      Leukocyte Esterase Negative NEG      WBC 0-3 0 /hpf    RBC 0-3 0 /hpf    Epithelial cells 0-3 0 /hpf    Bacteria 0 0 /hpf    Casts 5-10 0 /lpf       All Micro Results     Procedure Component Value Units Date/Time    CULTURE, RESPIRATORY/SPUTUM/BRONCH Makayla [place] STAIN [[id number]]     Order Status:  Sent Specimen:  Sputum     CULTURE, BLOOD [[id number]]     Order Status:  Canceled Specimen:  Blood     CULTURE, BLOOD [[id number]]     Order Status:  Canceled Specimen:  Blood     CULTURE, BLOOD [[id number]] Collected:  08/04/20 0950    Order Status:  Completed Specimen:  Blood Updated:  08/04/20 1021    CULTURE, BLOOD [[id number]] Collected:  08/04/20 0951    Order Status:  Completed Specimen:  Blood Updated:  08/04/20 1020          Current Facility-Administered Medications   Medication Dose Route Frequency    sodium chloride (NS) flush 5-40 mL  5-40 mL IntraVENous Q8H    sodium chloride (NS) flush 5-40 mL  5-40 mL IntraVENous PRN    acetaminophen (TYLENOL) tablet 650 mg  650 mg Oral Q6H PRN    Or    acetaminophen (TYLENOL) suppository 650 mg  650 mg Rectal Q6H PRN    polyethylene glycol (MIRALAX) packet 17 g  17 g Oral DAILY PRN    promethazine (PHENERGAN) tablet 12.5 mg  12.5 mg Oral Q6H PRN    Or    ondansetron (ZOFRAN) injection 4 mg  4 mg IntraVENous Q6H PRN    enoxaparin (LOVENOX) injection 40 mg  40 mg SubCUTAneous QHS    insulin lispro (HUMALOG) injection   SubCUTAneous AC&HS    ascorbic acid (vitamin C) (VITAMIN C) tablet 500 mg  500 mg Oral TID    [START ON 8/5/2020] zinc sulfate tablet 220 mg  220 mg Oral DAILY    [START ON 8/5/2020] famotidine (PEPCID) tablet 20 mg  20 mg Oral DAILY    albuterol (PROVENTIL HFA, VENTOLIN HFA, PROAIR HFA) inhaler 1 Puff  1 Puff Inhalation Q4H PRN    [START ON 8/5/2020] dexAMETHasone (DECADRON) tablet 6 mg  6 mg Oral DAILY       Other Studies:  Xr Chest Port    Result Date: 8/4/2020  CHEST X-RAY, one view. HISTORY:  Suspected COVID, results pending; cough. TECHNIQUE:  AP upright portable view. COMPARISON: August 2019     FINDINGS/IMPRESSION: Small focus of atelectasis or infiltrate right midlung zone. Mild interstitial prominence. Costophrenic angles are sharp. Heart and pulmonary vasculature is unremarkable. Assessment:     Active Hospital Problems    Diagnosis Date Noted    Acute respiratory failure with hypoxia ([facility] [address] 08/04/2020    COVID-19 virus infection 08/04/2020    Controlled type 2 diabetes mellitus without complication, with long-term current use of insulin ([facility] [address] 01/27/2017    Sjogren's syndrome ([facility] [address] 01/12/2017    Positive sm/RNP antibody 01/12/2017    Asthma 10/24/2016    GERD (gastroesophageal reflux disease) 07/23/2013    Hypertension 07/23/2013    Hypercholesterolemia 07/23/2013       Plan:     Acute Respiratory Failure due with hypoxia due to COVID-19  Sepsis due to COVID 19 Infection  COVID-19 Virus Infection  Met sepsis criteria on admission with tachycardia and hypoxia (88% on RA). CXR with possible data like this is worsening infiltrate in the right midlung zone. GINA-COV2 Rapid test is positive in ED.  - convalescent plasma - 1 unit ordered and consented  - O2 supplement and wean as tolerated  - zinc and vitamin C supplement  - dexamethasone 6mg PO daily   - DVT PPx with lovenox  - follow-up GINA-COV2 swab. - follow up BCx, Sputum Cx      Moderate Persistence Asthma  - O2 supplement as above. - nebs PRN    T2DM  - ISS  - hold home trulicity    Sjogren's Syndrome // Positive sm/RNP antibody: continue with home medications    Hypertension // HLD : continue with home medications    GERD: pepcid BID    Diet:  DIET DIABETIC CONSISTENT CARB  DVT PPx: lovenox  Code: Full Code  Estimated LOS:  Greater than 2 midnights      Labs/Imaging Reviewed. Patient is high risk due to current condition and comorbid conditions as well as requiring frequent monitoring.   Plan discussed with staff, patient/family and are in agreement. Time Spent: Greater than 45 minutes was spent in reviewing charts, physical exam, discussion with patient, and answered any questions.       Signed:  Taty Cobb MD

## 2020-08-04 NOTE — PROGRESS NOTES
TRANSFER - IN REPORT:    Verbal report received from Winston Cedeño RN on [place]  being received from ER for routine progression of care      Report consisted of patients Situation, Background, Assessment and   Recommendations(SBAR). Information from the following report(s) ED Summary was reviewed with the receiving nurse. Opportunity for questions and clarification was provided.

## 2020-08-05 LAB
ANION GAP SERPL CALC-SCNC: 7 MMOL/L (ref 7–16)
BASOPHILS # BLD: 0 K/UL (ref 0–0.2)
BASOPHILS NFR BLD: 0 % (ref 0–2)
BUN SERPL-MCNC: 18 MG/DL (ref 8–23)
CALCIUM SERPL-MCNC: 8.5 MG/DL (ref 8.3–10.4)
CHLORIDE SERPL-SCNC: 107 MMOL/L (ref 98–107)
CO2 SERPL-SCNC: 27 MMOL/L (ref 21–32)
CREAT SERPL-MCNC: 1.16 MG/DL (ref 0.6–1)
DIFFERENTIAL METHOD BLD: ABNORMAL
EOSINOPHIL # BLD: 0 K/UL (ref 0–0.8)
EOSINOPHIL NFR BLD: 0 % (ref 0.5–7.8)
ERYTHROCYTE [DISTWIDTH] IN BLOOD BY AUTOMATED COUNT: 13.8 % (ref 11.9–14.6)
GLUCOSE BLD STRIP.AUTO-MCNC: 136 MG/DL (ref 65–100)
GLUCOSE BLD STRIP.AUTO-MCNC: 146 MG/DL (ref 65–100)
GLUCOSE BLD STRIP.AUTO-MCNC: 152 MG/DL (ref 65–100)
GLUCOSE BLD STRIP.AUTO-MCNC: 208 MG/DL (ref 65–100)
GLUCOSE BLD STRIP.AUTO-MCNC: 220 MG/DL (ref 65–100)
GLUCOSE SERPL-MCNC: 164 MG/DL (ref 65–100)
HCT VFR BLD AUTO: 45.2 % (ref 35.8–46.3)
HGB BLD-MCNC: 14 G/DL (ref 11.7–15.4)
IMM GRANULOCYTES # BLD AUTO: 0 K/UL (ref 0–0.5)
IMM GRANULOCYTES NFR BLD AUTO: 1 % (ref 0–5)
LYMPHOCYTES # BLD: 0.8 K/UL (ref 0.5–4.6)
LYMPHOCYTES NFR BLD: 20 % (ref 13–44)
MCH RBC QN AUTO: 26.4 PG (ref 26.1–32.9)
MCHC RBC AUTO-ENTMCNC: 31 G/DL (ref 31.4–35)
MCV RBC AUTO: 85.3 FL (ref 79.6–97.8)
MONOCYTES # BLD: 0.4 K/UL (ref 0.1–1.3)
MONOCYTES NFR BLD: 10 % (ref 4–12)
NEUTS SEG # BLD: 3 K/UL (ref 1.7–8.2)
NEUTS SEG NFR BLD: 69 % (ref 43–78)
NRBC # BLD: 0 K/UL (ref 0–0.2)
PLATELET # BLD AUTO: 220 K/UL (ref 150–450)
PLATELET COMMENTS,PCOM: ADEQUATE
PMV BLD AUTO: 11 FL (ref 9.4–12.3)
POTASSIUM SERPL-SCNC: 4.2 MMOL/L (ref 3.5–5.1)
RBC # BLD AUTO: 5.3 M/UL (ref 4.05–5.2)
RBC MORPH BLD: ABNORMAL
SODIUM SERPL-SCNC: 141 MMOL/L (ref 136–145)
WBC # BLD AUTO: 4.2 K/UL (ref 4.3–11.1)
WBC MORPH BLD: ABNORMAL

## 2020-08-05 PROCEDURE — 65270000029 HC RM PRIVATE

## 2020-08-05 PROCEDURE — 36430 TRANSFUSION BLD/BLD COMPNT: CPT

## 2020-08-05 PROCEDURE — 85025 COMPLETE CBC W/AUTO DIFF WBC: CPT

## 2020-08-05 PROCEDURE — 94760 N-INVAS EAR/PLS OXIMETRY 1: CPT

## 2020-08-05 PROCEDURE — 82962 GLUCOSE BLOOD TEST: CPT

## 2020-08-05 PROCEDURE — 74011250636 HC RX REV CODE- 250/636: Performed by: INTERNAL MEDICINE

## 2020-08-05 PROCEDURE — 80048 BASIC METABOLIC PNL TOTAL CA: CPT

## 2020-08-05 PROCEDURE — 30233K1 TRANSFUSION OF NONAUTOLOGOUS FROZEN PLASMA INTO PERIPHERAL VEIN, PERCUTANEOUS APPROACH: ICD-10-PCS | Performed by: INTERNAL MEDICINE

## 2020-08-05 PROCEDURE — 87070 CULTURE OTHR SPECIMN AEROBIC: CPT

## 2020-08-05 PROCEDURE — 74011250637 HC RX REV CODE- 250/637: Performed by: INTERNAL MEDICINE

## 2020-08-05 PROCEDURE — 94640 AIRWAY INHALATION TREATMENT: CPT

## 2020-08-05 PROCEDURE — 74011636637 HC RX REV CODE- 636/637: Performed by: INTERNAL MEDICINE

## 2020-08-05 RX ORDER — ALBUTEROL SULFATE 90 UG/1
1 AEROSOL, METERED RESPIRATORY (INHALATION) EVERY 4 HOURS
Status: DISCONTINUED | OUTPATIENT
Start: 2020-08-05 | End: 2020-08-11 | Stop reason: HOSPADM

## 2020-08-05 RX ORDER — GUAIFENESIN 600 MG/1
600 TABLET, EXTENDED RELEASE ORAL EVERY 12 HOURS
Status: DISCONTINUED | OUTPATIENT
Start: 2020-08-05 | End: 2020-08-08

## 2020-08-05 RX ORDER — BENZONATATE 100 MG/1
100 CAPSULE ORAL
Status: DISCONTINUED | OUTPATIENT
Start: 2020-08-05 | End: 2020-08-11 | Stop reason: HOSPADM

## 2020-08-05 RX ADMIN — ALBUTEROL SULFATE 1 PUFF: 108 INHALANT RESPIRATORY (INHALATION) at 16:00

## 2020-08-05 RX ADMIN — Medication 500 MG: at 08:23

## 2020-08-05 RX ADMIN — NEBIVOLOL HYDROCHLORIDE 5 MG: 5 TABLET ORAL at 08:23

## 2020-08-05 RX ADMIN — INSULIN GLARGINE 30 UNITS: 100 INJECTION, SOLUTION SUBCUTANEOUS at 22:00

## 2020-08-05 RX ADMIN — MONTELUKAST 10 MG: 10 TABLET, FILM COATED ORAL at 21:27

## 2020-08-05 RX ADMIN — Medication 500 MG: at 16:20

## 2020-08-05 RX ADMIN — LOSARTAN POTASSIUM 100 MG: 50 TABLET, FILM COATED ORAL at 08:23

## 2020-08-05 RX ADMIN — DEXAMETHASONE 6 MG: 4 TABLET ORAL at 08:23

## 2020-08-05 RX ADMIN — ASPIRIN 81 MG: 81 TABLET, COATED ORAL at 08:25

## 2020-08-05 RX ADMIN — AMLODIPINE BESYLATE 10 MG: 10 TABLET ORAL at 08:25

## 2020-08-05 RX ADMIN — INSULIN LISPRO 4 UNITS: 100 INJECTION, SOLUTION INTRAVENOUS; SUBCUTANEOUS at 16:20

## 2020-08-05 RX ADMIN — Medication 10 ML: at 06:37

## 2020-08-05 RX ADMIN — GUAIFENESIN 600 MG: 600 TABLET ORAL at 13:11

## 2020-08-05 RX ADMIN — Medication 220 MG: at 08:24

## 2020-08-05 RX ADMIN — ALBUTEROL SULFATE 1 PUFF: 108 INHALANT RESPIRATORY (INHALATION) at 20:00

## 2020-08-05 RX ADMIN — Medication 10 ML: at 13:11

## 2020-08-05 RX ADMIN — FOLIC ACID 1 MG: 1 TABLET ORAL at 08:23

## 2020-08-05 RX ADMIN — SODIUM CHLORIDE 500 ML: 900 INJECTION, SOLUTION INTRAVENOUS at 16:04

## 2020-08-05 RX ADMIN — ENOXAPARIN SODIUM 40 MG: 40 INJECTION SUBCUTANEOUS at 21:27

## 2020-08-05 RX ADMIN — FAMOTIDINE 20 MG: 20 TABLET, FILM COATED ORAL at 08:23

## 2020-08-05 RX ADMIN — GUAIFENESIN 600 MG: 600 TABLET ORAL at 20:03

## 2020-08-05 RX ADMIN — HYDROCHLOROTHIAZIDE 12.5 MG: 25 TABLET ORAL at 08:24

## 2020-08-05 RX ADMIN — HYDROXYCHLOROQUINE SULFATE 200 MG: 200 TABLET ORAL at 16:21

## 2020-08-05 RX ADMIN — Medication 10 ML: at 21:28

## 2020-08-05 RX ADMIN — Medication 500 MG: at 21:27

## 2020-08-05 RX ADMIN — INSULIN LISPRO 4 UNITS: 100 INJECTION, SOLUTION INTRAVENOUS; SUBCUTANEOUS at 21:28

## 2020-08-05 RX ADMIN — HYDROXYCHLOROQUINE SULFATE 200 MG: 200 TABLET ORAL at 08:23

## 2020-08-05 NOTE — PROGRESS NOTES
Name: Blaze Alvarez MRN: [id number]  : 1945  Age:74 y.o.  female  Admit Date:  2020 LOS: 1      Hospitalist Progress Note    Blaze Alvarez is a 75 y.o. female with medical history significant for intermittent moderate persistent asthma, diabetes type 2, hypertension, hyperlipidemia,Sjogren's syndrome / Positive sm/RNP antibody who presented to the ED with worsening shortness of breath, productive cough x 2-3days. She normally uses O2 2L at night time but now has been requiring during the day time due to SOB. CXR notable for worsening infiltrate in the right midlung zone and rapid COVID-19 test is positive. Patient was admitted due to acute respiratory failure with hypoxia due to COVID-19 infection and possible right lung PNA. Subjective (20) :  Patient is seen and examined at bedside. No acute events reported overnight by nursing staff. Patient reports feeling slightly improved but still lethargic. Currently on 5L O2 NC. Reports coughing up a lot overnight and had a hard time bringing up her sputum    Patient denies fever, chills, chest pains, n/v, abdominal pain. Tolerating diet. ROS: 10 point review of systems is otherwise negative with the exception of the elements mentioned above.     Objective:    Patient Vitals for the past 24 hrs:   Temp Pulse Resp BP SpO2   20 1125 98 °F (36.7 °C) 70 18 103/67 95 %   20 0754 97.9 °F (36.6 °C) 73 18 110/72 94 %   20 0338 97.7 °F (36.5 °C) 75 18 118/70 94 %   20 2251 98.3 °F (36.8 °C) 74 20 107/71 93 %   20 2112 98.7 °F (37.1 °C) 88 18 104/64 91 %   20 1546 98.6 °F (37 °C) 96 18 104/62 96 %   20 1249 99.7 °F (37.6 °C) (!) 116 25 120/72 90 %     Oxygen Therapy  O2 Sat (%): 95 % (20 1125)  Pulse via Oximetry: 117 beats per minute (20 1202)  O2 Device: Nasal cannula(present on assessment) (20 0750)  O2 Flow Rate (L/min): 2 l/min(present on assessment) (20 0750)    Estimated body mass index is 31.31 kg/m² as calculated from the following:    Height as of this encounter: 4' 11\" (1.499 m). Weight as of this encounter: 70.3 kg (155 lb). Intake/Output Summary (Last 24 hours) at 8/5/2020 1229  Last data filed at 8/5/2020 0750  Gross per 24 hour   Intake 490.5 ml   Output    Net 490.5 ml       *Note that automatically entered I/Os may not be accurate; dependent on patient compliance with collection and accurate  by techs. Physical Exam:   General:                     alert, awake, no acute distress. Well nourished  Head:                          normocephalic, atraumatic  Eyes, Ears, nose:      PERRL, EOMI. Normal Conjunctiva. Neck:                          supple, non-tender. Trachea midline. Lungs:                        Decreased air entry, slight wheezing  Cardiac:                      RRR, Normal S1 and S2. Abdomen:                  Soft, non distended, nontender, +BS  Extremities:               Warm, dry. No edema  Skin:                           No rashes, no jaundice  Neuro:              AAOx 3 .  No gross focal neurological deficit  Psychiatric:                No anxiety, calm, cooperative    Data Review:  I have reviewed all labs, meds, and studies from the last 24 hours:    Recent Results (from the past 24 hour(s))   CONVALESCENT PLASMA, ALLOCATE    Collection Time: 08/04/20  2:30 PM   Result Value Ref Range    Comment STUDY NUMBER [id number]     Unit number [id number]     Blood component type ConvPls,Th     Unit division C0     Status of unit ALLOCATED    GLUCOSE, POC    Collection Time: 08/04/20  3:12 PM   Result Value Ref Range    Glucose (POC) 248 (H) 65 - 100 mg/dL   TYPE & SCREEN    Collection Time: 08/04/20  9:04 PM   Result Value Ref Range    Crossmatch Expiration 08/07/2020     ABO/Rh(D) A POSITIVE     Antibody screen NEG    GLUCOSE, POC    Collection Time: 08/04/20  9:09 PM   Result Value Ref Range    Glucose (POC) 282 (H) 65 - 129 mg/dL   METABOLIC PANEL, BASIC    Collection Time: 08/05/20  5:13 AM   Result Value Ref Range    Sodium 141 136 - 145 mmol/L    Potassium 4.2 3.5 - 5.1 mmol/L    Chloride 107 98 - 107 mmol/L    CO2 27 21 - 32 mmol/L    Anion gap 7 7 - 16 mmol/L    Glucose 164 (H) 65 - 100 mg/dL    BUN 18 8 - 23 MG/DL    Creatinine 1.16 (H) 0.6 - 1.0 MG/DL    GFR est AA 59 (L) >60 ml/min/1.73m2    GFR est non-AA 48 (L) >60 ml/min/1.73m2    Calcium 8.5 8.3 - 10.4 MG/DL   CBC WITH AUTOMATED DIFF    Collection Time: 08/05/20  5:13 AM   Result Value Ref Range    WBC 4.2 (L) 4.3 - 11.1 K/uL    RBC 5.30 (H) 4.05 - 5.2 M/uL    HGB 14.0 11.7 - 15.4 g/dL    HCT 45.2 35.8 - 46.3 %    MCV 85.3 79.6 - 97.8 FL    MCH 26.4 26.1 - 32.9 PG    MCHC 31.0 (L) 31.4 - 35.0 g/dL    RDW 13.8 11.9 - 14.6 %    PLATELET 749 657 - 505 K/uL    MPV 11.0 9.4 - 12.3 FL    ABSOLUTE NRBC 0.00 0.0 - 0.2 K/uL    NEUTROPHILS 69 43 - 78 %    LYMPHOCYTES 20 13 - 44 %    MONOCYTES 10 4.0 - 12.0 %    EOSINOPHILS 0 (L) 0.5 - 7.8 %    BASOPHILS 0 0.0 - 2.0 %    IMMATURE GRANULOCYTES 1 0.0 - 5.0 %    ABS. NEUTROPHILS 3.0 1.7 - 8.2 K/UL    ABS. LYMPHOCYTES 0.8 0.5 - 4.6 K/UL    ABS. MONOCYTES 0.4 0.1 - 1.3 K/UL    ABS. EOSINOPHILS 0.0 0.0 - 0.8 K/UL    ABS. BASOPHILS 0.0 0.0 - 0.2 K/UL    ABS. IMM.  GRANS. 0.0 0.0 - 0.5 K/UL    RBC COMMENTS NORMOCYTIC/NORMOCHROMIC      WBC COMMENTS Result Confirmed By Smear      PLATELET COMMENTS ADEQUATE      DF AUTOMATED     GLUCOSE, POC    Collection Time: 08/05/20  5:40 AM   Result Value Ref Range    Glucose (POC) 152 (H) 65 - 100 mg/dL   GLUCOSE, POC    Collection Time: 08/05/20  6:35 AM   Result Value Ref Range    Glucose (POC) 146 (H) 65 - 100 mg/dL   GLUCOSE, POC    Collection Time: 08/05/20 11:57 AM   Result Value Ref Range    Glucose (POC) 136 (H) 65 - 100 mg/dL        All Micro Results     Procedure Component Value Units Date/Time    CULTURE, BLOOD [[id number]] Collected:  08/04/20 0950    Order Status:  Completed Specimen:  Blood Updated:  08/05/20 1032     Special Requests: --        RIGHT  HAND       Culture result: NO GROWTH 1 DAY       CULTURE, BLOOD [[id number]] Collected:  08/04/20 0951    Order Status:  Completed Specimen:  Blood Updated:  08/05/20 1032     Special Requests: --        LEFT  HAND       Culture result: NO GROWTH 1 DAY       CULTURE, RESPIRATORY/SPUTUM/BRONCH [place] STAIN [[id number]] Collected:  08/05/20 0515    Order Status:  Completed Specimen:  Sputum Updated:  08/05/20 0806    CULTURE, BLOOD [[id number]]     Order Status:  Canceled Specimen:  Blood     CULTURE, BLOOD [[id number]]     Order Status:  Canceled Specimen:  Blood           Current Meds:  Current Facility-Administered Medications   Medication Dose Route Frequency    albuterol (PROVENTIL HFA, VENTOLIN HFA, PROAIR HFA) inhaler 1 Puff  1 Puff Inhalation Q4H    guaiFENesin ER (MUCINEX) tablet 600 mg  600 mg Oral Q12H    benzonatate (TESSALON) capsule 100 mg  100 mg Oral TID PRN    sodium chloride (NS) flush 5-40 mL  5-40 mL IntraVENous Q8H    sodium chloride (NS) flush 5-40 mL  5-40 mL IntraVENous PRN    acetaminophen (TYLENOL) tablet 650 mg  650 mg Oral Q6H PRN    Or    acetaminophen (TYLENOL) suppository 650 mg  650 mg Rectal Q6H PRN    polyethylene glycol (MIRALAX) packet 17 g  17 g Oral DAILY PRN    promethazine (PHENERGAN) tablet 12.5 mg  12.5 mg Oral Q6H PRN    Or    ondansetron (ZOFRAN) injection 4 mg  4 mg IntraVENous Q6H PRN    enoxaparin (LOVENOX) injection 40 mg  40 mg SubCUTAneous QHS    insulin lispro (HUMALOG) injection   SubCUTAneous AC&HS    ascorbic acid (vitamin C) (VITAMIN C) tablet 500 mg  500 mg Oral TID    zinc sulfate tablet 220 mg  220 mg Oral DAILY    famotidine (PEPCID) tablet 20 mg  20 mg Oral DAILY    dexAMETHasone (DECADRON) tablet 6 mg  6 mg Oral DAILY    aspirin delayed-release tablet 81 mg  81 mg Oral DAILY    amLODIPine (NORVASC) tablet 10 mg  10 mg Oral DAILY    folic acid (FOLVITE) tablet 1 mg  1 mg Oral DAILY  hydrOXYchloroQUINE (PLAQUENIL) tablet 200 mg  200 mg Oral BIDPC    insulin glargine (LANTUS) injection 30 Units  30 Units SubCUTAneous QHS    losartan (COZAAR) tablet 100 mg  100 mg Oral DAILY    montelukast (SINGULAIR) tablet 10 mg  10 mg Oral QHS    nebivoloL (BYSTOLIC) tablet 5 mg  5 mg Oral DAILY    hydroCHLOROthiazide (HYDRODIURIL) tablet 12.5 mg  12.5 mg Oral DAILY    0.9% sodium chloride infusion 250 mL  250 mL IntraVENous PRN         Other Studies:  Xr Chest Port    Result Date: 8/4/2020  CHEST X-RAY, one view. HISTORY:  Suspected COVID, results pending; cough. TECHNIQUE:  AP upright portable view. COMPARISON: August 2019     FINDINGS/IMPRESSION: Small focus of atelectasis or infiltrate right midlung zone. Mild interstitial prominence. Costophrenic angles are sharp. Heart and pulmonary vasculature is unremarkable. Assessment:    Active Hospital Problems    Diagnosis Date Noted    Acute respiratory failure with hypoxia ([facility] [address] 08/04/2020    COVID-19 virus infection 08/04/2020    Controlled type 2 diabetes mellitus without complication, with long-term current use of insulin ([facility] [address] 01/27/2017    Sjogren's syndrome ([facility] [address] 01/12/2017    Positive sm/RNP antibody 01/12/2017    Asthma 10/24/2016    GERD (gastroesophageal reflux disease) 07/23/2013    Hypertension 07/23/2013    Hypercholesterolemia 07/23/2013       Plan:    Acute Respiratory Failure due with hypoxia due to COVID-19  Sepsis due to COVID 19 Infection  COVID-19 Virus Infection  Met sepsis criteria on admission with tachycardia and hypoxia (88% on RA). CXR with possible data like this is worsening infiltrate in the right midlung zone. GINA-COV2 Rapid test (+) on 8/4  - convalescent plasma - 1 unit ordered and consented (8/4)  - O2 supplement and wean as tolerated  - zinc and vitamin C supplement  - dexamethasone 6mg PO daily   - DVT PPx with lovenox  - follow-up GINA-COV2 swab.   - follow up BCx, Sputum Cx     Moderate Persistence Asthma  - O2 supplement as above. - nebs PRN     T2DM  - ISS  - hold home trulicity     Sjogren's Syndrome // Positive sm/RNP antibody: continue with home medications     Hypertension // HLD : continue with home medications     GERD: pepcid BID    Rosanne Son (Son): left voicemail with update. Diet:  DIET DIABETIC CONSISTENT CARB  DVT PPx:   Code: Full Code  Dispo: likely home  Estimated Discharge: TBD based on clinical course    Labs/Imaging Reviewed. Patient is high risk due to current condition and comorbid conditions as well as requiring frequent monitoring. Plan discussed with staff, patient/family and are in agreement. Time Spent: Greater than 45 minutes was spent in reviewing charts, physical exam, discussion with patient, and answered any questions.     Signed By: Aleshia Quesada MD     August 5, 2020

## 2020-08-05 NOTE — PROGRESS NOTES
Pt is resting in bed. Respirations presents on 2L NC. No signs of distress. No needs expressed. Bed low and locked. Call light within reach.  Report received from Heather, [address]

## 2020-08-05 NOTE — PROGRESS NOTES
Informed consent obtained for plasma transfusion by this staff member. Prior primary consent signed between MD and patient yesterday with copy was placed on chart. Aware of risk and benefits. A&O x3. Verbalized understanding.

## 2020-08-05 NOTE — PROGRESS NOTES
Chart screened by JORDAN  for discharge planning. Patient admitted under inpatient status by hospitalist on 8/4/20 due to [place] positive. No PT/OT consults during this admission. Pending convalescent plasma. JORDAN attempted to contact patient via telephone due to isolation precautions, but no response. No DC or social needs identified at this time. Patient uses home O2 at night, per MD. [place] to home when medically ready. Care Management Interventions  Mode of Transport at Discharge: Other (see comment)  Transition of Care Consult (CM Consult): Other(Chart screened.  No CM consult)  Discharge Durable Medical Equipment: No  Physical Therapy Consult: No  Occupational Therapy Consult: No  Speech Therapy Consult: No  Discharge Location  Discharge Placement: Home

## 2020-08-06 LAB
ANION GAP SERPL CALC-SCNC: 8 MMOL/L (ref 7–16)
ATRIAL RATE: 79 BPM
BASOPHILS # BLD: 0 K/UL (ref 0–0.2)
BASOPHILS NFR BLD: 0 % (ref 0–2)
BLD PROD TYP BPU: NORMAL
BLOOD BANK CMNT PATIENT-IMP: NORMAL
BPU ID: NORMAL
BUN SERPL-MCNC: 22 MG/DL (ref 8–23)
CALCIUM SERPL-MCNC: 8.2 MG/DL (ref 8.3–10.4)
CALCULATED P AXIS, ECG09: 50 DEGREES
CALCULATED R AXIS, ECG10: -30 DEGREES
CALCULATED T AXIS, ECG11: -2 DEGREES
CHLORIDE SERPL-SCNC: 104 MMOL/L (ref 98–107)
CO2 SERPL-SCNC: 27 MMOL/L (ref 21–32)
CREAT SERPL-MCNC: 1.11 MG/DL (ref 0.6–1)
D DIMER PPP FEU-MCNC: 1.19 UG/ML(FEU)
DIAGNOSIS, 93000: NORMAL
DIFFERENTIAL METHOD BLD: ABNORMAL
EOSINOPHIL # BLD: 0 K/UL (ref 0–0.8)
EOSINOPHIL NFR BLD: 0 % (ref 0.5–7.8)
ERYTHROCYTE [DISTWIDTH] IN BLOOD BY AUTOMATED COUNT: 13.7 % (ref 11.9–14.6)
GLUCOSE BLD STRIP.AUTO-MCNC: 120 MG/DL (ref 65–100)
GLUCOSE BLD STRIP.AUTO-MCNC: 158 MG/DL (ref 65–100)
GLUCOSE BLD STRIP.AUTO-MCNC: 192 MG/DL (ref 65–100)
GLUCOSE BLD STRIP.AUTO-MCNC: 286 MG/DL (ref 65–100)
GLUCOSE SERPL-MCNC: 136 MG/DL (ref 65–100)
HCT VFR BLD AUTO: 43.5 % (ref 35.8–46.3)
HGB BLD-MCNC: 13.6 G/DL (ref 11.7–15.4)
IMM GRANULOCYTES # BLD AUTO: 0.1 K/UL (ref 0–0.5)
IMM GRANULOCYTES NFR BLD AUTO: 1 % (ref 0–5)
LYMPHOCYTES # BLD: 0.8 K/UL (ref 0.5–4.6)
LYMPHOCYTES NFR BLD: 9 % (ref 13–44)
MCH RBC QN AUTO: 26.6 PG (ref 26.1–32.9)
MCHC RBC AUTO-ENTMCNC: 31.3 G/DL (ref 31.4–35)
MCV RBC AUTO: 85.1 FL (ref 79.6–97.8)
MONOCYTES # BLD: 0.5 K/UL (ref 0.1–1.3)
MONOCYTES NFR BLD: 6 % (ref 4–12)
NEUTS SEG # BLD: 7 K/UL (ref 1.7–8.2)
NEUTS SEG NFR BLD: 84 % (ref 43–78)
NRBC # BLD: 0 K/UL (ref 0–0.2)
P-R INTERVAL, ECG05: 166 MS
PLATELET # BLD AUTO: 279 K/UL (ref 150–450)
PMV BLD AUTO: 11.6 FL (ref 9.4–12.3)
POTASSIUM SERPL-SCNC: 3.7 MMOL/L (ref 3.5–5.1)
Q-T INTERVAL, ECG07: 396 MS
QRS DURATION, ECG06: 86 MS
QTC CALCULATION (BEZET), ECG08: 454 MS
RBC # BLD AUTO: 5.11 M/UL (ref 4.05–5.2)
SODIUM SERPL-SCNC: 139 MMOL/L (ref 136–145)
STATUS OF UNIT,%ST: NORMAL
TROPONIN-HIGH SENSITIVITY: 9.6 PG/ML (ref 0–14)
UNIT DIVISION, %UDIV: NORMAL
VENTRICULAR RATE, ECG03: 79 BPM
WBC # BLD AUTO: 8.3 K/UL (ref 4.3–11.1)

## 2020-08-06 PROCEDURE — 74011250636 HC RX REV CODE- 250/636: Performed by: HOSPITALIST

## 2020-08-06 PROCEDURE — 82962 GLUCOSE BLOOD TEST: CPT

## 2020-08-06 PROCEDURE — 74011250636 HC RX REV CODE- 250/636: Performed by: INTERNAL MEDICINE

## 2020-08-06 PROCEDURE — 85025 COMPLETE CBC W/AUTO DIFF WBC: CPT

## 2020-08-06 PROCEDURE — 74011250637 HC RX REV CODE- 250/637: Performed by: INTERNAL MEDICINE

## 2020-08-06 PROCEDURE — 84484 ASSAY OF TROPONIN QUANT: CPT

## 2020-08-06 PROCEDURE — 94640 AIRWAY INHALATION TREATMENT: CPT

## 2020-08-06 PROCEDURE — 80048 BASIC METABOLIC PNL TOTAL CA: CPT

## 2020-08-06 PROCEDURE — 74011636637 HC RX REV CODE- 636/637: Performed by: INTERNAL MEDICINE

## 2020-08-06 PROCEDURE — 77010033678 HC OXYGEN DAILY

## 2020-08-06 PROCEDURE — 94760 N-INVAS EAR/PLS OXIMETRY 1: CPT

## 2020-08-06 PROCEDURE — 85379 FIBRIN DEGRADATION QUANT: CPT

## 2020-08-06 PROCEDURE — 65270000029 HC RM PRIVATE

## 2020-08-06 PROCEDURE — 93005 ELECTROCARDIOGRAM TRACING: CPT | Performed by: HOSPITALIST

## 2020-08-06 RX ORDER — KETOROLAC TROMETHAMINE 15 MG/ML
15 INJECTION, SOLUTION INTRAMUSCULAR; INTRAVENOUS ONCE
Status: COMPLETED | OUTPATIENT
Start: 2020-08-06 | End: 2020-08-06

## 2020-08-06 RX ORDER — ENOXAPARIN SODIUM 100 MG/ML
0.5 INJECTION SUBCUTANEOUS EVERY 12 HOURS
Status: DISCONTINUED | OUTPATIENT
Start: 2020-08-06 | End: 2020-08-11 | Stop reason: HOSPADM

## 2020-08-06 RX ORDER — GUAIFENESIN/DEXTROMETHORPHAN 100-10MG/5
10 SYRUP ORAL
Status: DISCONTINUED | OUTPATIENT
Start: 2020-08-06 | End: 2020-08-08 | Stop reason: SDUPTHER

## 2020-08-06 RX ADMIN — Medication 220 MG: at 08:53

## 2020-08-06 RX ADMIN — INSULIN LISPRO 2 UNITS: 100 INJECTION, SOLUTION INTRAVENOUS; SUBCUTANEOUS at 16:30

## 2020-08-06 RX ADMIN — ALBUTEROL SULFATE 1 PUFF: 108 INHALANT RESPIRATORY (INHALATION) at 20:00

## 2020-08-06 RX ADMIN — GUAIFENESIN 600 MG: 600 TABLET ORAL at 21:32

## 2020-08-06 RX ADMIN — MONTELUKAST 10 MG: 10 TABLET, FILM COATED ORAL at 21:32

## 2020-08-06 RX ADMIN — Medication 10 ML: at 21:34

## 2020-08-06 RX ADMIN — GUAIFENESIN 600 MG: 600 TABLET ORAL at 08:54

## 2020-08-06 RX ADMIN — ALBUTEROL SULFATE 1 PUFF: 108 INHALANT RESPIRATORY (INHALATION) at 09:14

## 2020-08-06 RX ADMIN — INSULIN LISPRO 6 UNITS: 100 INJECTION, SOLUTION INTRAVENOUS; SUBCUTANEOUS at 21:32

## 2020-08-06 RX ADMIN — Medication 10 ML: at 11:58

## 2020-08-06 RX ADMIN — BENZONATATE 100 MG: 100 CAPSULE ORAL at 21:32

## 2020-08-06 RX ADMIN — INSULIN LISPRO 2 UNITS: 100 INJECTION, SOLUTION INTRAVENOUS; SUBCUTANEOUS at 06:46

## 2020-08-06 RX ADMIN — ALBUTEROL SULFATE 1 PUFF: 108 INHALANT RESPIRATORY (INHALATION) at 00:00

## 2020-08-06 RX ADMIN — ALBUTEROL SULFATE 1 PUFF: 108 INHALANT RESPIRATORY (INHALATION) at 12:00

## 2020-08-06 RX ADMIN — HYDROXYCHLOROQUINE SULFATE 200 MG: 200 TABLET ORAL at 17:02

## 2020-08-06 RX ADMIN — Medication 10 ML: at 05:00

## 2020-08-06 RX ADMIN — HYDROXYCHLOROQUINE SULFATE 200 MG: 200 TABLET ORAL at 08:54

## 2020-08-06 RX ADMIN — FOLIC ACID 1 MG: 1 TABLET ORAL at 08:54

## 2020-08-06 RX ADMIN — ENOXAPARIN SODIUM 40 MG: 40 INJECTION SUBCUTANEOUS at 19:33

## 2020-08-06 RX ADMIN — ALBUTEROL SULFATE 1 PUFF: 108 INHALANT RESPIRATORY (INHALATION) at 16:00

## 2020-08-06 RX ADMIN — Medication 500 MG: at 21:32

## 2020-08-06 RX ADMIN — KETOROLAC TROMETHAMINE 15 MG: 15 INJECTION, SOLUTION INTRAMUSCULAR; INTRAVENOUS at 06:28

## 2020-08-06 RX ADMIN — DEXAMETHASONE 6 MG: 4 TABLET ORAL at 08:53

## 2020-08-06 RX ADMIN — FAMOTIDINE 20 MG: 20 TABLET, FILM COATED ORAL at 08:53

## 2020-08-06 RX ADMIN — Medication 500 MG: at 08:53

## 2020-08-06 RX ADMIN — BENZONATATE 100 MG: 100 CAPSULE ORAL at 02:48

## 2020-08-06 RX ADMIN — ASPIRIN 81 MG: 81 TABLET, COATED ORAL at 08:54

## 2020-08-06 RX ADMIN — GUAIFENESIN AND DEXTROMETHORPHAN 10 ML: 100; 10 SYRUP ORAL at 22:38

## 2020-08-06 RX ADMIN — INSULIN GLARGINE 30 UNITS: 100 INJECTION, SOLUTION SUBCUTANEOUS at 21:31

## 2020-08-06 RX ADMIN — Medication 500 MG: at 17:02

## 2020-08-06 RX ADMIN — ALBUTEROL SULFATE 1 PUFF: 108 INHALANT RESPIRATORY (INHALATION) at 04:00

## 2020-08-06 NOTE — PROGRESS NOTES
Interdisciplinary rounds with provider,  and . PT  Verbal ordered for discharge planning  Discharge plan poss [facility] CM will continue to follow.

## 2020-08-06 NOTE — PROGRESS NOTES
Pt c/o chest pain 10 out of 10. Stated it hurt when she took a deep breath in. This RN perfectserved the MD. MD ordered IV Ketorolac 15mg, EKG, and labs including D-dimer and Troponin. This RN gave the Ketorolac and mk the labs. EKG has been preformed.

## 2020-08-06 NOTE — DISCHARGE INSTR - DIET
· Good nutrition is important when healing from an illness, injury, or surgery. Follow any nutrition recommendations given to you during your hospital stay. · If you were given an oral nutrition supplement while in the hospital, continue to take this supplement at home. You can take it with meals, in-between meals, and/or before bedtime. These supplements can be purchased at most local grocery stores, pharmacies, and chain super-stores. · If you have any questions about your diet or nutrition, call the hospital and ask for the dietitian. Discharge Nutrition Plan: Continue Oral Nutrition Supplement (ONS) at discharge. Recommend Glucerna or a comparable/similar product Twice daily  unless otherwise directed by your Primary Care Physician. Rashid Glasgow, [address] [place] [address], [address] [place] [address], [address] [place] [address]

## 2020-08-06 NOTE — PROGRESS NOTES
Comprehensive Nutrition Assessment    Type and Reason for Visit: Initial, Positive nutrition screen  This assessment was completed remotely from [facility]. Patient consent was obtained for remote assessment. Nutrition History and Allergies: H/O: asthma, DM,HTN, HLD,Sjogren's syndrome. Presented with worsening shortness of breath, productive cough x 2-3 days. Findings of + COVID    Nutrition Assessment:  Pt reports appetie is \"not too good\", but she ate \" pretty good at breakfast\". She reports intake of bites of eggs and potatoes,and 8 oz of milk and 8 oz ofcoffee. She has not consumed any Glucerna because she has not recevied the flavor she likes. Pt reports she has not had much of an appetite for several years, and has only been eating once a day for \"quite a while\". Usual intake is fruit, toast and fried bologna around 11 or Noon. About once a month her niece will bring her crablegs and she'll eat a cluster or a cluster and a half. Sometimes they will also bring her corn and wienies. Weight has been  stable around 150-152#. She currently has a sore area on her gums that hurts under her dentures which makes it difficult to chew. She would prefer mechanical soft consistency. Estimated Daily Nutrient Needs:  Energy (kcal):  5194-9694 kcal/d (18-20 kcal/kg stated weight of 150#)  Protein (g):  61-68 grams/d (20% of kcal)         Current Nutrition Therapies:   DIET NUTRITIONAL SUPPLEMENTS All Meals; Glucerna Shake  DIET DIABETIC CONSISTENT CARB Mechanical Soft    Anthropometric Measures:  · Height:  4' 11\" (149.9 cm)  Current Body Wt:      Last 3 Recorded Weights in this Encounter    08/04/20 0945 08/05/20 0750   Weight: 70.3 kg (155 lb) 78.3 kg (172 lb 9.9 oz)   · Last 2 weights are bed scale weight. First weight was a stated weight. Question accuracy of bed scale weights based on weight hx as below. .  Usual Body Wt:        WT / BMI WEIGHT   5/14/2020 155 lb   3/5/2020 153 lb 12.8 oz   2/7/2020 151 lb 3.2 oz 11/15/2019 151 lb 3.2 oz   9/16/2019 148 lb 9.6 oz   9/11/2019 150 lb 12.8 oz   8/19/2019 154 lb   8/12/2019 149 lb   7/11/2019 155 lb   7/8/2019 154 lb 12.8 oz   7/1/2019 155 lb 9.6 oz   · BMI for UBW is ~30-31  · BMI Category:  Obese class 1 (BMI 30.0-34. 9)       Nutrition Diagnosis:   · Inadequate oral intake related to inadequate protein-energy intake/chronic decreased appetite as evidenced by average intake 30% of 4 recorded meal in past 2 days, pt report of intake of 1 meal/d PTA for several years, reported intake of breakfast <25%      Nutrition Interventions:   Food and/or Nutrient Delivery: Modify current diet, Continue oral nutrition supplement  Change to mechanical soft consistency. Specify flavor preference as chocolate or strawberry for Glucerna shake.   Goals:  Intake >75% of needs within 7 days       Nutrition Monitoring and Evaluation:      Food/Nutrient Intake Outcomes: Food and nutrient intake, Supplement intake    Discharge Planning:    Continue oral nutrition supplement     Electronically signed by Denton Orozco RD on 8/6/2020 at 12:19 PM    Contact: [phone]

## 2020-08-06 NOTE — PROGRESS NOTES
Pt is resting in bed. Respirations present on 2L NC. No signs of distress. No needs expressed. Bed low and locked. Call light within reach.  Report received from ILDA, [address]

## 2020-08-06 NOTE — PROGRESS NOTES
Pt resting in bed. Pt states chest pain is 2 out of 10 now. Respirations present on 2L NC. No signs of distress. No needs expressed. Bed low and locked. Call light within reach.  Report given to Delmer Bonilla

## 2020-08-06 NOTE — PROGRESS NOTES
Pt sitting up in bed. Pt on 3 1/2 L NC at this time. Pt alert oriented times 3 at this time. Pt reports chest pain is better at this time and reports pain 2/10 at this time. Pt has no acute distress noted at this time. Pt on droplet plus precautions, pt encouraged to call for assistance if needed call light in reach, will monitor.

## 2020-08-06 NOTE — PROGRESS NOTES
Problem: Risk for Spread of Infection  Goal: Prevent transmission of infectious organism to others  Description: Prevent the transmission of infectious organisms to other patients, staff members, and visitors.   Outcome: Progressing Towards Goal     Problem: Airway Clearance - Ineffective  Goal: Achieve or maintain patent airway  Outcome: Progressing Towards Goal     Problem: Gas Exchange - Impaired  Goal: Absence of hypoxia  Outcome: Progressing Towards Goal     Problem: Breathing Pattern - Ineffective  Goal: Ability to achieve and maintain a regular respiratory rate  Outcome: Progressing Towards Goal     Problem: Isolation Precautions - Risk of Spread of Infection  Goal: Prevent transmission of infectious organism to others  Outcome: Progressing Towards Goal

## 2020-08-06 NOTE — PROGRESS NOTES
Pt resting in bed. No distress noted at this time. Pt on 2 L NC at this time. Call light in reach, will monitor.

## 2020-08-07 LAB
ANION GAP SERPL CALC-SCNC: 8 MMOL/L (ref 7–16)
BACTERIA SPEC CULT: NORMAL
BASOPHILS # BLD: 0 K/UL (ref 0–0.2)
BASOPHILS NFR BLD: 0 % (ref 0–2)
BUN SERPL-MCNC: 19 MG/DL (ref 8–23)
CALCIUM SERPL-MCNC: 8.2 MG/DL (ref 8.3–10.4)
CHLORIDE SERPL-SCNC: 108 MMOL/L (ref 98–107)
CO2 SERPL-SCNC: 26 MMOL/L (ref 21–32)
CREAT SERPL-MCNC: 0.81 MG/DL (ref 0.6–1)
DIFFERENTIAL METHOD BLD: ABNORMAL
EOSINOPHIL # BLD: 0 K/UL (ref 0–0.8)
EOSINOPHIL NFR BLD: 0 % (ref 0.5–7.8)
ERYTHROCYTE [DISTWIDTH] IN BLOOD BY AUTOMATED COUNT: 13.6 % (ref 11.9–14.6)
GLUCOSE BLD STRIP.AUTO-MCNC: 178 MG/DL (ref 65–100)
GLUCOSE BLD STRIP.AUTO-MCNC: 178 MG/DL (ref 65–100)
GLUCOSE BLD STRIP.AUTO-MCNC: 84 MG/DL (ref 65–100)
GLUCOSE BLD STRIP.AUTO-MCNC: 94 MG/DL (ref 65–100)
GLUCOSE SERPL-MCNC: 76 MG/DL (ref 65–100)
GRAM STN SPEC: NORMAL
HCT VFR BLD AUTO: 40.7 % (ref 35.8–46.3)
HGB BLD-MCNC: 12.9 G/DL (ref 11.7–15.4)
IMM GRANULOCYTES # BLD AUTO: 0.1 K/UL (ref 0–0.5)
IMM GRANULOCYTES NFR BLD AUTO: 2 % (ref 0–5)
LYMPHOCYTES # BLD: 0.9 K/UL (ref 0.5–4.6)
LYMPHOCYTES NFR BLD: 12 % (ref 13–44)
MCH RBC QN AUTO: 26.8 PG (ref 26.1–32.9)
MCHC RBC AUTO-ENTMCNC: 31.7 G/DL (ref 31.4–35)
MCV RBC AUTO: 84.4 FL (ref 79.6–97.8)
MONOCYTES # BLD: 0.4 K/UL (ref 0.1–1.3)
MONOCYTES NFR BLD: 5 % (ref 4–12)
NEUTS SEG # BLD: 6.8 K/UL (ref 1.7–8.2)
NEUTS SEG NFR BLD: 83 % (ref 43–78)
NRBC # BLD: 0 K/UL (ref 0–0.2)
PLATELET # BLD AUTO: 301 K/UL (ref 150–450)
PMV BLD AUTO: 11.4 FL (ref 9.4–12.3)
POTASSIUM SERPL-SCNC: 3.8 MMOL/L (ref 3.5–5.1)
RBC # BLD AUTO: 4.82 M/UL (ref 4.05–5.2)
SERVICE CMNT-IMP: NORMAL
SODIUM SERPL-SCNC: 142 MMOL/L (ref 136–145)
WBC # BLD AUTO: 8.2 K/UL (ref 4.3–11.1)

## 2020-08-07 PROCEDURE — 94640 AIRWAY INHALATION TREATMENT: CPT

## 2020-08-07 PROCEDURE — 74011250637 HC RX REV CODE- 250/637: Performed by: INTERNAL MEDICINE

## 2020-08-07 PROCEDURE — 74011250636 HC RX REV CODE- 250/636: Performed by: INTERNAL MEDICINE

## 2020-08-07 PROCEDURE — 97161 PT EVAL LOW COMPLEX 20 MIN: CPT

## 2020-08-07 PROCEDURE — 94760 N-INVAS EAR/PLS OXIMETRY 1: CPT

## 2020-08-07 PROCEDURE — 82962 GLUCOSE BLOOD TEST: CPT

## 2020-08-07 PROCEDURE — 65270000029 HC RM PRIVATE

## 2020-08-07 PROCEDURE — 80048 BASIC METABOLIC PNL TOTAL CA: CPT

## 2020-08-07 PROCEDURE — 97530 THERAPEUTIC ACTIVITIES: CPT

## 2020-08-07 PROCEDURE — 74011636637 HC RX REV CODE- 636/637: Performed by: INTERNAL MEDICINE

## 2020-08-07 PROCEDURE — 85025 COMPLETE CBC W/AUTO DIFF WBC: CPT

## 2020-08-07 RX ORDER — HYDROCODONE BITARTRATE AND HOMATROPINE METHYLBROMIDE 1.5; 5 MG/5ML; MG/5ML
5 SYRUP ORAL
Status: DISCONTINUED | OUTPATIENT
Start: 2020-08-07 | End: 2020-08-11 | Stop reason: HOSPADM

## 2020-08-07 RX ORDER — INSULIN GLARGINE 100 [IU]/ML
26 INJECTION, SOLUTION SUBCUTANEOUS
Status: DISCONTINUED | OUTPATIENT
Start: 2020-08-07 | End: 2020-08-08

## 2020-08-07 RX ADMIN — INSULIN LISPRO 2 UNITS: 100 INJECTION, SOLUTION INTRAVENOUS; SUBCUTANEOUS at 17:36

## 2020-08-07 RX ADMIN — MONTELUKAST 10 MG: 10 TABLET, FILM COATED ORAL at 21:00

## 2020-08-07 RX ADMIN — Medication 500 MG: at 15:25

## 2020-08-07 RX ADMIN — HYDROXYCHLOROQUINE SULFATE 200 MG: 200 TABLET ORAL at 08:28

## 2020-08-07 RX ADMIN — ALBUTEROL SULFATE 1 PUFF: 108 INHALANT RESPIRATORY (INHALATION) at 13:11

## 2020-08-07 RX ADMIN — GUAIFENESIN 600 MG: 600 TABLET ORAL at 08:28

## 2020-08-07 RX ADMIN — DEXAMETHASONE 6 MG: 4 TABLET ORAL at 08:28

## 2020-08-07 RX ADMIN — ALBUTEROL SULFATE 1 PUFF: 108 INHALANT RESPIRATORY (INHALATION) at 20:00

## 2020-08-07 RX ADMIN — HYDROXYCHLOROQUINE SULFATE 200 MG: 200 TABLET ORAL at 17:36

## 2020-08-07 RX ADMIN — Medication 220 MG: at 08:28

## 2020-08-07 RX ADMIN — HYDROCODONE BITARTRATE AND HOMATROPINE METHYLBROMIDE 5 ML: 5; 1.5 SOLUTION ORAL at 15:25

## 2020-08-07 RX ADMIN — ENOXAPARIN SODIUM 40 MG: 40 INJECTION SUBCUTANEOUS at 05:47

## 2020-08-07 RX ADMIN — Medication 10 ML: at 21:02

## 2020-08-07 RX ADMIN — ENOXAPARIN SODIUM 40 MG: 40 INJECTION SUBCUTANEOUS at 17:36

## 2020-08-07 RX ADMIN — FOLIC ACID 1 MG: 1 TABLET ORAL at 08:28

## 2020-08-07 RX ADMIN — ALBUTEROL SULFATE 1 PUFF: 108 INHALANT RESPIRATORY (INHALATION) at 16:27

## 2020-08-07 RX ADMIN — HYDROCODONE BITARTRATE AND HOMATROPINE METHYLBROMIDE 5 ML: 5; 1.5 SOLUTION ORAL at 20:58

## 2020-08-07 RX ADMIN — Medication 500 MG: at 21:00

## 2020-08-07 RX ADMIN — Medication 10 ML: at 05:49

## 2020-08-07 RX ADMIN — ALBUTEROL SULFATE 1 PUFF: 108 INHALANT RESPIRATORY (INHALATION) at 00:00

## 2020-08-07 RX ADMIN — ACETAMINOPHEN 650 MG: 325 TABLET, FILM COATED ORAL at 05:47

## 2020-08-07 RX ADMIN — TIOTROPIUM BROMIDE INHALATION SPRAY 2 PUFF: 3.12 SPRAY, METERED RESPIRATORY (INHALATION) at 07:42

## 2020-08-07 RX ADMIN — ALBUTEROL SULFATE 1 PUFF: 108 INHALANT RESPIRATORY (INHALATION) at 04:00

## 2020-08-07 RX ADMIN — ALBUTEROL SULFATE 1 PUFF: 108 INHALANT RESPIRATORY (INHALATION) at 07:41

## 2020-08-07 RX ADMIN — INSULIN GLARGINE 26 UNITS: 100 INJECTION, SOLUTION SUBCUTANEOUS at 21:01

## 2020-08-07 RX ADMIN — GUAIFENESIN AND DEXTROMETHORPHAN 10 ML: 100; 10 SYRUP ORAL at 11:52

## 2020-08-07 RX ADMIN — Medication 10 ML: at 11:46

## 2020-08-07 RX ADMIN — ASPIRIN 81 MG: 81 TABLET, COATED ORAL at 08:28

## 2020-08-07 RX ADMIN — INSULIN LISPRO 2 UNITS: 100 INJECTION, SOLUTION INTRAVENOUS; SUBCUTANEOUS at 21:01

## 2020-08-07 RX ADMIN — FAMOTIDINE 20 MG: 20 TABLET, FILM COATED ORAL at 08:28

## 2020-08-07 RX ADMIN — GUAIFENESIN 600 MG: 600 TABLET ORAL at 20:58

## 2020-08-07 RX ADMIN — Medication 500 MG: at 08:28

## 2020-08-07 NOTE — PROGRESS NOTES
Robitussin DM 10 ml po given for complaints of cough. Pt complaints of chest discomfort with cough. Will monitor.

## 2020-08-07 NOTE — PROGRESS NOTES
Problem: Mobility Impaired (Adult and Pediatric)  Goal: *Acute Goals and Plan of Care  Outcome: Progressing Towards Goal  Note: Acute PT Goals:  (1.)Rashmi Page  will move from supine to sit and sit to supine , scoot up and down, and roll side to side with MODIFIED INDEPENDENCE within 7 treatment day(s). (2.)Rashmi Page will transfer from bed to chair and chair to bed with MODIFIED INDEPENDENCE using the least restrictive device within 7 treatment day(s). (3.)Rashmi Page will ambulate with MODIFIED INDEPENDENCE for 250 feet with the least restrictive device within 7 treatment day(s). (4.)Rashmi Page will perform standing static and dynamic balance activities x 25 minutes with MODIFIED INDEPENDENCE to improve safety  and activity tolerance within 7 treatment day(s). (Jonathan Em will ascend and descend 3 stairs using one hand rail(s) with MODIFIED INDEPENDENCE to improve functional mobility and safety within 7 treatment day(s). (6.)Rashmi Page will perform bilateral lower extremity exercises x 15 min for HEP with INDEPENDENCE to improve strength, endurance, and functional mobility within 7 treatment day(s).      PHYSICAL THERAPY: Initial Assessment, Daily Note, and PM 8/7/2020  INPATIENT: PT Visit Days : 1  Payor: [facility] Degree / Plan: [address] / Product Type: [employer] /       NAME/AGE/GENDER: Sergio Tellez is a 75 y.o. female   PRIMARY DIAGNOSIS: Acute respiratory failure with hypoxia ([address] [J96.01]  COVID-19 virus infection [U07.1] Acute respiratory failure with hypoxia ([address] Acute respiratory failure with hypoxia ([address]        ICD-10: Treatment Diagnosis:   · Generalized Muscle Weakness (M62.81)  · Other lack of cordination (R27.8)  · Difficulty in walking, Not elsewhere classified (R26.2)  · Other abnormalities of gait and mobility (R26.89)   Precaution/Allergies:  Prilosec [omeprazole] and Penicillins      ASSESSMENT:     Ms. Enoch Gonzalez is a 75year old F who presents to hospital with acute respiratory failure. Prior to hospital admission pt lives with her  in a one story home with three step(s) to enter. Pt endorses one fall in past 6 months. Prior to admission Ms. Enoch Gonzalez uses a rollator walker or a cane PRN for mobility. She wears 2L O2 at night. Upon entering, pt resting in bed, agreeable to PT evaluation. On 3L O2 via nasal cannula. she reports no pain at rest. BLE assessment indicates sensation to light touch intact, AROM WFL, and strength diminished. Pt performed supine > sit with CGA, sitting EOB with good sitting balance control. Sit > stand with CGA/Min A hand held assist. Gait x 20 ft with Min A hand held assist, cues for pacing, breathing technique, balance control. Noted decrease in dynamic standing balance activities. Sit > supine with CGA. Once resting in bed PT instructed pt in strengthening BLE exercises. At end of session pt resting in bed with all needs within reach, RN notified. Pt presents as functioning below her baseline, with deficits in mobility including transfers, gait, balance, and activity tolerance. Pt will benefit from skilled therapy services to address stated deficits to promote return to highest level of function, independence, and safety. Will continue to follow. This section established at most recent assessment   PROBLEM LIST (Impairments causing functional limitations):  1. Decreased Strength  2. Decreased ADL/Functional Activities  3. Decreased Transfer Abilities  4. Decreased Ambulation Ability/Technique  5. Decreased Balance  6. Decreased Activity Tolerance  7. Increased Fatigue  8. Increased Shortness of Breath   INTERVENTIONS PLANNED: (Benefits and precautions of physical therapy have been discussed with the patient.)  1. Balance Exercise  2. Bed Mobility  3. Family Education  4. Gait Training  5.  Home Exercise Program (HEP)  6. Neuromuscular Re-education/Strengthening  7. Range of Motion (ROM)  8. Therapeutic Activites  9. Therapeutic Exercise/Strengthening  10. Transfer Training     TREATMENT PLAN: Frequency/Duration: 3 times a week for duration of hospital stay  Rehabilitation Potential For Stated Goals: Good     REHAB RECOMMENDATIONS (at time of discharge pending progress):    Placement: It is my opinion, based on this patient's performance to date, that Ms. Ritchie Mosher may benefit from [address] after discharge due to the functional deficits listed above that are likely to improve with skilled rehabilitation because he/she has multiple medical issues that affect his/her functional mobility in the community. Equipment:    None at this time              HISTORY:   History of Present Injury/Illness (Reason for Referral):  Per H&P: Ministerio Pride is a 75 y.o. female with medical history significant for intermittent moderate persistent asthma, diabetes type 2, hypertension, hyperlipidemia,Sjogren's syndrome / Positive sm/RNP antibody who presented to the ED with worsening shortness of breath, productive cough. Reports that her symptoms started about 2 to 3 days ago and has progressively gotten worse. She reports having asthma without any acute exacerbations since 2018. She wears O2 at night time only but has been using it during the day in the past 2 days. She denies any fever, chills, chest pains, abdominal pain, n/v. In the ED, patient was tachycardic and hypoxic(88%) on room air. Rapid COVID-19 test in the ED is positive. CXR CXR with possible data like this is worsening infiltrate in the right midlung zone. Labs are fairly unremarkable. 10 systems reviewed and negative except as noted in HPI. \"  Past Medical History/Comorbidities:   Ms. Ritchie Mosher  has a past medical history of Arrhythmia (3 - 4 weeks ago (9/2010)), Arthritis, Asthma, Bladder incontinence (10/24/2016), Chronic pain, Dermatophytosis, Diabetes [facility]), Former smoker, GERD (gastroesophageal reflux disease) (7/23/2013), Heart failure ([address] Hypercholesterolemia, Hypertension, Hypertensive heart disease without HF (heart failure), Lupus (systemic lupus erythematosus) ([address] (2005), Non compliance with medical treatment, Obesity, ANATOLY (obstructive sleep apnea) (07/23/2013), Seizures ([address] Stroke ([address] (1979), Systemic lupus erythematosus ([address] (07/23/2013), and Type 2 diabetes mellitus with hyperglycemia ([address] (10/24/2016). She also has no past medical history of Adverse effect of anesthesia, Aneurysm ([address] CAD (coronary artery disease), Cancer ([address] Chronic kidney disease, Chronic obstructive pulmonary disease ([address] Coagulation disorder ([address] Difficult intubation, Endocarditis, Ill-defined condition, Liver disease, Malignant hyperthermia due to anesthesia, Nausea & vomiting, Nicotine vapor product user, Non-nicotine vapor product user, Pseudocholinesterase deficiency, Psychiatric disorder, PUD (peptic ulcer disease), Rheumatic fever, Thromboembolus ([address] or Thyroid disease. Ms. Young Chiu  has a past surgical history that includes hx hysterectomy (1974); hx breast lumpectomy (1998); hx appendectomy; hx hammer toe repair (2010); pr rev upper eyelid w excess skin; hx heent; hx other surgical; hx other surgical (03/15/2018); hx heart catheterization (03/22/1999); and hx breast biopsy (Left). Social History/Living Environment:   Home Environment: Private residence  # Steps to Enter: 3  One/Two Story Residence: One story  Living Alone: No  Support Systems: Spouse/Significant Other/Partner, Friends \ neighbors, [place] / caio community, Family member(s)  Patient Expects to be Discharged to[de-identified] Private residence  Current DME Used/Available at Home: Walker, rollator, Cane, straight, Oxygen, portable  Prior Level of Function/Work/Activity:  Prior to hospital admission pt lives with her  in a one story home with three step(s) to enter.  Pt endorses one fall in past 6 months. Prior to admission Ms. Bessy Levin uses a rollator walker or a cane PRN for mobility. She wears 2L O2 at night. Number of Personal Factors/Comorbidities that affect the Plan of Care: 1-2: MODERATE COMPLEXITY   EXAMINATION:   Most Recent Physical Functioning:   Gross Assessment:  AROM: Within functional limits  Strength: Generally decreased, functional  Coordination: Within functional limits  Sensation: Intact               Posture:  Posture (WDL): Exceptions to WDL  Posture Assessment: Forward head, Rounded shoulders  Balance:  Sitting: Intact  Standing: Impaired  Standing - Static: Fair  Standing - Dynamic : Fair Bed Mobility:  Rolling: Contact guard assistance  Supine to Sit: Contact guard assistance  Sit to Supine: Contact guard assistance  Scooting: Contact guard assistance  Wheelchair Mobility:     Transfers:  Sit to Stand: Contact guard assistance  Stand to Sit: Contact guard assistance  Bed to Chair: Minimum assistance  Interventions: Safety awareness training; Tactile cues; Verbal cues  Gait:     Base of Support: Center of gravity altered  Speed/Elen: Shuffled; Slow  Step Length: Left shortened;Right shortened  Gait Abnormalities: Decreased step clearance;Trunk sway increased; Path deviations; Shuffling gait  Distance (ft): 20 Feet (ft)  Assistive Device: (hand held assist)  Ambulation - Level of Assistance: Contact guard assistance;Minimal assistance      Body Structures Involved:  1. Heart  2. Lungs  3. Bones  4. Joints  5. Muscles Body Functions Affected:  1. Cardio  2. Respiratory  3. Neuromusculoskeletal  4. Movement Related Activities and Participation Affected:  1. General Tasks and Demands  2. Mobility  3.  Self Care   Number of elements that affect the Plan of Care: 3: MODERATE COMPLEXITY   CLINICAL PRESENTATION:   Presentation: Stable and uncomplicated: LOW COMPLEXITY   CLINICAL DECISION MAKIN \[facility]\"" [address] AM-PAC 6 Clicks   Basic Mobility Inpatient Short Form  How much difficulty does the patient currently have. .. Unable A Lot A Little None   1. Turning over in bed (including adjusting bedclothes, sheets and blankets)? [] 1   [] 2   [x] 3   [] 4   2. Sitting down on and standing up from a chair with arms ( e.g., wheelchair, bedside commode, etc.)   [] 1   [] 2   [x] 3   [] 4   3. Moving from lying on back to sitting on the side of the bed? [] 1   [] 2   [x] 3   [] 4   How much help from another person does the patient currently need. .. Total A Lot A Little None   4. Moving to and from a bed to a chair (including a wheelchair)? [] 1   [] 2   [x] 3   [] 4   5. Need to walk in hospital room? [] 1   [] 2   [x] 3   [] 4   6. Climbing 3-5 steps with a railing? [] 1   [x] 2   [] 3   [] 4   © 2007, Trustees of [facility] [employer], under license to [employer]. All rights reserved      Score:  Initial: 17 Most Recent: X (Date: -- )    Interpretation of Tool:  Represents activities that are increasingly more difficult (i.e. Bed mobility, Transfers, Gait). Medical Necessity:     · Patient is expected to demonstrate progress in strength, balance, coordination, functional technique and activity tolerance to increase independence with all mobility and promote return to prior level of function. Reason for Services/Other Comments:  · Patient continues to require skilled intervention due to medical complications and mobility deficits which impact her level of function, safety, and independence as indicated above. Use of outcome tool(s) and clinical judgement create a POC that gives a: Clear prediction of patient's progress: LOW COMPLEXITY        TREATMENT:   (In addition to Assessment/Re-Assessment sessions the following treatments were rendered)   Pre-treatment Symptoms/Complaints:  Fatigue, weakness  Pain: Initial:   Pain Intensity 1: 0  Post Session:  0/10     Therapeutic Activity: (   10 Minutes):   Therapeutic activities including Bed transfers, Chair transfers, Ambulation on level ground and BLE exercises to improve mobility, strength, balance, coordination and activity tolerance. Required minimal cues and assist   to promote static and dynamic balance in standing and promote coordination of bilateral, lower extremity(s). Date:  8/7/20 Date:   Date:     Activity/Exercise Parameters Parameters Parameters   Supine AP 1 x 10 BLE     Supine Knee Ext 1 x 8 BLE     Seated Marches 1 x 8 BLE                               Braces/Orthotics/Lines/Etc:   · O2 Device: Nasal cannula  Treatment/Session Assessment:    · Response to Treatment:  See above. Pt weak, fatigues quickly. · Interdisciplinary Collaboration:   o Physical Therapist  o Registered Nurse  · After treatment position/precautions:   o Supine in bed  o Bed/Chair-wheels locked  o Bed in low position  o Call light within reach  o RN notified   · Compliance with Program/Exercises: Will assess as treatment progresses  · Recommendations/Intent for next treatment session: \"Next visit will focus on advancements to more challenging activities and reduction in assistance provided\".     Total Treatment Duration:  PT Patient Time In/Time Out  Time In: 1553  Time Out: 2200 CAROLINE Cabello PT

## 2020-08-07 NOTE — PROGRESS NOTES
Pt continues to cough after giving prn Benzonatate 100 mg. Beny GOLD and MD ordered Guaifenesin 10ml prn for cough every 6 hours. This RN administered Guaifenesin 10ml.

## 2020-08-07 NOTE — PROGRESS NOTES
Pt resting in bed. Respirations present on 3L NC. No signs of distress. No needs expressed. Bed low and locked. Call light within reach.  Report given to Kimi Barakat

## 2020-08-07 NOTE — PROGRESS NOTES
Problem: Falls - Risk of  Goal: *Absence of Falls  Description: Document Chuckie Rae Fall Risk and appropriate interventions in the flowsheet.   Outcome: Progressing Towards Goal  Note: Fall Risk Interventions:  Mobility Interventions: Bed/chair exit alarm         Medication Interventions: Bed/chair exit alarm    Elimination Interventions: Call light in reach              Problem: Patient Education: Go to Patient Education Activity  Goal: Patient/Family Education  Outcome: Progressing Towards Goal

## 2020-08-07 NOTE — PROGRESS NOTES
Pt resting in bed. No distress noted at this time. Pt on 3 L NC At this time. Call light in reach, will monitor.

## 2020-08-07 NOTE — PROGRESS NOTES
Pt resting in bed with eyes closed. Pt on 3 L NC At this time. Pt alert oriented times 3 at this time. Pt complaints of cough during the night but denies cough now. Pt denies pain or distress at this time. Pt on droplet plus precautions. Pt encouraged to call for assistance if needed call light in reach, will monitor.

## 2020-08-07 NOTE — PROGRESS NOTES
Pt sitting up in bed. Pt on 3 L NC at this time. No s/sx of distress noted at this time. Call light in reach, will monitor.

## 2020-08-07 NOTE — PROGRESS NOTES
Name: Severiano Miller MRN: [id number]  : 1945  Age:74 y.o.  female  Admit Date:  2020 LOS: 3      Hospitalist Progress Note    Severiano Miller is a 75 y.o. female with medical history significant for intermittent moderate persistent asthma, diabetes type 2, hypertension, hyperlipidemia,Sjogren's syndrome / Positive sm/RNP antibody who presented to the ED with worsening shortness of breath, productive cough x 2-3days. She normally uses O2 2L at night time but now has been requiring during the day time due to SOB. CXR notable for worsening infiltrate in the right midlung zone and rapid COVID-19 test is positive. Patient was admitted due to acute respiratory failure with hypoxia due to COVID-19 infection and possible right lung PNA. Subjective (20) : Patient is seen and examined at bedside. No acute events reported overnight by nursing staff. Continues to complains of cough and chest congestion with chest pain upon coughing spells. Currently on 3L O2 NC. Patient denies fever, chills, chest pains, n/v, abdominal pain. Tolerating diet. ROS: 10 point review of systems is otherwise negative with the exception of the elements mentioned above.     Objective:    Patient Vitals for the past 24 hrs:   Temp Pulse Resp BP SpO2   20 1122 98.3 °F (36.8 °C) 72 18 101/66 93 %   20 0738 98.4 °F (36.9 °C) 70 17 108/64 94 %   20 0457     93 %   20 0229 98.4 °F (36.9 °C) 74 20 118/72 95 %   20 2356     94 %   20 2303 98.6 °F (37 °C) 69 20 108/66 92 %   20 1932 98.6 °F (37 °C) 91 20 121/75 90 %   20 1600     96 %   20 1515 98.1 °F (36.7 °C) 68 20 113/76 93 %     Oxygen Therapy  O2 Sat (%): 93 % (20 1122)  Pulse via Oximetry: 76 beats per minute (20)  O2 Device: Nasal cannula (20)  O2 Flow Rate (L/min): 3 l/min (20)    Estimated body mass index is 34.87 kg/m² as calculated from the following:    Height as of this encounter: 4' 11\" (1.499 m). Weight as of this encounter: 78.3 kg (172 lb 9.9 oz). Intake/Output Summary (Last 24 hours) at 8/7/2020 1308  Last data filed at 8/7/2020 1546  Gross per 24 hour   Intake 236 ml   Output    Net 236 ml       *Note that automatically entered I/Os may not be accurate; dependent on patient compliance with collection and accurate  by techs. Physical Exam:   General:                     alert, awake, no acute distress. Well nourished  Head:                          normocephalic, atraumatic  Eyes, Ears, nose:      PERRL, EOMI. Normal Conjunctiva. Neck:                          supple, non-tender. Trachea midline. Lungs:                        Decreased air entry, no wheezing  Cardiac:                      RRR, Normal S1 and S2. Abdomen:                  Soft, non distended, nontender, +BS  Extremities:               Warm, dry. No edema  Skin:                           No rashes, no jaundice  Neuro:             AAOx 3 .  No gross focal neurological deficit  Psychiatric:                No anxiety, calm, cooperative    Data Review:  I have reviewed all labs, meds, and studies from the last 24 hours:    Recent Results (from the past 24 hour(s))   GLUCOSE, POC    Collection Time: 08/06/20  4:15 PM   Result Value Ref Range    Glucose (POC) 192 (H) 65 - 100 mg/dL   GLUCOSE, POC    Collection Time: 08/06/20  8:41 PM   Result Value Ref Range    Glucose (POC) 286 (H) 65 - 449 mg/dL   METABOLIC PANEL, BASIC    Collection Time: 08/07/20  5:07 AM   Result Value Ref Range    Sodium 142 136 - 145 mmol/L    Potassium 3.8 3.5 - 5.1 mmol/L    Chloride 108 (H) 98 - 107 mmol/L    CO2 26 21 - 32 mmol/L    Anion gap 8 7 - 16 mmol/L    Glucose 76 65 - 100 mg/dL    BUN 19 8 - 23 MG/DL    Creatinine 0.81 0.6 - 1.0 MG/DL    GFR est AA >60 >60 ml/min/1.73m2    GFR est non-AA >60 >60 ml/min/1.73m2    Calcium 8.2 (L) 8.3 - 10.4 MG/DL   CBC WITH AUTOMATED DIFF Collection Time: 08/07/20  5:07 AM   Result Value Ref Range    WBC 8.2 4.3 - 11.1 K/uL    RBC 4.82 4.05 - 5.2 M/uL    HGB 12.9 11.7 - 15.4 g/dL    HCT 40.7 35.8 - 46.3 %    MCV 84.4 79.6 - 97.8 FL    MCH 26.8 26.1 - 32.9 PG    MCHC 31.7 31.4 - 35.0 g/dL    RDW 13.6 11.9 - 14.6 %    PLATELET 958 096 - 504 K/uL    MPV 11.4 9.4 - 12.3 FL    ABSOLUTE NRBC 0.00 0.0 - 0.2 K/uL    DF AUTOMATED      NEUTROPHILS 83 (H) 43 - 78 %    LYMPHOCYTES 12 (L) 13 - 44 %    MONOCYTES 5 4.0 - 12.0 %    EOSINOPHILS 0 (L) 0.5 - 7.8 %    BASOPHILS 0 0.0 - 2.0 %    IMMATURE GRANULOCYTES 2 0.0 - 5.0 %    ABS. NEUTROPHILS 6.8 1.7 - 8.2 K/UL    ABS. LYMPHOCYTES 0.9 0.5 - 4.6 K/UL    ABS. MONOCYTES 0.4 0.1 - 1.3 K/UL    ABS. EOSINOPHILS 0.0 0.0 - 0.8 K/UL    ABS. BASOPHILS 0.0 0.0 - 0.2 K/UL    ABS. IMM.  GRANS. 0.1 0.0 - 0.5 K/UL   GLUCOSE, POC    Collection Time: 08/07/20  5:31 AM   Result Value Ref Range    Glucose (POC) 84 65 - 100 mg/dL   GLUCOSE, POC    Collection Time: 08/07/20 11:08 AM   Result Value Ref Range    Glucose (POC) 94 65 - 100 mg/dL        All Micro Results     Procedure Component Value Units Date/Time    CULTURE, RESPIRATORY/SPUTUM/BRONCH [employer] ST[employer] [[id number]] Collected:  08/05/20 0515    Order Status:  Completed Specimen:  Sputum Updated:  08/07/20 0819     Special Requests: NO SPECIAL REQUESTS        GRAM STAIN 0 TO 5 WBCS SEEN PER OIF      0 TO 1 EPITHELIAL CELLS SEEN PER OIF      MANY GRAM POSITIVE COCCI         FEW GRAM NEGATIVE RODS         FEW GRAM NEGATIVE COCCI         TRACE MUCUS PRESENT        Culture result:       MODERATE NORMAL RESPIRATORY SANDRA          CULTURE, BLOOD [[id number]] Collected:  08/04/20 0950    Order Status:  Completed Specimen:  Blood Updated:  08/07/20 0756     Special Requests: --        RIGHT  HAND       Culture result: NO GROWTH 3 DAYS       CULTURE, BLOOD [[id number]] Collected:  08/04/20 0951    Order Status:  Completed Specimen:  Blood Updated:  08/07/20 0756     Special Requests: -- LEFT  HAND       Culture result: NO GROWTH 3 DAYS       CULTURE, BLOOD [[id number]]     Order Status:  Canceled Specimen:  Blood     CULTURE, BLOOD [[id number]]     Order Status:  Canceled Specimen:  Blood           Current Meds:  Current Facility-Administered Medications   Medication Dose Route Frequency    enoxaparin (LOVENOX) injection 40 mg  0.5 mg/kg SubCUTAneous Q12H    tiotropium bromide (SPIRIVA RESPIMAT) 2.5 mcg /actuation  2 Puff Inhalation DAILY    guaiFENesin-dextromethorphan (ROBITUSSIN DM) 100-10 mg/5 mL syrup 10 mL  10 mL Oral Q6H PRN    albuterol (PROVENTIL HFA, VENTOLIN HFA, PROAIR HFA) inhaler 1 Puff  1 Puff Inhalation Q4H    guaiFENesin ER (MUCINEX) tablet 600 mg  600 mg Oral Q12H    benzonatate (TESSALON) capsule 100 mg  100 mg Oral TID PRN    sodium chloride (NS) flush 5-40 mL  5-40 mL IntraVENous Q8H    sodium chloride (NS) flush 5-40 mL  5-40 mL IntraVENous PRN    acetaminophen (TYLENOL) tablet 650 mg  650 mg Oral Q6H PRN    Or    acetaminophen (TYLENOL) suppository 650 mg  650 mg Rectal Q6H PRN    polyethylene glycol (MIRALAX) packet 17 g  17 g Oral DAILY PRN    promethazine (PHENERGAN) tablet 12.5 mg  12.5 mg Oral Q6H PRN    Or    ondansetron (ZOFRAN) injection 4 mg  4 mg IntraVENous Q6H PRN    insulin lispro (HUMALOG) injection   SubCUTAneous AC&HS    ascorbic acid (vitamin C) (VITAMIN C) tablet 500 mg  500 mg Oral TID    zinc sulfate tablet 220 mg  220 mg Oral DAILY    famotidine (PEPCID) tablet 20 mg  20 mg Oral DAILY    dexAMETHasone (DECADRON) tablet 6 mg  6 mg Oral DAILY    aspirin delayed-release tablet 81 mg  81 mg Oral DAILY    [Held by provider] amLODIPine (NORVASC) tablet 10 mg  10 mg Oral DAILY    folic acid (FOLVITE) tablet 1 mg  1 mg Oral DAILY    hydrOXYchloroQUINE (PLAQUENIL) tablet 200 mg  200 mg Oral BIDPC    insulin glargine (LANTUS) injection 30 Units  30 Units SubCUTAneous QHS    [Held by provider] losartan (COZAAR) tablet 100 mg  100 mg Oral DAILY    montelukast (SINGULAIR) tablet 10 mg  10 mg Oral QHS    [Held by provider] nebivoloL (BYSTOLIC) tablet 5 mg  5 mg Oral DAILY    [Held by provider] hydroCHLOROthiazide (HYDRODIURIL) tablet 12.5 mg  12.5 mg Oral DAILY    0.9% sodium chloride infusion 250 mL  250 mL IntraVENous PRN         Other Studies:  Xr Chest Port    Result Date: 8/4/2020  CHEST X-RAY, one view. HISTORY:  Suspected COVID, results pending; cough. TECHNIQUE:  AP upright portable view. COMPARISON: August 2019     FINDINGS/IMPRESSION: Small focus of atelectasis or infiltrate right midlung zone. Mild interstitial prominence. Costophrenic angles are sharp. Heart and pulmonary vasculature is unremarkable. Assessment:    Active Hospital Problems    Diagnosis Date Noted    Acute respiratory failure with hypoxia ([facility] [address] 08/04/2020    COVID-19 virus infection 08/04/2020    Controlled type 2 diabetes mellitus without complication, with long-term current use of insulin ([facility] [address] 01/27/2017    Sjogren's syndrome ([facility] [address] 01/12/2017    Positive sm/RNP antibody 01/12/2017    Asthma 10/24/2016    GERD (gastroesophageal reflux disease) 07/23/2013    Hypertension 07/23/2013    Hypercholesterolemia 07/23/2013       Plan:    Acute Respiratory Failure due with hypoxia due to COVID-19  Sepsis due to COVID 19 Infection  COVID-19 Virus Infection  Met sepsis criteria on admission with tachycardia and hypoxia (88% on RA). CXR with possible data like this is worsening infiltrate in the right midlung zone. GINA-COV2 Rapid test (+) on 8/4. Convalescent plasma on 8/5. BCx is NGTD and sputum culture is normal joceline  - O2 supplement and wean as tolerated  - zinc and vitamin C supplement  - dexamethasone 6mg PO daily   - DVT PPx with lovenox  - follow up BCx, Sputum Cx    Moderate Persistence Asthma  - O2 supplement as above. - nebs PRN    Elevated D-Dimer  Likely due to acute illness especially COVID19.  No increase O2 requirement compared to prior days. Less likely PE/DVT given low Wells score     T2DM  - ISS  - hold home trulicity     Sjogren's Syndrome // Positive sm/RNP antibody: continue with home medications     Hypertension // HLD : continue with home medications     GERD: pepcid BID    Diet:  DIET NUTRITIONAL SUPPLEMENTS  DIET DIABETIC CONSISTENT CARB  DVT PPx:   Code: Full Code  Dispo: likely home. PT order for DC rec  Estimated Discharge: 48hrs    Labs/Imaging Reviewed. Patient is moderate risk due to current condition and comorbid conditions as well as requiring frequent monitoring. Plan discussed with staff, patient/family and are in agreement. Time Spent: Greater than 45 minutes was spent in reviewing charts, physical exam, discussion with patient, and answered any questions.     Signed By: Tamara Gomes MD     August 7, 2020

## 2020-08-07 NOTE — PROGRESS NOTES
SW spoke with patient on this date regarding baseline home O2. Per patient she is not currently serviced by any home O2 DME provider due to \"it has been years since I needed them\". If home O2 is needed at higher level than baseline at time of discharge, she will need a new O2 qualifier and DME referral. Patient agreeable with this plan. If HH is indicated at time of discharger, referral should be sent to [facility] per her history of service use.

## 2020-08-08 ENCOUNTER — APPOINTMENT (OUTPATIENT)
Dept: GENERAL RADIOLOGY | Age: 75
DRG: 871 | End: 2020-08-08
Attending: INTERNAL MEDICINE
Payer: MEDICARE

## 2020-08-08 LAB
ANION GAP SERPL CALC-SCNC: 9 MMOL/L (ref 7–16)
BASOPHILS # BLD: 0 K/UL (ref 0–0.2)
BASOPHILS NFR BLD: 0 % (ref 0–2)
BUN SERPL-MCNC: 16 MG/DL (ref 8–23)
CALCIUM SERPL-MCNC: 8.1 MG/DL (ref 8.3–10.4)
CHLORIDE SERPL-SCNC: 105 MMOL/L (ref 98–107)
CO2 SERPL-SCNC: 25 MMOL/L (ref 21–32)
CREAT SERPL-MCNC: 0.73 MG/DL (ref 0.6–1)
DIFFERENTIAL METHOD BLD: ABNORMAL
EOSINOPHIL # BLD: 0 K/UL (ref 0–0.8)
EOSINOPHIL NFR BLD: 0 % (ref 0.5–7.8)
ERYTHROCYTE [DISTWIDTH] IN BLOOD BY AUTOMATED COUNT: 13.7 % (ref 11.9–14.6)
GLUCOSE BLD STRIP.AUTO-MCNC: 139 MG/DL (ref 65–100)
GLUCOSE BLD STRIP.AUTO-MCNC: 171 MG/DL (ref 65–100)
GLUCOSE BLD STRIP.AUTO-MCNC: 81 MG/DL (ref 65–100)
GLUCOSE BLD STRIP.AUTO-MCNC: 86 MG/DL (ref 65–100)
GLUCOSE SERPL-MCNC: 70 MG/DL (ref 65–100)
HCT VFR BLD AUTO: 41.3 % (ref 35.8–46.3)
HGB BLD-MCNC: 12.8 G/DL (ref 11.7–15.4)
IMM GRANULOCYTES # BLD AUTO: 0.1 K/UL (ref 0–0.5)
IMM GRANULOCYTES NFR BLD AUTO: 2 % (ref 0–5)
LYMPHOCYTES # BLD: 1 K/UL (ref 0.5–4.6)
LYMPHOCYTES NFR BLD: 13 % (ref 13–44)
MCH RBC QN AUTO: 26.8 PG (ref 26.1–32.9)
MCHC RBC AUTO-ENTMCNC: 31 G/DL (ref 31.4–35)
MCV RBC AUTO: 86.4 FL (ref 79.6–97.8)
MONOCYTES # BLD: 0.3 K/UL (ref 0.1–1.3)
MONOCYTES NFR BLD: 3 % (ref 4–12)
NEUTS SEG # BLD: 6.4 K/UL (ref 1.7–8.2)
NEUTS SEG NFR BLD: 82 % (ref 43–78)
NRBC # BLD: 0 K/UL (ref 0–0.2)
PLATELET # BLD AUTO: 302 K/UL (ref 150–450)
PMV BLD AUTO: 11.3 FL (ref 9.4–12.3)
POTASSIUM SERPL-SCNC: 4.2 MMOL/L (ref 3.5–5.1)
RBC # BLD AUTO: 4.78 M/UL (ref 4.05–5.2)
SODIUM SERPL-SCNC: 139 MMOL/L (ref 136–145)
WBC # BLD AUTO: 7.9 K/UL (ref 4.3–11.1)

## 2020-08-08 PROCEDURE — 80048 BASIC METABOLIC PNL TOTAL CA: CPT

## 2020-08-08 PROCEDURE — 74011636637 HC RX REV CODE- 636/637: Performed by: INTERNAL MEDICINE

## 2020-08-08 PROCEDURE — 94760 N-INVAS EAR/PLS OXIMETRY 1: CPT

## 2020-08-08 PROCEDURE — 77010033678 HC OXYGEN DAILY

## 2020-08-08 PROCEDURE — 82962 GLUCOSE BLOOD TEST: CPT

## 2020-08-08 PROCEDURE — 74011250636 HC RX REV CODE- 250/636: Performed by: INTERNAL MEDICINE

## 2020-08-08 PROCEDURE — 71045 X-RAY EXAM CHEST 1 VIEW: CPT

## 2020-08-08 PROCEDURE — 74011250637 HC RX REV CODE- 250/637: Performed by: INTERNAL MEDICINE

## 2020-08-08 PROCEDURE — 85025 COMPLETE CBC W/AUTO DIFF WBC: CPT

## 2020-08-08 PROCEDURE — 77030027138 HC INCENT SPIROMETER -A

## 2020-08-08 PROCEDURE — 65270000029 HC RM PRIVATE

## 2020-08-08 PROCEDURE — 94640 AIRWAY INHALATION TREATMENT: CPT

## 2020-08-08 RX ORDER — INSULIN GLARGINE 100 [IU]/ML
20 INJECTION, SOLUTION SUBCUTANEOUS
Status: DISCONTINUED | OUTPATIENT
Start: 2020-08-08 | End: 2020-08-11 | Stop reason: HOSPADM

## 2020-08-08 RX ORDER — GUAIFENESIN/DEXTROMETHORPHAN 100-10MG/5
10 SYRUP ORAL
Status: DISCONTINUED | OUTPATIENT
Start: 2020-08-08 | End: 2020-08-09

## 2020-08-08 RX ADMIN — ALBUTEROL SULFATE 1 PUFF: 108 INHALANT RESPIRATORY (INHALATION) at 19:42

## 2020-08-08 RX ADMIN — ALBUTEROL SULFATE 1 PUFF: 108 INHALANT RESPIRATORY (INHALATION) at 08:00

## 2020-08-08 RX ADMIN — Medication 220 MG: at 08:06

## 2020-08-08 RX ADMIN — FOLIC ACID 1 MG: 1 TABLET ORAL at 08:06

## 2020-08-08 RX ADMIN — INSULIN LISPRO 2 UNITS: 100 INJECTION, SOLUTION INTRAVENOUS; SUBCUTANEOUS at 22:09

## 2020-08-08 RX ADMIN — GUAIFENESIN 600 MG: 600 TABLET ORAL at 08:06

## 2020-08-08 RX ADMIN — HYDROXYCHLOROQUINE SULFATE 200 MG: 200 TABLET ORAL at 16:57

## 2020-08-08 RX ADMIN — ALBUTEROL SULFATE 1 PUFF: 108 INHALANT RESPIRATORY (INHALATION) at 12:19

## 2020-08-08 RX ADMIN — INSULIN GLARGINE 20 UNITS: 100 INJECTION, SOLUTION SUBCUTANEOUS at 22:08

## 2020-08-08 RX ADMIN — Medication 5 ML: at 12:02

## 2020-08-08 RX ADMIN — MONTELUKAST 10 MG: 10 TABLET, FILM COATED ORAL at 22:09

## 2020-08-08 RX ADMIN — DEXAMETHASONE 6 MG: 4 TABLET ORAL at 08:06

## 2020-08-08 RX ADMIN — HYDROXYCHLOROQUINE SULFATE 200 MG: 200 TABLET ORAL at 08:06

## 2020-08-08 RX ADMIN — ALBUTEROL SULFATE 1 PUFF: 108 INHALANT RESPIRATORY (INHALATION) at 14:47

## 2020-08-08 RX ADMIN — FAMOTIDINE 20 MG: 20 TABLET, FILM COATED ORAL at 08:06

## 2020-08-08 RX ADMIN — ENOXAPARIN SODIUM 40 MG: 40 INJECTION SUBCUTANEOUS at 05:02

## 2020-08-08 RX ADMIN — HYDROCODONE BITARTRATE AND HOMATROPINE METHYLBROMIDE 5 ML: 5; 1.5 SOLUTION ORAL at 08:15

## 2020-08-08 RX ADMIN — HYDROCODONE BITARTRATE AND HOMATROPINE METHYLBROMIDE 5 ML: 5; 1.5 SOLUTION ORAL at 12:22

## 2020-08-08 RX ADMIN — ALBUTEROL SULFATE 1 PUFF: 108 INHALANT RESPIRATORY (INHALATION) at 04:00

## 2020-08-08 RX ADMIN — HYDROCODONE BITARTRATE AND HOMATROPINE METHYLBROMIDE 5 ML: 5; 1.5 SOLUTION ORAL at 16:57

## 2020-08-08 RX ADMIN — Medication 500 MG: at 22:08

## 2020-08-08 RX ADMIN — ALBUTEROL SULFATE 1 PUFF: 108 INHALANT RESPIRATORY (INHALATION) at 00:00

## 2020-08-08 RX ADMIN — ENOXAPARIN SODIUM 40 MG: 40 INJECTION SUBCUTANEOUS at 16:57

## 2020-08-08 RX ADMIN — Medication 500 MG: at 16:56

## 2020-08-08 RX ADMIN — ASPIRIN 81 MG: 81 TABLET, COATED ORAL at 08:06

## 2020-08-08 RX ADMIN — Medication 10 ML: at 22:08

## 2020-08-08 RX ADMIN — TIOTROPIUM BROMIDE INHALATION SPRAY 2 PUFF: 3.12 SPRAY, METERED RESPIRATORY (INHALATION) at 13:07

## 2020-08-08 RX ADMIN — Medication 10 ML: at 06:46

## 2020-08-08 RX ADMIN — Medication 500 MG: at 08:06

## 2020-08-08 NOTE — PROGRESS NOTES
Pt sitting up in chair. Pt complaints of cough and not feeling well. No distress noted at this time. Call light in reach, will monitor.

## 2020-08-08 NOTE — PROGRESS NOTES
Pt Blood sugar this morning was 81. Pt drank 2oz of orange juice this morning. Will continue to monitor.

## 2020-08-08 NOTE — PROGRESS NOTES
Name: Asad Zambrano MRN: [id number]  : 1945  Age:74 y.o.  female  Admit Date:  2020 LOS: 4      Hospitalist Progress Note    Asad Zambrano is a 75 y.o. female with medical history significant for intermittent moderate persistent asthma, diabetes type 2, hypertension, hyperlipidemia,Sjogren's syndrome / Positive sm/RNP antibody who presented to the ED with worsening shortness of breath, productive cough x 2-3days. She normally uses O2 2L at night time but now has been requiring during the day time due to SOB. CXR notable for worsening infiltrate in the right midlung zone and rapid COVID-19 test is positive. Patient was admitted due to acute respiratory failure with hypoxia due to COVID-19 infection. Subjective (20) :  Patient is seen and examined at bedside. No acute events reported overnight by nursing staff. Remains afebrile but continues to have cough with congestion despite mucinex/tessalon pearls/hycoden syrup. Chest pain/tightness has improved per patient. Appeared tired today as she has been trying to use the incentive spirometer. Currently on 3L O2 NC. FS of 80s this AM.    Patient denies fever, chills, chest pains, n/v, abdominal pain. Tolerating diet. ROS: 10 point review of systems is otherwise negative with the exception of the elements mentioned above.     Objective:    Patient Vitals for the past 24 hrs:   Temp Pulse Resp BP SpO2   20 0734 99.4 °F (37.4 °C) 78 18 113/66 92 %   20 0429     91 %   20 0311 98.2 °F (36.8 °C) 75 19 129/78 91 %   20 0006     96 %   20 2312 98.4 °F (36.9 °C) 61 19 113/75 94 %   20 2000     95 %   20 1945 98.5 °F (36.9 °C) 67 19 111/70 96 %   20 1530 98.7 °F (37.1 °C) 68 18 112/73 95 %   20 1122 98.3 °F (36.8 °C) 72 18 101/66 93 %     Oxygen Therapy  O2 Sat (%): 92 % (20 8434)  Pulse via Oximetry: 71 beats per minute (20 4362)  O2 Device: Nasal cannula (08/08/20 0429)  O2 Flow Rate (L/min): 3 l/min (08/08/20 0429)  FIO2 (%): 30 % (08/07/20 2000)    Estimated body mass index is 34.87 kg/m² as calculated from the following:    Height as of this encounter: 4' 11\" (1.499 m). Weight as of this encounter: 78.3 kg (172 lb 9.9 oz). Intake/Output Summary (Last 24 hours) at 8/8/2020 0743  Last data filed at 8/7/2020 1820  Gross per 24 hour   Intake 532 ml   Output    Net 532 ml       *Note that automatically entered I/Os may not be accurate; dependent on patient compliance with collection and accurate  by techs. Physical Exam:   General:                     alert, awake, no acute distress. Well nourished  Head:                          normocephalic, atraumatic  Eyes, Ears, nose:      PERRL, EOMI. Normal Conjunctiva. Neck:                          supple, non-tender. Trachea midline. Lungs:                        Decreased air entry, no wheezing  Cardiac:                      RRR, Normal S1 and S2. Abdomen:                  Soft, non distended, nontender, +BS  Extremities:               Warm, dry. No edema  Skin:                           No rashes, no jaundice  Neuro:             AAOx 3 .  No gross focal neurological deficit  Psychiatric:                No anxiety, calm, cooperative    Data Review:  I have reviewed all labs, meds, and studies from the last 24 hours:    Recent Results (from the past 24 hour(s))   GLUCOSE, POC    Collection Time: 08/07/20 11:08 AM   Result Value Ref Range    Glucose (POC) 94 65 - 100 mg/dL   GLUCOSE, POC    Collection Time: 08/07/20  3:26 PM   Result Value Ref Range    Glucose (POC) 178 (H) 65 - 100 mg/dL   GLUCOSE, POC    Collection Time: 08/07/20  8:51 PM   Result Value Ref Range    Glucose (POC) 178 (H) 65 - 143 mg/dL   METABOLIC PANEL, BASIC    Collection Time: 08/08/20  5:12 AM   Result Value Ref Range    Sodium 139 136 - 145 mmol/L    Potassium 4.2 3.5 - 5.1 mmol/L    Chloride 105 98 - 107 mmol/L    CO2 25 21 - 32 mmol/L    Anion gap 9 7 - 16 mmol/L    Glucose 70 65 - 100 mg/dL    BUN 16 8 - 23 MG/DL    Creatinine 0.73 0.6 - 1.0 MG/DL    GFR est AA >60 >60 ml/min/1.73m2    GFR est non-AA >60 >60 ml/min/1.73m2    Calcium 8.1 (L) 8.3 - 10.4 MG/DL   CBC WITH AUTOMATED DIFF    Collection Time: 08/08/20  5:12 AM   Result Value Ref Range    WBC 7.9 4.3 - 11.1 K/uL    RBC 4.78 4.05 - 5.2 M/uL    HGB 12.8 11.7 - 15.4 g/dL    HCT 41.3 35.8 - 46.3 %    MCV 86.4 79.6 - 97.8 FL    MCH 26.8 26.1 - 32.9 PG    MCHC 31.0 (L) 31.4 - 35.0 g/dL    RDW 13.7 11.9 - 14.6 %    PLATELET 571 371 - 762 K/uL    MPV 11.3 9.4 - 12.3 FL    ABSOLUTE NRBC 0.00 0.0 - 0.2 K/uL    DF AUTOMATED      NEUTROPHILS 82 (H) 43 - 78 %    LYMPHOCYTES 13 13 - 44 %    MONOCYTES 3 (L) 4.0 - 12.0 %    EOSINOPHILS 0 (L) 0.5 - 7.8 %    BASOPHILS 0 0.0 - 2.0 %    IMMATURE GRANULOCYTES 2 0.0 - 5.0 %    ABS. NEUTROPHILS 6.4 1.7 - 8.2 K/UL    ABS. LYMPHOCYTES 1.0 0.5 - 4.6 K/UL    ABS. MONOCYTES 0.3 0.1 - 1.3 K/UL    ABS. EOSINOPHILS 0.0 0.0 - 0.8 K/UL    ABS. BASOPHILS 0.0 0.0 - 0.2 K/UL    ABS. IMM.  GRANS. 0.1 0.0 - 0.5 K/UL   GLUCOSE, POC    Collection Time: 08/08/20  5:49 AM   Result Value Ref Range    Glucose (POC) 81 65 - 100 mg/dL        All Micro Results     Procedure Component Value Units Date/Time    CULTURE, RESPIRATORY/SPUTUM/BRONCH [employer] [[id number]] Collected:  08/05/20 0515    Order Status:  Completed Specimen:  Sputum Updated:  08/07/20 0819     Special Requests: NO SPECIAL REQUESTS        GRAM STAIN 0 TO 5 WBCS SEEN PER OIF      0 TO 1 EPITHELIAL CELLS SEEN PER OIF      MANY GRAM POSITIVE COCCI         FEW GRAM NEGATIVE RODS         FEW GRAM NEGATIVE COCCI         TRACE MUCUS PRESENT        Culture result:       MODERATE NORMAL RESPIRATORY SANDRA          CULTURE, BLOOD [[id number]] Collected:  08/04/20 0950    Order Status:  Completed Specimen:  Blood Updated:  08/07/20 0756     Special Requests: --        RIGHT  HAND       Culture result: NO GROWTH 3 DAYS       CULTURE, BLOOD [[id number]] Collected:  08/04/20 0951    Order Status:  Completed Specimen:  Blood Updated:  08/07/20 0756     Special Requests: --        LEFT  HAND       Culture result: NO GROWTH 3 DAYS       CULTURE, BLOOD [[id number]]     Order Status:  Canceled Specimen:  Blood     CULTURE, BLOOD [[id number]]     Order Status:  Canceled Specimen:  Blood           Current Meds:  Current Facility-Administered Medications   Medication Dose Route Frequency    insulin glargine (LANTUS) injection 20 Units  20 Units SubCUTAneous QHS    HYDROcodone-homatropine (HYCODAN) 5-1.5 mg/5 mL (5 mL) oral solution 5 mL  5 mL Oral Q4H PRN    enoxaparin (LOVENOX) injection 40 mg  0.5 mg/kg SubCUTAneous Q12H    tiotropium bromide (SPIRIVA RESPIMAT) 2.5 mcg /actuation  2 Puff Inhalation DAILY    guaiFENesin-dextromethorphan (ROBITUSSIN DM) 100-10 mg/5 mL syrup 10 mL  10 mL Oral Q6H PRN    albuterol (PROVENTIL HFA, VENTOLIN HFA, PROAIR HFA) inhaler 1 Puff  1 Puff Inhalation Q4H    guaiFENesin ER (MUCINEX) tablet 600 mg  600 mg Oral Q12H    benzonatate (TESSALON) capsule 100 mg  100 mg Oral TID PRN    sodium chloride (NS) flush 5-40 mL  5-40 mL IntraVENous Q8H    sodium chloride (NS) flush 5-40 mL  5-40 mL IntraVENous PRN    acetaminophen (TYLENOL) tablet 650 mg  650 mg Oral Q6H PRN    Or    acetaminophen (TYLENOL) suppository 650 mg  650 mg Rectal Q6H PRN    polyethylene glycol (MIRALAX) packet 17 g  17 g Oral DAILY PRN    promethazine (PHENERGAN) tablet 12.5 mg  12.5 mg Oral Q6H PRN    Or    ondansetron (ZOFRAN) injection 4 mg  4 mg IntraVENous Q6H PRN    insulin lispro (HUMALOG) injection   SubCUTAneous AC&HS    ascorbic acid (vitamin C) (VITAMIN C) tablet 500 mg  500 mg Oral TID    zinc sulfate tablet 220 mg  220 mg Oral DAILY    famotidine (PEPCID) tablet 20 mg  20 mg Oral DAILY    dexAMETHasone (DECADRON) tablet 6 mg  6 mg Oral DAILY    aspirin delayed-release tablet 81 mg  81 mg Oral DAILY  [Held by provider] amLODIPine (NORVASC) tablet 10 mg  10 mg Oral DAILY    folic acid (FOLVITE) tablet 1 mg  1 mg Oral DAILY    hydrOXYchloroQUINE (PLAQUENIL) tablet 200 mg  200 mg Oral BIDPC    [Held by provider] losartan (COZAAR) tablet 100 mg  100 mg Oral DAILY    montelukast (SINGULAIR) tablet 10 mg  10 mg Oral QHS    [Held by provider] nebivoloL (BYSTOLIC) tablet 5 mg  5 mg Oral DAILY    [Held by provider] hydroCHLOROthiazide (HYDRODIURIL) tablet 12.5 mg  12.5 mg Oral DAILY    0.9% sodium chloride infusion 250 mL  250 mL IntraVENous PRN         Other Studies:  Xr Chest Port    Result Date: 8/4/2020  CHEST X-RAY, one view. HISTORY:  Suspected COVID, results pending; cough. TECHNIQUE:  AP upright portable view. COMPARISON: August 2019     FINDINGS/IMPRESSION: Small focus of atelectasis or infiltrate right midlung zone. Mild interstitial prominence. Costophrenic angles are sharp. Heart and pulmonary vasculature is unremarkable. Assessment:    Active Hospital Problems    Diagnosis Date Noted    Acute respiratory failure with hypoxia ([facility] [address] 08/04/2020    COVID-19 virus infection 08/04/2020    Controlled type 2 diabetes mellitus without complication, with long-term current use of insulin ([facility] [address] 01/27/2017    Sjogren's syndrome ([facility] [address] 01/12/2017    Positive sm/RNP antibody 01/12/2017    Asthma 10/24/2016    GERD (gastroesophageal reflux disease) 07/23/2013    Hypertension 07/23/2013    Hypercholesterolemia 07/23/2013       Plan:    Acute Respiratory Failure due with hypoxia due to COVID-19  Sepsis due to COVID 19 Infection  COVID-19 Virus Infection  Met sepsis criteria on admission with tachycardia and hypoxia (88% on RA). CXR with possible data like this is worsening infiltrate in the right midlung zone. GINA-COV2 Rapid test (+) on 8/4. Convalescent plasma on 8/5.    BCx is NGTD and sputum culture is normal joceline  - O2 supplement and wean as tolerated  - zinc and vitamin C supplement  - dexamethasone 6mg PO daily   - DVT PPx with lovenox    Moderate Persistence Asthma  - O2 supplement as above. - nebs PRN    Elevated D-Dimer  Likely due to acute illness especially COVID19. No increase O2 requirement compared to prior days. Less likely PE/DVT given low Wells score     T2DM  - ISS  - hold home trulicity  - decrease lantus given hypoglycemia in the AM     Sjogren's Syndrome // Positive sm/RNP antibody: continue with home medications     Hypertension // HLD : continue with home medications     GERD: pepcid BID    Diet:  DIET NUTRITIONAL SUPPLEMENTS  DIET DIABETIC CONSISTENT CARB  DVT PPx:   Code: Full Code  Dispo: Home with Home PT  Estimated Discharge: likely 48hrs    Labs/Imaging Reviewed. Patient is moderate risk due to current condition and comorbid conditions as well as requiring frequent monitoring. Plan discussed with staff, patient/family and are in agreement. Time Spent: Greater than 45 minutes was spent in reviewing charts, physical exam, discussion with patient, and answered any questions.     Signed By: Kitty Tamez MD     August 8, 2020

## 2020-08-08 NOTE — PROGRESS NOTES
Pt is resting in bed at this time. Pt is on 2l NC. Pt is alert and oriented times 4. Pt has no complaints of pain at this time. Pt has no s/sx of distress at this time. Pt has call light within reach and is encouraged to call for assistance if needed.  Report given to [employer]

## 2020-08-08 NOTE — PROGRESS NOTES
Pt assisted back to bed. No s/sx of distress noted at this time. Pt on 3 L NC. Pt encouraged to call for assistance if needed call light in reach, will monitor.

## 2020-08-08 NOTE — PROGRESS NOTES
Pt sitting up in bed and reports cough is better at this time. She is on 3 LNC. No distress noted at this time. Call light in reach, will monitor.

## 2020-08-08 NOTE — PROGRESS NOTES
Problem: Falls - Risk of  Goal: *Absence of Falls  Description: Document Cuco Albrecht Fall Risk and appropriate interventions in the flowsheet.   Outcome: Progressing Towards Goal  Note: Fall Risk Interventions:  Mobility Interventions: Bed/chair exit alarm         Medication Interventions: Bed/chair exit alarm    Elimination Interventions: Call light in reach              Problem: Patient Education: Go to Patient Education Activity  Goal: Patient/Family Education  Outcome: Progressing Towards Goal

## 2020-08-08 NOTE — PROGRESS NOTES
Pt sitting up in bed. Pt alert oriented times 3 at this time. Pt complaints of cough with thick white sputum production. And chest pain when coughing. Pt reports not being able to get a deep breath. Pt on 3 L NC. Pt given incentive spirometer and demonstrated use. Pt coughs when attempting to use incentive spirometer. Pt on droplet plus precautions. Pt encouraged to call for assistance if needed call light in reach, will monitor.

## 2020-08-08 NOTE — PROGRESS NOTES
Pt is sleeping in bed at this time. Respirations greater than 10 at this time. Will continue to monitor.

## 2020-08-09 LAB
ANION GAP SERPL CALC-SCNC: 10 MMOL/L (ref 7–16)
ANION GAP SERPL CALC-SCNC: 6 MMOL/L (ref 7–16)
ATRIAL RATE: 80 BPM
BACTERIA SPEC CULT: NORMAL
BACTERIA SPEC CULT: NORMAL
BASOPHILS # BLD: 0.1 K/UL (ref 0–0.2)
BASOPHILS NFR BLD: 1 % (ref 0–2)
BUN SERPL-MCNC: 16 MG/DL (ref 8–23)
BUN SERPL-MCNC: 17 MG/DL (ref 8–23)
CALCIUM SERPL-MCNC: 8 MG/DL (ref 8.3–10.4)
CALCIUM SERPL-MCNC: 8.3 MG/DL (ref 8.3–10.4)
CALCULATED P AXIS, ECG09: 57 DEGREES
CALCULATED R AXIS, ECG10: -20 DEGREES
CALCULATED T AXIS, ECG11: 1 DEGREES
CHLORIDE SERPL-SCNC: 105 MMOL/L (ref 98–107)
CHLORIDE SERPL-SCNC: 106 MMOL/L (ref 98–107)
CO2 SERPL-SCNC: 21 MMOL/L (ref 21–32)
CO2 SERPL-SCNC: 27 MMOL/L (ref 21–32)
CREAT SERPL-MCNC: 0.77 MG/DL (ref 0.6–1)
CREAT SERPL-MCNC: 0.83 MG/DL (ref 0.6–1)
CRP SERPL-MCNC: 1.8 MG/DL (ref 0–0.9)
D DIMER PPP FEU-MCNC: 0.44 UG/ML(FEU)
DIAGNOSIS, 93000: NORMAL
DIFFERENTIAL METHOD BLD: ABNORMAL
EOSINOPHIL # BLD: 0 K/UL (ref 0–0.8)
EOSINOPHIL NFR BLD: 0 % (ref 0.5–7.8)
ERYTHROCYTE [DISTWIDTH] IN BLOOD BY AUTOMATED COUNT: 13.8 % (ref 11.9–14.6)
GLUCOSE BLD STRIP.AUTO-MCNC: 219 MG/DL (ref 65–100)
GLUCOSE BLD STRIP.AUTO-MCNC: 220 MG/DL (ref 65–100)
GLUCOSE BLD STRIP.AUTO-MCNC: 82 MG/DL (ref 65–100)
GLUCOSE BLD STRIP.AUTO-MCNC: 90 MG/DL (ref 65–100)
GLUCOSE SERPL-MCNC: 74 MG/DL (ref 65–100)
GLUCOSE SERPL-MCNC: 76 MG/DL (ref 65–100)
HCT VFR BLD AUTO: 43.7 % (ref 35.8–46.3)
HGB BLD-MCNC: 13.5 G/DL (ref 11.7–15.4)
IMM GRANULOCYTES # BLD AUTO: 0.3 K/UL (ref 0–0.5)
IMM GRANULOCYTES NFR BLD AUTO: 4 % (ref 0–5)
LDH SERPL L TO P-CCNC: 281 U/L (ref 110–210)
LYMPHOCYTES # BLD: 1.3 K/UL (ref 0.5–4.6)
LYMPHOCYTES NFR BLD: 19 % (ref 13–44)
MCH RBC QN AUTO: 26.7 PG (ref 26.1–32.9)
MCHC RBC AUTO-ENTMCNC: 30.9 G/DL (ref 31.4–35)
MCV RBC AUTO: 86.4 FL (ref 79.6–97.8)
MONOCYTES # BLD: 0.3 K/UL (ref 0.1–1.3)
MONOCYTES NFR BLD: 5 % (ref 4–12)
NEUTS SEG # BLD: 4.9 K/UL (ref 1.7–8.2)
NEUTS SEG NFR BLD: 71 % (ref 43–78)
NRBC # BLD: 0 K/UL (ref 0–0.2)
P-R INTERVAL, ECG05: 158 MS
PLATELET # BLD AUTO: 345 K/UL (ref 150–450)
PLATELET COMMENTS,PCOM: ADEQUATE
PMV BLD AUTO: 11.1 FL (ref 9.4–12.3)
POTASSIUM SERPL-SCNC: 4 MMOL/L (ref 3.5–5.1)
POTASSIUM SERPL-SCNC: 6.4 MMOL/L (ref 3.5–5.1)
Q-T INTERVAL, ECG07: 396 MS
QRS DURATION, ECG06: 74 MS
QTC CALCULATION (BEZET), ECG08: 456 MS
RBC # BLD AUTO: 5.06 M/UL (ref 4.05–5.2)
RBC MORPH BLD: ABNORMAL
SERVICE CMNT-IMP: NORMAL
SERVICE CMNT-IMP: NORMAL
SODIUM SERPL-SCNC: 136 MMOL/L (ref 136–145)
SODIUM SERPL-SCNC: 139 MMOL/L (ref 136–145)
TROPONIN-HIGH SENSITIVITY: 5.1 PG/ML (ref 0–14)
VENTRICULAR RATE, ECG03: 80 BPM
WBC # BLD AUTO: 6.9 K/UL (ref 4.3–11.1)
WBC MORPH BLD: ABNORMAL

## 2020-08-09 PROCEDURE — 74011250636 HC RX REV CODE- 250/636: Performed by: INTERNAL MEDICINE

## 2020-08-09 PROCEDURE — 83615 LACTATE (LD) (LDH) ENZYME: CPT

## 2020-08-09 PROCEDURE — 85379 FIBRIN DEGRADATION QUANT: CPT

## 2020-08-09 PROCEDURE — 84484 ASSAY OF TROPONIN QUANT: CPT

## 2020-08-09 PROCEDURE — 93005 ELECTROCARDIOGRAM TRACING: CPT | Performed by: INTERNAL MEDICINE

## 2020-08-09 PROCEDURE — 94640 AIRWAY INHALATION TREATMENT: CPT

## 2020-08-09 PROCEDURE — 74011636637 HC RX REV CODE- 636/637: Performed by: INTERNAL MEDICINE

## 2020-08-09 PROCEDURE — 86140 C-REACTIVE PROTEIN: CPT

## 2020-08-09 PROCEDURE — 65270000029 HC RM PRIVATE

## 2020-08-09 PROCEDURE — 74011250637 HC RX REV CODE- 250/637: Performed by: INTERNAL MEDICINE

## 2020-08-09 PROCEDURE — 94760 N-INVAS EAR/PLS OXIMETRY 1: CPT

## 2020-08-09 PROCEDURE — 85025 COMPLETE CBC W/AUTO DIFF WBC: CPT

## 2020-08-09 PROCEDURE — 80048 BASIC METABOLIC PNL TOTAL CA: CPT

## 2020-08-09 PROCEDURE — 77010033678 HC OXYGEN DAILY

## 2020-08-09 PROCEDURE — 36415 COLL VENOUS BLD VENIPUNCTURE: CPT

## 2020-08-09 PROCEDURE — 82962 GLUCOSE BLOOD TEST: CPT

## 2020-08-09 RX ORDER — GUAIFENESIN/DEXTROMETHORPHAN 100-10MG/5
10 SYRUP ORAL EVERY 6 HOURS
Status: DISPENSED | OUTPATIENT
Start: 2020-08-09 | End: 2020-08-11

## 2020-08-09 RX ORDER — ACETAMINOPHEN 325 MG/1
650 TABLET ORAL 3 TIMES DAILY
Status: COMPLETED | OUTPATIENT
Start: 2020-08-09 | End: 2020-08-10

## 2020-08-09 RX ADMIN — ALBUTEROL SULFATE 1 PUFF: 108 INHALANT RESPIRATORY (INHALATION) at 08:00

## 2020-08-09 RX ADMIN — MONTELUKAST 10 MG: 10 TABLET, FILM COATED ORAL at 21:07

## 2020-08-09 RX ADMIN — INSULIN LISPRO 4 UNITS: 100 INJECTION, SOLUTION INTRAVENOUS; SUBCUTANEOUS at 16:30

## 2020-08-09 RX ADMIN — Medication 10 ML: at 05:51

## 2020-08-09 RX ADMIN — GUAIFENESIN AND DEXTROMETHORPHAN 10 ML: 100; 10 SYRUP ORAL at 17:01

## 2020-08-09 RX ADMIN — Medication 220 MG: at 09:27

## 2020-08-09 RX ADMIN — ACETAMINOPHEN 650 MG: 325 TABLET, FILM COATED ORAL at 09:26

## 2020-08-09 RX ADMIN — ACETAMINOPHEN 650 MG: 325 TABLET, FILM COATED ORAL at 16:34

## 2020-08-09 RX ADMIN — DEXAMETHASONE 6 MG: 4 TABLET ORAL at 09:26

## 2020-08-09 RX ADMIN — HYDROXYCHLOROQUINE SULFATE 200 MG: 200 TABLET ORAL at 16:34

## 2020-08-09 RX ADMIN — Medication 10 ML: at 16:33

## 2020-08-09 RX ADMIN — ACETAMINOPHEN 650 MG: 325 TABLET, FILM COATED ORAL at 21:07

## 2020-08-09 RX ADMIN — ALBUTEROL SULFATE 1 PUFF: 108 INHALANT RESPIRATORY (INHALATION) at 21:17

## 2020-08-09 RX ADMIN — Medication 10 ML: at 21:07

## 2020-08-09 RX ADMIN — TIOTROPIUM BROMIDE INHALATION SPRAY 2 PUFF: 3.12 SPRAY, METERED RESPIRATORY (INHALATION) at 08:00

## 2020-08-09 RX ADMIN — ENOXAPARIN SODIUM 40 MG: 40 INJECTION SUBCUTANEOUS at 17:01

## 2020-08-09 RX ADMIN — Medication 500 MG: at 16:34

## 2020-08-09 RX ADMIN — ALBUTEROL SULFATE 1 PUFF: 108 INHALANT RESPIRATORY (INHALATION) at 13:08

## 2020-08-09 RX ADMIN — GUAIFENESIN AND DEXTROMETHORPHAN 10 ML: 100; 10 SYRUP ORAL at 12:35

## 2020-08-09 RX ADMIN — ENOXAPARIN SODIUM 40 MG: 40 INJECTION SUBCUTANEOUS at 05:51

## 2020-08-09 RX ADMIN — INSULIN GLARGINE 20 UNITS: 100 INJECTION, SOLUTION SUBCUTANEOUS at 21:07

## 2020-08-09 RX ADMIN — Medication 500 MG: at 21:07

## 2020-08-09 RX ADMIN — ALBUTEROL SULFATE 1 PUFF: 108 INHALANT RESPIRATORY (INHALATION) at 16:00

## 2020-08-09 RX ADMIN — FAMOTIDINE 20 MG: 20 TABLET, FILM COATED ORAL at 09:26

## 2020-08-09 RX ADMIN — ALBUTEROL SULFATE 1 PUFF: 108 INHALANT RESPIRATORY (INHALATION) at 03:41

## 2020-08-09 RX ADMIN — Medication 500 MG: at 09:26

## 2020-08-09 RX ADMIN — INSULIN LISPRO 4 UNITS: 100 INJECTION, SOLUTION INTRAVENOUS; SUBCUTANEOUS at 21:07

## 2020-08-09 RX ADMIN — HYDROCODONE BITARTRATE AND HOMATROPINE METHYLBROMIDE 5 ML: 5; 1.5 SOLUTION ORAL at 09:25

## 2020-08-09 RX ADMIN — ALBUTEROL SULFATE 1 PUFF: 108 INHALANT RESPIRATORY (INHALATION) at 00:59

## 2020-08-09 RX ADMIN — ASPIRIN 81 MG: 81 TABLET, COATED ORAL at 09:27

## 2020-08-09 RX ADMIN — HYDROXYCHLOROQUINE SULFATE 200 MG: 200 TABLET ORAL at 09:27

## 2020-08-09 RX ADMIN — FOLIC ACID 1 MG: 1 TABLET ORAL at 09:26

## 2020-08-09 NOTE — PROGRESS NOTES
Name: Leena Santacruz MRN: [id number]  : 1945  Age:74 y.o.  female  Admit Date:  2020 LOS: 5      Hospitalist Progress Note    Leena Santacruz is a 75 y.o. female with medical history significant for intermittent moderate persistent asthma, diabetes type 2, hypertension, hyperlipidemia,Sjogren's syndrome / Positive sm/RNP antibody who presented to the ED with worsening shortness of breath, productive cough x 2-3days. She normally uses O2 2L at night time but now has been requiring during the day time due to SOB. CXR notable for worsening infiltrate in the right midlung zone and rapid COVID-19 test is positive. Patient was admitted due to acute respiratory failure with hypoxia due to COVID-19 infection. Subjective (20) :  Patient is seen and examined at bedside. No acute events reported overnight by nursing staff. Planes of chest tightness this morning. Reports tightness is worse with deep inspiration and coughing. Continues to have cough but having less sputum production. Appears tired due to coughing spells. Currently on 4L NC due to low saturations on 3L NC. Patient denies fever, chills, chest pains, n/v, abdominal pain. Tolerating diet. ROS: 10 point review of systems is otherwise negative with the exception of the elements mentioned above.     Objective:    Patient Vitals for the past 24 hrs:   Temp Pulse Resp BP SpO2   20 1116 98.2 °F (36.8 °C) 66 18 124/77 92 %   20 0800     94 %   20 0729 98.4 °F (36.9 °C) 85 22 138/65 91 %   20 0408 98.2 °F (36.8 °C) 75 19 113/76 91 %   20 0341     90 %   20 0059     91 %   20 2349 98.1 °F (36.7 °C) 66 19 101/69 92 %   20 2038 98.2 °F (36.8 °C) 80 20 92/58 91 %   20 1942     90 %   20 1549 98 °F (36.7 °C) 71 17 106/63 95 %   08/08/20 1447     97 %     Oxygen Therapy  O2 Sat (%): 92 % (20 1116)  Pulse via Oximetry: 65 beats per minute (08/09/20 0800)  O2 Device: Nasal cannula (08/09/20 0800)  O2 Flow Rate (L/min): 4 l/min (08/09/20 0800)  FIO2 (%): 30 % (08/07/20 2000)    Estimated body mass index is 34.87 kg/m² as calculated from the following:    Height as of this encounter: 4' 11\" (1.499 m). Weight as of this encounter: 78.3 kg (172 lb 9.9 oz). Intake/Output Summary (Last 24 hours) at 8/9/2020 1259  Last data filed at 8/9/2020 0956  Gross per 24 hour   Intake 480 ml   Output    Net 480 ml       *Note that automatically entered I/Os may not be accurate; dependent on patient compliance with collection and accurate  by techs. Physical Exam:   General:                     alert, awake, no acute distress. Well nourished  Head:                          normocephalic, atraumatic  Eyes, Ears, nose:      PERRL, EOMI. Normal Conjunctiva. Neck:                          supple, non-tender. Trachea midline. Lungs:                        Decreased air entry, no wheezing  Cardiac:                      RRR, Normal S1 and S2. Abdomen:                  Soft, non distended, nontender, +BS  Extremities:               Warm, dry. No edema  Skin:                           No rashes, no jaundice  Neuro:             AAOx 3 .  No gross focal neurological deficit  Psychiatric:                No anxiety, calm, cooperative    Data Review:  I have reviewed all labs, meds, and studies from the last 24 hours:    Recent Results (from the past 24 hour(s))   GLUCOSE, POC    Collection Time: 08/08/20  4:20 PM   Result Value Ref Range    Glucose (POC) 139 (H) 65 - 100 mg/dL   GLUCOSE, POC    Collection Time: 08/08/20  8:48 PM   Result Value Ref Range    Glucose (POC) 171 (H) 65 - 100 mg/dL   CBC WITH AUTOMATED DIFF    Collection Time: 08/09/20  3:30 AM   Result Value Ref Range    WBC 6.9 4.3 - 11.1 K/uL    RBC 5.06 4.05 - 5.2 M/uL    HGB 13.5 11.7 - 15.4 g/dL    HCT 43.7 35.8 - 46.3 %    MCV 86.4 79.6 - 97.8 FL    MCH 26.7 26.1 - 32.9 PG    MCHC 30.9 (L) 31.4 - 35.0 g/dL    RDW 13.8 11.9 - 14.6 %    PLATELET 980 649 - 188 K/uL    MPV 11.1 9.4 - 12.3 FL    ABSOLUTE NRBC 0.00 0.0 - 0.2 K/uL    NEUTROPHILS 71 43 - 78 %    LYMPHOCYTES 19 13 - 44 %    MONOCYTES 5 4.0 - 12.0 %    EOSINOPHILS 0 (L) 0.5 - 7.8 %    BASOPHILS 1 0.0 - 2.0 %    IMMATURE GRANULOCYTES 4 0.0 - 5.0 %    ABS. NEUTROPHILS 4.9 1.7 - 8.2 K/UL    ABS. LYMPHOCYTES 1.3 0.5 - 4.6 K/UL    ABS. MONOCYTES 0.3 0.1 - 1.3 K/UL    ABS. EOSINOPHILS 0.0 0.0 - 0.8 K/UL    ABS. BASOPHILS 0.1 0.0 - 0.2 K/UL    ABS. IMM.  GRANS. 0.3 0.0 - 0.5 K/UL    RBC COMMENTS HYPOCHROMIA      RBC COMMENTS MICROCYTOSIS      RBC COMMENTS SLIGHT  ANISOCYTOSIS + POIKILOCYTOSIS        WBC COMMENTS Result Confirmed By Smear      PLATELET COMMENTS ADEQUATE      DF AUTOMATED     METABOLIC PANEL, BASIC    Collection Time: 08/09/20  3:30 AM   Result Value Ref Range    Sodium 136 136 - 145 mmol/L    Potassium 6.4 (HH) 3.5 - 5.1 mmol/L    Chloride 105 98 - 107 mmol/L    CO2 21 21 - 32 mmol/L    Anion gap 10 7 - 16 mmol/L    Glucose 74 65 - 100 mg/dL    BUN 17 8 - 23 MG/DL    Creatinine 0.83 0.6 - 1.0 MG/DL    GFR est AA >60 >60 ml/min/1.73m2    GFR est non-AA >60 >60 ml/min/1.73m2    Calcium 8.0 (L) 8.3 - 10.4 MG/DL   GLUCOSE, POC    Collection Time: 08/09/20  5:59 AM   Result Value Ref Range    Glucose (POC) 82 65 - 100 mg/dL   TROPONIN-HIGH SENSITIVITY    Collection Time: 08/09/20  8:10 AM   Result Value Ref Range    Troponin-High Sensitivity 5.1 0 - 14 pg/mL   D DIMER    Collection Time: 08/09/20  8:10 AM   Result Value Ref Range    D DIMER 0.44 <0.56 ug/ml(FEU)   C REACTIVE PROTEIN, QT    Collection Time: 08/09/20  8:10 AM   Result Value Ref Range    C-Reactive protein 1.8 (H) 0.0 - 0.9 mg/dL   LD    Collection Time: 08/09/20  8:10 AM   Result Value Ref Range     (H) 110 - 160 U/L   METABOLIC PANEL, BASIC    Collection Time: 08/09/20  8:10 AM   Result Value Ref Range    Sodium 139 136 - 145 mmol/L    Potassium 4.0 3.5 - 5.1 mmol/L Chloride 106 98 - 107 mmol/L    CO2 27 21 - 32 mmol/L    Anion gap 6 (L) 7 - 16 mmol/L    Glucose 76 65 - 100 mg/dL    BUN 16 8 - 23 MG/DL    Creatinine 0.77 0.6 - 1.0 MG/DL    GFR est AA >60 >60 ml/min/1.73m2    GFR est non-AA >60 >60 ml/min/1.73m2    Calcium 8.3 8.3 - 10.4 MG/DL   GLUCOSE, POC    Collection Time: 08/09/20 11:40 AM   Result Value Ref Range    Glucose (POC) 90 65 - 100 mg/dL        All Micro Results     Procedure Component Value Units Date/Time    CULTURE, BLOOD [[id number]] Collected:  08/04/20 0951    Order Status:  Completed Specimen:  Blood Updated:  08/09/20 0633     Special Requests: --        LEFT  HAND       Culture result: NO GROWTH 5 DAYS       CULTURE, BLOOD [[id number]] Collected:  08/04/20 0950    Order Status:  Completed Specimen:  Blood Updated:  08/09/20 0633     Special Requests: --        RIGHT  HAND       Culture result: NO GROWTH 5 DAYS       CULTURE, RESPIRATORY/SPUTUM/BRONCH Verlie Nam STAIN [[id number]] Collected:  08/05/20 0515    Order Status:  Completed Specimen:  Sputum Updated:  08/07/20 0819     Special Requests: NO SPECIAL REQUESTS        GRAM STAIN 0 TO 5 WBCS SEEN PER OIF      0 TO 1 EPITHELIAL CELLS SEEN PER OIF      MANY GRAM POSITIVE COCCI         FEW GRAM NEGATIVE RODS         FEW GRAM NEGATIVE COCCI         TRACE MUCUS PRESENT        Culture result:       MODERATE NORMAL RESPIRATORY SANDRA          CULTURE, BLOOD [[id number]]     Order Status:  Canceled Specimen:  Blood     CULTURE, BLOOD [[id number]]     Order Status:  Canceled Specimen:  Blood           Current Meds:  Current Facility-Administered Medications   Medication Dose Route Frequency    guaiFENesin-dextromethorphan (ROBITUSSIN DM) 100-10 mg/5 mL syrup 10 mL  10 mL Oral Q6H    insulin glargine (LANTUS) injection 20 Units  20 Units SubCUTAneous QHS    HYDROcodone-homatropine (HYCODAN) 5-1.5 mg/5 mL (5 mL) oral solution 5 mL  5 mL Oral Q4H PRN    enoxaparin (LOVENOX) injection 40 mg  0.5 mg/kg SubCUTAneous Q12H  tiotropium bromide (SPIRIVA RESPIMAT) 2.5 mcg /actuation  2 Puff Inhalation DAILY    albuterol (PROVENTIL HFA, VENTOLIN HFA, PROAIR HFA) inhaler 1 Puff  1 Puff Inhalation Q4H    benzonatate (TESSALON) capsule 100 mg  100 mg Oral TID PRN    sodium chloride (NS) flush 5-40 mL  5-40 mL IntraVENous Q8H    sodium chloride (NS) flush 5-40 mL  5-40 mL IntraVENous PRN    acetaminophen (TYLENOL) tablet 650 mg  650 mg Oral Q6H PRN    Or    acetaminophen (TYLENOL) suppository 650 mg  650 mg Rectal Q6H PRN    polyethylene glycol (MIRALAX) packet 17 g  17 g Oral DAILY PRN    promethazine (PHENERGAN) tablet 12.5 mg  12.5 mg Oral Q6H PRN    Or    ondansetron (ZOFRAN) injection 4 mg  4 mg IntraVENous Q6H PRN    insulin lispro (HUMALOG) injection   SubCUTAneous AC&HS    ascorbic acid (vitamin C) (VITAMIN C) tablet 500 mg  500 mg Oral TID    zinc sulfate tablet 220 mg  220 mg Oral DAILY    famotidine (PEPCID) tablet 20 mg  20 mg Oral DAILY    dexAMETHasone (DECADRON) tablet 6 mg  6 mg Oral DAILY    aspirin delayed-release tablet 81 mg  81 mg Oral DAILY    [Held by provider] amLODIPine (NORVASC) tablet 10 mg  10 mg Oral DAILY    folic acid (FOLVITE) tablet 1 mg  1 mg Oral DAILY    hydrOXYchloroQUINE (PLAQUENIL) tablet 200 mg  200 mg Oral BIDPC    [Held by provider] losartan (COZAAR) tablet 100 mg  100 mg Oral DAILY    montelukast (SINGULAIR) tablet 10 mg  10 mg Oral QHS    [Held by provider] nebivoloL (BYSTOLIC) tablet 5 mg  5 mg Oral DAILY    [Held by provider] hydroCHLOROthiazide (HYDRODIURIL) tablet 12.5 mg  12.5 mg Oral DAILY    0.9% sodium chloride infusion 250 mL  250 mL IntraVENous PRN         Other Studies:  Xr Chest Port    Result Date: 8/4/2020  CHEST X-RAY, one view. HISTORY:  Suspected COVID, results pending; cough. TECHNIQUE:  AP upright portable view. COMPARISON: August 2019     FINDINGS/IMPRESSION: Small focus of atelectasis or infiltrate right midlung zone. Mild interstitial prominence. Costophrenic angles are sharp. Heart and pulmonary vasculature is unremarkable. Assessment:    Active Hospital Problems    Diagnosis Date Noted    Acute respiratory failure with hypoxia ([facility] [address] 08/04/2020    COVID-19 virus infection 08/04/2020    Controlled type 2 diabetes mellitus without complication, with long-term current use of insulin ([facility] [address] 01/27/2017    Sjogren's syndrome ([facility] [address] 01/12/2017    Positive sm/RNP antibody 01/12/2017    Asthma 10/24/2016    GERD (gastroesophageal reflux disease) 07/23/2013    Hypertension 07/23/2013    Hypercholesterolemia 07/23/2013       Plan:    Acute Respiratory Failure due with hypoxia due to COVID-19  Sepsis due to COVID 19 Infection  COVID-19 Virus Infection  Met sepsis criteria on admission with tachycardia and hypoxia (88% on RA). CXR with possible data like this is worsening infiltrate in the right midlung zone. GINA-COV2 Rapid test (+) on 8/4. Convalescent plasma on 8/5. BCx is NGTD and sputum culture is normal joceline  - O2 supplement and wean as tolerated  - zinc and vitamin C supplement  - dexamethasone 6mg PO daily   - DVT PPx with lovenox  - tylenol for costochondritis     Moderate Persistence Asthma  - O2 supplement as above. - nebs PRN    Elevated D-Dimer  Likely due to acute illness especially COVID19. No increase O2 requirement compared to prior days. Less likely PE/DVT given low Wells score     T2DM  - ISS  - hold home trulicity  - decrease lantus given hypoglycemia in the AM     Sjogren's Syndrome // Positive sm/RNP antibody: continue with home medications     Hypertension // HLD : continue with home medications     GERD: pepcid BID    Diet:  DIET NUTRITIONAL SUPPLEMENTS  DIET DIABETIC CONSISTENT CARB  DVT PPx: lovenox  Code: Full Code  Dispo: Home with Home PT  Estimated Discharge: likely 48hrs    Labs/Imaging Reviewed.    Patient is moderate risk due to current condition and comorbid conditions as well as requiring frequent monitoring. Plan discussed with staff, patient/family and are in agreement. Time Spent: Greater than 45 minutes was spent in reviewing charts, physical exam, discussion with patient, and answered any questions.     Signed By: Selwyn Young MD     August 9, 2020

## 2020-08-09 NOTE — PROGRESS NOTES
Patient states she feels aweful   Says feels slightly short of breath  Does appear dyspneic  Oxygen sat is 89% on 2l  Increased to 4L to get sat at 91%  Says has pain in chest  She thinks the pain is from her lungs

## 2020-08-09 NOTE — PROGRESS NOTES
Patient given new cup of warm soup  Appetite is good  Oxygen at 4l   She says she coughed up just a tiny bit of blood   Instructed her to save sputum in her kleenex for nurse to observe

## 2020-08-09 NOTE — PROGRESS NOTES
Patient resting in bed, alert and oriented, cooperative with care. Patient on 3 liters of Oxygen via NC.  patient denies pain or distress, safety measures in place, call light within reach.

## 2020-08-09 NOTE — PROGRESS NOTES
Tylenol 2 tabs po for discomfort in chest  She relates pain to coughing  She rates pain a 4 on 0-10 scale

## 2020-08-09 NOTE — PROGRESS NOTES
called this nurse and said that this patient blood that was send tot he lab was hemolyzed and that is why this patient potassium was high and he needs his morning labs to be done again.

## 2020-08-09 NOTE — PROGRESS NOTES
SBAR received from hospitals. Pt resting in bed. Respirations even and unlabored. No s/sx of distress noted. Call light within reach, safety measures in place.

## 2020-08-10 ENCOUNTER — HOME HEALTH ADMISSION (OUTPATIENT)
Dept: HOME HEALTH SERVICES | Facility: HOME HEALTH | Age: 75
End: 2020-08-10
Payer: MEDICARE

## 2020-08-10 LAB
ANION GAP SERPL CALC-SCNC: 10 MMOL/L (ref 7–16)
BASOPHILS # BLD: 0 K/UL (ref 0–0.2)
BASOPHILS NFR BLD: 0 % (ref 0–2)
BUN SERPL-MCNC: 18 MG/DL (ref 8–23)
CALCIUM SERPL-MCNC: 8.3 MG/DL (ref 8.3–10.4)
CHLORIDE SERPL-SCNC: 106 MMOL/L (ref 98–107)
CO2 SERPL-SCNC: 25 MMOL/L (ref 21–32)
CREAT SERPL-MCNC: 0.67 MG/DL (ref 0.6–1)
DIFFERENTIAL METHOD BLD: ABNORMAL
EOSINOPHIL # BLD: 0 K/UL (ref 0–0.8)
EOSINOPHIL NFR BLD: 0 % (ref 0.5–7.8)
ERYTHROCYTE [DISTWIDTH] IN BLOOD BY AUTOMATED COUNT: 13.6 % (ref 11.9–14.6)
EST. AVERAGE GLUCOSE BLD GHB EST-MCNC: 186 MG/DL
GLUCOSE BLD STRIP.AUTO-MCNC: 110 MG/DL (ref 65–100)
GLUCOSE BLD STRIP.AUTO-MCNC: 118 MG/DL (ref 65–100)
GLUCOSE BLD STRIP.AUTO-MCNC: 118 MG/DL (ref 65–100)
GLUCOSE BLD STRIP.AUTO-MCNC: 139 MG/DL (ref 65–100)
GLUCOSE BLD STRIP.AUTO-MCNC: 163 MG/DL (ref 65–100)
GLUCOSE BLD STRIP.AUTO-MCNC: 171 MG/DL (ref 65–100)
GLUCOSE BLD STRIP.AUTO-MCNC: 178 MG/DL (ref 65–100)
GLUCOSE BLD STRIP.AUTO-MCNC: 178 MG/DL (ref 65–100)
GLUCOSE BLD STRIP.AUTO-MCNC: 210 MG/DL (ref 65–100)
GLUCOSE BLD STRIP.AUTO-MCNC: 219 MG/DL (ref 65–100)
GLUCOSE BLD STRIP.AUTO-MCNC: 220 MG/DL (ref 65–100)
GLUCOSE BLD STRIP.AUTO-MCNC: 81 MG/DL (ref 65–100)
GLUCOSE BLD STRIP.AUTO-MCNC: 82 MG/DL (ref 65–100)
GLUCOSE BLD STRIP.AUTO-MCNC: 86 MG/DL (ref 65–100)
GLUCOSE BLD STRIP.AUTO-MCNC: 90 MG/DL (ref 65–100)
GLUCOSE SERPL-MCNC: 109 MG/DL (ref 65–100)
HBA1C MFR BLD: 8.1 % (ref 4.8–6)
HCT VFR BLD AUTO: 41.2 % (ref 35.8–46.3)
HGB BLD-MCNC: 12.9 G/DL (ref 11.7–15.4)
IMM GRANULOCYTES # BLD AUTO: 0.3 K/UL (ref 0–0.5)
IMM GRANULOCYTES NFR BLD AUTO: 5 % (ref 0–5)
LYMPHOCYTES # BLD: 1.3 K/UL (ref 0.5–4.6)
LYMPHOCYTES NFR BLD: 20 % (ref 13–44)
MCH RBC QN AUTO: 27 PG (ref 26.1–32.9)
MCHC RBC AUTO-ENTMCNC: 31.3 G/DL (ref 31.4–35)
MCV RBC AUTO: 86.2 FL (ref 79.6–97.8)
MONOCYTES # BLD: 0.4 K/UL (ref 0.1–1.3)
MONOCYTES NFR BLD: 6 % (ref 4–12)
NEUTS SEG # BLD: 4.4 K/UL (ref 1.7–8.2)
NEUTS SEG NFR BLD: 68 % (ref 43–78)
NRBC # BLD: 0 K/UL (ref 0–0.2)
PLATELET # BLD AUTO: 333 K/UL (ref 150–450)
PMV BLD AUTO: 11.4 FL (ref 9.4–12.3)
POTASSIUM SERPL-SCNC: 4.2 MMOL/L (ref 3.5–5.1)
RBC # BLD AUTO: 4.78 M/UL (ref 4.05–5.2)
SODIUM SERPL-SCNC: 141 MMOL/L (ref 136–145)
WBC # BLD AUTO: 6.5 K/UL (ref 4.3–11.1)

## 2020-08-10 PROCEDURE — 74011636637 HC RX REV CODE- 636/637: Performed by: INTERNAL MEDICINE

## 2020-08-10 PROCEDURE — 94640 AIRWAY INHALATION TREATMENT: CPT

## 2020-08-10 PROCEDURE — 85025 COMPLETE CBC W/AUTO DIFF WBC: CPT

## 2020-08-10 PROCEDURE — 80048 BASIC METABOLIC PNL TOTAL CA: CPT

## 2020-08-10 PROCEDURE — 65270000029 HC RM PRIVATE

## 2020-08-10 PROCEDURE — 94761 N-INVAS EAR/PLS OXIMETRY MLT: CPT

## 2020-08-10 PROCEDURE — 83036 HEMOGLOBIN GLYCOSYLATED A1C: CPT

## 2020-08-10 PROCEDURE — 74011250636 HC RX REV CODE- 250/636: Performed by: INTERNAL MEDICINE

## 2020-08-10 PROCEDURE — 94760 N-INVAS EAR/PLS OXIMETRY 1: CPT

## 2020-08-10 PROCEDURE — 77010033678 HC OXYGEN DAILY

## 2020-08-10 PROCEDURE — 36415 COLL VENOUS BLD VENIPUNCTURE: CPT

## 2020-08-10 PROCEDURE — 74011250637 HC RX REV CODE- 250/637: Performed by: INTERNAL MEDICINE

## 2020-08-10 RX ADMIN — MONTELUKAST 10 MG: 10 TABLET, FILM COATED ORAL at 21:30

## 2020-08-10 RX ADMIN — ALBUTEROL SULFATE 1 PUFF: 108 INHALANT RESPIRATORY (INHALATION) at 11:20

## 2020-08-10 RX ADMIN — GUAIFENESIN AND DEXTROMETHORPHAN 10 ML: 100; 10 SYRUP ORAL at 16:54

## 2020-08-10 RX ADMIN — FOLIC ACID 1 MG: 1 TABLET ORAL at 08:42

## 2020-08-10 RX ADMIN — ALBUTEROL SULFATE 1 PUFF: 108 INHALANT RESPIRATORY (INHALATION) at 14:11

## 2020-08-10 RX ADMIN — ENOXAPARIN SODIUM 40 MG: 40 INJECTION SUBCUTANEOUS at 06:05

## 2020-08-10 RX ADMIN — DEXAMETHASONE 6 MG: 4 TABLET ORAL at 08:42

## 2020-08-10 RX ADMIN — GUAIFENESIN AND DEXTROMETHORPHAN 10 ML: 100; 10 SYRUP ORAL at 06:06

## 2020-08-10 RX ADMIN — ACETAMINOPHEN 650 MG: 325 TABLET, FILM COATED ORAL at 08:42

## 2020-08-10 RX ADMIN — FAMOTIDINE 20 MG: 20 TABLET, FILM COATED ORAL at 08:42

## 2020-08-10 RX ADMIN — Medication 500 MG: at 08:42

## 2020-08-10 RX ADMIN — Medication 500 MG: at 16:54

## 2020-08-10 RX ADMIN — Medication 500 MG: at 21:30

## 2020-08-10 RX ADMIN — INSULIN GLARGINE 20 UNITS: 100 INJECTION, SOLUTION SUBCUTANEOUS at 21:30

## 2020-08-10 RX ADMIN — Medication 10 ML: at 21:30

## 2020-08-10 RX ADMIN — ALBUTEROL SULFATE 1 PUFF: 108 INHALANT RESPIRATORY (INHALATION) at 04:04

## 2020-08-10 RX ADMIN — ASPIRIN 81 MG: 81 TABLET, COATED ORAL at 08:42

## 2020-08-10 RX ADMIN — ALBUTEROL SULFATE 1 PUFF: 108 INHALANT RESPIRATORY (INHALATION) at 07:18

## 2020-08-10 RX ADMIN — ENOXAPARIN SODIUM 40 MG: 40 INJECTION SUBCUTANEOUS at 16:54

## 2020-08-10 RX ADMIN — Medication 10 ML: at 11:46

## 2020-08-10 RX ADMIN — ALBUTEROL SULFATE 1 PUFF: 108 INHALANT RESPIRATORY (INHALATION) at 20:00

## 2020-08-10 RX ADMIN — Medication 220 MG: at 08:42

## 2020-08-10 RX ADMIN — HYDROXYCHLOROQUINE SULFATE 200 MG: 200 TABLET ORAL at 08:42

## 2020-08-10 RX ADMIN — TIOTROPIUM BROMIDE INHALATION SPRAY 2 PUFF: 3.12 SPRAY, METERED RESPIRATORY (INHALATION) at 07:19

## 2020-08-10 RX ADMIN — Medication 10 ML: at 06:06

## 2020-08-10 RX ADMIN — ALBUTEROL SULFATE 1 PUFF: 108 INHALANT RESPIRATORY (INHALATION) at 01:19

## 2020-08-10 RX ADMIN — INSULIN LISPRO 4 UNITS: 100 INJECTION, SOLUTION INTRAVENOUS; SUBCUTANEOUS at 16:53

## 2020-08-10 RX ADMIN — GUAIFENESIN AND DEXTROMETHORPHAN 10 ML: 100; 10 SYRUP ORAL at 11:45

## 2020-08-10 RX ADMIN — INSULIN LISPRO 2 UNITS: 100 INJECTION, SOLUTION INTRAVENOUS; SUBCUTANEOUS at 21:30

## 2020-08-10 RX ADMIN — HYDROXYCHLOROQUINE SULFATE 200 MG: 200 TABLET ORAL at 16:54

## 2020-08-10 NOTE — PROGRESS NOTES
Pt resting in bed. Respirations even and unlabored. No s/sx of distress noted. Call light within reach, safety measures in place. Will give report to oncoming RN.

## 2020-08-10 NOTE — PROGRESS NOTES
Request from [facility] for pt have a Hgb A1c before discharge. Ordered. Case management will continue to monitor.

## 2020-08-10 NOTE — PROGRESS NOTES
Pt sitting up in bed. Pt denies pain or distress at this time. Remains on 2 1/2 LN . Call light in reach, will monitor.

## 2020-08-10 NOTE — PROGRESS NOTES
Name: Adeel Ho MRN: [id number]  : 1945  Age:74 y.o.  female  Admit Date:  2020 LOS: 6      Hospitalist Progress Note    Adeel Ho is a 75 y.o. female with medical history significant for intermittent moderate persistent asthma, diabetes type 2, hypertension, hyperlipidemia,Sjogren's syndrome / Positive sm/RNP antibody who presented to the ED with worsening shortness of breath, productive cough x 2-3days. She normally uses O2 2L at night time but now has been requiring during the day time due to SOB. CXR notable for worsening infiltrate in the right midlung zone and rapid COVID-19 test is positive. Patient was admitted due to acute respiratory failure with hypoxia due to COVID-19 infection. Received convalescent plasma on . Patient's O2 saturation has been weaned down to 2-3L NC. She has had severe coughing spells with chest pains due to cough during her stay and has been improving on cough suppressant/mucinex. Subjective (08/10/20) :  Patient is seen and examined at bedside. No acute events reported overnight by nursing staff. Reports ongoing coughing spells with chest pains during cough but has been improving. Appeared tired still but better compared to yesterday. On 2 1/2 L NC O2 and saturating well    Patient denies fever, chills, chest pains, n/v, abdominal pain. Tolerating diet. ROS: 10 point review of systems is otherwise negative with the exception of the elements mentioned above.     Objective:    Patient Vitals for the past 24 hrs:   Temp Pulse Resp BP SpO2   08/10/20 1120     96 %   08/10/20 1058 98.1 °F (36.7 °C) 68 18 143/86 93 %   08/10/20 0736 98.4 °F (36.9 °C) 67 18 144/85 91 %   08/10/20 0719     96 %   08/10/20 0404     94 %   08/10/20 0341 98.2 °F (36.8 °C) 71 18 148/72 92 %   08/10/20 0119     96 %   20 2313 97.6 °F (36.4 °C) 68 18 117/76 97 %   20 2117     95 %   20 1905 98.4 °F (36.9 °C) 74 18 124/77 95 %   08/09/20 1600     93 %   08/09/20 1516 98.1 °F (36.7 °C) 81 18 118/78 94 %   08/09/20 1308     94 %     Oxygen Therapy  O2 Sat (%): 96 % (08/10/20 1120)  Pulse via Oximetry: 68 beats per minute (08/10/20 1120)  O2 Device: Nasal cannula (08/10/20 1120)  O2 Flow Rate (L/min): 3 l/min (08/10/20 1120)  FIO2 (%): 30 % (08/07/20 2000)    Estimated body mass index is 34.87 kg/m² as calculated from the following:    Height as of this encounter: 4' 11\" (1.499 m). Weight as of this encounter: 78.3 kg (172 lb 9.9 oz). Intake/Output Summary (Last 24 hours) at 8/10/2020 1200  Last data filed at 8/9/2020 1853  Gross per 24 hour   Intake 480 ml   Output    Net 480 ml       *Note that automatically entered I/Os may not be accurate; dependent on patient compliance with collection and accurate  by techs. Physical Exam:   General:                     alert, awake, no acute distress. Well nourished  Head:                          normocephalic, atraumatic  Eyes, Ears, nose:      PERRL, EOMI. Normal Conjunctiva. Neck:                          supple, non-tender. Trachea midline. Lungs:                        Decreased air entry, no wheezing  Cardiac:                      RRR, Normal S1 and S2. Abdomen:                  Soft, non distended, nontender, +BS  Extremities:               Warm, dry. No edema  Skin:                           No rashes, no jaundice  Neuro:             AAOx 3 .  No gross focal neurological deficit  Psychiatric:                No anxiety, calm, cooperative    Data Review:  I have reviewed all labs, meds, and studies from the last 24 hours:    Recent Results (from the past 24 hour(s))   EKG, 12 LEAD, SUBSEQUENT    Collection Time: 08/09/20  2:38 PM   Result Value Ref Range    Ventricular Rate 80 BPM    Atrial Rate 80 BPM    P-R Interval 158 ms    QRS Duration 74 ms    Q-T Interval 396 ms    QTC Calculation (Bezet) 456 ms    Calculated P Axis 57 degrees    Calculated R Axis -20 degrees    Calculated T Axis 1 degrees    Diagnosis       Normal sinus rhythm  Normal ECG  When compared with ECG of 06-AUG-2020 07:03,  T wave amplitude has increased in Anterior leads  Confirmed by Lenore Avila MD (), FELY CRAWFORD ([id number]) on 8/9/2020 5:25:53 PM     GLUCOSE, POC    Collection Time: 08/09/20  4:38 PM   Result Value Ref Range    Glucose (POC) 220 (H) 65 - 100 mg/dL   GLUCOSE, POC    Collection Time: 08/09/20  4:38 PM   Result Value Ref Range    Glucose (POC) 220 (H) 65 - 100 mg/dL   GLUCOSE, POC    Collection Time: 08/09/20  8:42 PM   Result Value Ref Range    Glucose (POC) 219 (H) 65 - 100 mg/dL   GLUCOSE, POC    Collection Time: 08/09/20  8:42 PM   Result Value Ref Range    Glucose (POC) 219 (H) 65 - 100 mg/dL   GLUCOSE, POC    Collection Time: 08/10/20  5:29 AM   Result Value Ref Range    Glucose (POC) 118 (H) 65 - 100 mg/dL   CBC WITH AUTOMATED DIFF    Collection Time: 08/10/20  7:38 AM   Result Value Ref Range    WBC 6.5 4.3 - 11.1 K/uL    RBC 4.78 4.05 - 5.2 M/uL    HGB 12.9 11.7 - 15.4 g/dL    HCT 41.2 35.8 - 46.3 %    MCV 86.2 79.6 - 97.8 FL    MCH 27.0 26.1 - 32.9 PG    MCHC 31.3 (L) 31.4 - 35.0 g/dL    RDW 13.6 11.9 - 14.6 %    PLATELET 368 062 - 237 K/uL    MPV 11.4 9.4 - 12.3 FL    ABSOLUTE NRBC 0.00 0.0 - 0.2 K/uL    DF AUTOMATED      NEUTROPHILS 68 43 - 78 %    LYMPHOCYTES 20 13 - 44 %    MONOCYTES 6 4.0 - 12.0 %    EOSINOPHILS 0 (L) 0.5 - 7.8 %    BASOPHILS 0 0.0 - 2.0 %    IMMATURE GRANULOCYTES 5 0.0 - 5.0 %    ABS. NEUTROPHILS 4.4 1.7 - 8.2 K/UL    ABS. LYMPHOCYTES 1.3 0.5 - 4.6 K/UL    ABS. MONOCYTES 0.4 0.1 - 1.3 K/UL    ABS. EOSINOPHILS 0.0 0.0 - 0.8 K/UL    ABS. BASOPHILS 0.0 0.0 - 0.2 K/UL    ABS. IMM.  GRANS. 0.3 0.0 - 0.5 K/UL   METABOLIC PANEL, BASIC    Collection Time: 08/10/20  7:38 AM   Result Value Ref Range    Sodium 141 136 - 145 mmol/L    Potassium 4.2 3.5 - 5.1 mmol/L    Chloride 106 98 - 107 mmol/L    CO2 25 21 - 32 mmol/L    Anion gap 10 7 - 16 mmol/L    Glucose 109 (H) 65 - 100 mg/dL    BUN 18 8 - 23 MG/DL    Creatinine 0.67 0.6 - 1.0 MG/DL    GFR est AA >60 >60 ml/min/1.73m2    GFR est non-AA >60 >60 ml/min/1.73m2    Calcium 8.3 8.3 - 10.4 MG/DL   GLUCOSE, POC    Collection Time: 08/10/20 11:10 AM   Result Value Ref Range    Glucose (POC) 110 (H) 65 - 100 mg/dL        All Micro Results     Procedure Component Value Units Date/Time    CULTURE, BLOOD [[id number]] Collected:  08/04/20 0951    Order Status:  Completed Specimen:  Blood Updated:  08/09/20 0633     Special Requests: --        LEFT  HAND       Culture result: NO GROWTH 5 DAYS       CULTURE, BLOOD [[id number]] Collected:  08/04/20 0950    Order Status:  Completed Specimen:  Blood Updated:  08/09/20 0633     Special Requests: --        RIGHT  HAND       Culture result: NO GROWTH 5 DAYS       CULTURE, RESPIRATORY/SPUTUM/BRONCH Haze Ashley STAIN [[id number]] Collected:  08/05/20 0515    Order Status:  Completed Specimen:  Sputum Updated:  08/07/20 0819     Special Requests: NO SPECIAL REQUESTS        GRAM STAIN 0 TO 5 WBCS SEEN PER OIF      0 TO 1 EPITHELIAL CELLS SEEN PER OIF      MANY GRAM POSITIVE COCCI         FEW GRAM NEGATIVE RODS         FEW GRAM NEGATIVE COCCI         TRACE MUCUS PRESENT        Culture result:       MODERATE NORMAL RESPIRATORY SANDRA          CULTURE, BLOOD [[id number]]     Order Status:  Canceled Specimen:  Blood     CULTURE, BLOOD [[id number]]     Order Status:  Canceled Specimen:  Blood           Current Meds:  Current Facility-Administered Medications   Medication Dose Route Frequency    guaiFENesin-dextromethorphan (ROBITUSSIN DM) 100-10 mg/5 mL syrup 10 mL  10 mL Oral Q6H    insulin glargine (LANTUS) injection 20 Units  20 Units SubCUTAneous QHS    HYDROcodone-homatropine (HYCODAN) 5-1.5 mg/5 mL (5 mL) oral solution 5 mL  5 mL Oral Q4H PRN    enoxaparin (LOVENOX) injection 40 mg  0.5 mg/kg SubCUTAneous Q12H    tiotropium bromide (SPIRIVA RESPIMAT) 2.5 mcg /actuation  2 Puff Inhalation DAILY    albuterol (PROVENTIL HFA, VENTOLIN HFA, PROAIR HFA) inhaler 1 Puff  1 Puff Inhalation Q4H    benzonatate (TESSALON) capsule 100 mg  100 mg Oral TID PRN    sodium chloride (NS) flush 5-40 mL  5-40 mL IntraVENous Q8H    sodium chloride (NS) flush 5-40 mL  5-40 mL IntraVENous PRN    acetaminophen (TYLENOL) tablet 650 mg  650 mg Oral Q6H PRN    Or    acetaminophen (TYLENOL) suppository 650 mg  650 mg Rectal Q6H PRN    polyethylene glycol (MIRALAX) packet 17 g  17 g Oral DAILY PRN    promethazine (PHENERGAN) tablet 12.5 mg  12.5 mg Oral Q6H PRN    Or    ondansetron (ZOFRAN) injection 4 mg  4 mg IntraVENous Q6H PRN    insulin lispro (HUMALOG) injection   SubCUTAneous AC&HS    ascorbic acid (vitamin C) (VITAMIN C) tablet 500 mg  500 mg Oral TID    zinc sulfate tablet 220 mg  220 mg Oral DAILY    famotidine (PEPCID) tablet 20 mg  20 mg Oral DAILY    dexAMETHasone (DECADRON) tablet 6 mg  6 mg Oral DAILY    aspirin delayed-release tablet 81 mg  81 mg Oral DAILY    [Held by provider] amLODIPine (NORVASC) tablet 10 mg  10 mg Oral DAILY    folic acid (FOLVITE) tablet 1 mg  1 mg Oral DAILY    hydrOXYchloroQUINE (PLAQUENIL) tablet 200 mg  200 mg Oral BIDPC    [Held by provider] losartan (COZAAR) tablet 100 mg  100 mg Oral DAILY    montelukast (SINGULAIR) tablet 10 mg  10 mg Oral QHS    [Held by provider] nebivoloL (BYSTOLIC) tablet 5 mg  5 mg Oral DAILY    [Held by provider] hydroCHLOROthiazide (HYDRODIURIL) tablet 12.5 mg  12.5 mg Oral DAILY    0.9% sodium chloride infusion 250 mL  250 mL IntraVENous PRN         Other Studies:  Xr Chest Port    Result Date: 8/4/2020  CHEST X-RAY, one view. HISTORY:  Suspected COVID, results pending; cough. TECHNIQUE:  AP upright portable view. COMPARISON: August 2019     FINDINGS/IMPRESSION: Small focus of atelectasis or infiltrate right midlung zone. Mild interstitial prominence. Costophrenic angles are sharp. Heart and pulmonary vasculature is unremarkable. Assessment:    Active Hospital Problems    Diagnosis Date Noted    Acute respiratory failure with hypoxia ([facility] [address] 08/04/2020    COVID-19 virus infection 08/04/2020    Controlled type 2 diabetes mellitus without complication, with long-term current use of insulin ([facility] [address] 01/27/2017    Sjogren's syndrome ([facility] [address] 01/12/2017    Positive sm/RNP antibody 01/12/2017    Asthma 10/24/2016    GERD (gastroesophageal reflux disease) 07/23/2013    Hypertension 07/23/2013    Hypercholesterolemia 07/23/2013       Plan:    Acute Respiratory Failure due with hypoxia due to COVID-19  Sepsis due to COVID 19 Infection (resolved)  COVID-19 Virus Infection  Met sepsis criteria on admission with tachycardia and hypoxia (88% on RA). CXR with possible data like this is worsening infiltrate in the right midlung zone. GINA-COV2 Rapid test (+) on 8/4. Convalescent plasma on 8/5. BCx is NGTD and sputum culture is normal joceline  - O2 supplement and wean as tolerated  - zinc and vitamin C supplement  - dexamethasone 6mg PO daily   - DVT PPx with lovenox  - trial of naproxen (x 1 day) for costochondritis   - RT O2 qualifier today    Moderate Persistence Asthma  - O2 supplement as above. - nebs PRN    Elevated D-Dimer  Likely due to acute illness especially COVID19. No increase O2 requirement compared to prior days. Less likely PE/DVT given low Wells score     T2DM  - ISS  - hold home trulicity  - decrease lantus given hypoglycemia in the AM     Sjogren's Syndrome // Positive sm/RNP antibody: continue with home medications     Hypertension // HLD : continue with home medications     GERD: pepcid BID    Diet:  DIET NUTRITIONAL SUPPLEMENTS  DIET DIABETIC CONSISTENT CARB  DVT PPx: lovenox  Code: Full Code  Dispo: Home with Home PT  Estimated Discharge: likely 24hrs    Labs/Imaging Reviewed. Patient is moderate risk due to current condition and comorbid conditions as well as requiring frequent monitoring.   Plan discussed with staff, patient/family and are in agreement. Time Spent: Greater than 45 minutes was spent in reviewing charts, physical exam, discussion with patient, and answered any questions.     Signed By: Giacomo Albrecht MD     August 10, 2020

## 2020-08-10 NOTE — PROGRESS NOTES
SBAR received from [facility]. Pt resting in bed. Respirations even and unlabored. No s/sx of distress noted. Call light within reach, safety measures in place.

## 2020-08-10 NOTE — PROGRESS NOTES
SW spoke with patient via telephone contact to room 505 on this date. Patient is expected to DC tomorrow if medically ready. She is agreeable to [facility] referral for PT/OT/RN; referral completed. Currently awaiting O2 qualifier to determine if she will need home O2. Patient has a concentrator at home, but is not currently in service with any DME company (\"years since last service\"). Patient verbalized agreement with [facility] referral if home O2 is indicated.

## 2020-08-10 NOTE — PROGRESS NOTES
Pt resting in bed with eyes closed. Pt awakens when spoken to. Pt oriented times 3 at this time. Pt speaks very softly. Pt on 2 1/2 L NC at time. Pt denies pain or distress at this time. Pt encouraged to call for assistance if needed call light in reach, will monitor. Droplet plus precautions in place.

## 2020-08-10 NOTE — PROGRESS NOTES
Oxygen Qualifier       Room air: SpO2 with O2 and liter flow   Resting SpO2 86%  94% on 3L   Ambulating SpO2  84% 93% on 4l   Ambulating on 3l sat was 90%    Completed by:    Emeli Garnica RT

## 2020-08-10 NOTE — PROGRESS NOTES
's visit via telephone call. I conveyed care and concern for patient and offered spiritual interventions including prayer as requested.      Milind Barajas MDIV, [facility] Presley Gomez has shoulder surgery scheduled for November and he has some questions. Please call [phone]

## 2020-08-11 VITALS
TEMPERATURE: 98.5 F | RESPIRATION RATE: 20 BRPM | BODY MASS INDEX: 34.8 KG/M2 | WEIGHT: 172.62 LBS | HEIGHT: 59 IN | SYSTOLIC BLOOD PRESSURE: 149 MMHG | OXYGEN SATURATION: 94 % | HEART RATE: 62 BPM | DIASTOLIC BLOOD PRESSURE: 61 MMHG

## 2020-08-11 LAB
GLUCOSE BLD STRIP.AUTO-MCNC: 87 MG/DL (ref 65–100)
GLUCOSE BLD STRIP.AUTO-MCNC: 96 MG/DL (ref 65–100)

## 2020-08-11 PROCEDURE — 94640 AIRWAY INHALATION TREATMENT: CPT

## 2020-08-11 PROCEDURE — 74011250637 HC RX REV CODE- 250/637: Performed by: INTERNAL MEDICINE

## 2020-08-11 PROCEDURE — 74011250636 HC RX REV CODE- 250/636: Performed by: INTERNAL MEDICINE

## 2020-08-11 PROCEDURE — 77010033678 HC OXYGEN DAILY

## 2020-08-11 PROCEDURE — 82962 GLUCOSE BLOOD TEST: CPT

## 2020-08-11 PROCEDURE — 94760 N-INVAS EAR/PLS OXIMETRY 1: CPT

## 2020-08-11 RX ORDER — FAMOTIDINE 20 MG/1
20 TABLET, FILM COATED ORAL 2 TIMES DAILY
Qty: 10 TAB | Refills: 0 | Status: SHIPPED | OUTPATIENT
Start: 2020-08-11 | End: 2020-08-16

## 2020-08-11 RX ORDER — ALBUTEROL SULFATE 0.83 MG/ML
2.5 SOLUTION RESPIRATORY (INHALATION)
Qty: 180 EACH | Refills: 6 | Status: SHIPPED | OUTPATIENT
Start: 2020-08-11 | End: 2021-02-15 | Stop reason: SDUPTHER

## 2020-08-11 RX ORDER — DEXAMETHASONE 6 MG/1
6 TABLET ORAL
Qty: 4 TAB | Refills: 0 | Status: SHIPPED | OUTPATIENT
Start: 2020-08-12 | End: 2020-08-16

## 2020-08-11 RX ORDER — HYDROCODONE BITARTRATE AND HOMATROPINE METHYLBROMIDE 1.5; 5 MG/5ML; MG/5ML
5 SYRUP ORAL
Qty: 90 ML | Refills: 0 | Status: SHIPPED | OUTPATIENT
Start: 2020-08-11 | End: 2020-08-14

## 2020-08-11 RX ORDER — UMECLIDINIUM BROMIDE AND VILANTEROL TRIFENATATE 62.5; 25 UG/1; UG/1
1 POWDER RESPIRATORY (INHALATION) DAILY
Qty: 3 INHALER | Refills: 3 | Status: SHIPPED | OUTPATIENT
Start: 2020-08-11 | End: 2021-01-11 | Stop reason: SINTOL

## 2020-08-11 RX ADMIN — ALBUTEROL SULFATE 1 PUFF: 108 INHALANT RESPIRATORY (INHALATION) at 12:17

## 2020-08-11 RX ADMIN — Medication 220 MG: at 08:42

## 2020-08-11 RX ADMIN — FAMOTIDINE 20 MG: 20 TABLET, FILM COATED ORAL at 08:43

## 2020-08-11 RX ADMIN — HYDROXYCHLOROQUINE SULFATE 200 MG: 200 TABLET ORAL at 08:43

## 2020-08-11 RX ADMIN — ALBUTEROL SULFATE 1 PUFF: 108 INHALANT RESPIRATORY (INHALATION) at 04:00

## 2020-08-11 RX ADMIN — Medication 500 MG: at 08:43

## 2020-08-11 RX ADMIN — DEXAMETHASONE 6 MG: 4 TABLET ORAL at 08:43

## 2020-08-11 RX ADMIN — FOLIC ACID 1 MG: 1 TABLET ORAL at 08:43

## 2020-08-11 RX ADMIN — ENOXAPARIN SODIUM 40 MG: 40 INJECTION SUBCUTANEOUS at 06:15

## 2020-08-11 RX ADMIN — NEBIVOLOL HYDROCHLORIDE 5 MG: 5 TABLET ORAL at 08:43

## 2020-08-11 RX ADMIN — ACETAMINOPHEN 650 MG: 325 TABLET, FILM COATED ORAL at 01:59

## 2020-08-11 RX ADMIN — ASPIRIN 81 MG: 81 TABLET, COATED ORAL at 08:43

## 2020-08-11 RX ADMIN — GUAIFENESIN AND DEXTROMETHORPHAN 10 ML: 100; 10 SYRUP ORAL at 01:59

## 2020-08-11 RX ADMIN — GUAIFENESIN AND DEXTROMETHORPHAN 10 ML: 100; 10 SYRUP ORAL at 06:13

## 2020-08-11 RX ADMIN — ALBUTEROL SULFATE 1 PUFF: 108 INHALANT RESPIRATORY (INHALATION) at 00:00

## 2020-08-11 RX ADMIN — Medication 10 ML: at 06:15

## 2020-08-11 NOTE — DISCHARGE INSTRUCTIONS
DISCHARGE SUMMARY from Nurse    PATIENT INSTRUCTIONS:    After general anesthesia or intravenous sedation, for 24 hours or while taking prescription Narcotics:  · Limit your activities  · Do not drive and operate hazardous machinery  · Do not make important personal or business decisions  · Do  not drink alcoholic beverages  · If you have not urinated within 8 hours after discharge, please contact your surgeon on call. Report the following to your surgeon:  · Excessive pain, swelling, redness or odor of or around the surgical area  · Temperature over 100.5  · Nausea and vomiting lasting longer than 4 hours or if unable to take medications  · Any signs of decreased circulation or nerve impairment to extremity: change in color, persistent  numbness, tingling, coldness or increase pain  · Any questions    What to do at Home:  Recommended activity: Activity as tolerated    If you experience any of the following symptoms shortness of breath not relieved by rest, pain not relieved by medications, chest pain or pressures, increase in weakness fatigue or swelling, temperature greater than 101, or nausea and vomiting please follow up with MD.    DISCHARGE INSTRUCTIONS:  - Please take medication as prescribed. - Please follow-up with your primary care physician within 1 to 2 weeks of discharge. - All your Blood Pressure medications have been held due to hypotension/normotension while in the hospital. Please discuss with your PCP on when it is appropriate to restart them. *  Please give a list of your current medications to your Primary Care Provider. *  Please update this list whenever your medications are discontinued, doses are      changed, or new medications (including over-the-counter products) are added. *  Please carry medication information at all times in case of emergency situations.     These are general instructions for a healthy lifestyle:    No smoking/ No tobacco products/ Avoid exposure to second hand smoke  Surgeon General's Warning:  Quitting smoking now greatly reduces serious risk to your health. Obesity, smoking, and sedentary lifestyle greatly increases your risk for illness    A healthy diet, regular physical exercise & weight monitoring are important for maintaining a healthy lifestyle    You may be retaining fluid if you have a history of heart failure or if you experience any of the following symptoms:  Weight gain of 3 pounds or more overnight or 5 pounds in a week, increased swelling in our hands or feet or shortness of breath while lying flat in bed. Please call your doctor as soon as you notice any of these symptoms; do not wait until your next office visit. The discharge information has been reviewed with the patient. The patient verbalized understanding. Discharge medications reviewed with the patient and appropriate educational materials and side effects teaching were provided.   ___________________________________________________________________________________________________________________________________

## 2020-08-11 NOTE — DISCHARGE SUMMARY
Hospitalist Discharge Summary     Admit Date:  2020  9:40 AM   Name:  Brian Amanda   Age:  75 y.o.  :  1945   MRN:  [id number]   PCP:  Jaden Mcgee MD  Treatment Team: Attending Provider: Jeferson Mccollum MD; Care Manager: Sherrell Santillan Primary Nurse: Sarah Beth Goode RN; Utilization Review: Kat Montejo RN; Physical Therapist: Michael Harrison, PT, DPT    Problem List for this Hospitalization:  Active Hospital Problems    Diagnosis Date Noted    Acute respiratory failure with hypoxia ([facility] 2020    COVID-19 virus infection 2020    Controlled type 2 diabetes mellitus without complication, with long-term current use of insulin ([facility] 2017    Sjogren's syndrome ([facility] 2017    Positive sm/RNP antibody 2017    Asthma 10/24/2016    GERD (gastroesophageal reflux disease) 2013    Hypertension 2013    Hypercholesterolemia 2013         Hospital Course :  Please refer to the admission H&P for details of presentation. In summary, Brian Amanda is a 75 y.o. female with past medical history significant for intermittent moderate persistent asthma, diabetes type 2, hypertension, hyperlipidemia,Sjogren's syndrome / Positive sm/RNP antibody who presented to the ED with worsening shortness of breath, productive cough x 2-3days. She normally uses O2 2L at night time but now has been requiring during the day time due to SOB. CXR notable for worsening infiltrate in the right midlung zone and rapid COVID-19 test is positive. Patient was admitted due to acute respiratory failure with hypoxia due to COVID-19 infection. Received convalescent plasma on . Patient's O2 saturation has been weaned down to 2-3L NC. She has had severe coughing spells with chest pains due to cough during her stay and has been improving on cough suppressant/mucinex.    RT evaluated the patient and recommended that she should be on 3L O2 NC at rest and 4L O2 on exertion. Patient's cough has improved but still intermittent. Patient is medically stable to be discharged home with home PT(per PT/OT recs). Patient is to follow up with PCP within 2 weeks. Disposition: [place] Health Care [facility] [place]  Activity: Activity as tolerated  Diet: DIET NUTRITIONAL SUPPLEMENTS All Meals; Glucerna Shake  DIET DIABETIC CONSISTENT CARB Mechanical Soft  Code Status: Full Code      Follow up instructions, discharge meds at bottom of this note. Plan was discussed with patient/family. All questions answered. Patient was stable at time of discharge. Patient will call a physician or return if any concerns. Diagnostic Imaging/Tests:   Xr Chest Port    Result Date: 8/4/2020  CHEST X-RAY, one view. HISTORY:  Suspected COVID, results pending; cough. TECHNIQUE:  AP upright portable view. COMPARISON: August 2019     FINDINGS/IMPRESSION: Small focus of atelectasis or infiltrate right midlung zone. Mild interstitial prominence. Costophrenic angles are sharp. Heart and pulmonary vasculature is unremarkable. Echocardiogram results:  No results found for this visit on 08/04/20.     Procedures done this admission:  * No surgery found *    All Micro Results     Procedure Component Value Units Date/Time    CULTURE, BLOOD [[id number]] Collected:  08/04/20 0951    Order Status:  Completed Specimen:  Blood Updated:  08/09/20 0633     Special Requests: --        LEFT  HAND       Culture result: NO GROWTH 5 DAYS       CULTURE, BLOOD [[id number]] Collected:  08/04/20 0950    Order Status:  Completed Specimen:  Blood Updated:  08/09/20 0633     Special Requests: --        RIGHT  HAND       Culture result: NO GROWTH 5 DAYS       CULTURE, RESPIRATORY/SPUTUM/BRONCH [place] STAIN [[id number]] Collected:  08/05/20 0515    Order Status:  Completed Specimen:  Sputum Updated:  08/07/20 0819     Special Requests: NO SPECIAL REQUESTS        GRAM STAIN 0 TO 5 WBCS SEEN PER OIF      0 TO 1 EPITHELIAL CELLS SEEN PER OIF      MANY GRAM POSITIVE COCCI         FEW GRAM NEGATIVE RODS         FEW GRAM NEGATIVE COCCI         TRACE MUCUS PRESENT        Culture result:       MODERATE NORMAL RESPIRATORY SANDRA          CULTURE, BLOOD [[id number]]     Order Status:  Canceled Specimen:  Blood     CULTURE, BLOOD [[id number]]     Order Status:  Canceled Specimen:  Blood           Labs: Results:       BMP, Mg, Phos Recent Labs     08/10/20  0738 08/09/20  0810 08/09/20  0330    139 136   K 4.2 4.0 6.4*    106 105   CO2 25 27 21   AGAP 10 6* 10   BUN 18 16 17   CREA 0.67 0.77 0.83   CA 8.3 8.3 8.0*   * 76 74      CBC Recent Labs     08/10/20  0738 08/09/20  0330   WBC 6.5 6.9   RBC 4.78 5.06   HGB 12.9 13.5   HCT 41.2 43.7    345   GRANS 68 71   LYMPH 20 19   EOS 0* 0*   MONOS 6 5   BASOS 0 1   IG 5 4   ANEU 4.4 4.9   ABL 1.3 1.3   ROBERT 0.0 0.0   ABM 0.4 0.3   ABB 0.0 0.1   AIG 0.3 0.3      LFT No results for input(s): ALT, TBIL, AP, TP, ALB, GLOB, AGRAT in the last 72 hours.     No lab exists for component: SGOT, GPT   Cardiac Testing Lab Results   Component Value Date/Time    BNP 39 (H) 01/30/2019 06:21 AM    BNP 66 (H) 01/29/2019 06:06 AM    CK 98 07/22/2013 10:45 AM    CK - MB 1.0 07/22/2013 10:45 AM    CK-MB Index 1.0 07/22/2013 10:45 AM    Troponin-I, Qt. <0.02 (L) 01/26/2019 01:15 PM      Coagulation Tests Lab Results   Component Value Date/Time    Prothrombin time 10.3 07/22/2013 10:45 AM    INR 1.0 07/22/2013 10:45 AM    aPTT 25.0 07/22/2013 10:45 AM      A1c Lab Results   Component Value Date/Time    Hemoglobin A1c 8.1 (H) 08/10/2020 07:38 AM    Hemoglobin A1c 8.2 (H) 01/29/2019 06:06 AM    Hemoglobin A1c, External 6.5 08/02/2016      Lipid Panel Lab Results   Component Value Date/Time    Cholesterol, total 244 (H) 09/11/2018 01:06 PM    HDL Cholesterol 48 09/11/2018 01:06 PM    LDL, calculated 163 (H) 09/11/2018 01:06 PM    VLDL, calculated 33 09/11/2018 01:06 PM    Triglyceride 165 (H) 09/11/2018 01:06 PM Thyroid Panel Lab Results   Component Value Date/Time    TSH 3.020 12/12/2016 08:42 AM    T4, Total 7.5 12/12/2016 08:42 AM        Most Recent UA Lab Results   Component Value Date/Time    Color RICHARD 08/04/2020 11:46 AM    Appearance CLOUDY 08/04/2020 11:46 AM    Specific gravity 1.019 08/04/2020 11:46 AM    pH (UA) 6.0 08/04/2020 11:46 AM    Protein 30 (A) 08/04/2020 11:46 AM    Glucose Negative 08/04/2020 11:46 AM    Ketone >80 (A) 08/04/2020 11:46 AM    Bilirubin SMALL (A) 08/04/2020 11:46 AM    Blood Negative 08/04/2020 11:46 AM    Urobilinogen 1.0 08/04/2020 11:46 AM    Nitrites Negative 08/04/2020 11:46 AM    Leukocyte Esterase Negative 08/04/2020 11:46 AM    WBC 0-3 08/04/2020 11:46 AM    RBC 0-3 08/04/2020 11:46 AM    Epithelial cells 0-3 08/04/2020 11:46 AM    Bacteria 0 08/04/2020 11:46 AM    Casts 5-10 08/04/2020 11:46 AM    Crystals, urine 0 09/08/2016 07:16 PM    Mucus 0 09/08/2016 07:16 PM        Allergies   Allergen Reactions    Prilosec [Omeprazole] Rash    Penicillins Rash     Immunization History   Administered Date(s) Administered    Influenza Vaccine 10/01/2012, 09/02/2016, 10/01/2018    Influenza Vaccine (Quad) Mdck Pf 10/16/2017    Influenza Vaccine (Quad) PF 10/04/2018    Influenza Vaccine (Tri) Adjuvanted 11/15/2019    Pneumococcal Polysaccharide (PPSV-23) 04/23/2015    Pneumococcal Vaccine (Unspecified Type) 01/01/2009    TB Skin Test (PPD) Intradermal 01/26/2019    Tdap 04/21/2017    Zoster Vaccine, Live 04/21/2017       All Labs from Last 24 Hrs:  Recent Results (from the past 24 hour(s))   GLUCOSE, POC    Collection Time: 08/10/20 11:10 AM   Result Value Ref Range    Glucose (POC) 110 (H) 65 - 100 mg/dL   GLUCOSE, POC    Collection Time: 08/10/20  4:31 PM   Result Value Ref Range    Glucose (POC) 210 (H) 65 - 100 mg/dL   GLUCOSE, POC    Collection Time: 08/10/20  8:46 PM   Result Value Ref Range    Glucose (POC) 163 (H) 65 - 100 mg/dL   GLUCOSE, POC    Collection Time: 08/11/20  6:05 AM   Result Value Ref Range    Glucose (POC) 87 65 - 100 mg/dL       Current Med List in Hospital:   Current Facility-Administered Medications   Medication Dose Route Frequency    guaiFENesin-dextromethorphan (ROBITUSSIN DM) 100-10 mg/5 mL syrup 10 mL  10 mL Oral Q6H    insulin glargine (LANTUS) injection 20 Units  20 Units SubCUTAneous QHS    HYDROcodone-homatropine (HYCODAN) 5-1.5 mg/5 mL (5 mL) oral solution 5 mL  5 mL Oral Q4H PRN    enoxaparin (LOVENOX) injection 40 mg  0.5 mg/kg SubCUTAneous Q12H    tiotropium bromide (SPIRIVA RESPIMAT) 2.5 mcg /actuation  2 Puff Inhalation DAILY    albuterol (PROVENTIL HFA, VENTOLIN HFA, PROAIR HFA) inhaler 1 Puff  1 Puff Inhalation Q4H    benzonatate (TESSALON) capsule 100 mg  100 mg Oral TID PRN    sodium chloride (NS) flush 5-40 mL  5-40 mL IntraVENous Q8H    sodium chloride (NS) flush 5-40 mL  5-40 mL IntraVENous PRN    acetaminophen (TYLENOL) tablet 650 mg  650 mg Oral Q6H PRN    Or    acetaminophen (TYLENOL) suppository 650 mg  650 mg Rectal Q6H PRN    polyethylene glycol (MIRALAX) packet 17 g  17 g Oral DAILY PRN    promethazine (PHENERGAN) tablet 12.5 mg  12.5 mg Oral Q6H PRN    Or    ondansetron (ZOFRAN) injection 4 mg  4 mg IntraVENous Q6H PRN    insulin lispro (HUMALOG) injection   SubCUTAneous AC&HS    ascorbic acid (vitamin C) (VITAMIN C) tablet 500 mg  500 mg Oral TID    zinc sulfate tablet 220 mg  220 mg Oral DAILY    famotidine (PEPCID) tablet 20 mg  20 mg Oral DAILY    dexAMETHasone (DECADRON) tablet 6 mg  6 mg Oral DAILY    aspirin delayed-release tablet 81 mg  81 mg Oral DAILY    [Held by provider] amLODIPine (NORVASC) tablet 10 mg  10 mg Oral DAILY    folic acid (FOLVITE) tablet 1 mg  1 mg Oral DAILY    hydrOXYchloroQUINE (PLAQUENIL) tablet 200 mg  200 mg Oral BIDPC    [Held by provider] losartan (COZAAR) tablet 100 mg  100 mg Oral DAILY    montelukast (SINGULAIR) tablet 10 mg  10 mg Oral QHS    nebivoloL (BYSTOLIC) tablet 5 mg  5 mg Oral DAILY    [Held by provider] hydroCHLOROthiazide (HYDRODIURIL) tablet 12.5 mg  12.5 mg Oral DAILY    0.9% sodium chloride infusion 250 mL  250 mL IntraVENous PRN       Discharge Exam:  Patient Vitals for the past 24 hrs:   Temp Pulse Resp BP SpO2   08/11/20 0734 98.3 °F (36.8 °C) 70 18 138/84 92 %   08/11/20 0423 98.1 °F (36.7 °C) 69 20 121/70 95 %   08/11/20 0006 98.3 °F (36.8 °C) 68 20 143/82 93 %   08/10/20 1934 98.4 °F (36.9 °C) 81 20 119/68 93 %   08/10/20 1543 98.9 °F (37.2 °C) 62 20 142/74 97 %   08/10/20 1411     95 %   08/10/20 1120     96 %   08/10/20 1058 98.1 °F (36.7 °C) 68 18 143/86 93 %     Oxygen Therapy  O2 Sat (%): 92 % (08/11/20 0734)  Pulse via Oximetry: 67 beats per minute (08/11/20 0423)  O2 Device: Nasal cannula (08/11/20 0423)  O2 Flow Rate (L/min): 3 l/min (08/11/20 0423)  FIO2 (%): 30 % (08/07/20 2000)    Estimated body mass index is 34.87 kg/m² as calculated from the following:    Height as of this encounter: 4' 11\" (1.499 m). Weight as of this encounter: 78.3 kg (172 lb 9.9 oz). Intake/Output Summary (Last 24 hours) at 8/11/2020 4317  Last data filed at 8/10/2020 1836  Gross per 24 hour   Intake 240 ml   Output 400 ml   Net -160 ml       *Note that automatically entered I/Os may not be accurate; dependent on patient compliance with collection and accurate  by assistants. Physical Exam:   General:                     alert, awake, no acute distress. Well nourished  Head:                          normocephalic, atraumatic  Eyes, Ears, nose:      PERRL, EOMI. Normal Conjunctiva. Neck:                          supple, non-tender. Trachea midline. Lungs:                        Decreased air entry, no wheezing  Cardiac:                      RRR, Normal S1 and S2. Abdomen:                  Soft, non distended, nontender, +BS  Extremities:               Warm, dry.  No edema  Skin:                           No rashes, no jaundice  Neuro:              AAOx 3 . No gross focal neurological deficit  Psychiatric:                No anxiety, calm, cooperative        Discharge Info:   Current Discharge Medication List      START taking these medications    Details   dexAMETHasone (DECADRON) 6 mg tablet Take 1 Tab by mouth Daily (before breakfast) for 4 days. Qty: 4 Tab, Refills: 0      famotidine (PEPCID) 20 mg tablet Take 1 Tab by mouth two (2) times a day for 5 days. Qty: 10 Tab, Refills: 0      rivaroxaban (Xarelto) 10 mg tablet Take 1 Tab by mouth daily (with breakfast). Qty: 14 Tab, Refills: 0      HYDROcodone-homatropine (HYCODAN) 5-1.5 mg/5 mL (5 mL) oral solution Take 5 mL by mouth every four (4) hours as needed for Cough for up to 3 days. Max Daily Amount: 30 mL. Qty: 90 mL, Refills: 0    Associated Diagnoses: Cough         CONTINUE these medications which have CHANGED    Details   umeclidinium-vilanteroL (Anoro Ellipta) 62.5-25 mcg/actuation inhaler Take 1 Puff by inhalation daily. Qty: 3 Inhaler, Refills: 3    Associated Diagnoses: Mild intermittent asthma without complication      albuterol (PROVENTIL VENTOLIN) 2.5 mg /3 mL (0.083 %) nebu 3 mL by Nebulization route every four (4) hours as needed (prn). Qty: 180 Each, Refills: 6    Associated Diagnoses: Mild intermittent asthma without complication         CONTINUE these medications which have NOT CHANGED    Details   montelukast (SINGULAIR) 10 mg tablet TAKE ONE TABLET BY MOUTH NIGHTLY  Qty: 30 Tab, Refills: 5    Comments: DUE TO COVID-19 WE ARE REQUESTING 80 DAY SUPPLY ON ALL MEDICATIONS PLEASE, THANK YOU  Associated Diagnoses:  Moderate persistent asthma with acute exacerbation      raNITIdine (ZANTAC) 150 mg tablet TAKE ONE TABLET BY MOUTH TWICE DAILY  Qty: 180 Tab, Refills: 3    Comments: RANITIDINE IS ALL RECALLED AT THE MOMENT, PLEASE CHANGE THE PRESCRIPTION TO SOMETHING ELSE FOR THIS PATIENT, THANK YOU      dulaglutide (Trulicity) 1.5 UX/0.8 mL sub-q pen inject ONE syringe subcutaneously ONCE EVERY WEEK  Qty: 22 mL, Refills: 5      hydrOXYzine HCL (ATARAX) 10 mg tablet Take 1 Tab by mouth every six (6) hours as needed for Itching. Indications: hives, itching  Qty: 40 Tab, Refills: 3      folic acid (FOLVITE) 1 mg tablet Take 1 Tab by mouth daily. Qty: 90 Tab, Refills: 3      hydroxychloroquine (PLAQUENIL) 200 mg tablet Take 1 Tab by mouth two (2) times daily (after meals). Qty: 180 Tab, Refills: 3    Associated Diagnoses: Sjogren's syndrome, with unspecified organ involvement ([facility])      atorvastatin (LIPITOR) 40 mg tablet Take 1 Tab by mouth daily. Qty: 90 Tab, Refills: 3    Associated Diagnoses: Pure hypercholesterolemia      lancets (ACCU-CHEK FASTCLIX LANCET DRUM) misc Pt checks BS tid. Dx: E11.9  Qty: 300 Each, Refills: 3    Associated Diagnoses: Controlled type 2 diabetes mellitus without complication, with long-term current use of insulin ([facility])      glucose blood VI test strips (ACCU-CHEK GUIDE) strip by Does Not Apply route See Admin Instructions. Pt checks BS tid. Dx: E11.9  Qty: 300 Strip, Refills: 3    Associated Diagnoses: Controlled type 2 diabetes mellitus without complication, with long-term current use of insulin ([facility])      Insulin Needles, Disposable, (LETI PEN NEEDLE) 32 gauge x 5/32\" ndle Inject insulin once daily  DX E 11.9  Qty: 100 Pen Needle, Refills: 3    Associated Diagnoses: Controlled type 2 diabetes mellitus without complication, with long-term current use of insulin ([facility])      meloxicam (MOBIC) 15 mg tablet Take 1 pill once a day after food. Qty: 90 Tab, Refills: 3    Associated Diagnoses: Degeneration of lumbar intervertebral disc; Primary osteoarthritis of right foot      Blood-Glucose Meter (ACCU-CHEK GUIDE GLUCOSE METER) misc 1 Units by Does Not Apply route three (3) times daily.   Qty: 1 Each, Refills: 1    Associated Diagnoses: Controlled type 2 diabetes mellitus without complication, with long-term current use of insulin ([facility])      cetirizine (ZYRTEC) 10 mg tablet Take 1 Tab by mouth daily. Qty: 90 Tab, Refills: 3    Associated Diagnoses: Moderate persistent asthma with acute exacerbation      budesonide (PULMICORT) 0.5 mg/2 mL nbsp 2 mL by Nebulization route two (2) times a day. Qty: 60 Each, Refills: 11      insulin glargine U-300 conc (TOUJEO SOLOSTAR U-300 INSULIN) 300 unit/mL (1.5 mL) inpn 35 Units by SubCUTAneous route nightly. Qty: 15 Adjustable Dose Pre-filled Pen Syringe, Refills: 3    Associated Diagnoses: Controlled type 2 diabetes mellitus without complication, with long-term current use of insulin ([facility])      triamcinolone acetonide (KENALOG) 0.1 % topical cream Apply  to affected area two (2) times a day. use thin layer      EPINEPHrine (EPIPEN) 0.3 mg/0.3 mL injection 0.3 mg by IntraMUSCular route once as needed for Anaphylaxis. aspirin delayed-release 81 mg tablet Take 81 mg by mouth daily. STOP taking these medications       cefdinir (OMNICEF) 300 mg capsule Comments:   Reason for Stopping:         hydroCHLOROthiazide (HYDRODIURIL) 12.5 mg tablet Comments:   Reason for Stopping:         losartan (COZAAR) 100 mg tablet Comments:   Reason for Stopping:         amLODIPine (NORVASC) 10 mg tablet Comments:   Reason for Stopping:         nebivoloL (BYSTOLIC) 5 mg tablet Comments:   Reason for Stopping:         losartan-hydroCHLOROthiazide (HYZAAR) 100-12.5 mg per tablet Comments:   Reason for Stopping: Follow Up Orders:  No orders of the defined types were placed in this encounter. Follow-up Information     Follow up With Specialties Details Why Contact Info    Cindi Zamora MD Internal Medicine   [address] [place] [address]  [phone]            Time spent in patient discharge planning and coordination 40 minutes.     Signed:  Tamara Gomes MD

## 2020-08-11 NOTE — PROGRESS NOTES
Pt resting in bed with eyes closed. Pt awakens when spoken to. Pt oriented times 3 at this time. Pt speaks very softly. Pt on 4 L NC at time. Pt denies  distress at this time. Pt complaints of chest pain 5/10 at this time. Pt report it started hurting during the night. Pt encouraged to call for assistance if needed call light in reach, will monitor.  Droplet plus precautions in place

## 2020-08-11 NOTE — PROGRESS NOTES
DC instructions given to pt. Pt being DC home to self care with home O2. DC instructions and medications given and understood. Prescriptions sent home. Pt awaiting home O2 tank and then will be DC home with family. esign not completed related to droplet plus precautions.

## 2020-08-11 NOTE — PROGRESS NOTES
Pt is for discharge home today with [facility]  SN/PT/OT   discharging home with [facility] given   no further needs/supportive care orders received for CM at this time. Pt will have close follow up with PCP. Milestones met    Care Management Interventions  PCP Verified by CM: Yes(Anthony Watson* [phone])  Mode of Transport at Discharge: Self  Transition of Care Consult (CM Consult): Other, Discharge Planning, Home Health(Chart screened. No CM consult)  [address] Yes  Discharge Durable Medical Equipment: Yes(Home O2 [employer] [facility])  Physical Therapy Consult: Yes  Occupational Therapy Consult: Yes  Speech Therapy Consult: No  Current Support Network: Own Home, Family Lives Nearby  Confirm Follow Up Transport: Family  The Plan for Transition of Care is Related to the Following Treatment Goals : ongoing therapy needs  The Patient and/or Patient Representative was Provided with a Choice of Provider and Agrees with the Discharge Plan?: Yes  Name of the Patient Representative Who was Provided with a Choice of Provider and Agrees with the Discharge Plan: pt.   Freedom of Choice List was Provided with Basic Dialogue that Supports the Patient's Individualized Plan of Care/Goals, Treatment Preferences and Shares the Quality Data Associated with the Providers?: Yes  Discharge Location  Discharge Placement: Home with home health

## 2020-08-12 ENCOUNTER — PATIENT OUTREACH (OUTPATIENT)
Dept: CASE MANAGEMENT | Age: 75
End: 2020-08-12

## 2020-08-12 ENCOUNTER — HOME CARE VISIT (OUTPATIENT)
Dept: SCHEDULING | Facility: HOME HEALTH | Age: 75
End: 2020-08-12
Payer: MEDICARE

## 2020-08-12 VITALS
OXYGEN SATURATION: 94 % | HEART RATE: 86 BPM | SYSTOLIC BLOOD PRESSURE: 124 MMHG | TEMPERATURE: 98.2 F | DIASTOLIC BLOOD PRESSURE: 62 MMHG | RESPIRATION RATE: 18 BRPM

## 2020-08-12 PROCEDURE — G0299 HHS/HOSPICE OF RN EA 15 MIN: HCPCS

## 2020-08-12 PROCEDURE — 400013 HH SOC

## 2020-08-12 NOTE — PROGRESS NOTES
Transition of Care Hospital Discharge Follow-Up      Date/Time:  2020 10:53 AM    Patient was admitted to [facility] on 20 and discharged on 20 for Acute respiratory failure with hypoxia. The physician discharge summary was available at the time of outreach. Patient was contacted within 1 business days of discharge. Inpatient RUR score: 12   Was this a readmission? no   Patient stated reason for the readmission: n/a  Patients top risk factors for readmission: comorbidities and/or recurrence of symptoms      LPN Care Coordinator contacted the patient by telephone to perform post hospital discharge assessment. Verified name and  with patient as identifiers. Provided introduction to self, and explanation of the Care Coordinator role. Patient received hospital discharge instructions. LPN reviewed discharge instructions and red flags with patient who verbalized understanding. Patient given an opportunity to ask questions and does not have any further questions or concerns at this time. The patient agrees to contact the PCP office for questions related to their healthcare. LPN provided contact information for future reference. Home Health orders at discharge: RN/PT/OT  [address]: [facility]  Date of initial or scheduled visit: 20  (Assist with coordination of services if necessary.)    Durable Medical Equipment ordered at discharge: [address]: [address] received: yes  (Assist patient in obtaining DME orders &/or equipment if necessary.)    Medication(s):   Medication review was performed with patient, who verbalizes understanding of administration of home medications. There were no barriers to obtaining medications identified at this time. Current Outpatient Medications   Medication Sig    dexAMETHasone (DECADRON) 6 mg tablet Take 1 Tab by mouth Daily (before breakfast) for 4 days.     famotidine (PEPCID) 20 mg tablet Take 1 Tab by mouth two (2) times a day for 5 days.  rivaroxaban (Xarelto) 10 mg tablet Take 1 Tab by mouth daily (with breakfast).  umeclidinium-vilanteroL (Anoro Ellipta) 62.5-25 mcg/actuation inhaler Take 1 Puff by inhalation daily.  albuterol (PROVENTIL VENTOLIN) 2.5 mg /3 mL (0.083 %) nebu 3 mL by Nebulization route every four (4) hours as needed (prn).  HYDROcodone-homatropine (HYCODAN) 5-1.5 mg/5 mL (5 mL) oral solution Take 5 mL by mouth every four (4) hours as needed for Cough for up to 3 days. Max Daily Amount: 30 mL.  montelukast (SINGULAIR) 10 mg tablet TAKE ONE TABLET BY MOUTH NIGHTLY    raNITIdine (ZANTAC) 150 mg tablet TAKE ONE TABLET BY MOUTH TWICE DAILY    dulaglutide (Trulicity) 1.5 CI/0.8 mL sub-q pen inject ONE syringe subcutaneously ONCE EVERY WEEK    hydrOXYzine HCL (ATARAX) 10 mg tablet Take 1 Tab by mouth every six (6) hours as needed for Itching. Indications: hives, itching    folic acid (FOLVITE) 1 mg tablet Take 1 Tab by mouth daily.  hydroxychloroquine (PLAQUENIL) 200 mg tablet Take 1 Tab by mouth two (2) times daily (after meals).  atorvastatin (LIPITOR) 40 mg tablet Take 1 Tab by mouth daily.  lancets (ACCU-CHEK FASTCLIX LANCET DRUM) misc Pt checks BS tid. Dx: E11.9    glucose blood VI test strips (ACCU-CHEK GUIDE) strip by Does Not Apply route See Admin Instructions. Pt checks BS tid. Dx: E11.9    Insulin Needles, Disposable, (LETI PEN NEEDLE) 32 gauge x 5/32\" ndle Inject insulin once daily  DX E 11.9    meloxicam (MOBIC) 15 mg tablet Take 1 pill once a day after food.  Blood-Glucose Meter (ACCU-CHEK GUIDE GLUCOSE METER) misc 1 Units by Does Not Apply route three (3) times daily.  cetirizine (ZYRTEC) 10 mg tablet Take 1 Tab by mouth daily.  budesonide (PULMICORT) 0.5 mg/2 mL nbsp 2 mL by Nebulization route two (2) times a day.     insulin glargine U-300 conc (TOUJEO SOLOSTAR U-300 INSULIN) 300 unit/mL (1.5 mL) inpn 35 Units by SubCUTAneous route nightly.  triamcinolone acetonide (KENALOG) 0.1 % topical cream Apply  to affected area two (2) times a day. use thin layer    EPINEPHrine (EPIPEN) 0.3 mg/0.3 mL injection 0.3 mg by IntraMUSCular route once as needed for Anaphylaxis.  aspirin delayed-release 81 mg tablet Take 81 mg by mouth daily. No current facility-administered medications for this visit. There are no discontinued medications. ADL assessment:   (If patient is unable to perform ADLs  what is the limiting factor(s)? Do they have a support system that can assist? If no support system is present, discuss possible assistance that they may be able to obtain. Escalate for SW if ongoing issues are verbalized by pt or anticipated)    [facility] follow up appointment(s):   Future Appointments   Date Time Provider Serenity Owens   8/14/2020  2:30 PM Aileen Salamanca MD [facility]      Non-[facility] follow up appointment(s): n/a  7 Day follow up with PCP or Specialist: 8/14/20  Transportation arranged: no needs at this time    Covid Risk Education    Patient has following risk factors of: acute respiratory failure, diabetes and COVID test positive. Education provided regarding infection prevention, and signs and symptoms of COVID-19 and when to seek medical attention with patient who verbalized understanding. Discussed exposure protocols and quarantine From CDC: Are you at higher risk for severe illness?  and given an opportunity for questions and concerns. The patient agrees to contact the COVID-19 hotline [phone] or PCP office for questions related to COVID-19. For more information on steps you can take to protect yourself, see CDC's How to Protect Yourself     Patient/family/caregiver given information for Joselyn Yanez and agrees to enroll yes  Patient's preferred e-mail: declines  Patient's preferred phone number: [phone]      Any other questions or concerns expressed by patient?  Not at this time    Scheduled next follow up call or referral to CTN/ ACM: Halima Pryor will follow per workflow guidelines  Next follow up call: within 14 days

## 2020-08-13 ENCOUNTER — HOME CARE VISIT (OUTPATIENT)
Dept: SCHEDULING | Facility: HOME HEALTH | Age: 75
End: 2020-08-13
Payer: MEDICARE

## 2020-08-13 VITALS
OXYGEN SATURATION: 97 % | RESPIRATION RATE: 20 BRPM | HEART RATE: 70 BPM | TEMPERATURE: 98.1 F | SYSTOLIC BLOOD PRESSURE: 136 MMHG | DIASTOLIC BLOOD PRESSURE: 82 MMHG

## 2020-08-13 PROCEDURE — G0152 HHCP-SERV OF OT,EA 15 MIN: HCPCS

## 2020-08-14 ENCOUNTER — HOME CARE VISIT (OUTPATIENT)
Dept: SCHEDULING | Facility: HOME HEALTH | Age: 75
End: 2020-08-14
Payer: MEDICARE

## 2020-08-14 PROCEDURE — G0299 HHS/HOSPICE OF RN EA 15 MIN: HCPCS

## 2020-08-16 VITALS
DIASTOLIC BLOOD PRESSURE: 68 MMHG | SYSTOLIC BLOOD PRESSURE: 118 MMHG | RESPIRATION RATE: 14 BRPM | HEART RATE: 79 BPM | OXYGEN SATURATION: 93 %

## 2020-08-17 ENCOUNTER — HOME CARE VISIT (OUTPATIENT)
Dept: SCHEDULING | Facility: HOME HEALTH | Age: 75
End: 2020-08-17
Payer: MEDICARE

## 2020-08-17 PROCEDURE — G0152 HHCP-SERV OF OT,EA 15 MIN: HCPCS

## 2020-08-17 PROCEDURE — G0151 HHCP-SERV OF PT,EA 15 MIN: HCPCS

## 2020-08-18 ENCOUNTER — HOME CARE VISIT (OUTPATIENT)
Dept: SCHEDULING | Facility: HOME HEALTH | Age: 75
End: 2020-08-18
Payer: MEDICARE

## 2020-08-18 VITALS
RESPIRATION RATE: 18 BRPM | TEMPERATURE: 97.2 F | DIASTOLIC BLOOD PRESSURE: 60 MMHG | HEART RATE: 74 BPM | SYSTOLIC BLOOD PRESSURE: 116 MMHG | OXYGEN SATURATION: 97 %

## 2020-08-18 VITALS — HEART RATE: 116 BPM | RESPIRATION RATE: 24 BRPM | OXYGEN SATURATION: 88 %

## 2020-08-18 PROCEDURE — G0299 HHS/HOSPICE OF RN EA 15 MIN: HCPCS

## 2020-08-20 ENCOUNTER — HOME CARE VISIT (OUTPATIENT)
Dept: SCHEDULING | Facility: HOME HEALTH | Age: 75
End: 2020-08-20
Payer: MEDICARE

## 2020-08-20 VITALS
RESPIRATION RATE: 20 BRPM | DIASTOLIC BLOOD PRESSURE: 70 MMHG | SYSTOLIC BLOOD PRESSURE: 130 MMHG | HEART RATE: 100 BPM | TEMPERATURE: 98.3 F | OXYGEN SATURATION: 98 %

## 2020-08-20 VITALS
OXYGEN SATURATION: 97 % | DIASTOLIC BLOOD PRESSURE: 66 MMHG | TEMPERATURE: 97.8 F | SYSTOLIC BLOOD PRESSURE: 126 MMHG | RESPIRATION RATE: 18 BRPM | HEART RATE: 97 BPM

## 2020-08-20 PROCEDURE — G0157 HHC PT ASSISTANT EA 15: HCPCS

## 2020-08-20 PROCEDURE — G0299 HHS/HOSPICE OF RN EA 15 MIN: HCPCS

## 2020-08-21 ENCOUNTER — HOME CARE VISIT (OUTPATIENT)
Dept: SCHEDULING | Facility: HOME HEALTH | Age: 75
End: 2020-08-21
Payer: MEDICARE

## 2020-08-21 PROCEDURE — G0155 HHCP-SVS OF CSW,EA 15 MIN: HCPCS

## 2020-08-25 ENCOUNTER — HOME CARE VISIT (OUTPATIENT)
Dept: SCHEDULING | Facility: HOME HEALTH | Age: 75
End: 2020-08-25
Payer: MEDICARE

## 2020-08-25 VITALS — TEMPERATURE: 98.4 F | RESPIRATION RATE: 24 BRPM | OXYGEN SATURATION: 96 % | HEART RATE: 88 BPM

## 2020-08-25 PROCEDURE — G0151 HHCP-SERV OF PT,EA 15 MIN: HCPCS

## 2020-08-26 ENCOUNTER — PATIENT OUTREACH (OUTPATIENT)
Dept: CASE MANAGEMENT | Age: 75
End: 2020-08-26

## 2020-08-26 NOTE — PROGRESS NOTES
Attempted outreach follow up without success, left message. Noted per [address] patient has followed given plan of care. Patient attended follow up with next visit: Dr. Joesph Alvarado in 4 weeks. [facility] PT remain active with patient. No new needs are noted on chart review. Patient will be graduated from [employer] program.  Will reopen if call is returned.

## 2020-08-27 ENCOUNTER — HOME CARE VISIT (OUTPATIENT)
Dept: SCHEDULING | Facility: HOME HEALTH | Age: 75
End: 2020-08-27
Payer: MEDICARE

## 2020-08-27 VITALS
RESPIRATION RATE: 18 BRPM | DIASTOLIC BLOOD PRESSURE: 70 MMHG | OXYGEN SATURATION: 99 % | SYSTOLIC BLOOD PRESSURE: 144 MMHG | HEART RATE: 92 BPM | TEMPERATURE: 97.9 F

## 2020-08-27 PROCEDURE — G0157 HHC PT ASSISTANT EA 15: HCPCS

## 2020-08-31 ENCOUNTER — HOME CARE VISIT (OUTPATIENT)
Dept: SCHEDULING | Facility: HOME HEALTH | Age: 75
End: 2020-08-31
Payer: MEDICARE

## 2020-08-31 PROCEDURE — G0157 HHC PT ASSISTANT EA 15: HCPCS

## 2020-09-02 ENCOUNTER — HOME CARE VISIT (OUTPATIENT)
Dept: HOME HEALTH SERVICES | Facility: HOME HEALTH | Age: 75
End: 2020-09-02
Payer: MEDICARE

## 2020-09-02 VITALS
DIASTOLIC BLOOD PRESSURE: 86 MMHG | TEMPERATURE: 98.7 F | OXYGEN SATURATION: 98 % | SYSTOLIC BLOOD PRESSURE: 136 MMHG | RESPIRATION RATE: 24 BRPM | HEART RATE: 104 BPM

## 2020-09-02 PROCEDURE — G0151 HHCP-SERV OF PT,EA 15 MIN: HCPCS

## 2020-09-11 ENCOUNTER — HOME CARE VISIT (OUTPATIENT)
Dept: SCHEDULING | Facility: HOME HEALTH | Age: 75
End: 2020-09-11
Payer: MEDICARE

## 2020-09-11 PROCEDURE — 400013 HH SOC

## 2020-09-11 PROCEDURE — G0151 HHCP-SERV OF PT,EA 15 MIN: HCPCS

## 2020-09-13 VITALS
TEMPERATURE: 98.2 F | RESPIRATION RATE: 18 BRPM | DIASTOLIC BLOOD PRESSURE: 92 MMHG | SYSTOLIC BLOOD PRESSURE: 138 MMHG | OXYGEN SATURATION: 98 % | HEART RATE: 92 BPM

## 2020-09-15 ENCOUNTER — HOME CARE VISIT (OUTPATIENT)
Dept: SCHEDULING | Facility: HOME HEALTH | Age: 75
End: 2020-09-15
Payer: MEDICARE

## 2020-09-15 VITALS
TEMPERATURE: 97.7 F | OXYGEN SATURATION: 98 % | HEART RATE: 96 BPM | RESPIRATION RATE: 20 BRPM | SYSTOLIC BLOOD PRESSURE: 144 MMHG | DIASTOLIC BLOOD PRESSURE: 96 MMHG

## 2020-09-15 PROCEDURE — G0151 HHCP-SERV OF PT,EA 15 MIN: HCPCS

## 2020-09-17 ENCOUNTER — HOME CARE VISIT (OUTPATIENT)
Dept: SCHEDULING | Facility: HOME HEALTH | Age: 75
End: 2020-09-17
Payer: MEDICARE

## 2020-09-17 VITALS
HEART RATE: 88 BPM | DIASTOLIC BLOOD PRESSURE: 86 MMHG | SYSTOLIC BLOOD PRESSURE: 138 MMHG | RESPIRATION RATE: 16 BRPM | TEMPERATURE: 98.6 F

## 2020-09-17 PROCEDURE — G0157 HHC PT ASSISTANT EA 15: HCPCS

## 2020-09-22 ENCOUNTER — HOME CARE VISIT (OUTPATIENT)
Dept: SCHEDULING | Facility: HOME HEALTH | Age: 75
End: 2020-09-22
Payer: MEDICARE

## 2020-09-22 VITALS
DIASTOLIC BLOOD PRESSURE: 92 MMHG | SYSTOLIC BLOOD PRESSURE: 138 MMHG | OXYGEN SATURATION: 98 % | TEMPERATURE: 98.6 F | HEART RATE: 88 BPM | RESPIRATION RATE: 18 BRPM

## 2020-09-22 PROCEDURE — G0151 HHCP-SERV OF PT,EA 15 MIN: HCPCS

## 2020-09-24 ENCOUNTER — HOME CARE VISIT (OUTPATIENT)
Dept: SCHEDULING | Facility: HOME HEALTH | Age: 75
End: 2020-09-24
Payer: MEDICARE

## 2020-09-24 VITALS
DIASTOLIC BLOOD PRESSURE: 88 MMHG | TEMPERATURE: 98 F | SYSTOLIC BLOOD PRESSURE: 140 MMHG | HEART RATE: 92 BPM | RESPIRATION RATE: 18 BRPM | OXYGEN SATURATION: 97 %

## 2020-09-24 PROCEDURE — G0151 HHCP-SERV OF PT,EA 15 MIN: HCPCS

## 2020-10-15 ENCOUNTER — TRANSCRIBE ORDER (OUTPATIENT)
Dept: SCHEDULING | Age: 75
End: 2020-10-15

## 2020-10-15 DIAGNOSIS — Z12.31 SCREENING MAMMOGRAM, ENCOUNTER FOR: Primary | ICD-10-CM

## 2020-11-27 ENCOUNTER — HOSPITAL ENCOUNTER (OUTPATIENT)
Dept: MAMMOGRAPHY | Age: 75
Discharge: HOME | End: 2020-11-27
Attending: FAMILY MEDICINE
Payer: MEDICARE

## 2020-11-27 DIAGNOSIS — Z12.31 SCREENING MAMMOGRAM, ENCOUNTER FOR: ICD-10-CM

## 2020-11-27 PROCEDURE — 77067 SCR MAMMO BI INCL CAD: CPT

## 2021-03-29 ENCOUNTER — HOSPITAL ENCOUNTER (EMERGENCY)
Age: 76
Discharge: HOME | End: 2021-03-29
Attending: EMERGENCY MEDICINE
Payer: MEDICARE

## 2021-03-29 ENCOUNTER — APPOINTMENT (OUTPATIENT)
Dept: CT IMAGING | Age: 76
End: 2021-03-29
Attending: EMERGENCY MEDICINE
Payer: MEDICARE

## 2021-03-29 ENCOUNTER — APPOINTMENT (OUTPATIENT)
Dept: GENERAL RADIOLOGY | Age: 76
End: 2021-03-29
Attending: EMERGENCY MEDICINE
Payer: MEDICARE

## 2021-03-29 VITALS
WEIGHT: 152 LBS | HEIGHT: 59 IN | OXYGEN SATURATION: 98 % | RESPIRATION RATE: 18 BRPM | BODY MASS INDEX: 30.64 KG/M2 | DIASTOLIC BLOOD PRESSURE: 90 MMHG | TEMPERATURE: 98.4 F | HEART RATE: 72 BPM | SYSTOLIC BLOOD PRESSURE: 167 MMHG

## 2021-03-29 DIAGNOSIS — J44.1 ACUTE EXACERBATION OF CHRONIC OBSTRUCTIVE PULMONARY DISEASE (COPD) (HCC): Primary | ICD-10-CM

## 2021-03-29 LAB
ALBUMIN SERPL-MCNC: 3.6 G/DL (ref 3.2–4.6)
ALBUMIN/GLOB SERPL: 0.9 {RATIO} (ref 1.2–3.5)
ALP SERPL-CCNC: 54 U/L (ref 50–136)
ALT SERPL-CCNC: 16 U/L (ref 12–65)
ANION GAP SERPL CALC-SCNC: 3 MMOL/L (ref 7–16)
AST SERPL-CCNC: 20 U/L (ref 15–37)
BASOPHILS # BLD: 0 K/UL (ref 0–0.2)
BASOPHILS NFR BLD: 0 % (ref 0–2)
BILIRUB SERPL-MCNC: 0.5 MG/DL (ref 0.2–1.1)
BUN SERPL-MCNC: 13 MG/DL (ref 8–23)
CALCIUM SERPL-MCNC: 8.9 MG/DL (ref 8.3–10.4)
CHLORIDE SERPL-SCNC: 108 MMOL/L (ref 98–107)
CO2 SERPL-SCNC: 31 MMOL/L (ref 21–32)
CREAT SERPL-MCNC: 0.73 MG/DL (ref 0.6–1)
DIFFERENTIAL METHOD BLD: ABNORMAL
EOSINOPHIL # BLD: 0 K/UL (ref 0–0.8)
EOSINOPHIL NFR BLD: 0 % (ref 0.5–7.8)
ERYTHROCYTE [DISTWIDTH] IN BLOOD BY AUTOMATED COUNT: 15.9 % (ref 11.9–14.6)
GLOBULIN SER CALC-MCNC: 3.9 G/DL (ref 2.3–3.5)
GLUCOSE SERPL-MCNC: 96 MG/DL (ref 65–100)
HCT VFR BLD AUTO: 45.6 % (ref 35.8–46.3)
HGB BLD-MCNC: 14.1 G/DL (ref 11.7–15.4)
IMM GRANULOCYTES # BLD AUTO: 0 K/UL (ref 0–0.5)
IMM GRANULOCYTES NFR BLD AUTO: 0 % (ref 0–5)
LYMPHOCYTES # BLD: 2.4 K/UL (ref 0.5–4.6)
LYMPHOCYTES NFR BLD: 26 % (ref 13–44)
MCH RBC QN AUTO: 26.4 PG (ref 26.1–32.9)
MCHC RBC AUTO-ENTMCNC: 30.9 G/DL (ref 31.4–35)
MCV RBC AUTO: 85.2 FL (ref 79.6–97.8)
MONOCYTES # BLD: 0.7 K/UL (ref 0.1–1.3)
MONOCYTES NFR BLD: 7 % (ref 4–12)
NEUTS SEG # BLD: 6.3 K/UL (ref 1.7–8.2)
NEUTS SEG NFR BLD: 67 % (ref 43–78)
NRBC # BLD: 0 K/UL (ref 0–0.2)
PLATELET # BLD AUTO: 320 K/UL (ref 150–450)
PMV BLD AUTO: 11.3 FL (ref 9.4–12.3)
POTASSIUM SERPL-SCNC: 4.3 MMOL/L (ref 3.5–5.1)
PROT SERPL-MCNC: 7.5 G/DL (ref 6.3–8.2)
RBC # BLD AUTO: 5.35 M/UL (ref 4.05–5.2)
SODIUM SERPL-SCNC: 142 MMOL/L (ref 136–145)
WBC # BLD AUTO: 9.5 K/UL (ref 4.3–11.1)

## 2021-03-29 PROCEDURE — 74011250637 HC RX REV CODE- 250/637: Performed by: EMERGENCY MEDICINE

## 2021-03-29 PROCEDURE — 71260 CT THORAX DX C+: CPT

## 2021-03-29 PROCEDURE — 85025 COMPLETE CBC W/AUTO DIFF WBC: CPT

## 2021-03-29 PROCEDURE — 71046 X-RAY EXAM CHEST 2 VIEWS: CPT

## 2021-03-29 PROCEDURE — 96374 THER/PROPH/DIAG INJ IV PUSH: CPT

## 2021-03-29 PROCEDURE — 74011000258 HC RX REV CODE- 258: Performed by: EMERGENCY MEDICINE

## 2021-03-29 PROCEDURE — 81003 URINALYSIS AUTO W/O SCOPE: CPT

## 2021-03-29 PROCEDURE — 99284 EMERGENCY DEPT VISIT MOD MDM: CPT

## 2021-03-29 PROCEDURE — 94640 AIRWAY INHALATION TREATMENT: CPT

## 2021-03-29 PROCEDURE — 74011250636 HC RX REV CODE- 250/636: Performed by: EMERGENCY MEDICINE

## 2021-03-29 PROCEDURE — 80053 COMPREHEN METABOLIC PANEL: CPT

## 2021-03-29 PROCEDURE — 74011000636 HC RX REV CODE- 636: Performed by: EMERGENCY MEDICINE

## 2021-03-29 PROCEDURE — 74011000250 HC RX REV CODE- 250: Performed by: EMERGENCY MEDICINE

## 2021-03-29 RX ORDER — IPRATROPIUM BROMIDE AND ALBUTEROL SULFATE 2.5; .5 MG/3ML; MG/3ML
3 SOLUTION RESPIRATORY (INHALATION)
Status: COMPLETED | OUTPATIENT
Start: 2021-03-29 | End: 2021-03-29

## 2021-03-29 RX ORDER — ONDANSETRON 2 MG/ML
4 INJECTION INTRAMUSCULAR; INTRAVENOUS
Status: COMPLETED | OUTPATIENT
Start: 2021-03-29 | End: 2021-03-29

## 2021-03-29 RX ORDER — PREDNISONE 10 MG/1
TABLET ORAL
Qty: 48 TAB | Refills: 0 | Status: SHIPPED | OUTPATIENT
Start: 2021-03-29 | End: 2021-05-20 | Stop reason: ALTCHOICE

## 2021-03-29 RX ORDER — SODIUM CHLORIDE 0.9 % (FLUSH) 0.9 %
10 SYRINGE (ML) INJECTION
Status: COMPLETED | OUTPATIENT
Start: 2021-03-29 | End: 2021-03-29

## 2021-03-29 RX ORDER — BENZONATATE 100 MG/1
200 CAPSULE ORAL
Status: COMPLETED | OUTPATIENT
Start: 2021-03-29 | End: 2021-03-29

## 2021-03-29 RX ADMIN — IPRATROPIUM BROMIDE AND ALBUTEROL SULFATE 3 ML: .5; 3 SOLUTION RESPIRATORY (INHALATION) at 13:27

## 2021-03-29 RX ADMIN — BENZONATATE 200 MG: 100 CAPSULE ORAL at 13:09

## 2021-03-29 RX ADMIN — Medication 10 ML: at 14:13

## 2021-03-29 RX ADMIN — IOPAMIDOL 100 ML: 755 INJECTION, SOLUTION INTRAVENOUS at 14:13

## 2021-03-29 RX ADMIN — ONDANSETRON 4 MG: 2 INJECTION INTRAMUSCULAR; INTRAVENOUS at 14:44

## 2021-03-29 RX ADMIN — SODIUM CHLORIDE 100 ML: 900 INJECTION, SOLUTION INTRAVENOUS at 14:13

## 2021-03-29 NOTE — ED NOTES
I have reviewed discharge instructions with the patient. The patient verbalized understanding. Patient left ED via Discharge Method: ambulatory to Home with  Self. Opportunity for questions and clarification provided. Patient given 1 scripts. To continue your aftercare when you leave the hospital, you may receive an automated call from our care team to check in on how you are doing. This is a free service and part of our promise to provide the best care and service to meet your aftercare needs.  If you have questions, or wish to unsubscribe from this service please call [phone]. Thank you for Choosing our [facility] Emergency Department.

## 2021-03-29 NOTE — ED PROVIDER NOTES
69-year-old lady presents with concerns about cough and shortness of breath that has been present for the last few days. She notes that she had Covid in August 2020 but made a good recovery. She said that she has had no new fevers or chills. She said no nausea, vomiting, or diarrhea. She has had a cough with some clear mucus and sputum. She says she has had some mild fatigue and a sore throat. She denies any chest pain or pressure. No other associated symptoms. Elements of this note were created using speech recognition software. As such, errors of speech recognition may be present. Past Medical History:   Diagnosis Date    Arrhythmia 3 - 4 weeks ago (9/2010)    Dr. Joel Mary?  ([facility]) ran tests for heart speeding up/slowing down; (has occasional heart palpitations; no cardiologist 3/9/18)   Floydene Ada Arthritis     Asthma     first told in 2000; inhaler daily; nebs prn    Bladder incontinence 10/24/2016    Chronic pain     Dermatophytosis     Diabetes ([address]     type 2; insulin meds; hasn't been checking bs lately; last A1C=6.8 on 2/19/18    Former smoker     GERD (gastroesophageal reflux disease) 7/23/2013    Heart failure ([address]     no cardiologist     Hypercholesterolemia     Hypertension     on med for control     Hypertensive heart disease without HF (heart failure)     Lupus (systemic lupus erythematosus) ([address] 2005    recent blood work was negative (3/9/18)    Non compliance with medical treatment     Obesity     ANATOLY (obstructive sleep apnea) 07/23/2013    no c-pap; uses oxygen prn at HS 2L/NC    Seizures ([address]     last one about 2.5 yrs ago (noted 3/9/18); no meds    Stroke [facility]) 1979    MINI STROKE    Systemic lupus erythematosus ([address] 07/23/2013    recent blood work negative (3/9/18)    Type 2 diabetes mellitus with hyperglycemia ([address] 10/24/2016       Past Surgical History:   Procedure Laterality Date    HX APPENDECTOMY      HX BREAST BIOPSY Left     HX BREAST LUMPECTOMY  1998    benign    HX HAMMER TOE REPAIR  2010    left    HX HEART CATHETERIZATION  1999    LHC:LV EF=75%. Normal coronary arteries.     HX HEENT      cataract removal right eye and skin removal off of lids    HX HYSTERECTOMY      HX OTHER SURGICAL      had all teeth removed    HX OTHER SURGICAL  03/15/2018    Throat    SD REV UPPER EYELID W EXCESS SKIN           Family History:   Problem Relation Age of Onset    Heart Disease Father     Bleeding Prob Brother     Diabetes Brother     Heart Disease Brother     Diabetes Brother     Cancer Brother     Heart Disease Mother     Dementia Mother     Diabetes Sister     Asthma Brother     Cancer Brother     Stroke Brother     Malignant Hyperthermia Neg Hx     Pseudocholinesterase Deficiency Neg Hx     Delayed Awakening Neg Hx     Post-op Nausea/Vomiting Neg Hx     Emergence Delirium Neg Hx     Post-op Cognitive Dysfunction Neg Hx     Other Neg Hx        Social History     Socioeconomic History    Marital status: LEGALLY      Spouse name: Not on file    Number of children: 0    Years of education: Not on file    Highest education level: Not on file   Occupational History    Occupation: [employer]     Employer: NOT EMPLOYED     Comment: Denies industrial toxins    Occupation: Textiles     Comment: 6 yrs   Social Needs    Financial resource strain: Not on file    Food insecurity     Worry: Not on file     Inability: Not on file   [employer] needs     Medical: Not on file     Non-medical: Not on file   Tobacco Use    Smoking status: Former Smoker     Packs/day: 0.50     Years: 12.00     Pack years: 6.00     Quit date: 10/14/1977     Years since quittin.4    Smokeless tobacco: Never Used   Substance and Sexual Activity    Alcohol use: No    Drug use: No    Sexual activity: Never     Partners: Male     Birth control/protection: Surgical     Comment: sleeps alone for past 30 years,  in [address]    Lifestyle    Physical activity     Days per week: Not on file     Minutes per session: Not on file    Stress: Not on file   Relationships    Social connections     Talks on phone: Not on file     Gets together: Not on file     Attends Holiness service: Not on file     Active member of club or organization: Not on file     Attends meetings of clubs or organizations: Not on file     Relationship status: Not on file    Intimate partner violence     Fear of current or ex partner: Not on file     Emotionally abused: Not on file     Physically abused: Not on file     Forced sexual activity: Not on file   Other Topics Concern    Not on file   Social History Narrative        Worked in [place] for 6 years and at [employer] for 2 years where she states she was not exposed to industrial toxins. , no children. Has always live in [address]. No pets. ALLERGIES: Prilosec [omeprazole] and Penicillins    Review of Systems   Constitutional: Negative for chills and fever. HENT: Negative for congestion, rhinorrhea and sinus pain. Respiratory: Positive for chest tightness, shortness of breath and wheezing. Cardiovascular: Positive for chest pain. Gastrointestinal: Negative for nausea and vomiting. Endocrine: Negative for cold intolerance and heat intolerance. Neurological: Negative for dizziness, syncope and light-headedness. Vitals:    03/29/21 1152 03/29/21 1327 03/29/21 1444   BP: (!) 160/97  (!) 186/91   Pulse: 98     Resp: 16  16   Temp: 98.4 °F (36.9 °C)     SpO2: 95% 96% 97%   Weight: 68.9 kg (152 lb)     Height: 4' 11\" (1.499 m)              Physical Exam  Vitals signs and nursing note reviewed. Constitutional:       Appearance: She is well-developed. HENT:      Head: Normocephalic and atraumatic. Eyes:      Conjunctiva/sclera: Conjunctivae normal.      Pupils: Pupils are equal, round, and reactive to light.    Neck:      Musculoskeletal: Normal range of motion and neck supple. Cardiovascular:      Rate and Rhythm: Normal rate and regular rhythm. Heart sounds: Normal heart sounds. Pulmonary:      Effort: Pulmonary effort is normal. No respiratory distress. Breath sounds: Wheezing present. Chest:      Chest wall: No tenderness. Abdominal:      General: Bowel sounds are normal.      Palpations: Abdomen is soft. Musculoskeletal: Normal range of motion. Skin:     General: Skin is warm and dry. Neurological:      Mental Status: She is alert and oriented to person, place, and time. MDM  Number of Diagnoses or Management Options    ED Course as of Mar 29 1531   Mon Mar 29, 2021   1530 His chest x-ray was unrevealing. Her CT scan showed no evidence for PE. She is feeling a little bit better after her breathing treatment.   I will discharge her home with some steroids for a likely COPD exacerbation.    [AC]      ED Course User Index  [AC] Richard Robles MD       Procedures

## 2021-03-29 NOTE — DISCHARGE INSTRUCTIONS
Return with any fevers, vomiting, difficulty breathing, worsening symptoms, or additional concerns. Follow-up with your primary care doctor or your lung doctor for reevaluation in 3 or 4 days.

## 2021-03-29 NOTE — ED TRIAGE NOTES
Patient ambulatory to triage with mask in place. Patient reports shob x a month. Pt reports productive cough. Pt also reports n/v, sore throat and fatigue. Pt had COVID in August.  Denies fever.

## 2021-04-05 ENCOUNTER — TRANSCRIBE ORDER (OUTPATIENT)
Dept: SCHEDULING | Age: 76
End: 2021-04-05

## 2021-04-05 DIAGNOSIS — R19.06 EPIGASTRIC MASS: Primary | ICD-10-CM

## 2021-04-13 ENCOUNTER — HOSPITAL ENCOUNTER (OUTPATIENT)
Dept: CT IMAGING | Age: 76
Discharge: HOME | End: 2021-04-13
Attending: STUDENT IN AN ORGANIZED HEALTH CARE EDUCATION/TRAINING PROGRAM
Payer: MEDICARE

## 2021-04-13 DIAGNOSIS — R19.06 EPIGASTRIC MASS: ICD-10-CM

## 2021-04-13 LAB — CREAT BLD-MCNC: 0.7 MG/DL (ref 0.8–1.5)

## 2021-04-13 PROCEDURE — 82565 ASSAY OF CREATININE: CPT

## 2021-04-13 PROCEDURE — 74011000258 HC RX REV CODE- 258: Performed by: STUDENT IN AN ORGANIZED HEALTH CARE EDUCATION/TRAINING PROGRAM

## 2021-04-13 PROCEDURE — 74177 CT ABD & PELVIS W/CONTRAST: CPT

## 2021-04-13 PROCEDURE — 74011000636 HC RX REV CODE- 636: Performed by: STUDENT IN AN ORGANIZED HEALTH CARE EDUCATION/TRAINING PROGRAM

## 2021-04-13 RX ORDER — SODIUM CHLORIDE 0.9 % (FLUSH) 0.9 %
10 SYRINGE (ML) INJECTION
Status: COMPLETED | OUTPATIENT
Start: 2021-04-13 | End: 2021-04-13

## 2021-04-13 RX ADMIN — DIATRIZOATE MEGLUMINE AND DIATRIZOATE SODIUM 15 ML: 660; 100 LIQUID ORAL; RECTAL at 11:05

## 2021-04-13 RX ADMIN — Medication 10 ML: at 11:05

## 2021-04-13 RX ADMIN — SODIUM CHLORIDE 100 ML: 900 INJECTION, SOLUTION INTRAVENOUS at 11:05

## 2021-04-13 RX ADMIN — IOPAMIDOL 100 ML: 755 INJECTION, SOLUTION INTRAVENOUS at 11:05

## 2021-04-30 ENCOUNTER — HOSPITAL ENCOUNTER (OUTPATIENT)
Dept: LAB | Age: 76
Discharge: HOME | End: 2021-04-30
Payer: MEDICARE

## 2021-04-30 DIAGNOSIS — J44.1 CHRONIC OBSTRUCTIVE PULMONARY DISEASE WITH ACUTE EXACERBATION (HCC): ICD-10-CM

## 2021-04-30 DIAGNOSIS — R06.02 SOB (SHORTNESS OF BREATH): ICD-10-CM

## 2021-04-30 LAB
BASOPHILS # BLD: 0 K/UL (ref 0–0.2)
BASOPHILS NFR BLD: 1 % (ref 0–2)
BNP SERPL-MCNC: 467 PG/ML
DIFFERENTIAL METHOD BLD: NORMAL
EOSINOPHIL # BLD: 0.1 K/UL (ref 0–0.8)
EOSINOPHIL NFR BLD: 1 % (ref 0.5–7.8)
ERYTHROCYTE [DISTWIDTH] IN BLOOD BY AUTOMATED COUNT: 14.6 % (ref 11.9–14.6)
HCT VFR BLD AUTO: 43.7 % (ref 35.8–46.3)
HGB BLD-MCNC: 13.8 G/DL (ref 11.7–15.4)
IMM GRANULOCYTES # BLD AUTO: 0.1 K/UL (ref 0–0.5)
IMM GRANULOCYTES NFR BLD AUTO: 1 % (ref 0–5)
LYMPHOCYTES # BLD: 2 K/UL (ref 0.5–4.6)
LYMPHOCYTES NFR BLD: 23 % (ref 13–44)
MCH RBC QN AUTO: 27 PG (ref 26.1–32.9)
MCHC RBC AUTO-ENTMCNC: 31.6 G/DL (ref 31.4–35)
MCV RBC AUTO: 85.5 FL (ref 79.6–97.8)
MONOCYTES # BLD: 0.6 K/UL (ref 0.1–1.3)
MONOCYTES NFR BLD: 7 % (ref 4–12)
NEUTS SEG # BLD: 6 K/UL (ref 1.7–8.2)
NEUTS SEG NFR BLD: 68 % (ref 43–78)
NRBC # BLD: 0 K/UL (ref 0–0.2)
PLATELET # BLD AUTO: 306 K/UL (ref 150–450)
PMV BLD AUTO: 11.1 FL (ref 9.4–12.3)
PROCALCITONIN SERPL-MCNC: <0.05 NG/ML
RBC # BLD AUTO: 5.11 M/UL (ref 4.05–5.2)
WBC # BLD AUTO: 8.8 K/UL (ref 4.3–11.1)

## 2021-04-30 PROCEDURE — 36415 COLL VENOUS BLD VENIPUNCTURE: CPT

## 2021-04-30 PROCEDURE — 83880 ASSAY OF NATRIURETIC PEPTIDE: CPT

## 2021-04-30 PROCEDURE — 84145 PROCALCITONIN (PCT): CPT

## 2021-04-30 PROCEDURE — 85025 COMPLETE CBC W/AUTO DIFF WBC: CPT

## 2021-05-01 NOTE — PROGRESS NOTES
Please let patient know her blood work was normal except the marker for heart stretch, which could be a reason for her shortness of breath. Needs echocardiogram scheduled.

## 2021-05-06 NOTE — PROGRESS NOTES
Spoke with the patient about her normal lab results, except her heart stretch marker.  She was informed of our referral to cardiology for an echocardiogram.

## 2021-06-01 PROBLEM — U07.1 COVID-19 VIRUS INFECTION: Status: RESOLVED | Noted: 2020-08-04 | Resolved: 2021-06-01

## 2021-06-01 PROBLEM — J96.01 ACUTE RESPIRATORY FAILURE WITH HYPOXIA (HCC): Status: RESOLVED | Noted: 2020-08-04 | Resolved: 2021-06-01

## 2021-06-02 ENCOUNTER — HOSPITAL ENCOUNTER (OUTPATIENT)
Dept: PHYSICAL THERAPY | Age: 76
Discharge: HOME | End: 2021-06-02
Payer: MEDICARE

## 2021-06-02 PROCEDURE — 97014 ELECTRIC STIMULATION THERAPY: CPT

## 2021-06-02 PROCEDURE — 97161 PT EVAL LOW COMPLEX 20 MIN: CPT

## 2021-06-02 PROCEDURE — 97140 MANUAL THERAPY 1/> REGIONS: CPT

## 2021-06-02 PROCEDURE — 97110 THERAPEUTIC EXERCISES: CPT

## 2021-06-02 NOTE — THERAPY EVALUATION
Jessica Maria : 1945 Payor: Villa Arrington / Plan: [address] / Product Type: Managed Care Medicare /  [address] at [address] [facility] [address], [address], [facility], [address] [facility] [phone]   [phone] OUTPATIENT PHYSICAL THERAPY:Initial Assessment 2021 ICD-10: Treatment Diagnosis: Low back pain Low back pain (M54.5) Sciatica, right side (M54.31) Sciatica, left side (M54.32) Lumbago with sciatica, right side (M54.41) Lumbago with sciatica, left side (M54.42) Difficulty in walking, not elsewhere classified (R26.2) Muscle weakness (generalized) (M62.81) PRECAUTIONS/ALLERGIES:  
Prilosec [omeprazole] and Penicillins FALL RISK SCORE: 1 (? 5 = High Risk) MD Mason Oar and Treat MEDICAL/REFERRING DIAGNOSIS: 
Pain in right arm [M79.601] Low back pain [M54.5] DATE OF ONSET: 2 years ago REFERRING PHYSICIAN: DO LEA Rodas PHYSICIAN APPOINTMENT: TBD Ambulatory/Rehab Services H2 Model Falls Risk Assessment Risk Factors: 
     No Risk Factors Identified Ability to Rise from Chair: 
     (1)  Pushes up, successful in one attempt Falls Prevention Plan: No modifications necessary Total: (5 or greater = High Risk): 1  [facility] States Patent #[id number]. Federal Law prohibits the replication, distribution or use without written permission from [facility] INITIAL ASSESSMENT:  Ms. Jessica Maria has attended 1 physical therapy session including initial evaluation as of 2021. Jessica Line exhibits decreased ROM, increased pain, decreased postural and core strength, decreased functional tolerance, and decreased general LE strength. No pelvic malalignment upon initial evaluation but decreased posture.   
 
      Jessica Maria will benefit from skilled PT (medically necessary) to address above deficits affecting participation in basic ADLs and overall functional tolerance. Manual techniques (stretching, joint mobilizations, soft tissue mobilization/myofascial release), postural exercises/education, therapeutic techniques/activities, and HEP will be performed as appropriate addressing Lanny Chatman current condition. PROBLEM LIST (Impacting functional limitations): 1. Decreased Strength 2. Decreased ADL/Functional Activities 3. Decreased Transfer Abilities 4. Decreased Ambulation Ability/Technique 5. Decreased Balance 6. Increased Pain 7. Decreased Activity Tolerance 8. Increased Fatigue 9. Increased Shortness of Breath 10. Decreased Flexibility/Joint Mobility 11. Decreased [place] with Home Exercise Program INTERVENTIONS PLANNED: 
1. Balance Exercise 2. Bed Mobility 3. Cold 4. Cryotherapy 5. Electrical Stimulation 6. Family Education 7. Gait Training 8. Heat 9. Home Exercise Program (HEP) 10. Manual Therapy 11. Neuromuscular Re-education/Strengthening 12. Range of Motion (ROM) 13. Therapeutic Activites 14. Therapeutic Exercise/Strengthening 15. Transfer Training 16. Mechanical traction 17. Aquatic Therapy 18. Electrical Stimulation 19. Vasopneumatic compression 20. Dry Needling for Pain control TREATMENT PLAN: 
Effective Dates: 6/2/2021 TO 8/31/2021 (90 days). Frequency/Duration: 2 times a week for 90 Days GOALS: (Goals have been discussed and agreed upon with patient.) Short Term Goals 4 weeks 1. Lanny Chatman will be independent with HEP to promote self-management of symptoms. 2. Lanny Chatman will participate in core stabilization exercises to help with stabilization and improve posture during ADLs to help prevent future injuries. 3. Lanny Chatman will participate in LE strengthening program with weights as appropriate to help with gait and elevations. [address] will participate in static and dynamic balance activities to decrease the risk for falls and improve overall QOL. [address] will tolerate manual therapy/joint mobilizations/soft tissue to increase ROM and decrease pain to improve functional mobility during ADLs. Long Term Goals 12 weeks 1. Dania Dyer will demonstrate an 5 point improvement on the Oswestry to show improvement in function. 2. Dania Dyer will report <=4/10 pain at rest and during ADLs to improve QOL. 3. Dania Dyer will demonstrate >=4+/5 LE strength on manual muscle testing to improve functional mobility. [address] will be able to demonstrate safe lifting and transfer mechanics without cueing for improved safety with home, childcare, and community activities. Rehabilitation Potential For Stated Goals: GOOD Regarding Dania Dyer therapy, I certify that the treatment plan above will be carried out by a therapist or under their direction. Thank you for this referral, 
Karel Palencia, PT, DPT Referring Physician Signature: Alex Zambrano,               Date HISTORY:  
PATIENT GOAL FOR PHYSICAL THERAPY: 
Reduce pain in her back HISTORY OF PRESENT INJURY/ILLNESS (REASON FOR REFERRAL): 
Patient reports she's suffered from pain her low back (right > left) with insidious KACEY. Pt reports she suffers from arthritis and attributes most of her impairments to that diagnosis. Pt reports that previous MD visits have resulted in additional attributions to arthritis causing her pain; however, pt reporting TTP along R latissimus and QL in addition to lumbar spine. Patient reports she has tried exercise in the past as a method to address her pain, but states that it hasn't been much help. Pt states she is unable to perform hobbies (fishing) or ADLs (self-care) secondary to decreased activity tolerance as a result of increased pain. Pt reports that she will occasionally have radicular symptoms, which result after standing or sitting for an extended period of time. Patient reports she only has the capacity to stand for 20-25 minutes before needing a seated rest break. Note: Patient denies any increase of symptoms with cough, sneeze or valsalva. Patient denies any saddle paresthesia or bowel/bladder deficits. PAIN AND NATURE OF CONDITION Date:  
6/2/2021 Highest pain level 10/10 Lowest pain level 7/10 Aggravating factors Standing, walking Alleviating factors/positions/motions Lying down Irritability Significant Depression, fear, anxiety None KACEY Insidious Leg pain Yes Sedentary lifestyle Yes PAST MEDICAL HISTORY/COMORBIDITIES:  
Ms. [facility]  has a past medical history of Arrhythmia (3 - 4 weeks ago (9/2010)), Arthritis, Asthma, Bladder incontinence (10/24/2016), Chronic pain, Dermatophytosis, Diabetes ([address] Former smoker, GERD (gastroesophageal reflux disease) (7/23/2013), Heart failure ([address] Hypercholesterolemia, Hypertension, Hypertensive heart disease without HF (heart failure), Lupus (systemic lupus erythematosus) ([address] (2005), Non compliance with medical treatment, Obesity, ANATOLY (obstructive sleep apnea) (07/23/2013), Seizures ([address] Stroke ([address] (1979), Systemic lupus erythematosus ([address] (07/23/2013), and Type 2 diabetes mellitus with hyperglycemia ([address] (10/24/2016).  She also has no past medical history of Adverse effect of anesthesia, Aneurysm ([address] CAD (coronary artery disease), Cancer ([address] Chronic kidney disease, Chronic obstructive pulmonary disease ([address] Coagulation disorder ([address] Difficult intubation, Endocarditis, Ill-defined condition, Liver disease, Malignant hyperthermia due to anesthesia, Nausea & vomiting, Nicotine vapor product user, Non-nicotine vapor product user, Pseudocholinesterase deficiency, Psychiatric disorder, PUD (peptic ulcer disease), Rheumatic fever, Thromboembolus ([address] or Thyroid disease. Ms. Angella aNvas  has a past surgical history that includes hx hysterectomy (); hx breast lumpectomy (); hx appendectomy; hx hammer toe repair (); pr rev upper eyelid w excess skin; hx heent; hx other surgical; hx other surgical (03/15/2018); hx heart catheterization (1999); and hx breast biopsy (Left). SOCIAL HISTORY/LIVING ENVIRONMENT:  
Patient lives with her   in a duplex. Social History Socioeconomic History  Marital status: LEGALLY  Spouse name: Not on file  Number of children: 0  
 Years of education: Not on file  Highest education level: Not on file Occupational History  Occupation: [employer] Employer: NOT EMPLOYED Comment: Denies industrial toxins  Occupation: Textiles Comment: 6 yrs Tobacco Use  Smoking status: Former Smoker Packs/day: 0.50 Years: 12.00 Pack years: 6.00 Quit date: 10/14/1977 Years since quittin.6  Smokeless tobacco: Never Used Substance and Sexual Activity  Alcohol use: No  
 Drug use: No  
 Sexual activity: Never Partners: Male Birth control/protection: Surgical  
  Comment: sleeps alone for past 30 years,  in [address] Other Topics Concern  Not on file Social History Narrative Worked in [employer] for 6 years and at [employer] for 2 years where she states she was not exposed to industrial toxins. , no children. Has always live in [address] [place] [address] Road. No pets. Social Determinants of Health Financial Resource Strain:  Difficulty of Paying Living Expenses:   
Food Insecurity:  Worried About [address] in the Last Year:   
[address] in the Last Year:   
Transportation Needs:   
 Lack of Transportation (Medical):  Lack of Transportation (Non-Medical): Physical Activity:   
 Days of Exercise per Week:  Minutes of Exercise per Session:   
Stress:  Feeling of Stress :   
Social Connections:  Frequency of Communication with Friends and Family:  Frequency of Social Gatherings with Friends and Family:  Attends Baptism Services:  Active Member of Clubs or Organizations:  Attends Club or Organization Meetings:  Marital Status: Intimate Partner Violence:  Fear of Current or Ex-Partner:  Emotionally Abused:   
 Physically Abused:   
 Sexually Abused: LIFESTYLE Date: 6/2/2021 Occupation: Retired Vigorous Activity: None Expose individual to vibrations N/A Unpleasant work environment None PRIOR LEVEL OF FUNCTION/WORK/ACTIVITY: 
Independent with functional mobility and ADLs with occasional use of rollator walker Active Ambulatory Problems Diagnosis Date Noted  Hypercholesterolemia 07/23/2013  ANATOLY (obstructive sleep apnea) 07/23/2013  Hypertension 07/23/2013  Asthma, intrinsic, without status asthmaticus 07/23/2013  GERD (gastroesophageal reflux disease) 07/23/2013  Contact dermatitis 07/23/2013  
 Hoarseness 07/23/2013  Chronic cough 07/23/2013  Chest pain 07/23/2013  Personal history of tobacco use, presenting hazards to health 07/23/2013  Bladder incontinence 10/24/2016  Asthma 10/24/2016  Arthritis  Obesity  Hypertensive heart disease without HF (heart failure)  Dermatophytosis  Peripheral polyneuropathy 12/08/2016  Sjogren's syndrome ([facility] 01/12/2017  Positive sm/RNP antibody 01/12/2017  Controlled type 2 diabetes mellitus without complication, with long-term current use of insulin ([address] 01/27/2017  Type 2 diabetes mellitus with diabetic neuropathy ([facility] 01/18/2018  Palpitations 04/02/2018  Hives 01/26/2019 Resolved Ambulatory Problems Diagnosis Date Noted  DM (diabetes mellitus) ([address] 07/23/2013  Systemic lupus erythematosus ([address] 07/23/2013  Type 2 diabetes mellitus with hyperglycemia ([address] 10/24/2016  Lupus (systemic lupus erythematosus) ([facility] 01/01/2005  Diabetes ([facility]  Asthma exacerbation 01/26/2019  Acute bronchiolitis 01/27/2019  Acute respiratory failure with hypoxia ([facility] 08/04/2020  COVID-19 virus infection 08/04/2020 Past Medical History:  
Diagnosis Date  Arrhythmia 3 - 4 weeks ago (9/2010)  Chronic pain  Former smoker  Heart failure ([facility]  Non compliance with medical treatment  Seizures ([facility]  Stroke [facility]) 7609 CURRENT MEDICATIONS:   
Current Outpatient Medications:  
  ondansetron hcl (ZOFRAN) 4 mg tablet, TAKE 1 TABLET BY MOUTH EVERY 6 HOURS AS NEEDED FOR NAUSEA, Disp: , Rfl:  
  amLODIPine (NORVASC) 10 mg tablet, TAKE 1 TABLET BY MOUTH ONCE DAILY FOR 90 DAYS, Disp: , Rfl:  
  baclofen 5 mg tab, TAKE 1 TABLET BY MOUTH ONCE DAILY AS NEEDED FOR 30 DAYS, Disp: , Rfl:  
  famotidine (PEPCID) 20 mg tablet, Take 20 mg by mouth two (2) times a day., Disp: , Rfl:  
  acetaminophen (TYLENOL) 500 mg tablet, Take 500 mg by mouth as needed for Pain., Disp: , Rfl:  
  docusate sodium (Stool Softener) 100 mg capsule, Take 100 mg by mouth daily. , Disp: , Rfl:  
  losartan (COZAAR) 50 mg tablet, Take 50 mg by mouth daily. , Disp: , Rfl:  
  olopatadine (PATADAY) 0.2 % drop ophthalmic solution, Administer 1 Drop to both eyes daily. , Disp: , Rfl:  
  fluticasone furoate-vilanteroL (Breo Ellipta) 100-25 mcg/dose inhaler, Take 1 Puff by inhalation daily. RINSE MOUTH WELL AFTER USE, Disp: 1 Inhaler, Rfl: 11 
  fluticasone propionate (FLONASE) 50 mcg/actuation nasal spray, 2 Sprays by Both Nostrils route daily. , Disp: 1 Bottle, Rfl: 5 
  albuterol (PROVENTIL VENTOLIN) 2.5 mg /3 mL (0.083 %) nebu, 3 mL by Nebulization route every four (4) hours as needed for Wheezing, Shortness of Breath, Respiratory Distress or Cough.  Please bill to Med [facility]--[id number], Disp: 180 Each, Rfl: 11 
  montelukast (SINGULAIR) 10 mg tablet, TAKE ONE TABLET BY MOUTH NIGHTLY, Disp: 30 Tab, Rfl: 5   hydroCHLOROthiazide (HYDRODIURIL) 12.5 mg tablet, TAKE ONE TABLET BY MOUTH DAILY, Disp: 90 Tab, Rfl: 1 
  OXYGEN-AIR DELIVERY SYSTEMS, 2 L by continuous inhalation route nightly. Patient uses oxygen QHS, Disp: , Rfl:  
  dulaglutide (Trulicity) 1.5 GD/5.3 mL sub-q pen, inject ONE syringe subcutaneously ONCE EVERY WEEK, Disp: 22 mL, Rfl: 5 
  hydrOXYzine HCL (ATARAX) 10 mg tablet, Take 1 Tab by mouth every six (6) hours as needed for Itching. Indications: hives, itching, Disp: 40 Tab, Rfl: 3 
  folic acid (FOLVITE) 1 mg tablet, Take 1 Tab by mouth daily. , Disp: 90 Tab, Rfl: 3 
  atorvastatin (LIPITOR) 40 mg tablet, Take 1 Tab by mouth daily. (Patient taking differently: Take 80 mg by mouth daily.), Disp: 90 Tab, Rfl: 3 
  cetirizine (ZYRTEC) 10 mg tablet, Take 1 Tab by mouth daily. , Disp: 90 Tab, Rfl: 3 
  insulin glargine U-300 conc (TOUJEO SOLOSTAR U-300 INSULIN) 300 unit/mL (1.5 mL) inpn, 35 Units by SubCUTAneous route nightly. (Patient taking differently: 40 Units by SubCUTAneous route nightly.), Disp: 15 Adjustable Dose Pre-filled Pen Syringe, Rfl: 3 
  EPINEPHrine (EPIPEN) 0.3 mg/0.3 mL injection, 0.3 mg by IntraMUSCular route once as needed for Anaphylaxis. , Disp: , Rfl:  
  aspirin delayed-release 81 mg tablet, Take 81 mg by mouth daily. , Disp: , Rfl:   
 
Date Last Reviewed:  6/2/2021 Number of Personal Factors/Comorbidities that affect the Plan of Care: 1-2: MODERATE COMPLEXITY EXAMINATION:  
  
-Pt sits with forward head and rounded shoulders which indicate tight anterior chest musculature, upper trapezius, and levator scapula and weak posterior scapula musculature and deep cervical flexors. Pt displays decreased core motor control indicating weak core and low back musculature. OBSERVATIONS and FUNCTIONAL MOBILITY Date:  
6/2/2021 Date:  
Transfers Increased time & effort to complete Posture Rounded shoulders, forward head, increased lordosis Gait deviations Decreased erica, widened DOMINIQUE, decreased trunk rotation and reciprocal arm swing Assistive device None Stairs NT Bed mobility Increased time & effort to complete Muscle atrophy No   
 
 
BALANCE Date: 6/2/2021 Date:   
Right NT Left NT   
 
 
AROM/PROM Joint: Date: 6/2/2021  Date:  Date: Active LE ROM Right Left Right Left Right Left Hip Flexion [facility] Hip IR [facility] Knee Extension [facility] Knee Flexion [facility] Knee Extension [facility] Lumbar Flexion WFL --      
Lumbar Extension Limited to 25% --      
Lumbar Side-Bending WFL; pain WFL; pain Lumbar Rotation Limited to 25%; pain Limited to 25%; pain STRENGTH Joint: Date: 6/2/2021  Date:  Date:   
 Right Left Right Left Right Left Hip Abduction 3+/5 4/5 Hip Adduction 3+/5 4/5 Hip IR 4/5 4/5 Hip ER 3+/5 4/5 Hip Flexion 3+/5 4-/5 Knee Extension 3+/5 4+/5 Knee Flexion 3+/5 3+/5 Ankle DF 3+/5 5/5 Ankle PF 4/5 4/5 Ankle IV 3/5 4/5 Ankle EV 3/5 4/5 PALPATION Date:  
6/2/2021 Date:  
TTP Mild discomfort along L3-L5 vertebrae; max discomfort S1-S4; R ASIS and PSIS   
TONE Normal   
PA GLIDES Painful L3-S4 B   
EDEMA No   
 
 
SPECIAL TESTS: Assessed @ Initial Visit  
 -SI COMPRESSION TEST: negative 
 -LUMBAR STENOSIS: 
    -Bilateral symptoms: yes 
    -Leg pain more than back pain: no 
    -Pain during walking/standing: yes 
    -Pain relief upon sitting: yes 
    -Age greater than 48 years: yes NEUROLOGICAL SCREEN: Assessed @ Initial Visit  
 -RADIATING SYMPTOMS: Yes 
 
 -DERMATOMES: Normal and equal B 
 
-REFLEXES: Date: 6/2/2021 Right Left L4 
(Quadriceps) NT NT  
S1 (Achilles) NT NT  
 
 
RED FLAGS: Date: 6/2/2021 Non-Mechanical pain distribution (cannot be produced, changed, or reduced during exam): NO  
Cauda Equina Dysfunction: NO Upper lumbar disc herniation in younger patients (femoral nerve tension test for lateral disc herniation in lower lumbar): NO  
Lumbar compression fracture (age > 48, trauma, corticosteroid use NO Spine Cancer (age > 48, pervious history of cancer, failure to improve in 1 month of therapy, no relief - be rest, duration > 1 month, unexplained weight loss, insidious onset, constitutional symptoms): NO Ankylosing Spondylitis (age < 36, pain not relieved by supine, morning back stiffness, pain duration > 3 months, improved by exercise): NO Sacral Fracture: NO Body Structures Involved: 1. Bones 2. Joints 3. Muscles 4. Ligaments Body Functions Affected: 1. Sensory/Pain 2. Neuromusculoskeletal 
3. Movement Related Activities and Participation Affected: 1. Mobility 2. Self Care 3. Domestic Life 4. Interpersonal Interactions and Relationships 5. Community, Social and [place] Number of elements that affect the Plan of Care: 4+: HIGH COMPLEXITY CLINICAL PRESENTATION:  
Presentation: Evolving clinical presentation with changing clinical characteristics: MODERATE COMPLEXITY CLINICAL DECISION MAKING:  
OUTCOME MEASURE USED:  
Tool Used: Tool Used: Modified Oswestry Low Back Pain Questionnaire Score:  Initial: 28/50 (Date: 6/2/21) Most Recent: X/50 (Date: -- ) Interpretation of Score: Each section is scored on a 0-5 scale, 5 representing the greatest disability. The scores of each section are added together for a total score of 50. Payor: [place] [employer] / Plan: [address] / Product Type: Managed Care Medicare /  
 
MEDICAL NECESSITY: 
· Skilled intervention continues to be required due to above deficits affecting participation in basic ADLs and overall functional tolerance. REASON FOR SERVICES/OTHER COMMENTS: 
· Patient continues to require skilled intervention due to  above deficits affecting participation in basic ADLs and overall functional tolerance.   
Use of outcome tool(s) and clinical judgement create a POC that gives a: Questionable prediction of patient's progress: MODERATE COMPLEXITY  
  
 
TREATMENT/SESSION ASSESSMENT:  Missael Perkins verbalized understanding of role of PT and POC. · Pain/ Symptoms: Initial:   7/10 Post Session:  3/10 · Compliance with Program/Exercises: Will assess as treatment progresses. · Recommendations/Intent for next treatment session: Next visit will focus on advancements to more challenging activities. Total Treatment Duration: 57 minutes PT Patient Time In/Time Out Time In: 1330 Time Out: 1435 Kanwal Padilla, PT, DPT

## 2021-06-02 NOTE — PROGRESS NOTES
David Duarte  : 1945  Primary: [address]  Secondary:  [address] [facility]  [address] [address], [address], [place], [facility] [address] [place]  [phone]   [phone]      OUTPATIENT PHYSICAL THERAPY: Daily Treatment Note 2021  Visit Count:  1    ICD-10: Treatment Diagnosis: Low back pain  Low back pain (M54.5)  Sciatica, right side (M54.31)          Sciatica, left side (M54.32)        Lumbago with sciatica, right side (M54.41)        Lumbago with sciatica, left side (M54.42)   Difficulty in walking, not elsewhere classified (R26.2)  Muscle weakness (generalized) (M62.81)    Pre-treatment Symptoms/Complaints:  See initial evaluation note below. Pain: Initial:   7/10 Post Session:  3/10   Medications Last Reviewed:  2021  Updated Objective Findings:  See evaluation note from today  TREATMENT:     THERAPEUTIC EXERCISE: (10 minutes):  Exercises per grid below to improve mobility and strength. Required minimal visual, verbal and tactile cues to promote proper body alignment and promote proper body posture. Progressed range and repetitions as indicated. See below for activities. Date:  21 Date:   Date:     Activity/Exercise Parameters Parameters Parameters   HEP review / pt education 8 min     Supine LTR 1 x 10 B     Supine bridges 1 x 10 B                                 MANUAL THERAPY: (10 minutes): Soft tissue mobilization was utilized and necessary because of the patient's restricted motion of soft tissue. - Soft tissue mobilization utilized with gua-sha tools, cupping, and manual techniques to lumbar spine    MODALITIES: (10 minutes): Electrical Stimulation Therapy (IFC) was provided with intensity adjusted throughout treatment to patient tolerance.    - Crossing electrodes at lumbar spine with hot pack applied for pain relief    [employer] Portal  Treatment/Session Summary:    · Response to Treatment:  Patient tolerated therapy well today without complaints. .  · Communication/Consultation:  None today  · Equipment provided today:  None today  · Recommendations/Intent for next treatment session: Next visit will focus on progressing to more challenging activities.     Total Treatment Billable Duration: 57 minutes  PT Patient Time In/Time Out  Time In: 1330  Time Out: [address], PT, DPT    Visit Approval Visit # Therapist initials Date A NS Cx Comments    1 VIANNEY 21 x   Eval                                                                                                                                                                                 Future Appointments   Date Time Provider Serenity Owens   2021 11:00 AM SFD PHYSICIAL THERAPIST SFDORPT SFD   2021  1:45 PM SFD PHYSICIAL THERAPIST SFDORPT SFD   2021 10:00 AM NUCLEAR STRESS GVL SSA UCDG UCD   2021  1:30 PM ECHO 36 SSA UCDG UCD   2021  3:30 PM Trinh Us MD S[facility] UCD   2021  1:30 PM Winston Holt NP SSA PP PP            العلي Albino  : 1945  Payor: [employer] MEDICARE / Plan: S[employer]ATec / Product Type: Managed Care Medicare /  [address] [place] [address]  at [facility], [address]  [phone]   [phone]                OUTPATIENT PHYSICAL THERAPY:Initial Assessment 2021    ICD-10: Treatment Diagnosis: Low back pain  Low back pain (M54.5)  Sciatica, right side (M54.31)          Sciatica, left side (M54.32)        Lumbago with sciatica, right side (M54.41)        Lumbago with sciatica, left side (M54.42)   Difficulty in walking, not elsewhere classified (R26.2)  Muscle weakness (generalized) (M62.81)        PRECAUTIONS/ALLERGIES:   Prilosec [omeprazole] and Penicillins     FALL RISK SCORE: 1 (? 5 = High Risk)    MD ORDERS: Eval and Treat MEDICAL/REFERRING DIAGNOSIS:  Pain in right arm [M79.601]  Low back pain [M54.5]     DATE OF ONSET: 2 years ago    REFERRING PHYSICIAN: Tom Zaragoza DO    RETURN PHYSICIAN APPOINTMENT: TBD      Ambulatory/Rehab Services H2 Model Falls Risk Assessment    Risk Factors:       No Risk Factors Identified Ability to Rise from Chair:       (1)  Pushes up, successful in one attempt    Falls Prevention Plan:       No modifications necessary   Total: (5 or greater = High Risk): 1    ©2010 [facility] of "[employer]". All Rights Reserved. [facility] [employer] Patent #8,[id number]. Federal Law prohibits the replication, distribution or use without written permission from [address] [place]:  Ms. Emmy Cooper has attended 1 physical therapy session including initial evaluation as of 6/2/2021. Emmy Cooper exhibits decreased ROM, increased pain, decreased postural and core strength, decreased functional tolerance, and decreased general LE strength. No pelvic malalignment upon initial evaluation but decreased posture. Emmy Cooper will benefit from skilled PT (medically necessary) to address above deficits affecting participation in basic ADLs and overall functional tolerance. Manual techniques (stretching, joint mobilizations, soft tissue mobilization/myofascial release), postural exercises/education, therapeutic techniques/activities, and HEP will be performed as appropriate addressing Emmy Cooper current condition. PROBLEM LIST (Impacting functional limitations):  1. Decreased Strength  2. Decreased ADL/Functional Activities  3. Decreased Transfer Abilities  4. Decreased Ambulation Ability/Technique  5. Decreased Balance  6. Increased Pain  7. Decreased Activity Tolerance  8. Increased Fatigue  9. Increased Shortness of Breath  10. Decreased Flexibility/Joint Mobility  11. Decreased [place] with Home Exercise Program INTERVENTIONS PLANNED:  1. Balance Exercise  2.  Bed Mobility  3. Cold  4. Cryotherapy  5. Electrical Stimulation  6. Family Education  7. Gait Training  8. Heat  9. Home Exercise Program (HEP)  10. Manual Therapy  11. Neuromuscular Re-education/Strengthening  12. Range of Motion (ROM)  13. Therapeutic Activites  14. Therapeutic Exercise/Strengthening  15. Transfer Training  16. Mechanical traction  17. Aquatic Therapy  18. Electrical Stimulation  19. Vasopneumatic compression   20. Dry Needling for Pain control   TREATMENT PLAN:  Effective Dates: 6/2/2021 TO 8/31/2021 (90 days). Frequency/Duration: 2 times a week for 90 Days    GOALS: (Goals have been discussed and agreed upon with patient.)  Short Term Goals 4 weeks   1. Adeel Ho will be independent with HEP to promote self-management of symptoms. 2. Adeel Ho will participate in core stabilization exercises to help with stabilization and improve posture during ADLs to help prevent future injuries. 3. Adeel Ho will participate in LE strengthening program with weights as appropriate to help with gait and elevations. Hanny Franco will participate in static and dynamic balance activities to decrease the risk for falls and improve overall QOL. Hanny Franco will tolerate manual therapy/joint mobilizations/soft tissue to increase ROM and decrease pain to improve functional mobility during ADLs. Long Term Goals 12 weeks   1. Adeel Ho will demonstrate an 5 point improvement on the Oswestry to show improvement in function. 2. Adeel Ho will report <=4/10 pain at rest and during ADLs to improve QOL. 3. Adeel Ho will demonstrate >=4+/5 LE strength on manual muscle testing to improve functional mobility. Hanny Franco will be able to demonstrate safe lifting and transfer mechanics without cueing for improved safety with home, childcare, and community activities.     Rehabilitation Potential For Stated Goals: GOOD    Regarding [address] [facility] therapy, I certify that the treatment plan above will be carried out by a therapist or under their direction. Thank you for this referral,  Aisha Madden, PT, DPT    Referring Physician Signature: Michelle Gregory, DO              Date                    HISTORY:   PATIENT GOAL FOR PHYSICAL THERAPY:  Reduce pain in her back    HISTORY OF PRESENT INJURY/ILLNESS (REASON FOR REFERRAL):  Patient reports she's suffered from pain her low back (right > left) with insidious KACEY. Pt reports she suffers from arthritis and attributes most of her impairments to that diagnosis. Pt reports that previous MD visits have resulted in additional attributions to arthritis causing her pain; however, pt reporting TTP along R latissimus and QL in addition to lumbar spine. Patient reports she has tried exercise in the past as a method to address her pain, but states that it hasn't been much help. Pt states she is unable to perform hobbies (fishing) or ADLs (self-care) secondary to decreased activity tolerance as a result of increased pain. Pt reports that she will occasionally have radicular symptoms, which result after standing or sitting for an extended period of time. Patient reports she only has the capacity to stand for 20-25 minutes before needing a seated rest break. Note: Patient denies any increase of symptoms with cough, sneeze or valsalva. Patient denies any saddle paresthesia or bowel/bladder deficits.      PAIN AND NATURE OF CONDITION Date:   6/2/2021   Highest pain level 10/10   Lowest pain level 7/10   Aggravating factors Standing, walking   Alleviating factors/positions/motions Lying down   Irritability Significant   Depression, fear, anxiety None     KACEY Insidious   Leg pain Yes   Sedentary lifestyle Yes     PAST MEDICAL HISTORY/COMORBIDITIES:   Ms. Sarah Coyle  has a past medical history of Arrhythmia (3 - 4 weeks ago (9/2010)), Arthritis, Asthma, Bladder incontinence (10/24/2016), Chronic pain, Dermatophytosis, Diabetes ([address] Former smoker, GERD (gastroesophageal reflux disease) (7/23/2013), Heart failure ([address] Hypercholesterolemia, Hypertension, Hypertensive heart disease without HF (heart failure), Lupus (systemic lupus erythematosus) ([address] (2005), Non compliance with medical treatment, Obesity, ANATOLY (obstructive sleep apnea) (07/23/2013), Seizures ([address] Stroke ([address] (1979), Systemic lupus erythematosus ([address] (07/23/2013), and Type 2 diabetes mellitus with hyperglycemia ([address] (10/24/2016). She also has no past medical history of Adverse effect of anesthesia, Aneurysm ([address] CAD (coronary artery disease), Cancer ([address] Chronic kidney disease, Chronic obstructive pulmonary disease ([address] Coagulation disorder ([address] Difficult intubation, Endocarditis, Ill-defined condition, Liver disease, Malignant hyperthermia due to anesthesia, Nausea & vomiting, Nicotine vapor product user, Non-nicotine vapor product user, Pseudocholinesterase deficiency, Psychiatric disorder, PUD (peptic ulcer disease), Rheumatic fever, Thromboembolus ([address] or Thyroid disease. Ms. Nahid Deutsch  has a past surgical history that includes hx hysterectomy (1974); hx breast lumpectomy (1998); hx appendectomy; hx hammer toe repair (2010); pr rev upper eyelid w excess skin; hx heent; hx other surgical; hx other surgical (03/15/2018); hx heart catheterization (03/22/1999); and hx breast biopsy (Left). SOCIAL HISTORY/LIVING ENVIRONMENT:   Patient lives with her   in a duplex.     Social History     Socioeconomic History    Marital status: LEGALLY      Spouse name: Not on file    Number of children: 0    Years of education: Not on file    Highest education level: Not on file   Occupational History    Occupation: [employer] [facility] [employer]     Employer: NOT EMPLOYED     Comment: Denies industrial toxins    Occupation: Textiles     Comment: 6 yrs   Tobacco Use    Smoking status: Former Smoker Packs/day: 0.50     Years: 12.00     Pack years: 6.00     Quit date: 10/14/1977     Years since quittin.6    Smokeless tobacco: Never Used   Substance and Sexual Activity    Alcohol use: No    Drug use: No    Sexual activity: Never     Partners: Male     Birth control/protection: Surgical     Comment: sleeps alone for past 30 years,  in seperate bed    Other Topics Concern    Not on file   Social History Narrative        Worked in [employer] for 6 years and at [employer] for 2 years where she states she was not exposed to industrial toxins. , no children. Has always live in [address] No pets. Social Determinants of Health     Financial Resource Strain:     Difficulty of Paying Living Expenses:    Food Insecurity:     Worried About Running Out of Food in the Last Year:     [address] in the Last Year:    Transportation Needs:     Lack of Transportation (Medical):      Lack of Transportation (Non-Medical):    Physical Activity:     Days of Exercise per Week:     Minutes of Exercise per Session:    Stress:     Feeling of Stress :    Social Connections:     Frequency of Communication with Friends and Family:     Frequency of Social Gatherings with Friends and Family:     Attends Orthodoxy Services:     Active Member of Clubs or Organizations:     Attends Club or Organization Meetings:     Marital Status:    Intimate Partner Violence:     Fear of Current or Ex-Partner:     Emotionally Abused:     Physically Abused:     Sexually Abused:        LIFESTYLE Date: 2021   Occupation: Retired   Vigorous Activity: None   Expose individual to vibrations N/A   Unpleasant work environment None       PRIOR LEVEL OF FUNCTION/WORK/ACTIVITY:  Independent with functional mobility and ADLs with occasional use of rollator walker    Active Ambulatory Problems     Diagnosis Date Noted    Hypercholesterolemia 2013    ANATOLY (obstructive sleep apnea) 2013  Hypertension 07/23/2013    Asthma, intrinsic, without status asthmaticus 07/23/2013    GERD (gastroesophageal reflux disease) 07/23/2013    Contact dermatitis 07/23/2013    Hoarseness 07/23/2013    Chronic cough 07/23/2013    Chest pain 07/23/2013    Personal history of tobacco use, presenting hazards to health 07/23/2013    Bladder incontinence 10/24/2016    Asthma 10/24/2016    Arthritis     Obesity     Hypertensive heart disease without HF (heart failure)     Dermatophytosis     Peripheral polyneuropathy 12/08/2016    Sjogren's syndrome ([facility] 01/12/2017    Positive sm/RNP antibody 01/12/2017    Controlled type 2 diabetes mellitus without complication, with long-term current use of insulin ([facility] 01/27/2017    Type 2 diabetes mellitus with diabetic neuropathy ([facility] 01/18/2018    Palpitations 04/02/2018    Hives 01/26/2019     Resolved Ambulatory Problems     Diagnosis Date Noted    DM (diabetes mellitus) ([facility] 07/23/2013    Systemic lupus erythematosus ([facility] 07/23/2013    Type 2 diabetes mellitus with hyperglycemia (HCC) 10/24/2016    Lupus (systemic lupus erythematosus) ([address] 01/01/2005    Diabetes ([facility]     Asthma exacerbation 01/26/2019    Acute bronchiolitis 01/27/2019    Acute respiratory failure with hypoxia ([facility] 08/04/2020    COVID-19 virus infection 08/04/2020     Past Medical History:   Diagnosis Date    Arrhythmia 3 - 4 weeks ago (9/2010)    Chronic pain     Former smoker     Heart failure ([address]     Non compliance with medical treatment     Seizures ([address]     Stroke ([facility] 1979       CURRENT MEDICATIONS:    Current Outpatient Medications:     ondansetron hcl (ZOFRAN) 4 mg tablet, TAKE 1 TABLET BY MOUTH EVERY 6 HOURS AS NEEDED FOR NAUSEA, Disp: , Rfl:     amLODIPine (NORVASC) 10 mg tablet, TAKE 1 TABLET BY MOUTH ONCE DAILY FOR 90 DAYS, Disp: , Rfl:     baclofen 5 mg tab, TAKE 1 TABLET BY MOUTH ONCE DAILY AS NEEDED FOR 30 DAYS, Disp: , Rfl:     famotidine (PEPCID) 20 mg tablet, Take 20 mg by mouth two (2) times a day., Disp: , Rfl:     acetaminophen (TYLENOL) 500 mg tablet, Take 500 mg by mouth as needed for Pain., Disp: , Rfl:     docusate sodium (Stool Softener) 100 mg capsule, Take 100 mg by mouth daily. , Disp: , Rfl:     losartan (COZAAR) 50 mg tablet, Take 50 mg by mouth daily. , Disp: , Rfl:     olopatadine (PATADAY) 0.2 % drop ophthalmic solution, Administer 1 Drop to both eyes daily. , Disp: , Rfl:     fluticasone furoate-vilanteroL (Breo Ellipta) 100-25 mcg/dose inhaler, Take 1 Puff by inhalation daily. RINSE MOUTH WELL AFTER USE, Disp: 1 Inhaler, Rfl: 11    fluticasone propionate (FLONASE) 50 mcg/actuation nasal spray, 2 Sprays by Both Nostrils route daily. , Disp: 1 Bottle, Rfl: 5    albuterol (PROVENTIL VENTOLIN) 2.5 mg /3 mL (0.083 %) nebu, 3 mL by Nebulization route every four (4) hours as needed for Wheezing, Shortness of Breath, Respiratory Distress or Cough. Please bill to Med B--J45.909, Disp: 180 Each, Rfl: 11    montelukast (SINGULAIR) 10 mg tablet, TAKE ONE TABLET BY MOUTH NIGHTLY, Disp: 30 Tab, Rfl: 5    hydroCHLOROthiazide (HYDRODIURIL) 12.5 mg tablet, TAKE ONE TABLET BY MOUTH DAILY, Disp: 90 Tab, Rfl: 1    OXYGEN-AIR DELIVERY SYSTEMS, 2 L by continuous inhalation route nightly. Patient uses oxygen QHS, Disp: , Rfl:     dulaglutide (Trulicity) 1.5 TY/8.1 mL sub-q pen, inject ONE syringe subcutaneously ONCE EVERY WEEK, Disp: 22 mL, Rfl: 5    hydrOXYzine HCL (ATARAX) 10 mg tablet, Take 1 Tab by mouth every six (6) hours as needed for Itching. Indications: hives, itching, Disp: 40 Tab, Rfl: 3    folic acid (FOLVITE) 1 mg tablet, Take 1 Tab by mouth daily. , Disp: 90 Tab, Rfl: 3    atorvastatin (LIPITOR) 40 mg tablet, Take 1 Tab by mouth daily. (Patient taking differently: Take 80 mg by mouth daily.), Disp: 90 Tab, Rfl: 3    cetirizine (ZYRTEC) 10 mg tablet, Take 1 Tab by mouth daily. , Disp: 90 Tab, Rfl: 3    insulin glargine U-300 conc (TOUJEO SOLOSTAR U-300 INSULIN) 300 unit/mL (1.5 mL) inpn, 35 Units by SubCUTAneous route nightly. (Patient taking differently: 40 Units by SubCUTAneous route nightly.), Disp: 15 Adjustable Dose Pre-filled Pen Syringe, Rfl: 3    EPINEPHrine (EPIPEN) 0.3 mg/0.3 mL injection, 0.3 mg by IntraMUSCular route once as needed for Anaphylaxis. , Disp: , Rfl:     aspirin delayed-release 81 mg tablet, Take 81 mg by mouth daily. , Disp: , Rfl:      Date Last Reviewed:  6/2/2021   Number of Personal Factors/Comorbidities that affect the Plan of Care: 1-2: MODERATE COMPLEXITY   EXAMINATION:      -Pt sits with forward head and rounded shoulders which indicate tight anterior chest musculature, upper trapezius, and levator scapula and weak posterior scapula musculature and deep cervical flexors. Pt displays decreased core motor control indicating weak core and low back musculature.     OBSERVATIONS and FUNCTIONAL MOBILITY Date:   6/2/2021 Date:   Transfers Increased time & effort to complete    Posture Rounded shoulders, forward head, increased lordosis    Gait deviations Decreased erica, widened DOMINIQUE, decreased trunk rotation and reciprocal arm swing    Assistive device None    Stairs NT    Bed mobility Increased time & effort to complete    Muscle atrophy No        BALANCE Date: 6/2/2021 Date:    Right NT    Left NT        AROM/PROM         Joint: Date: 6/2/2021  Date:  Date:    Active LE ROM Right Left Right Left Right Left   Hip Flexion [facility]       Hip Extension [facility]       Hip ER [facility]       Hip IR [facility] WFL       Knee Extension [facility] WFL       Knee Flexion [facility] WFL       Knee Extension [facility] WFL       Lumbar Flexion WFL --       Lumbar Extension Limited to 25% --       Lumbar Side-Bending WFL; pain WFL; pain       Lumbar Rotation Limited to 25%; pain Limited to 25%; pain         STRENGTH         Joint: Date: 6/2/2021  Date:  Date:     Right Left Right Left Right Left   Hip Abduction 3+/5 4/5       Hip Adduction 3+/5 4/5       Hip IR 4/5 4/5       Hip ER 3+/5 4/5       Hip Flexion 3+/5 4-/5       Knee Extension 3+/5 4+/5       Knee Flexion 3+/5 3+/5       Ankle DF 3+/5 5/5       Ankle PF 4/5 4/5       Ankle IV 3/5 4/5       Ankle EV 3/5 4/5         PALPATION Date:   6/2/2021 Date:   TTP Mild discomfort along L3-L5 vertebrae; max discomfort S1-S4; R ASIS and PSIS    TONE Normal    PA GLIDES Painful L3-S4 B    EDEMA No        SPECIAL TESTS: Assessed @ Initial Visit    -SI COMPRESSION TEST: negative   -LUMBAR STENOSIS:      -Bilateral symptoms: yes      -Leg pain more than back pain: no      -Pain during walking/standing: yes      -Pain relief upon sitting: yes      -Age greater than 48 years: yes      NEUROLOGICAL SCREEN: Assessed @ Initial Visit    -RADIATING SYMPTOMS: Yes     -DERMATOMES: Normal and equal B    -REFLEXES: Date: 6/2/2021     Right Left   L4  (Quadriceps) NT NT   S1  (Achilles) NT NT       RED FLAGS: Date: 6/2/2021   Non-Mechanical pain distribution (cannot be produced, changed, or reduced during exam): NO   Cauda Equina Dysfunction: NO   Upper lumbar disc herniation in younger patients (femoral nerve tension test for lateral disc herniation in lower lumbar): NO   Lumbar compression fracture (age > 48, trauma, corticosteroid use NO   Spine Cancer (age > 48, pervious history of cancer, failure to improve in 1 month of therapy, no relief - be rest, duration > 1 month, unexplained weight loss, insidious onset, constitutional symptoms): NO   Ankylosing Spondylitis (age < 36, pain not relieved by supine, morning back stiffness, pain duration > 3 months, improved by exercise): NO   Sacral Fracture: NO        Body Structures Involved:  1. Bones  2. Joints  3. Muscles  4. Ligaments Body Functions Affected:  1. Sensory/Pain  2. Neuromusculoskeletal  3. Movement Related Activities and Participation Affected:  1. Mobility  2. Self Care  3. Domestic Life  4. Interpersonal Interactions and Relationships  5.  Community, Social and [place] Number of elements that affect the Plan of Care: 4+: HIGH COMPLEXITY   CLINICAL PRESENTATION:   Presentation: Evolving clinical presentation with changing clinical characteristics: MODERATE COMPLEXITY   CLINICAL DECISION MAKING:   OUTCOME MEASURE USED:   Tool Used: Tool Used: Modified Oswestry Low Back Pain Questionnaire  Score:  Initial: 28/50 (Date: 6/2/21) Most Recent: X/50 (Date: -- )   Interpretation of Score: Each section is scored on a 0-5 scale, 5 representing the greatest disability. The scores of each section are added together for a total score of 50. Payor: [employer] MEDICARE / Plan: [employer] / Product Type: Managed Care Medicare /     MEDICAL NECESSITY:  · Skilled intervention continues to be required due to above deficits affecting participation in basic ADLs and overall functional tolerance. REASON FOR SERVICES/OTHER COMMENTS:  · Patient continues to require skilled intervention due to  above deficits affecting participation in basic ADLs and overall functional tolerance. Use of outcome tool(s) and clinical judgement create a POC that gives a: Questionable prediction of patient's progress: MODERATE COMPLEXITY        TREATMENT/SESSION ASSESSMENT:  Adrienne Watt verbalized understanding of role of PT and POC. · Pain/ Symptoms: Initial:   7/10 Post Session:  3/10 ·   Compliance with Program/Exercises: Will assess as treatment progresses. · Recommendations/Intent for next treatment session: Next visit will focus on advancements to more challenging activities.     Total Treatment Duration: 57 minutes  PT Patient Time In/Time Out  Time In: 1330  Time Out: 1435    Geoff Presley PT, DPT

## 2021-06-04 ENCOUNTER — HOSPITAL ENCOUNTER (OUTPATIENT)
Dept: PHYSICAL THERAPY | Age: 76
Discharge: HOME | End: 2021-06-04
Payer: MEDICARE

## 2021-06-04 PROCEDURE — 97140 MANUAL THERAPY 1/> REGIONS: CPT

## 2021-06-04 PROCEDURE — 97110 THERAPEUTIC EXERCISES: CPT

## 2021-06-04 PROCEDURE — 97010 HOT OR COLD PACKS THERAPY: CPT

## 2021-06-04 NOTE — PROGRESS NOTES
Michaela Ulrich  : 1945  Primary: [address]  Secondary:  [address] [facility] [address], [place], [address] [facility]  [phone]   [phone]      OUTPATIENT PHYSICAL THERAPY: Daily Treatment Note 2021  Visit Count:  2    ICD-10: Treatment Diagnosis: Low back pain  Low back pain (M54.5)  Sciatica, right side (M54.31)          Sciatica, left side (M54.32)        Lumbago with sciatica, right side (M54.41)        Lumbago with sciatica, left side (M54.42)   Difficulty in walking, not elsewhere classified (R26.2)  Muscle weakness (generalized) (M62.81)    Pre-treatment Symptoms/Complaints:  Pt reports a lot of soreness this morning. She says the exercises she was performing at home did not seem to relieve her pain, but knows she should still try them out. Pt reports she did not try out the hot pack at home because it's in storage. Pain: Initial:   9/10 soreness/pain Post Session:  3/10 soreness/pain   Medications Last Reviewed:  2021     Updated Objective Findings:  N/A.  TREATMENT:     THERAPEUTIC EXERCISE: (15 minutes):  Exercises per grid below to improve mobility and strength. Required minimal visual, verbal and tactile cues to promote proper body alignment and promote proper body posture. Progressed range and repetitions as indicated. See below for activities. Date:  21 Date:  21 Date:     Activity/Exercise Parameters Parameters Parameters   HEP review / pt education 8 min 10 min - discussed finding pain free positions and utilizing hot pack    Supine LTR      Supine bridges      SciFit  Level 0 x 5 min                        MANUAL THERAPY: (15 minutes): Soft tissue mobilization was utilized and necessary because of the patient's restricted motion of soft tissue.    - Soft tissue mobilization utilized with gua-sha tools, cupping, and manual techniques to lumbar spine, QL, paraspinals, lets, and superior glute max   - Lumbar PAs - pt unable to tolerate due to pain   - Long axis distraction of BLE - very painful on R side vs L was more tolerable for bouts of 30 sec   - Hip mobilizations - pt unable to tolerate due to pain    MODALITIES: (10 minutes): Electrical Stimulation Therapy (IFC) was provided with intensity adjusted throughout treatment to patient tolerance. - Crossing electrodes at lumbar spine with hot pack applied for pain relief - NOT TODAY 6/4/21   - Large hot pack to low back for pain relief in sitting position    MedBridge Portal    Supine LTR 1 x 10  Supine bridges 1 x 10    Treatment/Session Summary:    · Response to Treatment: Patient tolerated therapy fairly this morning. Patient reporting severe pain with various interventions attempted by therapist. Patient reporting increased pain along several \"sore spots\" along QL and lumbar paraspinals. Patient unable to tolerate long axis distraction, hip mobilizations, or pointed manual therapy (lumbar PAs) secondary to increased reports of pain. Patient reporting that without manual techniques, her pain is at 8/10, but with manual techniques, it increases to 10/10. Patient reporting that sitting >30 minutes causes her pain, in addition to standing. Patient reporting pain for more than 2 years with recent exacerbation. · Communication/Consultation: Patient encouraged to utilize hot pack at home if she finds it beneficial for pain management. · Equipment provided today: None today. · Recommendations/Intent for next treatment session: Next visit will focus on progressing to more challenging activities.     Total Treatment Billable Duration: 40 minutes  PT Patient Time In/Time Out  Time In: 1100  Time Out: [address], PT, DPT    Visit Approval Visit # Therapist initials Date A NS Cx Comments    1  6-2-21 x   Eval    2  6-4-21 x   1 Future Appointments   Date Time Provider Serenity Graciela   2021  1:45 PM SFD PHYSICIAL THERAPIST SFDORPT D   6/10/2021  1:45 PM SFD PHYSICIAL THERAPIST SFDORPT D   2021 10:00 AM NUCLEAR STRESS GVL SSA UCDG UCD   2021  1:45 PM SFD PHYSICIAL THERAPIST SFDORPT D   2021  1:45 PM SFD PHYSICIAL THERAPIST SFDORPT D   2021  1:30 PM ECHO 36 SSA UCDG D   2021  3:30 PM Yulissa Us MD SSA DG D   2021  1:30 PM Yue Rodriguez NP SSA PP PP            Christian Briceño  : 1945  Payor: Yenny Ahuja / Plan: [employer] / Product Type: Managed Care Medicare /  [address] [place] [address]  at [facility], [address]  [phone]   [phone]                OUTPATIENT PHYSICAL THERAPY:Initial Assessment 2021    ICD-10: Treatment Diagnosis: Low back pain  Low back pain (M54.5)  Sciatica, right side (M54.31)          Sciatica, left side (M54.32)        Lumbago with sciatica, right side (M54.41)        Lumbago with sciatica, left side (M54.42)   Difficulty in walking, not elsewhere classified (R26.2)  Muscle weakness (generalized) (M62.81)        PRECAUTIONS/ALLERGIES:   Prilosec [omeprazole] and Penicillins     FALL RISK SCORE: 1 (? 5 = High Risk)    MD ORDERS: Eval and Treat MEDICAL/REFERRING DIAGNOSIS:  Pain in right arm [M79.601]  Low back pain [M54.5]     DATE OF ONSET: 2 years ago    REFERRING PHYSICIAN: Chris Cespedes DO    RETURN PHYSICIAN APPOINTMENT: TBD      Ambulatory/Rehab Services H2 Model Falls Risk Assessment    Risk Factors:       No Risk Factors Identified Ability to Rise from Chair:       (1)  Pushes up, successful in one attempt    Falls Prevention Plan:       No modifications necessary   Total: (5 or greater = High Risk): 1    0 AHI of [facility]. All Rights Reserved. [facility] [employer] Patent #[id number]. Federal Law prohibits the replication, distribution or use without written permission from [address] [employer]:  Ms. Edvin Nagel has attended 1 physical therapy session including initial evaluation as of 6/2/2021. Edvin Nagel exhibits decreased ROM, increased pain, decreased postural and core strength, decreased functional tolerance, and decreased general LE strength. No pelvic malalignment upon initial evaluation but decreased posture. Edvin Nagel will benefit from skilled PT (medically necessary) to address above deficits affecting participation in basic ADLs and overall functional tolerance. Manual techniques (stretching, joint mobilizations, soft tissue mobilization/myofascial release), postural exercises/education, therapeutic techniques/activities, and HEP will be performed as appropriate addressing Edvin Nagel current condition. PROBLEM LIST (Impacting functional limitations):  1. Decreased Strength  2. Decreased ADL/Functional Activities  3. Decreased Transfer Abilities  4. Decreased Ambulation Ability/Technique  5. Decreased Balance  6. Increased Pain  7. Decreased Activity Tolerance  8. Increased Fatigue  9. Increased Shortness of Breath  10. Decreased Flexibility/Joint Mobility  11. Decreased Mil[place] with Home Exercise Program INTERVENTIONS PLANNED:  1. Balance Exercise  2. Bed Mobility  3. Cold  4. Cryotherapy  5. Electrical Stimulation  6. Family Education  7. Gait Training  8. Heat  9. Home Exercise Program (HEP)  10. Manual Therapy  11. Neuromuscular Re-education/Strengthening  12. Range of Motion (ROM)  13. Therapeutic Activites  14. Therapeutic Exercise/Strengthening  15. Transfer Training  16. Mechanical traction  17. Aquatic Therapy  18. Electrical Stimulation  19. Vasopneumatic compression   20.  Dry Needling for Pain control   TREATMENT PLAN:  Effective Dates: 6/2/2021 TO 8/31/2021 (90 days). Frequency/Duration: 2 times a week for 90 Days    GOALS: (Goals have been discussed and agreed upon with patient.)  Short Term Goals 4 weeks   1. Brynn Valentine will be independent with HEP to promote self-management of symptoms. 2. Brynn Valentine will participate in core stabilization exercises to help with stabilization and improve posture during ADLs to help prevent future injuries. 3. Brynn Valentine will participate in LE strengthening program with weights as appropriate to help with gait and elevations. [address] will participate in static and dynamic balance activities to decrease the risk for falls and improve overall QOL. [address] will tolerate manual therapy/joint mobilizations/soft tissue to increase ROM and decrease pain to improve functional mobility during ADLs. Long Term Goals 12 weeks   1. Brynn Valentine will demonstrate an 5 point improvement on the Oswestry to show improvement in function. 2. Brynn Valentine will report <=4/10 pain at rest and during ADLs to improve QOL. 3. Brynn Valentine will demonstrate >=4+/5 LE strength on manual muscle testing to improve functional mobility. [address] will be able to demonstrate safe lifting and transfer mechanics without cueing for improved safety with home, childcare, and community activities. Rehabilitation Potential For Stated Goals: GOOD    Regarding Brynn Valentine therapy, I certify that the treatment plan above will be carried out by a therapist or under their direction.     Thank you for this referral,  Vida Cifuentes, PT, DPT    Referring Physician Signature: Thelma Huber DO              Date                    HISTORY:   PATIENT GOAL FOR PHYSICAL THERAPY:  Reduce pain in her back    HISTORY OF PRESENT INJURY/ILLNESS (REASON FOR REFERRAL):  Patient reports she's suffered from pain her low back (right > left) with insidious KACEY. Pt reports she suffers from arthritis and attributes most of her impairments to that diagnosis. Pt reports that previous MD visits have resulted in additional attributions to arthritis causing her pain; however, pt reporting TTP along R latissimus and QL in addition to lumbar spine. Patient reports she has tried exercise in the past as a method to address her pain, but states that it hasn't been much help. Pt states she is unable to perform hobbies (fishing) or ADLs (self-care) secondary to decreased activity tolerance as a result of increased pain. Pt reports that she will occasionally have radicular symptoms, which result after standing or sitting for an extended period of time. Patient reports she only has the capacity to stand for 20-25 minutes before needing a seated rest break. Note: Patient denies any increase of symptoms with cough, sneeze or valsalva. Patient denies any saddle paresthesia or bowel/bladder deficits.      PAIN AND NATURE OF CONDITION Date:   6/2/2021   Highest pain level 10/10   Lowest pain level 7/10   Aggravating factors Standing, walking   Alleviating factors/positions/motions Lying down   Irritability Significant   Depression, fear, anxiety None     KACEY Insidious   Leg pain Yes   Sedentary lifestyle Yes     PAST MEDICAL HISTORY/COMORBIDITIES:   Ms. Keyanna Rodriguez  has a past medical history of Arrhythmia (3 - 4 weeks ago (9/2010)), Arthritis, Asthma, Bladder incontinence (10/24/2016), Chronic pain, Dermatophytosis, Diabetes ([address] Former smoker, GERD (gastroesophageal reflux disease) (7/23/2013), Heart failure ([address] Hypercholesterolemia, Hypertension, Hypertensive heart disease without HF (heart failure), Lupus (systemic lupus erythematosus) ([address] (2005), Non compliance with medical treatment, Obesity, ANATOLY (obstructive sleep apnea) (07/23/2013), Seizures ([address] Stroke ([address] (1979), Systemic lupus erythematosus ([address] (07/23/2013), and Type 2 diabetes mellitus with hyperglycemia ([facility] (10/24/2016). She also has no past medical history of Adverse effect of anesthesia, Aneurysm ([facility] CAD (coronary artery disease), Cancer ([address] Chronic kidney disease, Chronic obstructive pulmonary disease ([address] Coagulation disorder ([facility] Difficult intubation, Endocarditis, Ill-defined condition, Liver disease, Malignant hyperthermia due to anesthesia, Nausea & vomiting, Nicotine vapor product user, Non-nicotine vapor product user, Pseudocholinesterase deficiency, Psychiatric disorder, PUD (peptic ulcer disease), Rheumatic fever, Thromboembolus ([address] or Thyroid disease. Ms. Renata Posada  has a past surgical history that includes hx hysterectomy (); hx breast lumpectomy (); hx appendectomy; hx hammer toe repair (); pr rev upper eyelid w excess skin; hx heent; hx other surgical; hx other surgical (03/15/2018); hx heart catheterization (1999); and hx breast biopsy (Left). SOCIAL HISTORY/LIVING ENVIRONMENT:   Patient lives with her   in a duplex.     Social History     Socioeconomic History    Marital status: LEGALLY      Spouse name: Not on file    Number of children: 0    Years of education: Not on file    Highest education level: Not on file   Occupational History    Occupation: [address] [place] [address]     Employer: NOT EMPLOYED     Comment: Denies industrial toxins    Occupation: Textiles     Comment: 6 yrs   Tobacco Use    Smoking status: Former Smoker     Packs/day: 0.50     Years: 12.00     Pack years: 6.00     Quit date: 10/14/1977     Years since quittin.6    Smokeless tobacco: Never Used   Substance and Sexual Activity    Alcohol use: No    Drug use: No    Sexual activity: Never     Partners: Male     Birth control/protection: Surgical     Comment: sleeps alone for past 30 years,  in [address]    Other Topics Concern    Not on file   Social History Narrative        Worked in [address] [employer] for 6 years and at [address] [place] [address] for 2 years where she states she was not exposed to industrial toxins. , no children. Has always live in [address] No pets. Social Determinants of Health     Financial Resource Strain:     Difficulty of Paying Living Expenses:    Food Insecurity:     Worried About Running Out of Food in the Last Year:     [address] in the Last Year:    Transportation Needs:     Lack of Transportation (Medical):      Lack of Transportation (Non-Medical):    Physical Activity:     Days of Exercise per Week:     Minutes of Exercise per Session:    Stress:     Feeling of Stress :    Social Connections:     Frequency of Communication with Friends and Family:     Frequency of Social Gatherings with Friends and Family:     Attends Confucianist Services:     Active Member of Clubs or Organizations:     Attends Club or Organization Meetings:     Marital Status:    Intimate Partner Violence:     Fear of Current or Ex-Partner:     Emotionally Abused:     Physically Abused:     Sexually Abused:        LIFESTYLE Date: 6/2/2021   Occupation: Retired   Vigorous Activity: None   Expose individual to vibrations N/A   Unpleasant work environment None       PRIOR LEVEL OF FUNCTION/WORK/ACTIVITY:  Independent with functional mobility and ADLs with occasional use of rollator walker    Active Ambulatory Problems     Diagnosis Date Noted    Hypercholesterolemia 07/23/2013    ANATOLY (obstructive sleep apnea) 07/23/2013    Hypertension 07/23/2013    Asthma, intrinsic, without status asthmaticus 07/23/2013    GERD (gastroesophageal reflux disease) 07/23/2013    Contact dermatitis 07/23/2013    Hoarseness 07/23/2013    Chronic cough 07/23/2013    Chest pain 07/23/2013    Personal history of tobacco use, presenting hazards to health 07/23/2013    Bladder incontinence 10/24/2016    Asthma 10/24/2016    Arthritis     Obesity     Hypertensive heart disease without HF (heart failure)     Dermatophytosis     Peripheral polyneuropathy 12/08/2016    Sjogren's syndrome ([facility] 01/12/2017    Positive sm/RNP antibody 01/12/2017    Controlled type 2 diabetes mellitus without complication, with long-term current use of insulin ([facility] 01/27/2017    Type 2 diabetes mellitus with diabetic neuropathy ([facility] 01/18/2018    Palpitations 04/02/2018    Hives 01/26/2019     Resolved Ambulatory Problems     Diagnosis Date Noted    DM (diabetes mellitus) ([facility] 07/23/2013    Systemic lupus erythematosus ([facility] 07/23/2013    Type 2 diabetes mellitus with hyperglycemia ([facility] 10/24/2016    Lupus (systemic lupus erythematosus) ([facility] 01/01/2005    Diabetes ([facility]     Asthma exacerbation 01/26/2019    Acute bronchiolitis 01/27/2019    Acute respiratory failure with hypoxia ([facility] 08/04/2020    COVID-19 virus infection 08/04/2020     Past Medical History:   Diagnosis Date    Arrhythmia 3 - 4 weeks ago (9/2010)    Chronic pain     Former smoker     Heart failure ([facility]     Non compliance with medical treatment     Seizures ([facility]     Stroke ([facility] 1979       CURRENT MEDICATIONS:    Current Outpatient Medications:     ondansetron hcl (ZOFRAN) 4 mg tablet, TAKE 1 TABLET BY MOUTH EVERY 6 HOURS AS NEEDED FOR NAUSEA, Disp: , Rfl:     amLODIPine (NORVASC) 10 mg tablet, TAKE 1 TABLET BY MOUTH ONCE DAILY FOR 90 DAYS, Disp: , Rfl:     baclofen 5 mg tab, TAKE 1 TABLET BY MOUTH ONCE DAILY AS NEEDED FOR 30 DAYS, Disp: , Rfl:     famotidine (PEPCID) 20 mg tablet, Take 20 mg by mouth two (2) times a day., Disp: , Rfl:     acetaminophen (TYLENOL) 500 mg tablet, Take 500 mg by mouth as needed for Pain., Disp: , Rfl:     docusate sodium (Stool Softener) 100 mg capsule, Take 100 mg by mouth daily. , Disp: , Rfl:     losartan (COZAAR) 50 mg tablet, Take 50 mg by mouth daily. , Disp: , Rfl:     olopatadine (PATADAY) 0.2 % drop ophthalmic solution, Administer 1 Drop to both eyes daily. , Disp: , Rfl:     fluticasone furoate-vilanteroL (Breo Ellipta) 100-25 mcg/dose inhaler, Take 1 Puff by inhalation daily. RINSE MOUTH WELL AFTER USE, Disp: 1 Inhaler, Rfl: 11    fluticasone propionate (FLONASE) 50 mcg/actuation nasal spray, 2 Sprays by Both Nostrils route daily. , Disp: 1 Bottle, Rfl: 5    albuterol (PROVENTIL VENTOLIN) 2.5 mg /3 mL (0.083 %) nebu, 3 mL by Nebulization route every four (4) hours as needed for Wheezing, Shortness of Breath, Respiratory Distress or Cough. Please bill to Med B--J45.909, Disp: 180 Each, Rfl: 11    montelukast (SINGULAIR) 10 mg tablet, TAKE ONE TABLET BY MOUTH NIGHTLY, Disp: 30 Tab, Rfl: 5    hydroCHLOROthiazide (HYDRODIURIL) 12.5 mg tablet, TAKE ONE TABLET BY MOUTH DAILY, Disp: 90 Tab, Rfl: 1    OXYGEN-AIR DELIVERY SYSTEMS, 2 L by continuous inhalation route nightly. Patient uses oxygen QHS, Disp: , Rfl:     dulaglutide (Trulicity) 1.5 EP/8.6 mL sub-q pen, inject ONE syringe subcutaneously ONCE EVERY WEEK, Disp: 22 mL, Rfl: 5    hydrOXYzine HCL (ATARAX) 10 mg tablet, Take 1 Tab by mouth every six (6) hours as needed for Itching. Indications: hives, itching, Disp: 40 Tab, Rfl: 3    folic acid (FOLVITE) 1 mg tablet, Take 1 Tab by mouth daily. , Disp: 90 Tab, Rfl: 3    atorvastatin (LIPITOR) 40 mg tablet, Take 1 Tab by mouth daily. (Patient taking differently: Take 80 mg by mouth daily.), Disp: 90 Tab, Rfl: 3    cetirizine (ZYRTEC) 10 mg tablet, Take 1 Tab by mouth daily. , Disp: 90 Tab, Rfl: 3    insulin glargine U-300 conc (TOUJEO SOLOSTAR U-300 INSULIN) 300 unit/mL (1.5 mL) inpn, 35 Units by SubCUTAneous route nightly. (Patient taking differently: 40 Units by SubCUTAneous route nightly.), Disp: 15 Adjustable Dose Pre-filled Pen Syringe, Rfl: 3    EPINEPHrine (EPIPEN) 0.3 mg/0.3 mL injection, 0.3 mg by IntraMUSCular route once as needed for Anaphylaxis. , Disp: , Rfl:     aspirin delayed-release 81 mg tablet, Take 81 mg by mouth daily. , Disp: , Rfl:      Date Last Reviewed:  6/4/2021   Number of Personal Factors/Comorbidities that affect the Plan of Care: 1-2: MODERATE COMPLEXITY   EXAMINATION:      -Pt sits with forward head and rounded shoulders which indicate tight anterior chest musculature, upper trapezius, and levator scapula and weak posterior scapula musculature and deep cervical flexors. Pt displays decreased core motor control indicating weak core and low back musculature.     OBSERVATIONS and FUNCTIONAL MOBILITY Date:   6/2/2021 Date:   Transfers Increased time & effort to complete    Posture Rounded shoulders, forward head, increased lordosis    Gait deviations Decreased erica, widened DOMINIQUE, decreased trunk rotation and reciprocal arm swing    Assistive device None    Stairs NT    Bed mobility Increased time & effort to complete    Muscle atrophy No        BALANCE Date: 6/2/2021 Date:    Right NT    Left NT        AROM/PROM         Joint: Date: 6/2/2021  Date:  Date:    Active LE ROM Right Left Right Left Right Left   Hip Flexion [facility]       Hip Extension [facility]       Hip ER [facility]       Hip IR [facility] WFL       Knee Extension [facility] WFL       Knee Flexion [facility] WFL       Knee Extension [facility] WFL       Lumbar Flexion WFL --       Lumbar Extension Limited to 25% --       Lumbar Side-Bending WFL; pain WFL; pain       Lumbar Rotation Limited to 25%; pain Limited to 25%; pain         STRENGTH         Joint: Date: 6/2/2021  Date:  Date:     Right Left Right Left Right Left   Hip Abduction 3+/5 4/5       Hip Adduction 3+/5 4/5       Hip IR 4/5 4/5       Hip ER 3+/5 4/5       Hip Flexion 3+/5 4-/5       Knee Extension 3+/5 4+/5       Knee Flexion 3+/5 3+/5       Ankle DF 3+/5 5/5       Ankle PF 4/5 4/5       Ankle IV 3/5 4/5       Ankle EV 3/5 4/5         PALPATION Date:   6/2/2021 Date:   TTP Mild discomfort along L3-L5 vertebrae; max discomfort S1-S4; R ASIS and PSIS    TONE Normal    PA GLIDES Painful L3-S4 B    EDEMA No      SPECIAL TESTS: Assessed @ Initial Visit    -SI COMPRESSION TEST: negative   -LUMBAR STENOSIS:      -Bilateral symptoms: yes      -Leg pain more than back pain: no      -Pain during walking/standing: yes      -Pain relief upon sitting: yes      -Age greater than 48 years: yes      NEUROLOGICAL SCREEN: Assessed @ Initial Visit    -RADIATING SYMPTOMS: Yes     -DERMATOMES: Normal and equal B    -REFLEXES: Date: 6/2/2021     Right Left   L4  (Quadriceps) NT NT   S1  (Achilles) NT NT       RED FLAGS: Date: 6/2/2021   Non-Mechanical pain distribution (cannot be produced, changed, or reduced during exam): NO   Cauda Equina Dysfunction: NO   Upper lumbar disc herniation in younger patients (femoral nerve tension test for lateral disc herniation in lower lumbar): NO   Lumbar compression fracture (age > 48, trauma, corticosteroid use NO   Spine Cancer (age > 48, pervious history of cancer, failure to improve in 1 month of therapy, no relief - be rest, duration > 1 month, unexplained weight loss, insidious onset, constitutional symptoms): NO   Ankylosing Spondylitis (age < 36, pain not relieved by supine, morning back stiffness, pain duration > 3 months, improved by exercise): NO   Sacral Fracture: NO        Body Structures Involved:  1. Bones  2. Joints  3. Muscles  4. Ligaments Body Functions Affected:  1. Sensory/Pain  2. Neuromusculoskeletal  3. Movement Related Activities and Participation Affected:  1. Mobility  2. Self Care  3. Domestic Life  4. Interpersonal Interactions and Relationships  5. Community, Social and [place]   Number of elements that affect the Plan of Care: 4+: HIGH COMPLEXITY   CLINICAL PRESENTATION:   Presentation: Evolving clinical presentation with changing clinical characteristics: MODERATE COMPLEXITY   CLINICAL DECISION MAKING:   OUTCOME MEASURE USED:   Tool Used:  Tool Used: Modified Oswestry Low Back Pain Questionnaire  Score:  Initial: 28/50 (Date: 6/2/21) Most Recent: X/50 (Date: -- )   Interpretation of Score: Each section is scored on a 0-5 scale, 5 representing the greatest disability. The scores of each section are added together for a total score of 50. Payor: [facility] MEDICARE / Plan: [address] / Product Type: Managed Care Medicare /     MEDICAL NECESSITY:  · Skilled intervention continues to be required due to above deficits affecting participation in basic ADLs and overall functional tolerance. REASON FOR SERVICES/OTHER COMMENTS:  · Patient continues to require skilled intervention due to  above deficits affecting participation in basic ADLs and overall functional tolerance. Use of outcome tool(s) and clinical judgement create a POC that gives a: Questionable prediction of patient's progress: MODERATE COMPLEXITY        TREATMENT/SESSION ASSESSMENT:  Carlota Camejo verbalized understanding of role of PT and POC. · Pain/ Symptoms: Initial:   7/10 Post Session:  3/10 ·   Compliance with Program/Exercises: Will assess as treatment progresses. · Recommendations/Intent for next treatment session: Next visit will focus on advancements to more challenging activities.     Total Treatment Duration: 57 minutes  PT Patient Time In/Time Out  Time In: 1100  Time Out: 1150    Kaylah Venegas, PT, DPT

## 2021-06-08 ENCOUNTER — HOSPITAL ENCOUNTER (OUTPATIENT)
Dept: PHYSICAL THERAPY | Age: 76
Discharge: HOME | End: 2021-06-08
Payer: MEDICARE

## 2021-06-08 PROCEDURE — 97110 THERAPEUTIC EXERCISES: CPT

## 2021-06-08 PROCEDURE — 97014 ELECTRIC STIMULATION THERAPY: CPT

## 2021-06-08 NOTE — PROGRESS NOTES
Lynn Valentin  : 1945  Primary: [address]  Secondary:  [address] [facility]  [address], [place], [address] [facility]  [phone]   [phone]      OUTPATIENT PHYSICAL THERAPY: Daily Treatment Note 2021  Visit Count:  3    ICD-10: Treatment Diagnosis: Low back pain  Low back pain (M54.5)  Sciatica, right side (M54.31)          Sciatica, left side (M54.32)        Lumbago with sciatica, right side (M54.41)        Lumbago with sciatica, left side (M54.42)   Difficulty in walking, not elsewhere classified (R26.2)  Muscle weakness (generalized) (M62.81)    Pre-treatment Symptoms/Complaints:  Pt reports she has not slept all night due to her pain. Presents with a box of icy hot patches that she uses. Pain: Initial:   10/10 soreness/pain Post Session:  5/10 soreness/pain   Medications Last Reviewed:  2021     Updated Objective Findings:  N/A.  TREATMENT:     THERAPEUTIC EXERCISE: (45 minutes):  Exercises per grid below to improve mobility and strength. Required minimal visual, verbal and tactile cues to promote proper body alignment and promote proper body posture. Progressed range and repetitions as indicated. See below for activities. Date:  21 Date:  21 Date:  2021   Activity/Exercise Parameters Parameters Parameters   HEP review / pt education 8 min 10 min - discussed finding pain free positions and utilizing hot pack    Supine LTR   10 x 5 sec hold B   Hamstring stretch    With strap   3 x 20 sec hold B               Supine bridges   2 x 5    SciFit  Level 0 x 5 min Nustep    Level 1  10 minutes           seat at 5   arms at 6                       MANUAL THERAPY: ( minutes): Soft tissue mobilization was utilized and necessary because of the patient's restricted motion of soft tissue.    - Soft tissue mobilization utilized with gua-sha tools, cupping, and manual techniques to lumbar spine, QL, paraspinals, lets, and superior glute max   - Lumbar PAs - pt unable to tolerate due to pain   - Long axis distraction of BLE - very painful on R side vs L was more tolerable for bouts of 30 sec   - Hip mobilizations - pt unable to tolerate due to pain    MODALITIES: (15 minutes): Electrical Stimulation Therapy (IFC) was provided with intensity adjusted throughout treatment to patient tolerance. - Crossing electrodes at lumbar spine with hot pack applied for pain relief - Intensity at 10    - Large hot pack to low back for pain relief in prone position    [employer] Portal    Supine LTR 1 x 10  Supine bridges 1 x 10    Treatment/Session Summary:    · Response to Treatment: Patient with some relief and relaxation to low back upon departure. Patient with less guarding today. If pain still strong at next visit, may try manual after a MHP. · Communication/Consultation: Patient encouraged to utilize hot pack at home if she finds it beneficial for pain management. · Equipment provided today: None today. · Recommendations/Intent for next treatment session: Next visit will focus on progressing to more challenging activities.     Total Treatment Billable Duration: 60 minutes with unattended e-stim   PT Patient Time In/Time Out  Time In: 1345  Time Out: [address] [facility]    Visit Approval Visit # Therapist initials Date A NS Cx Comments    1 VIANNEY 6-2-21 x   Eval    2 VIANNEY 6-4-21 x   1    3 PDE 6-8-2021 x   2                                                                                                                                                             Future Appointments   Date Time Provider Serenity Owens   6/10/2021  1:45 PM SFD PHYSICIAL THERAPIST SFDORPT D   6/11/2021 10:00 AM NUCLEAR STRESS GVL SSA UCDG D   6/14/2021  1:45 PM SFD PHYSICIAL THERAPIST SFDORPT D   6/17/2021  1:45 PM Phomarcio Stoddard PTA [facility]FDORPT D   6/24/2021  1:30 PM ECHO 39 SSA UCDG UCD   7/9/2021  3:30 PM Espinoza Us MD SSA UCDG UCD   12/1/2021  1:30 PM RINA Cortez PP PP

## 2021-06-10 ENCOUNTER — HOSPITAL ENCOUNTER (OUTPATIENT)
Dept: PHYSICAL THERAPY | Age: 76
Discharge: HOME | End: 2021-06-10
Payer: MEDICARE

## 2021-06-10 PROCEDURE — 97110 THERAPEUTIC EXERCISES: CPT

## 2021-06-10 NOTE — PROGRESS NOTES
Carlota Camejo  : 1945  Primary: [address]  Secondary:  [address] [facility] [address], [place], [address] [facility]  [phone]   [phone]      OUTPATIENT PHYSICAL THERAPY: Daily Treatment Note 6/10/2021  Visit Count:  4    ICD-10: Treatment Diagnosis: Low back pain  Low back pain (M54.5)  Sciatica, right side (M54.31)          Sciatica, left side (M54.32)        Lumbago with sciatica, right side (M54.41)        Lumbago with sciatica, left side (M54.42)   Difficulty in walking, not elsewhere classified (R26.2)  Muscle weakness (generalized) (M62.81)    Pre-treatment Symptoms/Complaints:  Pt reports she has some pain in her back that is radiating down her legs. Reports she has had this issue for a while. Says her feet swell up occasionally too. Patient reports she has been utilizing a hot pack at home. Pain: Initial:   7/10 soreness/pain in back and down legs Post Session:  8/10 soreness/pain in back only   Medications Last Reviewed:  6/10/2021     Updated Objective Findings:  N/A.  TREATMENT:     THERAPEUTIC EXERCISE: (41 minutes):  Exercises per grid below to improve mobility and strength. Required minimal visual, verbal and tactile cues to promote proper body alignment and promote proper body posture. Progressed range and repetitions as indicated. See below for activities.      Date:  21 Date:  21 Date:  2021 Date:  6-    Activity/Exercise Parameters Parameters Parameters     HEP review / pt education 8 min 10 min - discussed finding pain free positions and utilizing hot pack  5 min HEP review  5 min education on extension-based exercises    Supine LTR   10 x 5 sec hold B     Hamstring stretch    With strap   3 x 20 sec hold B                     Supine bridges   2 x 5      SciFit  Level 0 x 5 min Nustep    Level 1  10 minutes           seat at 5   arms at 6 Level 1  8 min    Prone on elbows    1 x 10     Prone press ups    3 x 10  Hands in front of shoulders    Resisted PNF - scapular/pelvic    1 x 10 supine  1 x 10 sidelying                                      MANUAL THERAPY: (0 minutes): Soft tissue mobilization was utilized and necessary because of the patient's restricted motion of soft tissue. - Soft tissue mobilization utilized with gua-sha tools, cupping, and manual techniques to lumbar spine, QL, paraspinals, lets, and superior glute max   - Lumbar PAs - pt unable to tolerate due to pain   - Long axis distraction of BLE - very painful on R side vs L was more tolerable for bouts of 30 sec   - Hip mobilizations - pt unable to tolerate due to pain    MODALITIES: (0 minutes): Electrical Stimulation Therapy (IFC) was provided with intensity adjusted throughout treatment to patient tolerance. - Crossing electrodes at lumbar spine with hot pack applied for pain relief - Intensity at 10    - Large hot pack to low back for pain relief in prone position    MedBridge Portal    Supine LTR 3 x 10  Supine bridges 3 x 10  Prone press ups 3 x 10    Treatment/Session Summary:     · Response to Treatment: Patient tolerated therapy fairly well today. Patient reporting success with extension-based exercises with relief noted in BLE and slight increased in pain centralized at low back. Patient unable to tolerate long axis distraction with complaints of symptoms down RLE. Patient verbalized understanding of mechanics regarding extension-based exercises to intervene for radicular symptoms. Patient encouraged to continue with hot pack use at home for pain management. · Communication/Consultation: See above. · Equipment provided today: None today. · Recommendations/Intent for next treatment session: Next visit will focus on progressing to more challenging activities.     Total Treatment Billable Duration: 41 minutes  PT Patient Time In/Time Out  Time In: 6575  Time Out: 8452 Source MDx Izabela , PT, DPT    Visit Approval Visit # Therapist initials Date A NS Cx Comments    1 VIANNEY 6-2-2021 x   Evaluation    2 VIANNEY 6-4-2021 x   1    3 PDE 6-8-2021 x   2    4 VIANNEY 6- x   3                                                                                                                                                   Future Appointments   Date Time Provider Serenity Graciela   6/11/2021 10:00 AM NUCLEAR STRESS GVL [facility] UCDG D   6/14/2021  1:45 PM SFD PHYSICIAL THERAPIST [facility]FDORPT D   6/17/2021  1:45 PM Swati Stoddard PTA SFDORPT D   6/24/2021  1:30 PM ECHO 39 SSA UCDG D   7/9/2021  3:30 PM Opal Us MD SSA DG D   12/1/2021  1:30 PM Kofi Bryant NP [facility] PP PP

## 2021-06-14 ENCOUNTER — HOSPITAL ENCOUNTER (OUTPATIENT)
Dept: PHYSICAL THERAPY | Age: 76
Discharge: HOME | End: 2021-06-14
Payer: MEDICARE

## 2021-06-14 PROCEDURE — 97110 THERAPEUTIC EXERCISES: CPT

## 2021-06-14 PROCEDURE — 97014 ELECTRIC STIMULATION THERAPY: CPT

## 2021-06-14 NOTE — PROGRESS NOTES
Amada Velasco  : 1945  Primary: [address]  Secondary:  [address] [facility]  [address], [address], [place], [address] [facility]  [phone]   [phone]      OUTPATIENT PHYSICAL THERAPY: Daily Treatment Note 2021  Visit Count:  5    ICD-10: Treatment Diagnosis:   Low back pain (M54.5)   Sciatica, right side (M54.31)            Sciatica, left side (M54.32)  Muscle weakness (generalized) (M62.81)    Pre-treatment Symptoms/Complaints: Pt reports laid around for most of the weekend. She states it is hard to get in and out of the car. She reports she currently has pain that radiates down her R leg. Pain: Initial: 8/10 low back and RLE Post Session:  5/10 low back     Medications Last Reviewed:  2021     Updated Objective Findings:  N/A.    TREATMENT:     THERAPEUTIC EXERCISE: (38 minutes):  Exercises per grid below to improve mobility and strength. Required minimal visual, verbal and tactile cues to promote proper body alignment and promote proper body posture. Progressed range and repetitions as indicated. See below for activities. Date:  2021 Date:  6- Date:  2021   Activity/Exercise Parameters     HEP review / patient education  5 min HEP review  5 min education on extension-based exercises 5 min   Supine LTR 10 x 5 sec hold B     Hamstring stretch  With strap   3 x 20 sec hold B     Supine bridges 2 x 5      SciFit Nustep    Level 1  10 minutes           seat at 5   arms at 6 Level 1  8 min NuStep Level 1  10 min   Prone on elbows  1 x 3 min 1 x 5 min   Prone press ups  3 x 10  Hands in front of shoulders 1 x 5, 3 x 10  Hands in front, then below shoulders   Resisted PNF - scapular/pelvic  1 x 10 supine  1 x 10 sidelying                              MANUAL THERAPY: (0 minutes):  Soft tissue mobilization was utilized and necessary because of the patient's restricted motion of soft tissue. - Soft tissue mobilization utilized with gua-sha tools, cupping, and manual techniques to lumbar spine, QL, paraspinals, lets, and superior glute max   - Lumbar PAs - pt unable to tolerate due to pain   - Long axis distraction of BLE - very painful on R side vs L was more tolerable for bouts of 30 sec   - Hip mobilizations - pt unable to tolerate due to pain    MODALITIES: (10 minutes): Electrical Stimulation Therapy (IFC) was provided with intensity adjusted throughout treatment to patient tolerance. - Crossing electrodes at lumbar spine with hot pack applied for pain relief - Intensity at 21   - Large hot pack to low back for pain relief in prone position    [employer] Portal    Supine LTR 3 x 10  Supine bridges 3 x 10  Prone press ups 3 x 10    Treatment/Session Summary:     · Response to Treatment: Patient reporting decrease in LE symptoms with extension-based exercises again this visit. Patient reporting centralization of symptoms with increase in pain severity in low back. Patient reporting that prone on elbows is mildly uncomfortable, but not intolerable. Patient's complaints of radicular symptoms seem to improve with direction-based interventions, but unsure of sustained success outside of therapy visit. Patient requiring longer rest break between sets today secondary to increase in pain with further progression into lumbar extension via hand placement. Patient continues to report improvement with modalities, but want to encourage alternative methods to resolve symptoms such as strengthening and mobility. · Communication/Consultation: Patient encouraged to continue with HEP outside of direct supervision from PT.  · Equipment provided today: None today. · Recommendations/Intent for next treatment session: Next visit will focus on progressing to more challenging activities.     Total Treatment Billable Duration: 38 minutes + 10 minutes of e-stim/hot pack  PT Patient Time In/Time Out  Time In: 1330  Time Out: 9793 Ely Fish B, PT, DPT    Visit Approval Visit # Therapist initials Date A NS Cx Comments    1 VIANNEY 6-2-2021 x   Evaluation    2 VIANNEY 6-4-2021 x   1    3 PDE 6-8-2021 x   2    4 VIANNEY 6- x   3    5 VIANNEY 6- x   4                                                                                                                                         Future Appointments   Date Time Provider Serenity Owens   6/17/2021  1:45 PM Swati Stoddard PTA [facility]   6/24/2021  1:30 PM [facility]   [facility]  [facility]   12/1/2021  1:30 PM Kofi Bryant NP [facility] PP

## 2021-06-17 ENCOUNTER — HOSPITAL ENCOUNTER (OUTPATIENT)
Dept: PHYSICAL THERAPY | Age: 76
Discharge: HOME | End: 2021-06-17
Payer: MEDICARE

## 2021-06-17 PROCEDURE — 97140 MANUAL THERAPY 1/> REGIONS: CPT

## 2021-06-17 PROCEDURE — 97014 ELECTRIC STIMULATION THERAPY: CPT

## 2021-06-17 PROCEDURE — 97110 THERAPEUTIC EXERCISES: CPT

## 2021-06-17 NOTE — PROGRESS NOTES
Adeel Ho  : 1945  Primary: [address]  Secondary:  [address] [facility]  [address], [address], [place], [address] [facility]  [phone]   [phone]      OUTPATIENT PHYSICAL THERAPY: Daily Treatment Note 2021  Visit Count:  6    ICD-10: Treatment Diagnosis:   Low back pain (M54.5)   Sciatica, right side (M54.31)            Sciatica, left side (M54.32)  Muscle weakness (generalized) (M62.81)    Pre-treatment Symptoms/Complaints: Pt reports she is doing better. Pain: Initial: 8/10 low back and RLE Post Session:  5/10 low back     Medications Last Reviewed:  2021     Updated Objective Findings:  N/A.    TREATMENT:     THERAPEUTIC EXERCISE: (35 minutes):  Exercises per grid below to improve mobility and strength. Required minimal visual, verbal and tactile cues to promote proper body alignment and promote proper body posture. Progressed range and repetitions as indicated. See below for activities. Date:  2021 Date:  6- Date:  2021 Date:  2021   Activity/Exercise Parameters      HEP review / patient education  5 min HEP review  5 min education on extension-based exercises 5 min 5 minutes    Supine LTR 10 x 5 sec hold B      Hamstring stretch  With strap   3 x 20 sec hold B      Supine bridges 2 x 5       SciFit Nustep    Level 1  10 minutes           seat at 5   arms at 6 Level 1  8 min NuStep Level 1  10 min Scifit  Level 1  10 minutes    Prone on elbows  1 x 3 min 1 x 5 min 5 minutes   Prone press ups  3 x 10  Hands in front of shoulders 1 x 5, 3 x 10  Hands in front, then below shoulders 5 minutes    Resisted PNF - scapular/pelvic  1 x 10 supine  1 x 10 sidelying                                   MANUAL THERAPY: (10 minutes): Soft tissue mobilization was utilized and necessary because of the patient's restricted motion of soft tissue.    - Soft tissue mobilization utilized with hands and thera-roller to lumbar spine and gluteals. - Lumbar PAs - pt unable to tolerate due to pain   - Long axis distraction of BLE - very painful on R side vs L was more tolerable for bouts of 30 sec   - Hip mobilizations - pt unable to tolerate due to pain    MODALITIES: (10 minutes): Electrical Stimulation Therapy (IFC) was provided with intensity adjusted throughout treatment to patient tolerance. - Crossing electrodes at lumbar spine with hot pack applied for pain relief - Intensity at 21   - Large hot pack to low back for pain relief in prone position    MedBridge Portal    Supine LTR 3 x 10  Supine bridges 3 x 10  Prone press ups 3 x 10    Treatment/Session Summary:     · Response to Treatment: Patient reporting good results with extension exercises. Patient reporting centralization of symptoms with increase in pain severity in low back. Patient continues to report improvement with modalities, but want to encourage alternative methods to resolve symptoms such as strengthening and mobility. · Communication/Consultation: Patient encouraged to continue with HEP outside of direct supervision from PT.  · Equipment provided today: None today. · Recommendations/Intent for next treatment session: Next visit will focus on progressing to more challenging activities. Total Treatment Billable Duration: 40 minutes + 15 minutes of e-stim with MHP.    PT Patient Time In/Time Out  Time In: 1343  Time Out: [address] [facility]    Visit Approval Visit # Therapist initials Date A NS Cx Comments    1 VIANNEY 6-2-2021 x   Evaluation    2 VIANNEY 6-4-2021 x   1    3 PDE 6-8-2021 x   2    4 VIANNEY 6- x   3    5 VIANNEY 6- x   4    6 PDE 6- x   5                                                                                                                                Future Appointments   Date Time Provider Serenity Owens   6/24/2021  1:30 PM ECHO 39 [address]   7/9/2021  3:30 PM Dheeraj George MD SSA [facility]   12/1/2021  1:30 PM Michael Hayes NP SSA PP PP

## 2021-06-22 ENCOUNTER — HOSPITAL ENCOUNTER (OUTPATIENT)
Dept: PHYSICAL THERAPY | Age: 76
Discharge: HOME | End: 2021-06-22
Payer: MEDICARE

## 2021-06-22 PROCEDURE — 97110 THERAPEUTIC EXERCISES: CPT | Performed by: PHYSICAL THERAPIST

## 2021-06-22 NOTE — PROGRESS NOTES
Isha Blevins  : 1945  Primary: [address]  Secondary:  [address] [facility], [address], [place], [address] [facility]  [phone]   [phone]      OUTPATIENT PHYSICAL THERAPY: Daily Treatment Note 2021  Visit Count:  7    ICD-10: Treatment Diagnosis:   Low back pain (M54.5)   Sciatica, right side (M54.31)            Sciatica, left side (M54.32)  Muscle weakness (generalized) (M62.81)    Pre-treatment Symptoms/Complaints: Pt reports she has been having a lot of neuropathy pain in her feet. Pain: Initial: 7/10 low back   Post Session:  4/10 low back     Medications Last Reviewed:  2021     Updated Objective Findings:  N/A.    TREATMENT:     THERAPEUTIC EXERCISE: (48 minutes):  Exercises per grid below to improve mobility and strength. Required minimal visual, verbal and tactile cues to promote proper body alignment and promote proper body posture. Progressed range and repetitions as indicated. See below for activities.      Date:  2021 Date:  6- Date:  2021 Date:  2021 Date:  2021   Activity/Exercise Parameters       HEP review / patient education  5 min HEP review  5 min education on extension-based exercises 5 min 5 minutes  5-6 minutes   Supine LTR 10 x 5 sec hold B       Hamstring stretch  With strap   3 x 20 sec hold B       Supine bridges 2 x 5     3 x10   SciFit Nustep    Level 1  10 minutes           seat at 5   arms at 6 Level 1  8 min NuStep Level 1  10 min Scifit  Level 1  10 minutes  Level 1 6 min   Prone on elbows  1 x 3 min 1 x 5 min 5 minutes 5 min   Prone press ups  3 x 10  Hands in front of shoulders 1 x 5, 3 x 10  Hands in front, then below shoulders 5 minutes  2x 5 min w/ transitions into and out of child's pose   Resisted PNF - scapular/pelvic  1 x 10 supine  1 x 10 sidelying      Supine Ball Roll outs     3 x 90''   Supine Lumbar Rotation on Ball     3 x 90\"   Supine Single Leg Knee to chest     3 x1 min each leg             MANUAL THERAPY: (0 minutes): Soft tissue mobilization was utilized and necessary because of the patient's restricted motion of soft tissue. - Soft tissue mobilization utilized with hands and thera-roller to lumbar spine and gluteals. - Lumbar PAs - pt unable to tolerate due to pain   - Long axis distraction of BLE - very painful on R side vs L was more tolerable for bouts of 30 sec   - Hip mobilizations - pt unable to tolerate due to pain    MODALITIES: (0 minutes): Electrical Stimulation Therapy (IFC) was provided with intensity adjusted throughout treatment to patient tolerance. - Crossing electrodes at lumbar spine with hot pack applied for pain relief - Intensity at 21   - Large hot pack to low back for pain relief in prone position    [employer] Portal    Supine LTR 3 x 10  Supine bridges 3 x 10  Prone press ups 3 x 10    Treatment/Session Summary:     · Response to Treatment: Patient responded well to today's treatment, especially with supine ball exercises. Patient reported decreased pain and a plan to perform her HEP exercises more often. Will continue to progress per POC. · Communication/Consultation: Patient encouraged to continue with HEP outside of direct supervision from PT.  · Equipment provided today: None today. · Recommendations/Intent for next treatment session: Next visit will focus on progressing to more challenging activities.     Total Treatment Billable Duration: 48 minutes  PT Patient Time In/Time Out  Time In: 1257  Time Out: [address] [facility]    Visit Approval Visit # Therapist initials Date A NS Cx Comments    1 VIANNEY 6-2-2021 x   Evaluation    2 VIANNEY 6-4-2021 x   1    3 PDE 6-8-2021 x   2    4 VIANNEY 6- x   3    5 VIANNEY 6- x   4    6 PDE 6- x   5     7 SJ 6- x   6 Future Appointments   Date Time Provider Serenity Owens   6/24/2021  1:30 PM ECHO 36 SSA [facility]CDG D   [facility]24/2021  [facility]   6/29/2021  1:45 PM SFD PHYSICIAL THERAPIST [facility]FDORPT D   7/1/2021  1:45 PM SFD PHYSICIAL THERAPIST [facility]PT D   7/6/2021  1:45 PM SFD PHYSICIAL THERAPIST [facility]PT D   7/8/2021  1:45 PM SFD PHYSICIAL THERAPIST [facility]PT D   7/9/2021  3:30 PM [facility]   7/13/2021  1:45 PM SFD PHYSICIAL THERAPIST [facility]FDORPT D   7/15/2021  1:45 PM SFD PHYSICIAL THERAPIST SFDORPT SFD   12/1/2021  1:30 PM Александр Diamond, Mary Ayoub, RINA [facility] PP PP

## 2021-06-24 ENCOUNTER — HOSPITAL ENCOUNTER (OUTPATIENT)
Dept: PHYSICAL THERAPY | Age: 76
Discharge: HOME | End: 2021-06-24
Payer: MEDICARE

## 2021-06-24 PROCEDURE — 97110 THERAPEUTIC EXERCISES: CPT | Performed by: PHYSICAL THERAPIST

## 2021-06-24 PROCEDURE — 97140 MANUAL THERAPY 1/> REGIONS: CPT | Performed by: PHYSICAL THERAPIST

## 2021-06-24 NOTE — PROGRESS NOTES
Alli Esparza  : 1945  Primary: [address]  Secondary:  [address] [facility]  [address], [address], [place], [address] [facility]  [phone]   [phone]      OUTPATIENT PHYSICAL THERAPY: Daily Treatment Note 2021  Visit Count:  8    ICD-10: Treatment Diagnosis:   Low back pain (M54.5)   Sciatica, right side (M54.31)            Sciatica, left side (M54.32)  Muscle weakness (generalized) (M62.81)    Pre-treatment Symptoms/Complaints: Pt reports she has just came from a doctor's appointment where she had to lay flat out on her back, and that it caused her a great deal of pain. Pain: Initial: 9/10 low back   Post Session:  5/10 low back     Medications Last Reviewed:  2021     Updated Objective Findings:  N/A.    TREATMENT:     THERAPEUTIC EXERCISE: (50 minutes):  Exercises per grid below to improve mobility and strength. Required minimal visual, verbal and tactile cues to promote proper body alignment and promote proper body posture. Progressed range and repetitions as indicated. See below for activities.      Date:  2021 Date:  6- Date:  2021 Date:  2021 Date:  2021 Date:   21   Activity/Exercise Parameters        HEP review / patient education  5 min HEP review  5 min education on extension-based exercises 5 min 5 minutes  5-6 minutes 4 min   Supine LTR 10 x 5 sec hold B        Hamstring stretch  With strap   3 x 20 sec hold B     With strap 3 x 20 sec hold   Supine bridges 2 x 5     3 x10 3 x 10   SciFit Nustep    Level 1  10 minutes           seat at 5   arms at 6 Level 1  8 min NuStep Level 1  10 min Scifit  Level 1  10 minutes  Level 1 6 min Level 1 5 min   Prone on elbows  1 x 3 min 1 x 5 min 5 minutes 5 min    Prone press ups  3 x 10  Hands in front of shoulders 1 x 5, 3 x 10  Hands in front, then below shoulders 5 minutes  2x 5 min w/ transitions into and out of child's pose    Resisted PNF - scapular/pelvic  1 x 10 supine  1 x 10 sidelying       Supine Ball Roll outs     3 x 90''    Supine Lumbar Rotation on Ball     3 x 90\"    Supine Single Leg Knee to chest     3 x1 min each leg    Supine Double Knee to chest      3x 2'     MANUAL THERAPY: (10 minutes): Soft tissue mobilization was utilized and necessary because of the patient's restricted motion of soft tissue. - Soft tissue mobilization utilized with hands and thera-roller to lumbar spine and gluteals. - Lumbar PAs - mildly painful   -STM- to bilateral lumbar paraspinals w/ pin & stretch- reported decreased pain   - Long axis distraction of BLE - not performed   - Hip mobilizations - not performed    MODALITIES: (0 minutes): Electrical Stimulation Therapy (IFC) was provided with intensity adjusted throughout treatment to patient tolerance. - Crossing electrodes at lumbar spine with hot pack applied for pain relief - Intensity at 21   - Large hot pack to low back for pain relief in prone position    MedBridge Portal    Supine LTR 3 x 10  Supine bridges 3 x 10  Prone press ups 3 x 10    Treatment/Session Summary:     · Response to Treatment: Patient arrived to therapy today highly antalgic, prompting manual therapy and a decreased intensity session with focus on stretching and mobility. Will continue to progress per POC. · Communication/Consultation: Patient encouraged to continue with HEP outside of direct supervision from PT.  · Equipment provided today: None today. · Recommendations/Intent for next treatment session: Next visit will focus on progressing to more challenging activities.     Total Treatment Billable Duration:  60 minutes     David He    Visit Approval Visit # Therapist initials Date A NS Cx Comments    1 VIANNEY 6-2-2021 x   Evaluation    2 VIANNEY 6-4-2021 x   1    3 PDE 6-8-2021 x   2    4 VIANNEY 6- x   3    5 VIANNEY 6- x   4    6 PDE 6- x   5     7 SJ 6- x   6    8 SJ 6- x   7                                                                                                           Future Appointments   Date Time Provider Serenity Graciela   6/24/2021  3:15 PM Giovani [facility]   6/29/2021  1:45 PM [facility] PHYSICIAL THERAPIST [facility]   7/1/2021  1:45 PM [facility] PHYSICIAL THERAPIST [facility]   7/6/2021  1:45 PM [facility] PHYSICIAL THERAPIST [facility]   7/8/2021  1:45 PM [facility] PHYSICIAL THERAPIST [facility]   7/9/2021  3:30 PM Jamir Us MD [facility]   7/13/2021  1:45 PM [facility] PHYSICIAL THERAPIST [facility]   7/15/2021  1:45 PM [facility] PHYSICIAL THERAPIST [facility]   12/1/2021  1:30 PM Guy Baptiste NP [facility] PP PP

## 2021-06-29 ENCOUNTER — HOSPITAL ENCOUNTER (OUTPATIENT)
Dept: PHYSICAL THERAPY | Age: 76
Discharge: HOME | End: 2021-06-29
Payer: MEDICARE

## 2021-06-29 PROCEDURE — 97014 ELECTRIC STIMULATION THERAPY: CPT

## 2021-06-29 PROCEDURE — 97110 THERAPEUTIC EXERCISES: CPT

## 2021-06-29 NOTE — PROGRESS NOTES
Katherin Huang  : 1945  Primary: [address]  Secondary:  [address] [facility]  [address], [address], [place], [address] [facility]  [phone]   [phone]      OUTPATIENT PHYSICAL THERAPY: Daily Treatment Note 2021  Visit Count:  9    ICD-10: Treatment Diagnosis:   Low back pain (M54.5)   Sciatica, right side (M54.31)            Sciatica, left side (M54.32)  Muscle weakness (generalized) (M62.81)    Pre-treatment Symptoms/Complaints: Pt reports she is feeling better this afternoon. She reports mild reports of pain in her low back. She states she has still been unable to go fishing. Pain: Initial: 310 low back   Post Session:  0/10 low back     Medications Last Reviewed:  2021     Updated Objective Findings:  N/A.    TREATMENT:     THERAPEUTIC EXERCISE: (30 minutes):  Exercises per grid below to improve mobility and strength. Required minimal visual, verbal and tactile cues to promote proper body alignment and promote proper body posture. Progressed range and repetitions as indicated. See below for activities. Date:  2021 Date:  2021 Date:   21 Date:  21   Activity/Exercise       HEP review / patient education 5 minutes  5-6 minutes 4 min 3 min   Supine LTR       Hamstring stretch    With strap 3 x 20 sec hold    Supine bridges  3 x10 3 x 10    SciFit Scifit  Level 1  10 minutes  Level 1 6 min Level 1 5 min Level 1  8 min   Prone on elbows 5 minutes 5 min     Prone press ups 5 minutes  2x 5 min w/ transitions into and out of child's pose     Resisted PNF - scapular/pelvic       Supine Ball Roll outs  3 x 90''     Supine Lumbar Rotation on Ball  3 x 90\"     Supine Single Leg Knee to chest  3 x1 min each leg     Supine Double Knee to chest   3x 2'    Clam shells    2 x 10 B GTB   Standing hip abduction    1 x 10 B            MANUAL THERAPY: (0 minutes):  Soft tissue mobilization was utilized and necessary because of the patient's restricted motion of soft tissue. - Soft tissue mobilization utilized with hands and thera-roller to lumbar spine and gluteals. - Lumbar PAs - mildly painful   -STM- to bilateral lumbar paraspinals w/ pin & stretch- reported decreased pain   - Long axis distraction of BLE - not performed   - Hip mobilizations - not performed    MODALITIES: (10 minutes): Electrical Stimulation Therapy (IFC) was provided with intensity adjusted throughout treatment to patient tolerance. - Crossing electrodes at lumbar spine with hot pack applied for pain relief - Intensity at 28   - Large hot pack to low back for pain relief in prone position    MedBridge Portal    Supine LTR 3 x 10  Supine bridges 3 x 10  Prone press ups 3 x 10    Treatment/Session Summary:     · Response to Treatment: Patient tolerated therapy session fairly this afternoon. Patient initially started out with mild complaints of pain, but throughout the session she reported her pain increased dramatically - especially to her RLE. She said that lying down and performing standing exercises on RLE seem to exacerbate her pain the most. Patient appears to receive a lot of benefit in regards to pain modulation via hot pack with electrical stimulation at the end of the session. Pt saying at the end of the session she felt no pain. · Communication/Consultation: Patient encouraged to continue with HEP outside of direct supervision from PT.  · Equipment provided today: None today. · Recommendations/Intent for next treatment session: Next visit will focus on progressing to more challenging activities.     Total Treatment Billable Duration: 40 minutes + MHP 10 minutes  PT Patient Time In/Time Out  Time In: 1345  Time Out: [address], PT, DPT    Visit Approval Visit # Therapist initials Date A NS Cx Comments    1 VIANNEY 6-2-2021 x   Evaluation    2 VIANNEY 6-4-2021 x   1    3 PDE 6-8-2021 x   2    4 VIANNEY 6- x   3    5 VIANNEY 6- x   4    6 PDE 6- x   5     7 SJ 6- x   6    8 SJ 6- x   7    9 VIANNEY 6- x   8                                                                                                 Future Appointments   Date Time Provider Serenity Owens   7/1/2021  1:45 PM [facility] PHYSICIAL THERAPIST [facility]   7/6/2021  1:45 PM [facility] PHYSICIAL THERAPIST [facility]   7/8/2021  1:45 PM [facility] PHYSICIAL THERAPIST [facility]   7/9/2021  3:30 PM Fernanda Us MD [facility]   7/13/2021  1:45 PM [facility] PHYSICIAL THERAPIST [facility]   7/15/2021  1:45 PM [facility] PHYSICIAL THERAPIST [facility]   12/1/2021  1:30 PM Bindu Amezquita, RINA [facility] PP PP

## 2021-07-01 ENCOUNTER — HOSPITAL ENCOUNTER (OUTPATIENT)
Dept: PHYSICAL THERAPY | Age: 76
Discharge: HOME | End: 2021-07-01
Payer: MEDICARE

## 2021-07-01 PROCEDURE — 97110 THERAPEUTIC EXERCISES: CPT

## 2021-07-01 NOTE — PROGRESS NOTES
Jake Barton  : 1945  Payor: [facility] MEDICARE / Plan: [address] / Product Type: Managed Care Medicare /  [address] [place] [address]  at [facility]  [address], [address], [place] [address] [facility]  [phone]   [phone]                OUTPATIENT PHYSICAL THERAPY:Progress Report 2021    ICD-10: Treatment Diagnosis: Low back pain  Low back pain (M54.5)  Sciatica, right side (M54.31)          Sciatica, left side (M54.32)        Lumbago with sciatica, right side (M54.41)        Lumbago with sciatica, left side (M54.42)   Difficulty in walking, not elsewhere classified (R26.2)  Muscle weakness (generalized) (M62.81)        PRECAUTIONS/ALLERGIES:   Prilosec [omeprazole] and Penicillins     FALL RISK SCORE: 1 (? 5 = High Risk)    MD ORDERS: Eval and Treat MEDICAL/REFERRING DIAGNOSIS:  Pain in right arm [M79.601]  Low back pain [M54.5]     DATE OF ONSET: 2 years ago    REFERRING PHYSICIAN: Kathy Caldera DO    RETURN PHYSICIAN APPOINTMENT: TBD      Ambulatory/Rehab Services H2 Model Falls Risk Assessment    Risk Factors:       No Risk Factors Identified Ability to Rise from Chair:       (1)  Pushes up, successful in one attempt    Falls Prevention Plan:       No modifications necessary   Total: (5 or greater = High Risk): 1     [facility] of [facility] aspect. All Rights Reserved. [facility] States Patent #[id number]. Federal Law prohibits the replication, distribution or use without written permission from [employer]        PROGRESS ASSESSMENT: Ms. Emili Phoenix has participated in 9 PT sessions and demonstrated gains in LE strength, lumbar AROM, and reduction in pain report. Patient demonstrates improvement in outcome measure and subjective report of functional mobility and ADLs. She continues to demonstrate deficits in general strength, pain, and activity tolerance. She continues to report radicular symptoms down her RLE.  She has met 4/5 STGs and 1/4 LTGs at this time. She would benefit from continued PT services to address her deficits and maximize her independence with functional mobility. PROBLEM LIST (Impacting functional limitations):  1. Decreased Strength  2. Decreased ADL/Functional Activities  3. Decreased Transfer Abilities  4. Decreased Ambulation Ability/Technique  5. Decreased Balance  6. Increased Pain  7. Decreased Activity Tolerance  8. Increased Fatigue  9. Increased Shortness of Breath  10. Decreased Flexibility/Joint Mobility  11. Decreased [place] with Home Exercise Program INTERVENTIONS PLANNED:  1. Balance Exercise  2. Bed Mobility  3. Cold  4. Cryotherapy  5. Electrical Stimulation  6. Family Education  7. Gait Training  8. Heat  9. Home Exercise Program (HEP)  10. Manual Therapy  11. Neuromuscular Re-education/Strengthening  12. Range of Motion (ROM)  13. Therapeutic Activites  14. Therapeutic Exercise/Strengthening  15. Transfer Training  16. Mechanical traction  17. Aquatic Therapy  18. Electrical Stimulation  19. Vasopneumatic compression   20. Dry Needling for Pain control   TREATMENT PLAN:  Effective Dates: 6/2/2021 TO 8/31/2021 (90 days). Frequency/Duration: 2 times a week for 90 Days    GOALS: (Goals have been discussed and agreed upon with patient.)  Short Term Goals 4 weeks   1. aDkotah Francisco will be independent with HEP to promote self-management of symptoms. 2. Dakotah Francisco will participate in core stabilization exercises to help with stabilization and improve posture during ADLs to help prevent future injuries. MET 07/01/21  3. Dakotah Francisco will participate in LE strengthening program with weights as appropriate to help with gait and elevations. MET 07/01/21  4. Dakotah Francisco will participate in static and dynamic balance activities to decrease the risk for falls and improve overall QOL. MET 07/01/21  5.  Dakotah Francisco will tolerate manual therapy/joint mobilizations/soft tissue to increase ROM and decrease pain to improve functional mobility during ADLs. MET 07/01/21    Long Term Goals 12 weeks   1. Fernanda Almanzar will demonstrate an 5 point improvement on the Oswestry to show improvement in function. MET 07/01/21  2. Fernanda Almanzar will report <=4/10 pain at rest and during ADLs to improve QOL. 3. Fernanda Yohan will demonstrate >=4+/5 LE strength on manual muscle testing to improve functional mobility. [address] will be able to demonstrate safe lifting and transfer mechanics without cueing for improved safety with home, childcare, and community activities. Rehabilitation Potential For Stated Goals: GOOD    Regarding Fernanda Almanzar therapy, I certify that the treatment plan above will be carried out by a therapist or under their direction. Thank you for this referral,  Rody Davila, PT, DPT    Referring Physician Signature: Daisy Lewis,               Date                    HISTORY:   PATIENT GOAL FOR PHYSICAL THERAPY:  Reduce pain in her back    HISTORY OF PRESENT INJURY/ILLNESS (REASON FOR REFERRAL):  Patient reports she's suffered from pain her low back (right > left) with insidious KACEY. Pt reports she suffers from arthritis and attributes most of her impairments to that diagnosis. Pt reports that previous MD visits have resulted in additional attributions to arthritis causing her pain; however, pt reporting TTP along R latissimus and QL in addition to lumbar spine. Patient reports she has tried exercise in the past as a method to address her pain, but states that it hasn't been much help. Pt states she is unable to perform hobbies (fishing) or ADLs (self-care) secondary to decreased activity tolerance as a result of increased pain. Pt reports that she will occasionally have radicular symptoms, which result after standing or sitting for an extended period of time.  Patient reports she only has the capacity to stand for 20-25 minutes before needing a seated rest break. Progress Note (7/1/21): Patient reports that her RLE pain has improved a lot over the past few weeks. She states her sacrum (\"tailbone\") is her primary impairment at this point. She said she is still unable to go fishing like she wants to secondary to her tailbone pain. Note: Patient denies any increase of symptoms with cough, sneeze or valsalva. Patient denies any saddle paresthesia or bowel/bladder deficits. PAIN AND NATURE OF CONDITION Date:   6/2/2021 Date:  7/1/21   Highest pain level 10/10 7/10   Lowest pain level 7/10 4/10   Aggravating factors Standing, walking    Alleviating factors/positions/motions Lying down    Irritability Significant    Depression, fear, anxiety None      KACEY Insidious   Leg pain Yes   Sedentary lifestyle Yes     PAST MEDICAL HISTORY/COMORBIDITIES:   Ms. Tosin Arrieta  has a past medical history of Arrhythmia (3 - 4 weeks ago (9/2010)), Arthritis, Asthma, Bladder incontinence (10/24/2016), Chronic pain, COVID-19 (08/2020), Dermatophytosis, Diabetes ([address] Former smoker, GERD (gastroesophageal reflux disease) (7/23/2013), Heart failure ([address] Hypercholesterolemia, Hypertension, Hypertensive heart disease without HF (heart failure), Lupus (systemic lupus erythematosus) ([address] (2005), Non compliance with medical treatment, Obesity, ANATOLY (obstructive sleep apnea) (07/23/2013), Seizures ([address] Stroke ([address] (1979), Systemic lupus erythematosus ([address] (07/23/2013), and Type 2 diabetes mellitus with hyperglycemia ([address] (10/24/2016).  She also has no past medical history of Adverse effect of anesthesia, Aneurysm ([address] CAD (coronary artery disease), Cancer ([address] Chronic kidney disease, Chronic obstructive pulmonary disease ([address] Coagulation disorder ([address] Difficult intubation, Endocarditis, Ill-defined condition, Liver disease, Malignant hyperthermia due to anesthesia, Nausea & vomiting, Nicotine vapor product user, Non-nicotine vapor product user, Pseudocholinesterase deficiency, Psychiatric disorder, PUD (peptic ulcer disease), Rheumatic fever, Thromboembolus ([facility] or Thyroid disease. Ms. Jorge Heller  has a past surgical history that includes hx hysterectomy (); hx breast lumpectomy (); hx appendectomy; hx hammer toe repair (); pr rev upper eyelid w excess skin; hx heent; hx other surgical; hx other surgical (03/15/2018); hx heart catheterization (1999); and hx breast biopsy (Left). SOCIAL HISTORY/LIVING ENVIRONMENT:   Patient lives with her   in a duplex. Social History     Socioeconomic History    Marital status: LEGALLY      Spouse name: Not on file    Number of children: 0    Years of education: Not on file    Highest education level: Not on file   Occupational History    Occupation: [address] [place] [address]     Employer: NOT EMPLOYED     Comment: Denies industrial toxins    Occupation: Textiles     Comment: 6 yrs   Tobacco Use    Smoking status: Former Smoker     Packs/day: 0.50     Years: 12.00     Pack years: 6.00     Quit date: 10/14/1977     Years since quittin.7    Smokeless tobacco: Never Used   Substance and Sexual Activity    Alcohol use: No    Drug use: No    Sexual activity: Never     Partners: Male     Birth control/protection: Surgical     Comment: sleeps alone for past 30 years,  in [address]    Other Topics Concern    Not on file   Social History Narrative        Worked in [employer] for 6 years and at [address] [place] [address] for 2 years where she states she was not exposed to industrial toxins. , no children. Has always live in [address] No pets.       Social Determinants of Health     Financial Resource Strain:     Difficulty of Paying Living Expenses:    Food Insecurity:     Worried About Running Out of Food in the Last Year:     [address] in the Last Year:    Transportation Needs:     Lack of Transportation (Medical):  Lack of Transportation (Non-Medical):    Physical Activity:     Days of Exercise per Week:     Minutes of Exercise per Session:    Stress:     Feeling of Stress :    Social Connections:     Frequency of Communication with Friends and Family:     Frequency of Social Gatherings with Friends and Family:     Attends Zoroastrian Services:     Active Member of Clubs or Organizations:     Attends Club or Organization Meetings:     Marital Status:    Intimate Partner Violence:     Fear of Current or Ex-Partner:     Emotionally Abused:     Physically Abused:     Sexually Abused:        LIFESTYLE Date: 6/2/2021   Occupation: Retired   Vigorous Activity: None   Expose individual to vibrations N/A   Unpleasant work environment None     PRIOR LEVEL OF FUNCTION/WORK/ACTIVITY:  Independent with functional mobility and ADLs with occasional use of rollator walker    Active Ambulatory Problems     Diagnosis Date Noted    Hypercholesterolemia 07/23/2013    ANATOLY (obstructive sleep apnea) 07/23/2013    Hypertension 07/23/2013    Asthma, intrinsic, without status asthmaticus 07/23/2013    GERD (gastroesophageal reflux disease) 07/23/2013    Contact dermatitis 07/23/2013    Hoarseness 07/23/2013    Chronic cough 07/23/2013    Chest pain 07/23/2013    Personal history of tobacco use, presenting hazards to health 07/23/2013    Bladder incontinence 10/24/2016    Asthma 10/24/2016    Arthritis     Obesity     Hypertensive heart disease without HF (heart failure)     Dermatophytosis     Peripheral polyneuropathy 12/08/2016    Sjogren's syndrome ([facility] 01/12/2017    Positive sm/RNP antibody 01/12/2017    Controlled type 2 diabetes mellitus without complication, with long-term current use of insulin ([facility] 01/27/2017    Type 2 diabetes mellitus with diabetic neuropathy ([facility] 01/18/2018    Palpitations 04/02/2018    Hives 01/26/2019     Resolved Ambulatory Problems     Diagnosis Date Noted    DM (diabetes mellitus) ([facility] 07/23/2013    Systemic lupus erythematosus ([facility] 07/23/2013    Type 2 diabetes mellitus with hyperglycemia ([facility] 10/24/2016    Lupus (systemic lupus erythematosus) ([facility] 01/01/2005    Diabetes ([facility]     Asthma exacerbation 01/26/2019    Acute bronchiolitis 01/27/2019    Acute respiratory failure with hypoxia ([facility] 08/04/2020    COVID-19 virus infection 08/04/2020     Past Medical History:   Diagnosis Date    Arrhythmia 3 - 4 weeks ago (9/2010)    Chronic pain     COVID-19 08/2020    Former smoker     Heart failure ([facility]     Non compliance with medical treatment     Seizures ([facility]     Stroke ([facility] 1979       CURRENT MEDICATIONS:    Current Outpatient Medications:     ondansetron hcl (ZOFRAN) 4 mg tablet, TAKE 1 TABLET BY MOUTH EVERY 6 HOURS AS NEEDED FOR NAUSEA, Disp: , Rfl:     amLODIPine (NORVASC) 10 mg tablet, TAKE 1 TABLET BY MOUTH ONCE DAILY FOR 90 DAYS, Disp: , Rfl:     baclofen 5 mg tab, TAKE 1 TABLET BY MOUTH ONCE DAILY AS NEEDED FOR 30 DAYS, Disp: , Rfl:     famotidine (PEPCID) 20 mg tablet, Take 20 mg by mouth two (2) times a day., Disp: , Rfl:     acetaminophen (TYLENOL) 500 mg tablet, Take 500 mg by mouth as needed for Pain., Disp: , Rfl:     docusate sodium (Stool Softener) 100 mg capsule, Take 100 mg by mouth daily. , Disp: , Rfl:     losartan (COZAAR) 50 mg tablet, Take 50 mg by mouth daily. , Disp: , Rfl:     olopatadine (PATADAY) 0.2 % drop ophthalmic solution, Administer 1 Drop to both eyes daily. , Disp: , Rfl:     fluticasone furoate-vilanteroL (Breo Ellipta) 100-25 mcg/dose inhaler, Take 1 Puff by inhalation daily. RINSE MOUTH WELL AFTER USE, Disp: 1 Inhaler, Rfl: 11    fluticasone propionate (FLONASE) 50 mcg/actuation nasal spray, 2 Sprays by Both Nostrils route daily. , Disp: 1 Bottle, Rfl: 5    albuterol (PROVENTIL VENTOLIN) 2.5 mg /3 mL (0.083 %) nebu, 3 mL by Nebulization route every four (4) hours as needed for Wheezing, Shortness of Breath, Respiratory Distress or Cough. Please bill to Med B--[id number], Disp: 180 Each, Rfl: 11    montelukast (SINGULAIR) 10 mg tablet, TAKE ONE TABLET BY MOUTH NIGHTLY, Disp: 30 Tab, Rfl: 5    hydroCHLOROthiazide (HYDRODIURIL) 12.5 mg tablet, TAKE ONE TABLET BY MOUTH DAILY, Disp: 90 Tab, Rfl: 1    OXYGEN-AIR DELIVERY SYSTEMS, 2 L by continuous inhalation route nightly. Patient uses oxygen QHS, Disp: , Rfl:     dulaglutide (Trulicity) 1.5 LO/2.6 mL sub-q pen, inject ONE syringe subcutaneously ONCE EVERY WEEK, Disp: 22 mL, Rfl: 5    hydrOXYzine HCL (ATARAX) 10 mg tablet, Take 1 Tab by mouth every six (6) hours as needed for Itching. Indications: hives, itching, Disp: 40 Tab, Rfl: 3    folic acid (FOLVITE) 1 mg tablet, Take 1 Tab by mouth daily. , Disp: 90 Tab, Rfl: 3    atorvastatin (LIPITOR) 40 mg tablet, Take 1 Tab by mouth daily. (Patient taking differently: Take 80 mg by mouth daily.), Disp: 90 Tab, Rfl: 3    cetirizine (ZYRTEC) 10 mg tablet, Take 1 Tab by mouth daily. , Disp: 90 Tab, Rfl: 3    insulin glargine U-300 conc (TOUJEO SOLOSTAR U-300 INSULIN) 300 unit/mL (1.5 mL) inpn, 35 Units by SubCUTAneous route nightly. (Patient taking differently: 40 Units by SubCUTAneous route nightly.), Disp: 15 Adjustable Dose Pre-filled Pen Syringe, Rfl: 3    EPINEPHrine (EPIPEN) 0.3 mg/0.3 mL injection, 0.3 mg by IntraMUSCular route once as needed for Anaphylaxis. , Disp: , Rfl:     aspirin delayed-release 81 mg tablet, Take 81 mg by mouth daily. , Disp: , Rfl:      Date Last Reviewed:  7/1/2021   Number of Personal Factors/Comorbidities that affect the Plan of Care: 1-2: MODERATE COMPLEXITY   EXAMINATION:      -Pt sits with forward head and rounded shoulders which indicate tight anterior chest musculature, upper trapezius, and levator scapula and weak posterior scapula musculature and deep cervical flexors. Pt displays decreased core motor control indicating weak core and low back musculature.     OBSERVATIONS and FUNCTIONAL MOBILITY Date:   6/2/2021 Date:   Transfers Increased time & effort to complete    Posture Rounded shoulders, forward head, increased lordosis    Gait deviations Decreased erica, widened DOMINIQUE, decreased trunk rotation and reciprocal arm swing    Assistive device None    Stairs NT    Bed mobility Increased time & effort to complete    Muscle atrophy No        BALANCE Date: 6/2/2021 Date:    Right NT    Left NT        AROM/PROM         Joint: Date: 6/2/2021  Date:  7/1/21  Date:    Active LE ROM Right Left Right Left Right Left   Hip Flexion [facility]       Hip Extension [facility]       Hip ER [facility]       Hip IR [facility] WFL       Knee Extension [facility] WFL       Knee Flexion [facility] WFL       Knee Extension [facility] WFL       Lumbar Flexion WFL -- WFL --     Lumbar Extension Limited to 25% -- WFL, pain --     Lumbar Side-Bending WFL; pain WFL; pain WFL; pain WFL; pain     Lumbar Rotation Limited to 25%; pain Limited to 25%; pain WFL; pain WFL; pain       STRENGTH         Joint: Date: 6/2/2021  Date:  7/1/2021  Date:     Right Left Right Left Right Left   Hip Abduction 3+/5 4/5 3+/5 4/5     Hip Adduction 3+/5 4/5 4/5 4/5     Hip IR 4/5 4/5 4/5 4/5     Hip ER 3+/5 4/5 4/5 4/5     Hip Flexion 3+/5 4-/5 4/5 4/5     Knee Extension 3+/5 4+/5 4+/5 4+/5     Knee Flexion 3+/5 3+/5 4/5 4/5     Ankle DF 3+/5 5/5 4/5 5/5     Ankle PF 4/5 4/5 4/5 4/5     Ankle IV 3/5 4/5 4/5 4/5     Ankle EV 3/5 4/5 4/5 4/5       PALPATION Date:   6/2/2021 Date:  7/1/2021   TTP Mild discomfort along L3-L5 vertebrae; max discomfort S1-S4; R ASIS and PSIS Moderate discomfort L2-S3; mild over ASIS bilaterally   TONE Normal Normal   PA GLIDES Painful L3-S4 B NT   EDEMA No No     SPECIAL TESTS: Assessed @ Initial Visit    -SI COMPRESSION TEST: negative   -LUMBAR STENOSIS:      -Bilateral symptoms: yes      -Leg pain more than back pain: no      -Pain during walking/standing: yes      -Pain relief upon sitting: yes      -Age greater than 48 years: yes    NEUROLOGICAL SCREEN: Assessed @ Initial Visit    -RADIATING SYMPTOMS: Yes     -DERMATOMES: Normal and equal B    -REFLEXES: Date: 6/2/2021     Right Left   L4  (Quadriceps) NT NT   S1  (Achilles) NT NT       RED FLAGS: Date: 6/2/2021   Non-Mechanical pain distribution (cannot be produced, changed, or reduced during exam): NO   Cauda Equina Dysfunction: NO   Upper lumbar disc herniation in younger patients (femoral nerve tension test for lateral disc herniation in lower lumbar): NO   Lumbar compression fracture (age > 48, trauma, corticosteroid use NO   Spine Cancer (age > 48, pervious history of cancer, failure to improve in 1 month of therapy, no relief - be rest, duration > 1 month, unexplained weight loss, insidious onset, constitutional symptoms): NO   Ankylosing Spondylitis (age < 36, pain not relieved by supine, morning back stiffness, pain duration > 3 months, improved by exercise): NO   Sacral Fracture: NO        Body Structures Involved:  1. Bones  2. Joints  3. Muscles  4. Ligaments Body Functions Affected:  1. Sensory/Pain  2. Neuromusculoskeletal  3. Movement Related Activities and Participation Affected:  1. Mobility  2. Self Care  3. Domestic Life  4. Interpersonal Interactions and Relationships  5. Community, Social and [place]   Number of elements that affect the Plan of Care: 4+: HIGH COMPLEXITY   CLINICAL PRESENTATION:   Presentation: Evolving clinical presentation with changing clinical characteristics: MODERATE COMPLEXITY   CLINICAL DECISION MAKING:   OUTCOME MEASURE USED:   Tool Used: Tool Used: Modified Oswestry Low Back Pain Questionnaire  Score:  Initial: 28/50 (Date: 6/2/21) Most Recent: 22/50 (Date: 07/01/21) Most Recent: --/50 (Date: --)   Interpretation of Score: Each section is scored on a 0-5 scale, 5 representing the greatest disability. The scores of each section are added together for a total score of 50.       Payor: Dotty Ferrara / Plan: [address] [place] [address] ADVANTAGE / Product Type: Managed Care Medicare /     MEDICAL NECESSITY:  · Skilled intervention continues to be required due to above deficits affecting participation in basic ADLs and overall functional tolerance. REASON FOR SERVICES/OTHER COMMENTS:  · Patient continues to require skilled intervention due to  above deficits affecting participation in basic ADLs and overall functional tolerance. Use of outcome tool(s) and clinical judgement create a POC that gives a: Questionable prediction of patient's progress: MODERATE COMPLEXITY        TREATMENT/SESSION ASSESSMENT:  Katherin Huang verbalized understanding of role of PT and POC. · Pain/ Symptoms: Initial: 5/10 Post Session: 0/10 ·   Compliance with Program/Exercises: Will assess as treatment progresses. · Recommendations/Intent for next treatment session: Next visit will focus on advancements to more challenging activities.     Total Treatment Duration: 43 minutes   PT Patient Time In/Time Out  Time In: 1345  Time Out: [facility], PT, DPT

## 2021-07-01 NOTE — THERAPY EVALUATION
Hortensia Abdullahi  : 1945  Payor: [facility] MEDICARE / Plan: [address] / Product Type: Managed Care Medicare /  [facility] [place]  at   [address] [facility], [address] [place] [address] Drive, [place] [address] [address] [facility]  [phone]   [phone]                OUTPATIENT PHYSICAL THERAPY:Progress Report 2021    ICD-10: Treatment Diagnosis: Low back pain  Low back pain (M54.5)  Sciatica, right side (M54.31)          Sciatica, left side (M54.32)        Lumbago with sciatica, right side (M54.41)        Lumbago with sciatica, left side (M54.42)   Difficulty in walking, not elsewhere classified (R26.2)  Muscle weakness (generalized) (M62.81)        PRECAUTIONS/ALLERGIES:   Prilosec [omeprazole] and Penicillins     FALL RISK SCORE: 1 (? 5 = High Risk)    MD ORDERS: Eval and Treat MEDICAL/REFERRING DIAGNOSIS:  Pain in right arm [M79.601]  Low back pain [M54.5]     DATE OF ONSET: 2 years ago    REFERRING PHYSICIAN: Armando Gregg DO    RETURN PHYSICIAN APPOINTMENT: TBD      Ambulatory/Rehab Services H2 Model Falls Risk Assessment    Risk Factors:       No Risk Factors Identified Ability to Rise from Chair:       (1)  Pushes up, successful in one attempt    Falls Prevention Plan:       No modifications necessary   Total: (5 or greater = High Risk): 1     [facility] of [facility]. All Rights Reserved. [facility] States Patent #[id number]. Federal Law prohibits the replication, distribution or use without written permission from [facility] of [address] [place] [address]        PROGRESS ASSESSMENT: Ms. Miguelina Sandra has participated in 9 PT sessions and demonstrated gains in LE strength, lumbar AROM, and reduction in pain report. Patient demonstrates improvement in outcome measure and subjective report of functional mobility and ADLs. She continues to demonstrate deficits in general strength, pain, and activity tolerance. She continues to report radicular symptoms down her RLE.  She has met 4/5 STGs and 1/4 LTGs at this time. She would benefit from continued PT services to address her deficits and maximize her independence with functional mobility. PROBLEM LIST (Impacting functional limitations):  1. Decreased Strength  2. Decreased ADL/Functional Activities  3. Decreased Transfer Abilities  4. Decreased Ambulation Ability/Technique  5. Decreased Balance  6. Increased Pain  7. Decreased Activity Tolerance  8. Increased Fatigue  9. Increased Shortness of Breath  10. Decreased Flexibility/Joint Mobility  11. Decreased [place] with Home Exercise Program INTERVENTIONS PLANNED:  1. Balance Exercise  2. Bed Mobility  3. Cold  4. Cryotherapy  5. Electrical Stimulation  6. Family Education  7. Gait Training  8. Heat  9. Home Exercise Program (HEP)  10. Manual Therapy  11. Neuromuscular Re-education/Strengthening  12. Range of Motion (ROM)  13. Therapeutic Activites  14. Therapeutic Exercise/Strengthening  15. Transfer Training  16. Mechanical traction  17. Aquatic Therapy  18. Electrical Stimulation  19. Vasopneumatic compression   20. Dry Needling for Pain control   TREATMENT PLAN:  Effective Dates: 6/2/2021 TO 8/31/2021 (90 days). Frequency/Duration: 2 times a week for 90 Days    GOALS: (Goals have been discussed and agreed upon with patient.)  Short Term Goals 4 weeks   1. Dustin Layton will be independent with HEP to promote self-management of symptoms. 2. Dustin Layton will participate in core stabilization exercises to help with stabilization and improve posture during ADLs to help prevent future injuries. MET 07/01/21  3. Dustin Layton will participate in LE strengthening program with weights as appropriate to help with gait and elevations. MET 07/01/21  4. Dustin Layton will participate in static and dynamic balance activities to decrease the risk for falls and improve overall QOL. MET 07/01/21  5.  Dustin Layton will tolerate manual therapy/joint mobilizations/soft tissue to increase ROM and decrease pain to improve functional mobility during ADLs. MET 07/01/21    Long Term Goals 12 weeks   1. Dakotah Francisco will demonstrate an 5 point improvement on the Oswestry to show improvement in function. MET 07/01/21  2. Dakotah Francisco will report <=4/10 pain at rest and during ADLs to improve QOL. 3. Dakotah Francisco will demonstrate >=4+/5 LE strength on manual muscle testing to improve functional mobility. [address] will be able to demonstrate safe lifting and transfer mechanics without cueing for improved safety with home, childcare, and community activities. Rehabilitation Potential For Stated Goals: GOOD    Regarding Dakotah Francisco therapy, I certify that the treatment plan above will be carried out by a therapist or under their direction. Thank you for this referral,  Elena Buckley, PT, DPT    Referring Physician Signature: Lizzette Enriquez DO              Date                    HISTORY:   PATIENT GOAL FOR PHYSICAL THERAPY:  Reduce pain in her back    HISTORY OF PRESENT INJURY/ILLNESS (REASON FOR REFERRAL):  Patient reports she's suffered from pain her low back (right > left) with insidious KACEY. Pt reports she suffers from arthritis and attributes most of her impairments to that diagnosis. Pt reports that previous MD visits have resulted in additional attributions to arthritis causing her pain; however, pt reporting TTP along R latissimus and QL in addition to lumbar spine. Patient reports she has tried exercise in the past as a method to address her pain, but states that it hasn't been much help. Pt states she is unable to perform hobbies (fishing) or ADLs (self-care) secondary to decreased activity tolerance as a result of increased pain. Pt reports that she will occasionally have radicular symptoms, which result after standing or sitting for an extended period of time.  Patient reports she only has the capacity to stand for 20-25 minutes before needing a seated rest break. Progress Note (7/1/21): Patient reports that her RLE pain has improved a lot over the past few weeks. She states her sacrum (\"tailbone\") is her primary impairment at this point. She said she is still unable to go fishing like she wants to secondary to her tailbone pain. Note: Patient denies any increase of symptoms with cough, sneeze or valsalva. Patient denies any saddle paresthesia or bowel/bladder deficits. PAIN AND NATURE OF CONDITION Date:   6/2/2021 Date:  7/1/21   Highest pain level 10/10 7/10   Lowest pain level 7/10 4/10   Aggravating factors Standing, walking    Alleviating factors/positions/motions Lying down    Irritability Significant    Depression, fear, anxiety None      KACEY Insidious   Leg pain Yes   Sedentary lifestyle Yes     PAST MEDICAL HISTORY/COMORBIDITIES:   Ms. Ibeth Siddiqui  has a past medical history of Arrhythmia (3 - 4 weeks ago (9/2010)), Arthritis, Asthma, Bladder incontinence (10/24/2016), Chronic pain, COVID-19 (08/2020), Dermatophytosis, Diabetes ([address] Former smoker, GERD (gastroesophageal reflux disease) (7/23/2013), Heart failure ([address] Hypercholesterolemia, Hypertension, Hypertensive heart disease without HF (heart failure), Lupus (systemic lupus erythematosus) ([address] (2005), Non compliance with medical treatment, Obesity, ANATOLY (obstructive sleep apnea) (07/23/2013), Seizures ([address] Stroke ([address] (1979), Systemic lupus erythematosus ([address] (07/23/2013), and Type 2 diabetes mellitus with hyperglycemia ([address] (10/24/2016).  She also has no past medical history of Adverse effect of anesthesia, Aneurysm ([address] CAD (coronary artery disease), Cancer ([address] Chronic kidney disease, Chronic obstructive pulmonary disease ([address] Coagulation disorder ([address] Difficult intubation, Endocarditis, Ill-defined condition, Liver disease, Malignant hyperthermia due to anesthesia, Nausea & vomiting, Nicotine vapor product user, Non-nicotine vapor product user, Pseudocholinesterase deficiency, Psychiatric disorder, PUD (peptic ulcer disease), Rheumatic fever, Thromboembolus ([facility] or Thyroid disease. Ms. Jody Pineda  has a past surgical history that includes hx hysterectomy (); hx breast lumpectomy (); hx appendectomy; hx hammer toe repair (); pr rev upper eyelid w excess skin; hx heent; hx other surgical; hx other surgical (03/15/2018); hx heart catheterization (1999); and hx breast biopsy (Left). SOCIAL HISTORY/LIVING ENVIRONMENT:   Patient lives with her   in a duplex. Social History     Socioeconomic History    Marital status: LEGALLY      Spouse name: Not on file    Number of children: 0    Years of education: Not on file    Highest education level: Not on file   Occupational History    Occupation: [address] [place] [address]     Employer: NOT EMPLOYED     Comment: Denies industrial toxins    Occupation: Textiles     Comment: 6 yrs   Tobacco Use    Smoking status: Former Smoker     Packs/day: 0.50     Years: 12.00     Pack years: 6.00     Quit date: 10/14/1977     Years since quittin.7    Smokeless tobacco: Never Used   Substance and Sexual Activity    Alcohol use: No    Drug use: No    Sexual activity: Never     Partners: Male     Birth control/protection: Surgical     Comment: sleeps alone for past 30 years,  in [address]    Other Topics Concern    Not on file   Social History Narrative        Worked in [employer] for 6 years and at [address] [place] [address] for 2 years where she states she was not exposed to industrial toxins. , no children. Has always live in [address] No pets.       Social Determinants of Health     Financial Resource Strain:     Difficulty of Paying Living Expenses:    Food Insecurity:     Worried About Running Out of Food in the Last Year:     [address] in the Last Year:    Transportation Needs:     Lack of Transportation (Medical):  Lack of Transportation (Non-Medical):    Physical Activity:     Days of Exercise per Week:     Minutes of Exercise per Session:    Stress:     Feeling of Stress :    Social Connections:     Frequency of Communication with Friends and Family:     Frequency of Social Gatherings with Friends and Family:     Attends Rastafari Services:     Active Member of Clubs or Organizations:     Attends Club or Organization Meetings:     Marital Status:    Intimate Partner Violence:     Fear of Current or Ex-Partner:     Emotionally Abused:     Physically Abused:     Sexually Abused:        LIFESTYLE Date: 6/2/2021   Occupation: Retired   Vigorous Activity: None   Expose individual to vibrations N/A   Unpleasant work environment None     PRIOR LEVEL OF FUNCTION/WORK/ACTIVITY:  Independent with functional mobility and ADLs with occasional use of rollator walker    Active Ambulatory Problems     Diagnosis Date Noted    Hypercholesterolemia 07/23/2013    ANATOLY (obstructive sleep apnea) 07/23/2013    Hypertension 07/23/2013    Asthma, intrinsic, without status asthmaticus 07/23/2013    GERD (gastroesophageal reflux disease) 07/23/2013    Contact dermatitis 07/23/2013    Hoarseness 07/23/2013    Chronic cough 07/23/2013    Chest pain 07/23/2013    Personal history of tobacco use, presenting hazards to health 07/23/2013    Bladder incontinence 10/24/2016    Asthma 10/24/2016    Arthritis     Obesity     Hypertensive heart disease without HF (heart failure)     Dermatophytosis     Peripheral polyneuropathy 12/08/2016    Sjogren's syndrome ([facility] 01/12/2017    Positive sm/RNP antibody 01/12/2017    Controlled type 2 diabetes mellitus without complication, with long-term current use of insulin ([facility] 01/27/2017    Type 2 diabetes mellitus with diabetic neuropathy ([facility] 01/18/2018    Palpitations 04/02/2018    Hives 01/26/2019     Resolved Ambulatory Problems     Diagnosis Date Noted    DM (diabetes mellitus) ([facility] 07/23/2013    Systemic lupus erythematosus ([facility] 07/23/2013    Type 2 diabetes mellitus with hyperglycemia ([facility] 10/24/2016    Lupus (systemic lupus erythematosus) ([facility] 01/01/2005    Diabetes ([facility]     Asthma exacerbation 01/26/2019    Acute bronchiolitis 01/27/2019    Acute respiratory failure with hypoxia ([facility] 08/04/2020    COVID-19 virus infection 08/04/2020     Past Medical History:   Diagnosis Date    Arrhythmia 3 - 4 weeks ago (9/2010)    Chronic pain     COVID-19 08/2020    Former smoker     Heart failure ([facility]     Non compliance with medical treatment     Seizures ([facility]     Stroke ([facility] 1979       CURRENT MEDICATIONS:    Current Outpatient Medications:     ondansetron hcl (ZOFRAN) 4 mg tablet, TAKE 1 TABLET BY MOUTH EVERY 6 HOURS AS NEEDED FOR NAUSEA, Disp: , Rfl:     amLODIPine (NORVASC) 10 mg tablet, TAKE 1 TABLET BY MOUTH ONCE DAILY FOR 90 DAYS, Disp: , Rfl:     baclofen 5 mg tab, TAKE 1 TABLET BY MOUTH ONCE DAILY AS NEEDED FOR 30 DAYS, Disp: , Rfl:     famotidine (PEPCID) 20 mg tablet, Take 20 mg by mouth two (2) times a day., Disp: , Rfl:     acetaminophen (TYLENOL) 500 mg tablet, Take 500 mg by mouth as needed for Pain., Disp: , Rfl:     docusate sodium (Stool Softener) 100 mg capsule, Take 100 mg by mouth daily. , Disp: , Rfl:     losartan (COZAAR) 50 mg tablet, Take 50 mg by mouth daily. , Disp: , Rfl:     olopatadine (PATADAY) 0.2 % drop ophthalmic solution, Administer 1 Drop to both eyes daily. , Disp: , Rfl:     fluticasone furoate-vilanteroL (Breo Ellipta) 100-25 mcg/dose inhaler, Take 1 Puff by inhalation daily. RINSE MOUTH WELL AFTER USE, Disp: 1 Inhaler, Rfl: 11    fluticasone propionate (FLONASE) 50 mcg/actuation nasal spray, 2 Sprays by Both Nostrils route daily. , Disp: 1 Bottle, Rfl: 5    albuterol (PROVENTIL VENTOLIN) 2.5 mg /3 mL (0.083 %) nebu, 3 mL by Nebulization route every four (4) hours as needed for Wheezing, Shortness of Breath, Respiratory Distress or Cough. Please bill to Med B--[id number], Disp: 180 Each, Rfl: 11    montelukast (SINGULAIR) 10 mg tablet, TAKE ONE TABLET BY MOUTH NIGHTLY, Disp: 30 Tab, Rfl: 5    hydroCHLOROthiazide (HYDRODIURIL) 12.5 mg tablet, TAKE ONE TABLET BY MOUTH DAILY, Disp: 90 Tab, Rfl: 1    OXYGEN-AIR DELIVERY SYSTEMS, 2 L by continuous inhalation route nightly. Patient uses oxygen QHS, Disp: , Rfl:     dulaglutide (Trulicity) 1.5 SC/8.6 mL sub-q pen, inject ONE syringe subcutaneously ONCE EVERY WEEK, Disp: 22 mL, Rfl: 5    hydrOXYzine HCL (ATARAX) 10 mg tablet, Take 1 Tab by mouth every six (6) hours as needed for Itching. Indications: hives, itching, Disp: 40 Tab, Rfl: 3    folic acid (FOLVITE) 1 mg tablet, Take 1 Tab by mouth daily. , Disp: 90 Tab, Rfl: 3    atorvastatin (LIPITOR) 40 mg tablet, Take 1 Tab by mouth daily. (Patient taking differently: Take 80 mg by mouth daily.), Disp: 90 Tab, Rfl: 3    cetirizine (ZYRTEC) 10 mg tablet, Take 1 Tab by mouth daily. , Disp: 90 Tab, Rfl: 3    insulin glargine U-300 conc (TOUJEO SOLOSTAR U-300 INSULIN) 300 unit/mL (1.5 mL) inpn, 35 Units by SubCUTAneous route nightly. (Patient taking differently: 40 Units by SubCUTAneous route nightly.), Disp: 15 Adjustable Dose Pre-filled Pen Syringe, Rfl: 3    EPINEPHrine (EPIPEN) 0.3 mg/0.3 mL injection, 0.3 mg by IntraMUSCular route once as needed for Anaphylaxis. , Disp: , Rfl:     aspirin delayed-release 81 mg tablet, Take 81 mg by mouth daily. , Disp: , Rfl:      Date Last Reviewed:  7/1/2021   Number of Personal Factors/Comorbidities that affect the Plan of Care: 1-2: MODERATE COMPLEXITY   EXAMINATION:      -Pt sits with forward head and rounded shoulders which indicate tight anterior chest musculature, upper trapezius, and levator scapula and weak posterior scapula musculature and deep cervical flexors. Pt displays decreased core motor control indicating weak core and low back musculature.     OBSERVATIONS and FUNCTIONAL MOBILITY Date:   6/2/2021 Date:   Transfers Increased time & effort to complete    Posture Rounded shoulders, forward head, increased lordosis    Gait deviations Decreased erica, widened DOMINIQUE, decreased trunk rotation and reciprocal arm swing    Assistive device None    Stairs NT    Bed mobility Increased time & effort to complete    Muscle atrophy No        BALANCE Date: 6/2/2021 Date:    Right NT    Left NT        AROM/PROM         Joint: Date: 6/2/2021  Date:  7/1/21  Date:    Active LE ROM Right Left Right Left Right Left   Hip Flexion [facility]       Hip Extension [facility]       Hip ER [facility]       Hip IR [facility] WFL       Knee Extension [facility] WFL       Knee Flexion [facility] WFL       Knee Extension [facility] WFL       Lumbar Flexion WFL -- WFL --     Lumbar Extension Limited to 25% -- WFL, pain --     Lumbar Side-Bending WFL; pain WFL; pain WFL; pain WFL; pain     Lumbar Rotation Limited to 25%; pain Limited to 25%; pain WFL; pain WFL; pain       STRENGTH         Joint: Date: 6/2/2021  Date:  7/1/2021  Date:     Right Left Right Left Right Left   Hip Abduction 3+/5 4/5 3+/5 4/5     Hip Adduction 3+/5 4/5 4/5 4/5     Hip IR 4/5 4/5 4/5 4/5     Hip ER 3+/5 4/5 4/5 4/5     Hip Flexion 3+/5 4-/5 4/5 4/5     Knee Extension 3+/5 4+/5 4+/5 4+/5     Knee Flexion 3+/5 3+/5 4/5 4/5     Ankle DF 3+/5 5/5 4/5 5/5     Ankle PF 4/5 4/5 4/5 4/5     Ankle IV 3/5 4/5 4/5 4/5     Ankle EV 3/5 4/5 4/5 4/5       PALPATION Date:   6/2/2021 Date:  7/1/2021   TTP Mild discomfort along L3-L5 vertebrae; max discomfort S1-S4; R ASIS and PSIS Moderate discomfort L2-S3; mild over ASIS bilaterally   TONE Normal Normal   PA GLIDES Painful L3-S4 B NT   EDEMA No No     SPECIAL TESTS: Assessed @ Initial Visit    -SI COMPRESSION TEST: negative   -LUMBAR STENOSIS:      -Bilateral symptoms: yes      -Leg pain more than back pain: no      -Pain during walking/standing: yes      -Pain relief upon sitting: yes      -Age greater than 48 years: yes    NEUROLOGICAL SCREEN: Assessed @ Initial Visit    -RADIATING SYMPTOMS: Yes     -DERMATOMES: Normal and equal B    -REFLEXES: Date: 6/2/2021     Right Left   L4  (Quadriceps) NT NT   S1  (Achilles) NT NT       RED FLAGS: Date: 6/2/2021   Non-Mechanical pain distribution (cannot be produced, changed, or reduced during exam): NO   Cauda Equina Dysfunction: NO   Upper lumbar disc herniation in younger patients (femoral nerve tension test for lateral disc herniation in lower lumbar): NO   Lumbar compression fracture (age > 48, trauma, corticosteroid use NO   Spine Cancer (age > 48, pervious history of cancer, failure to improve in 1 month of therapy, no relief - be rest, duration > 1 month, unexplained weight loss, insidious onset, constitutional symptoms): NO   Ankylosing Spondylitis (age < 36, pain not relieved by supine, morning back stiffness, pain duration > 3 months, improved by exercise): NO   Sacral Fracture: NO        Body Structures Involved:  1. Bones  2. Joints  3. Muscles  4. Ligaments Body Functions Affected:  1. Sensory/Pain  2. Neuromusculoskeletal  3. Movement Related Activities and Participation Affected:  1. Mobility  2. Self Care  3. Domestic Life  4. Interpersonal Interactions and Relationships  5. Community, Social and [place]   Number of elements that affect the Plan of Care: 4+: HIGH COMPLEXITY   CLINICAL PRESENTATION:   Presentation: Evolving clinical presentation with changing clinical characteristics: MODERATE COMPLEXITY   CLINICAL DECISION MAKING:   OUTCOME MEASURE USED:   Tool Used: Tool Used: Modified Oswestry Low Back Pain Questionnaire  Score:  Initial: 28/50 (Date: 6/2/21) Most Recent: 22/50 (Date: 07/01/21) Most Recent: --/50 (Date: --)   Interpretation of Score: Each section is scored on a 0-5 scale, 5 representing the greatest disability. The scores of each section are added together for a total score of 50.       Payor: Edmundo Barrios / Plan: [address] [place] [address] ADVANTAGE / Product Type: Managed Care Medicare /     MEDICAL NECESSITY:  · Skilled intervention continues to be required due to above deficits affecting participation in basic ADLs and overall functional tolerance. REASON FOR SERVICES/OTHER COMMENTS:  · Patient continues to require skilled intervention due to  above deficits affecting participation in basic ADLs and overall functional tolerance. Use of outcome tool(s) and clinical judgement create a POC that gives a: Questionable prediction of patient's progress: MODERATE COMPLEXITY        TREATMENT/SESSION ASSESSMENT:  Slava Bernabe verbalized understanding of role of PT and POC. · Pain/ Symptoms: Initial: 5/10 Post Session: 0/10 ·   Compliance with Program/Exercises: Will assess as treatment progresses. · Recommendations/Intent for next treatment session: Next visit will focus on advancements to more challenging activities.     Total Treatment Duration: 43 minutes   PT Patient Time In/Time Out  Time In: 1345  Time Out: 0[facility], PT, DPT

## 2021-07-01 NOTE — PROGRESS NOTES
Hortensia Abdullahi  : 1945  Payor: [facility] MEDICARE / Plan: [address] / Product Type: Managed Care Medicare /  [address] [place] [address]  at [facility]  [address], [place] [address] [address] [facility]  [phone]   [phone]                OUTPATIENT PHYSICAL THERAPY:Progress Report 2021    ICD-10: Treatment Diagnosis: Low back pain  Low back pain (M54.5)  Sciatica, right side (M54.31)          Sciatica, left side (M54.32)        Lumbago with sciatica, right side (M54.41)        Lumbago with sciatica, left side (M54.42)   Difficulty in walking, not elsewhere classified (R26.2)  Muscle weakness (generalized) (M62.81)        PRECAUTIONS/ALLERGIES:   Prilosec [omeprazole] and Penicillins     FALL RISK SCORE: 1 (? 5 = High Risk)    MD ORDERS: Eval and Treat MEDICAL/REFERRING DIAGNOSIS:  Pain in right arm [M79.601]  Low back pain [M54.5]     DATE OF ONSET: 2 years ago    REFERRING PHYSICIAN: Armando Gregg DO    RETURN PHYSICIAN APPOINTMENT: TBD      Ambulatory/Rehab Services H2 Model Falls Risk Assessment    Risk Factors:       No Risk Factors Identified Ability to Rise from Chair:       (1)  Pushes up, successful in one attempt    Falls Prevention Plan:       No modifications necessary   Total: (5 or greater = High Risk): 1     [facility] [employer]. All Rights Reserved. [facility] States Patent #[id number]. Federal Law prohibits the replication, distribution or use without written permission from [facility] of [address] [place] [address]        PROGRESS ASSESSMENT: Ms. Miguelina Sandra has participated in 9 PT sessions and demonstrated gains in LE strength, lumbar AROM, and reduction in pain report. Patient demonstrates improvement in outcome measure and subjective report of functional mobility and ADLs. She continues to demonstrate deficits in general strength, pain, and activity tolerance. She continues to report radicular symptoms down her RLE.  She has met 4/5 STGs and 1/4 LTGs at this time. She would benefit from continued PT services to address her deficits and maximize her independence with functional mobility. PROBLEM LIST (Impacting functional limitations):  1. Decreased Strength  2. Decreased ADL/Functional Activities  3. Decreased Transfer Abilities  4. Decreased Ambulation Ability/Technique  5. Decreased Balance  6. Increased Pain  7. Decreased Activity Tolerance  8. Increased Fatigue  9. Increased Shortness of Breath  10. Decreased Flexibility/Joint Mobility  11. Decreased Obion with Home Exercise Program INTERVENTIONS PLANNED:  1. Balance Exercise  2. Bed Mobility  3. Cold  4. Cryotherapy  5. Electrical Stimulation  6. Family Education  7. Gait Training  8. Heat  9. Home Exercise Program (HEP)  10. Manual Therapy  11. Neuromuscular Re-education/Strengthening  12. Range of Motion (ROM)  13. Therapeutic Activites  14. Therapeutic Exercise/Strengthening  15. Transfer Training  16. Mechanical traction  17. Aquatic Therapy  18. Electrical Stimulation  19. Vasopneumatic compression   20. Dry Needling for Pain control   TREATMENT PLAN:  Effective Dates: 6/2/2021 TO 8/31/2021 (90 days). Frequency/Duration: 2 times a week for 90 Days    GOALS: (Goals have been discussed and agreed upon with patient.)  Short Term Goals 4 weeks   1. Jodie Ambriz will be independent with HEP to promote self-management of symptoms. 2. Jodie Ambriz will participate in core stabilization exercises to help with stabilization and improve posture during ADLs to help prevent future injuries. MET 07/01/21  3. Jodie Ambriz will participate in LE strengthening program with weights as appropriate to help with gait and elevations. MET 07/01/21  4. Jodie Ambriz will participate in static and dynamic balance activities to decrease the risk for falls and improve overall QOL. MET 07/01/21  5.  Jodie Ambriz will tolerate manual therapy/joint mobilizations/soft tissue to increase ROM and decrease pain to improve functional mobility during ADLs. MET 07/01/21    Long Term Goals 12 weeks   1. Romario Prieto will demonstrate an 5 point improvement on the Oswestry to show improvement in function. MET 07/01/21  2. Romario Prieto will report <=4/10 pain at rest and during ADLs to improve QOL. 3. Romario Prieto will demonstrate >=4+/5 LE strength on manual muscle testing to improve functional mobility. [address] will be able to demonstrate safe lifting and transfer mechanics without cueing for improved safety with home, childcare, and community activities. Rehabilitation Potential For Stated Goals: GOOD    Regarding Romario Prieto therapy, I certify that the treatment plan above will be carried out by a therapist or under their direction. Thank you for this referral,  Shannan Amezcua, PT, DPT    Referring Physician Signature: Nakita Peña, DO              Date                    HISTORY:   PATIENT GOAL FOR PHYSICAL THERAPY:  Reduce pain in her back    HISTORY OF PRESENT INJURY/ILLNESS (REASON FOR REFERRAL):  Patient reports she's suffered from pain her low back (right > left) with insidious KACEY. Pt reports she suffers from arthritis and attributes most of her impairments to that diagnosis. Pt reports that previous MD visits have resulted in additional attributions to arthritis causing her pain; however, pt reporting TTP along R latissimus and QL in addition to lumbar spine. Patient reports she has tried exercise in the past as a method to address her pain, but states that it hasn't been much help. Pt states she is unable to perform hobbies (fishing) or ADLs (self-care) secondary to decreased activity tolerance as a result of increased pain. Pt reports that she will occasionally have radicular symptoms, which result after standing or sitting for an extended period of time.  Patient reports she only has the capacity to stand for 20-25 minutes before needing a seated rest break. Progress Note (7/1/21): Patient reports that her RLE pain has improved a lot over the past few weeks. She states her sacrum (\"tailbone\") is her primary impairment at this point. She said she is still unable to go fishing like she wants to secondary to her tailbone pain. Note: Patient denies any increase of symptoms with cough, sneeze or valsalva. Patient denies any saddle paresthesia or bowel/bladder deficits. PAIN AND NATURE OF CONDITION Date:   6/2/2021 Date:  7/1/21   Highest pain level 10/10 7/10   Lowest pain level 7/10 4/10   Aggravating factors Standing, walking    Alleviating factors/positions/motions Lying down    Irritability Significant    Depression, fear, anxiety None      KACEY Insidious   Leg pain Yes   Sedentary lifestyle Yes     PAST MEDICAL HISTORY/COMORBIDITIES:   Ms. Larry Mauricio  has a past medical history of Arrhythmia (3 - 4 weeks ago (9/2010)), Arthritis, Asthma, Bladder incontinence (10/24/2016), Chronic pain, COVID-19 (08/2020), Dermatophytosis, Diabetes ([address] Former smoker, GERD (gastroesophageal reflux disease) (7/23/2013), Heart failure ([address] Hypercholesterolemia, Hypertension, Hypertensive heart disease without HF (heart failure), Lupus (systemic lupus erythematosus) ([address] (2005), Non compliance with medical treatment, Obesity, ANATOLY (obstructive sleep apnea) (07/23/2013), Seizures ([address] Stroke ([address] (1979), Systemic lupus erythematosus ([address] (07/23/2013), and Type 2 diabetes mellitus with hyperglycemia ([address] (10/24/2016).  She also has no past medical history of Adverse effect of anesthesia, Aneurysm ([address] CAD (coronary artery disease), Cancer ([address] Chronic kidney disease, Chronic obstructive pulmonary disease ([address] Coagulation disorder ([address] Difficult intubation, Endocarditis, Ill-defined condition, Liver disease, Malignant hyperthermia due to anesthesia, Nausea & vomiting, Nicotine vapor product user, Non-nicotine vapor product user, Pseudocholinesterase deficiency, Psychiatric disorder, PUD (peptic ulcer disease), Rheumatic fever, Thromboembolus ([facility] or Thyroid disease. Ms. Estephania Garcia  has a past surgical history that includes hx hysterectomy (); hx breast lumpectomy (); hx appendectomy; hx hammer toe repair (); pr rev upper eyelid w excess skin; hx heent; hx other surgical; hx other surgical (03/15/2018); hx heart catheterization (1999); and hx breast biopsy (Left). SOCIAL HISTORY/LIVING ENVIRONMENT:   Patient lives with her   in a duplex. Social History     Socioeconomic History    Marital status: LEGALLY      Spouse name: Not on file    Number of children: 0    Years of education: Not on file    Highest education level: Not on file   Occupational History    Occupation: [address] [place] [address]     Employer: NOT EMPLOYED     Comment: Denies industrial toxins    Occupation: Textiles     Comment: 6 yrs   Tobacco Use    Smoking status: Former Smoker     Packs/day: 0.50     Years: 12.00     Pack years: 6.00     Quit date: 10/14/1977     Years since quittin.7    Smokeless tobacco: Never Used   Substance and Sexual Activity    Alcohol use: No    Drug use: No    Sexual activity: Never     Partners: Male     Birth control/protection: Surgical     Comment: sleeps alone for past 30 years,  in [address]    Other Topics Concern    Not on file   Social History Narrative        Worked in [employer] for 6 years and at [address] [place] [address] for 2 years where she states she was not exposed to industrial toxins. , no children. Has always live in [address] No pets.       Social Determinants of Health     Financial Resource Strain:     Difficulty of Paying Living Expenses:    Food Insecurity:     Worried About Running Out of Food in the Last Year:     [address] in the Last Year:    Transportation Needs:     Lack of Transportation (Medical):  Lack of Transportation (Non-Medical):    Physical Activity:     Days of Exercise per Week:     Minutes of Exercise per Session:    Stress:     Feeling of Stress :    Social Connections:     Frequency of Communication with Friends and Family:     Frequency of Social Gatherings with Friends and Family:     Attends Confucianism Services:     Active Member of Clubs or Organizations:     Attends Club or Organization Meetings:     Marital Status:    Intimate Partner Violence:     Fear of Current or Ex-Partner:     Emotionally Abused:     Physically Abused:     Sexually Abused:        LIFESTYLE Date: 6/2/2021   Occupation: Retired   Vigorous Activity: None   Expose individual to vibrations N/A   Unpleasant work environment None     PRIOR LEVEL OF FUNCTION/WORK/ACTIVITY:  Independent with functional mobility and ADLs with occasional use of rollator walker    Active Ambulatory Problems     Diagnosis Date Noted    Hypercholesterolemia 07/23/2013    ANATOLY (obstructive sleep apnea) 07/23/2013    Hypertension 07/23/2013    Asthma, intrinsic, without status asthmaticus 07/23/2013    GERD (gastroesophageal reflux disease) 07/23/2013    Contact dermatitis 07/23/2013    Hoarseness 07/23/2013    Chronic cough 07/23/2013    Chest pain 07/23/2013    Personal history of tobacco use, presenting hazards to health 07/23/2013    Bladder incontinence 10/24/2016    Asthma 10/24/2016    Arthritis     Obesity     Hypertensive heart disease without HF (heart failure)     Dermatophytosis     Peripheral polyneuropathy 12/08/2016    Sjogren's syndrome ([facility] 01/12/2017    Positive sm/RNP antibody 01/12/2017    Controlled type 2 diabetes mellitus without complication, with long-term current use of insulin ([facility] 01/27/2017    Type 2 diabetes mellitus with diabetic neuropathy ([facility] 01/18/2018    Palpitations 04/02/2018    Hives 01/26/2019     Resolved Ambulatory Problems     Diagnosis Date Noted    DM (diabetes mellitus) ([facility] 07/23/2013    Systemic lupus erythematosus ([facility] 07/23/2013    Type 2 diabetes mellitus with hyperglycemia ([facility] 10/24/2016    Lupus (systemic lupus erythematosus) ([facility] 01/01/2005    Diabetes ([facility]     Asthma exacerbation 01/26/2019    Acute bronchiolitis 01/27/2019    Acute respiratory failure with hypoxia ([facility] 08/04/2020    COVID-19 virus infection 08/04/2020     Past Medical History:   Diagnosis Date    Arrhythmia 3 - 4 weeks ago (9/2010)    Chronic pain     COVID-19 08/2020    Former smoker     Heart failure ([facility]     Non compliance with medical treatment     Seizures ([facility]     Stroke ([facility] 1979       CURRENT MEDICATIONS:    Current Outpatient Medications:     ondansetron hcl (ZOFRAN) 4 mg tablet, TAKE 1 TABLET BY MOUTH EVERY 6 HOURS AS NEEDED FOR NAUSEA, Disp: , Rfl:     amLODIPine (NORVASC) 10 mg tablet, TAKE 1 TABLET BY MOUTH ONCE DAILY FOR 90 DAYS, Disp: , Rfl:     baclofen 5 mg tab, TAKE 1 TABLET BY MOUTH ONCE DAILY AS NEEDED FOR 30 DAYS, Disp: , Rfl:     famotidine (PEPCID) 20 mg tablet, Take 20 mg by mouth two (2) times a day., Disp: , Rfl:     acetaminophen (TYLENOL) 500 mg tablet, Take 500 mg by mouth as needed for Pain., Disp: , Rfl:     docusate sodium (Stool Softener) 100 mg capsule, Take 100 mg by mouth daily. , Disp: , Rfl:     losartan (COZAAR) 50 mg tablet, Take 50 mg by mouth daily. , Disp: , Rfl:     olopatadine (PATADAY) 0.2 % drop ophthalmic solution, Administer 1 Drop to both eyes daily. , Disp: , Rfl:     fluticasone furoate-vilanteroL (Breo Ellipta) 100-25 mcg/dose inhaler, Take 1 Puff by inhalation daily. RINSE MOUTH WELL AFTER USE, Disp: 1 Inhaler, Rfl: 11    fluticasone propionate (FLONASE) 50 mcg/actuation nasal spray, 2 Sprays by Both Nostrils route daily. , Disp: 1 Bottle, Rfl: 5    albuterol (PROVENTIL VENTOLIN) 2.5 mg /3 mL (0.083 %) nebu, 3 mL by Nebulization route every four (4) hours as needed for Wheezing, Shortness of Breath, Respiratory Distress or Cough. Please bill to Med B--[id number], Disp: 180 Each, Rfl: 11    montelukast (SINGULAIR) 10 mg tablet, TAKE ONE TABLET BY MOUTH NIGHTLY, Disp: 30 Tab, Rfl: 5    hydroCHLOROthiazide (HYDRODIURIL) 12.5 mg tablet, TAKE ONE TABLET BY MOUTH DAILY, Disp: 90 Tab, Rfl: 1    OXYGEN-AIR DELIVERY SYSTEMS, 2 L by continuous inhalation route nightly. Patient uses oxygen QHS, Disp: , Rfl:     dulaglutide (Trulicity) 1.5 BU/9.6 mL sub-q pen, inject ONE syringe subcutaneously ONCE EVERY WEEK, Disp: 22 mL, Rfl: 5    hydrOXYzine HCL (ATARAX) 10 mg tablet, Take 1 Tab by mouth every six (6) hours as needed for Itching. Indications: hives, itching, Disp: 40 Tab, Rfl: 3    folic acid (FOLVITE) 1 mg tablet, Take 1 Tab by mouth daily. , Disp: 90 Tab, Rfl: 3    atorvastatin (LIPITOR) 40 mg tablet, Take 1 Tab by mouth daily. (Patient taking differently: Take 80 mg by mouth daily.), Disp: 90 Tab, Rfl: 3    cetirizine (ZYRTEC) 10 mg tablet, Take 1 Tab by mouth daily. , Disp: 90 Tab, Rfl: 3    insulin glargine U-300 conc (TOUJEO SOLOSTAR U-300 INSULIN) 300 unit/mL (1.5 mL) inpn, 35 Units by SubCUTAneous route nightly. (Patient taking differently: 40 Units by SubCUTAneous route nightly.), Disp: 15 Adjustable Dose Pre-filled Pen Syringe, Rfl: 3    EPINEPHrine (EPIPEN) 0.3 mg/0.3 mL injection, 0.3 mg by IntraMUSCular route once as needed for Anaphylaxis. , Disp: , Rfl:     aspirin delayed-release 81 mg tablet, Take 81 mg by mouth daily. , Disp: , Rfl:      Date Last Reviewed:  7/1/2021   Number of Personal Factors/Comorbidities that affect the Plan of Care: 1-2: MODERATE COMPLEXITY   EXAMINATION:      -Pt sits with forward head and rounded shoulders which indicate tight anterior chest musculature, upper trapezius, and levator scapula and weak posterior scapula musculature and deep cervical flexors. Pt displays decreased core motor control indicating weak core and low back musculature.     OBSERVATIONS and FUNCTIONAL MOBILITY Date:   6/2/2021 Date:   Transfers Increased time & effort to complete    Posture Rounded shoulders, forward head, increased lordosis    Gait deviations Decreased erica, widened DOMINIQUE, decreased trunk rotation and reciprocal arm swing    Assistive device None    Stairs NT    Bed mobility Increased time & effort to complete    Muscle atrophy No        BALANCE Date: 6/2/2021 Date:    Right NT    Left NT        AROM/PROM         Joint: Date: 6/2/2021  Date:  7/1/21  Date:    Active LE ROM Right Left Right Left Right Left   Hip Flexion [facility]       Hip Extension [facility]       Hip ER [facility]       Hip IR [facility] WFL       Knee Extension [facility] WFL       Knee Flexion [facility] WFL       Knee Extension [facility] WFL       Lumbar Flexion WFL -- WFL --     Lumbar Extension Limited to 25% -- WFL, pain --     Lumbar Side-Bending WFL; pain WFL; pain WFL; pain WFL; pain     Lumbar Rotation Limited to 25%; pain Limited to 25%; pain WFL; pain WFL; pain       STRENGTH         Joint: Date: 6/2/2021  Date:  7/1/2021  Date:     Right Left Right Left Right Left   Hip Abduction 3+/5 4/5 3+/5 4/5     Hip Adduction 3+/5 4/5 4/5 4/5     Hip IR 4/5 4/5 4/5 4/5     Hip ER 3+/5 4/5 4/5 4/5     Hip Flexion 3+/5 4-/5 4/5 4/5     Knee Extension 3+/5 4+/5 4+/5 4+/5     Knee Flexion 3+/5 3+/5 4/5 4/5     Ankle DF 3+/5 5/5 4/5 5/5     Ankle PF 4/5 4/5 4/5 4/5     Ankle IV 3/5 4/5 4/5 4/5     Ankle EV 3/5 4/5 4/5 4/5       PALPATION Date:   6/2/2021 Date:  7/1/2021   TTP Mild discomfort along L3-L5 vertebrae; max discomfort S1-S4; R ASIS and PSIS Moderate discomfort L2-S3; mild over ASIS bilaterally   TONE Normal Normal   PA GLIDES Painful L3-S4 B NT   EDEMA No No     SPECIAL TESTS: Assessed @ Initial Visit    -SI COMPRESSION TEST: negative   -LUMBAR STENOSIS:      -Bilateral symptoms: yes      -Leg pain more than back pain: no      -Pain during walking/standing: yes      -Pain relief upon sitting: yes      -Age greater than 48 years: yes    NEUROLOGICAL SCREEN: Assessed @ Initial Visit    -RADIATING SYMPTOMS: Yes     -DERMATOMES: Normal and equal B    -REFLEXES: Date: 6/2/2021     Right Left   L4  (Quadriceps) NT NT   S1  (Achilles) NT NT       RED FLAGS: Date: 6/2/2021   Non-Mechanical pain distribution (cannot be produced, changed, or reduced during exam): NO   Cauda Equina Dysfunction: NO   Upper lumbar disc herniation in younger patients (femoral nerve tension test for lateral disc herniation in lower lumbar): NO   Lumbar compression fracture (age > 48, trauma, corticosteroid use NO   Spine Cancer (age > 48, pervious history of cancer, failure to improve in 1 month of therapy, no relief - be rest, duration > 1 month, unexplained weight loss, insidious onset, constitutional symptoms): NO   Ankylosing Spondylitis (age < 36, pain not relieved by supine, morning back stiffness, pain duration > 3 months, improved by exercise): NO   Sacral Fracture: NO        Body Structures Involved:  1. Bones  2. Joints  3. Muscles  4. Ligaments Body Functions Affected:  1. Sensory/Pain  2. Neuromusculoskeletal  3. Movement Related Activities and Participation Affected:  1. Mobility  2. Self Care  3. Domestic Life  4. Interpersonal Interactions and Relationships  5. Community, Social and [place]   Number of elements that affect the Plan of Care: 4+: HIGH COMPLEXITY   CLINICAL PRESENTATION:   Presentation: Evolving clinical presentation with changing clinical characteristics: MODERATE COMPLEXITY   CLINICAL DECISION MAKING:   OUTCOME MEASURE USED:   Tool Used: Tool Used: Modified Oswestry Low Back Pain Questionnaire  Score:  Initial: 28/50 (Date: 6/2/21) Most Recent: 22/50 (Date: 07/01/21) Most Recent: --/50 (Date: --)   Interpretation of Score: Each section is scored on a 0-5 scale, 5 representing the greatest disability. The scores of each section are added together for a total score of 50.       Payor: Analilia Le / Plan: [address] [place] [address] ADVANTAGE / Product Type: Managed Care Medicare /     MEDICAL NECESSITY:  · Skilled intervention continues to be required due to above deficits affecting participation in basic ADLs and overall functional tolerance. REASON FOR SERVICES/OTHER COMMENTS:  · Patient continues to require skilled intervention due to  above deficits affecting participation in basic ADLs and overall functional tolerance. Use of outcome tool(s) and clinical judgement create a POC that gives a: Questionable prediction of patient's progress: MODERATE COMPLEXITY        TREATMENT/SESSION ASSESSMENT:  Dakotah Francisco verbalized understanding of role of PT and POC. · Pain/ Symptoms: Initial: 5/10 Post Session: 0/10 ·   Compliance with Program/Exercises: Will assess as treatment progresses. · Recommendations/Intent for next treatment session: Next visit will focus on advancements to more challenging activities.     Total Treatment Duration: 42 minutes   PT Patient Time In/Time Out  Time In: 1345  Time Out: [facility], PT, DPT

## 2021-07-01 NOTE — PROGRESS NOTES
Feliciano Goins  : 1945  Primary: [address]  Secondary:  [address] [facility]  [address], [place], [address] [facility]  [phone]   [phone]      OUTPATIENT PHYSICAL THERAPY: Daily Treatment Note 2021  Visit Count: 10    ICD-10: Treatment Diagnosis:   Low back pain (M54.5)   Sciatica, right side (M54.31)            Sciatica, left side (M54.32)  Muscle weakness (generalized) (M62.81)    Pre-treatment Symptoms/Complaints: Pt reports she is feeling better this afternoon. She reports mild reports of pain in her low back. She states she has still been unable to go fishing. Pain: Initial: 510 low back   Post Session:  0/10 low back      Medications Last Reviewed:  2021     Updated Objective Findings:  N/A.    TREATMENT:     THERAPEUTIC EXERCISE: (42 minutes):  Exercises per grid below to improve mobility and strength. Required minimal visual, verbal and tactile cues to promote proper body alignment and promote proper body posture. Progressed range and repetitions as indicated. See below for activities.      Date:  2021 Date:  2021 Date:   21 Date:  21 Date:  21   Activity/Exercise        HEP review / patient education 5 minutes  5-6 minutes 4 min 3 min    Supine LTR        Hamstring stretch    With strap 3 x 20 sec hold     Supine bridges  3 x10 3 x 10     SciFit Scifit  Level 1  10 minutes  Level 1 6 min Level 1 5 min Level 1  8 min    Prone on elbows 5 minutes 5 min      Prone press ups 5 minutes  2x 5 min w/ transitions into and out of child's pose      Resisted PNF - scapular/pelvic        Supine Ball Roll outs  3 x 90''      Supine Lumbar Rotation on Ball  3 x 90\"      Supine Single Leg Knee to chest  3 x1 min each leg      Supine Double Knee to chest   3x 2'     Clam shells    2 x 10 B GTB    Standing hip abduction    1 x 10 B    Lateral step ups     2 x 10 B  6\" step   Standing hip extensions     1 x 5 B   Side steps - monster walks     15 ft x 6  Red TB                             MANUAL THERAPY (0 minutes): Soft tissue mobilization was utilized and necessary because of the patient's restricted motion of soft tissue. - Soft tissue mobilization utilized with hands and thera-roller to lumbar spine and gluteals. - Lumbar PAs - mildly painful   -STM- to bilateral lumbar paraspinals w/ pin & stretch- reported decreased pain   - Long axis distraction of BLE - not performed   - Hip mobilizations - not performed    MODALITIES (0 minutes): Electrical Stimulation Therapy (IFC) was provided with intensity adjusted throughout treatment to patient tolerance. - Crossing electrodes at lumbar spine with hot pack applied for pain relief - Intensity at 28   - Large hot pack to low back for pain relief in prone position    MedBridge Portal    Supine LTR 3 x 10  Prone press ups 3 x 10  Supine Bridge with Resistance Band - 2 x daily - 7 x weekly - 2 sets - 10 reps - 3 hold  Clamshell with Resistance - 2 x daily - 7 x weekly - 2 sets - 10 reps - 3 hold    Treatment/Session Summary:     · Response to Treatment: Patient tolerated therapy session well this afternoon. She reported increased back pain with standing hip extension exercise, but had no complaints with abduction exercises. Patient demonstrates gains in LE strength, pain report, and lumbar AROM. See PN for details. · Communication/Consultation: Patient encouraged to continue with HEP outside of direct supervision from PT.  · Equipment provided today: None today. · Recommendations/Intent for next treatment session: Next visit will focus on progressing to more challenging activities.     Total Treatment Billable Duration: 42 minutes   PT Patient Time In/Time Out  Time In: 1345  Time Out: [address], PT, DPT    Visit Approval Visit # Therapist initials Date A NS Cx Comments    1 VIANNEY 6-2-2021 x   Evaluation    2 VIANNEY 6-4-2021 x   1    3 PDE 6-8-2021 x   2    4 VIANNEY 6- x   3    5 VIANNEY 6- x   4    6 PDE 6- x   5     7 SJ 6- x   6    8 SJ 6- x   7    9 VIANNEY 6- x   8    10 VIANNEY 7-1-2021 x   PN                                                                                       Future Appointments   Date Time Provider Serenity Graciela   7/6/2021  1:45 PM S[facility] PHYSICIAL THERAPIST [facility]PT SFD   7/8/2021  1:45 PM SFD PHYSICIAL THERAPIST [facility]ORPT SFD   7/9/2021  3:30 PM Nicolas Us MD [facility] UCD   7/13/2021  1:45 PM SFD PHYSICIAL THERAPIST [facility]ORPT SFD   7/15/2021  1:45 PM SFD PHYSICIAL THERAPIST [facility]FDORPT SFD   12/1/2021  1:30 PM Lakshmi Espinal NP SSA PP PP

## 2021-07-06 ENCOUNTER — HOSPITAL ENCOUNTER (OUTPATIENT)
Dept: PHYSICAL THERAPY | Age: 76
Discharge: HOME | End: 2021-07-06
Payer: MEDICARE

## 2021-07-06 NOTE — PROGRESS NOTES
Feliciano Goins  : 1945  Primary: [address]  Secondary:  [address] [facility]  [address], [address], [place], [address] [facility]  [phone]   [phone]    CANCELLATION NOTE  Pt was a cancellation for physical therapy appointment today due to having a flat tire. Please disregard below information as it is inapplicable to today's note. # Recent No Shows/Same-Day Cancellations: 1       OUTPATIENT PHYSICAL THERAPY: Daily Treatment Note 2021  Visit Count: 11    ICD-10: Treatment Diagnosis:   Low back pain (M54.5)   Sciatica, right side (M54.31)            Sciatica, left side (M54.32)  Muscle weakness (generalized) (M62.81)    Pre-treatment Symptoms/Complaints: Pt reports she is feeling better this afternoon. She reports mild reports of pain in her low back. She states she has still been unable to go fishing. Pain: Initial: 510 low back   Post Session:  0/10 low back      Medications Last Reviewed:  2021     Updated Objective Findings:  N/A.    TREATMENT:     THERAPEUTIC EXERCISE: (42 minutes):  Exercises per grid below to improve mobility and strength. Required minimal visual, verbal and tactile cues to promote proper body alignment and promote proper body posture. Progressed range and repetitions as indicated. See below for activities.      Date:  2021 Date:  2021 Date:   21 Date:  21 Date:  21   Activity/Exercise        HEP review / patient education 5 minutes  5-6 minutes 4 min 3 min    Supine LTR        Hamstring stretch    With strap 3 x 20 sec hold     Supine bridges  3 x10 3 x 10     SciFit Scifit  Level 1  10 minutes  Level 1 6 min Level 1 5 min Level 1  8 min    Prone on elbows 5 minutes 5 min      Prone press ups 5 minutes  2x 5 min w/ transitions into and out of child's pose      Resisted PNF - scapular/pelvic        Supine Ball Roll outs  3 x 90''      Supine Lumbar Rotation on Ball  3 x 90\"      Supine Single Leg Knee to chest  3 x1 min each leg      Supine Double Knee to chest   3x 2'     Clam shells    2 x 10 B GTB    Standing hip abduction    1 x 10 B    Lateral step ups     2 x 10 B  6\" step   Standing hip extensions     1 x 5 B   Side steps - monster walks     15 ft x 6  Red TB                             MANUAL THERAPY (0 minutes): Soft tissue mobilization was utilized and necessary because of the patient's restricted motion of soft tissue. - Soft tissue mobilization utilized with hands and thera-roller to lumbar spine and gluteals. - Lumbar PAs - mildly painful   -STM- to bilateral lumbar paraspinals w/ pin & stretch- reported decreased pain   - Long axis distraction of BLE - not performed   - Hip mobilizations - not performed    MODALITIES (0 minutes): Electrical Stimulation Therapy (IFC) was provided with intensity adjusted throughout treatment to patient tolerance. - Crossing electrodes at lumbar spine with hot pack applied for pain relief - Intensity at 28   - Large hot pack to low back for pain relief in prone position    MedBridge Portal    Supine LTR 3 x 10  Prone press ups 3 x 10  Supine Bridge with Resistance Band - 2 x daily - 7 x weekly - 2 sets - 10 reps - 3 hold  Clamshell with Resistance - 2 x daily - 7 x weekly - 2 sets - 10 reps - 3 hold    Treatment/Session Summary:     · Response to Treatment: Patient tolerated therapy session well this afternoon. She reported increased back pain with standing hip extension exercise, but had no complaints with abduction exercises. Patient demonstrates gains in LE strength, pain report, and lumbar AROM. See PN for details. · Communication/Consultation: Patient encouraged to continue with HEP outside of direct supervision from PT.  · Equipment provided today: None today. · Recommendations/Intent for next treatment session: Next visit will focus on progressing to more challenging activities.     Total Treatment Billable Duration: 42 minutes      Coralyn Oms, PT, DPT    Visit Approval Visit # Therapist initials Date A NS Cx Comments    1 VIANNEY 6-2-2021 x   Evaluation    2 VIANNEY 6-4-2021 x   1    3 PDE 6-8-2021 x   2    4 VIANNEY 6- x   3    5 VIANNEY 6- x   4    6 PDE 6- x   5     7 SJ 6- x   6    8 SJ 6- x   7    9 VIANNEY 6- x   8    10 VIANNEY 7-1-2021 x   PN     VIANNEY 7-6-2021   x Flat tire                                                                             Future Appointments   Date Time Provider Serenity Owens   7/8/2021  1:45 PM [facility] PHYSICIAL THERAPIST [facility]PT D   7/9/2021  3:30 PM Femi Cool MD [facility] D   7/13/2021  1:45 PM SFD PHYSICIAL THERAPIST [facility]PT SFD   7/15/2021  1:45 PM SFD PHYSICIAL THERAPIST [facility]PT D   12/1/2021  1:30 PM Millicent Lopez NP [facility] PP PP

## 2021-07-08 ENCOUNTER — HOSPITAL ENCOUNTER (OUTPATIENT)
Dept: PHYSICAL THERAPY | Age: 76
Discharge: HOME | End: 2021-07-08
Payer: MEDICARE

## 2021-07-08 PROCEDURE — 97110 THERAPEUTIC EXERCISES: CPT

## 2021-07-08 NOTE — PROGRESS NOTES
Katherin Huang  : 1945  Primary: [address]  Secondary:  [address] [facility]  [address], [address], [place], [address] [facility]  [phone]   [phone]         OUTPATIENT PHYSICAL THERAPY: Daily Treatment Note 2021  Visit Count: 11    ICD-10: Treatment Diagnosis:   Low back pain (M54.5)   Sciatica, right side (M54.31)            Sciatica, left side (M54.32)  Muscle weakness (generalized) (M62.81)    Pre-treatment Symptoms/Complaints: Pt reports she is feeling good this afternoon. Says she has been exercising a lot since previous visit. Patient reports her family got her an exercise object for at home, which she has been using for her arms, core, and back. Patient is happy with how she is walking today. Pain: Initial: 4/10 low back   Post Session:  2/10 low back      Medications Last Reviewed:  2021     Updated Objective Findings:  N/A.    TREATMENT:     THERAPEUTIC EXERCISE (43 minutes):  Exercises per grid below to improve mobility and strength. Required minimal visual, verbal and tactile cues to promote proper body alignment and promote proper body posture. Progressed range and repetitions as indicated. See below for activities.      Date:  2021 Date:  2021 Date:   21 Date:  21 Date:  21 Date:  21   Activity/Exercise         HEP review / patient education 5 minutes  5-6 minutes 4 min 3 min     Supine LTR      1 x 10 B   Hamstring stretch    With strap 3 x 20 sec hold      Supine bridges  3 x10 3 x 10      SciFit Scifit  Level 1  10 minutes  Level 1 6 min Level 1 5 min Level 1  8 min  Level 2  8 minutes   Prone on elbows 5 minutes 5 min       Prone press ups 5 minutes  2 x 5 min w/ transitions into and out of child's pose       Resisted PNF - scapular/pelvic         Supine Ball Roll outs  3 x 90''       Supine Lumbar Rotation on Ball  3 x 90\"       Supine Single Leg Knee to chest  3 x 1 min each leg       Supine Double Knee to chest   3 x 2'      Clam shells    2 x 10 B GTB     Standing hip abduction    1 x 10 B     Lateral step ups     2 x 10 B  6\" step 2 x 10 B  6\" step   Standing hip extensions     1 x 5 B    Side steps - monster walks     15 ft x 6  Red TB    Dead bugs      2 x 8 B   Bird dogs      1 x 6   Open books      2 x 10 B                                         MANUAL THERAPY (0 minutes): Soft tissue mobilization was utilized and necessary because of the patient's restricted motion of soft tissue. - Soft tissue mobilization utilized with hands and thera-roller to lumbar spine and gluteals. - Lumbar PAs - mildly painful   -STM- to bilateral lumbar paraspinals w/ pin & stretch- reported decreased pain   - Long axis distraction of BLE - not performed   - Hip mobilizations - not performed    MODALITIES (0 minutes): Electrical Stimulation Therapy (IFC) was provided with intensity adjusted throughout treatment to patient tolerance. - Crossing electrodes at lumbar spine with hot pack applied for pain relief - Intensity at 28   - Large hot pack to low back for pain relief in prone position    MedBridge Portal    Supine LTR 3 x 10  Prone press ups 3 x 10  Supine Bridge with Resistance Band - 2 x daily - 7 x weekly - 2 sets - 10 reps - 3 hold  Clamshell with Resistance - 2 x daily - 7 x weekly - 2 sets - 10 reps - 3 hold  Sidelying Thoracic Rotation with Open Book - 1 x daily - 7 x weekly - 3 sets - 10 reps - 3 hold  Dead Bugs - 1 x daily - 7 x weekly - 3 sets - 10 reps    Treatment/Session Summary:     · Response to Treatment: Patient tolerated therapy session well this afternoon. Patient reporting she can feel her back ache whenever she was performing the exercises, but not once she stops and takes a break. She required min-mod verbal and visual cues to perform exercises with appropriate technique. Patient reporting onset of mild knee pain with lateral step ups.  PT updated patient's HEP. · Communication/Consultation: Patient encouraged to continue with HEP outside of direct supervision from PT.  · Equipment provided today: None today. · Recommendations/Intent for next treatment session: Next visit will focus on progressing to more challenging activities.     Total Treatment Billable Duration: 43 minutes   PT Patient Time In/Time Out  Time In: 1345  Time Out: [address] [place] [address], PT, DPT    Visit Approval Visit # Therapist initials Date A NS Cx Comments    1 VIANNEY 6-2-2021 x   Evaluation    2 VIANNEY 6-4-2021 x   1    3 PDE 6-8-2021 x   2    4 VIANNEY 6- x   3    5 VIANNEY 6- x   4    6 PDE 6- x   5     7 SJ 6- x   6    8 SJ 6- x   7    9 VIANNEY 6- x   8    10 VIANNEY 7-1-2021 x   PN     VIANNEY 7-6-2021   x Flat tire    11 VIANNEY 7-8-2021 x   1                                                                   Future Appointments   Date Time Provider Serenity Owens   7/9/2021  3:30 PM Ivis Andres MD [facility] UCD   7/13/2021  1:45 PM S[facility] PHYSICIAL THERAPIST [facility]PT SFD   7/15/2021  1:45 PM SFD PHYSICIAL THERAPIST [facility]FD[facility]PT SFD   12/1/2021  1:30 PM Justin Gonzalez NP [facility] PP PP

## 2021-07-13 ENCOUNTER — HOSPITAL ENCOUNTER (OUTPATIENT)
Dept: PHYSICAL THERAPY | Age: 76
Discharge: HOME | End: 2021-07-13
Payer: MEDICARE

## 2021-07-13 PROCEDURE — 97110 THERAPEUTIC EXERCISES: CPT

## 2021-07-13 NOTE — PROGRESS NOTES
Dorita Parra  : 1945  Primary: [address]  Secondary:  [address] [facility]  [address], [address], [place], [address] [facility]  [phone]   [phone]         OUTPATIENT PHYSICAL THERAPY: Daily Treatment Note 2021  Visit Count: 12    ICD-10: Treatment Diagnosis:   Low back pain (M54.5)   Sciatica, right side (M54.31)            Sciatica, left side (M54.32)  Muscle weakness (generalized) (M62.81)    Pre-treatment Symptoms/Complaints: Pt reports she is feeling good this afternoon. Patient reports she has very little pain today. Pain: Initial: 2/10 low back   Post Session: 2/10 low back      Medications Last Reviewed:  2021     Updated Objective Findings:  N/A.    TREATMENT:     THERAPEUTIC EXERCISE (54 minutes):  Exercises per grid below to improve mobility and strength. Required minimal visual, verbal and tactile cues to promote proper body alignment and promote proper body posture. Progressed range and repetitions as indicated. See below for activities.      Date:   21 Date:  21 Date:  21 Date:  21 Date:  21   Activity/Exercise        HEP review / patient education 4 min 3 min      Supine LTR    1 x 10 B    Hamstring stretch  With strap 3 x 20 sec hold       Bridges 3 x 10    2 x 15   SciFit Level 1 5 min Level 1  8 min  Level 2  8 minutes Level 2  8 minutes   Prone on elbows        Prone press ups        Resisted PNF - scapular/pelvic        Supine Ball Roll outs        Supine Lumbar Rotation on Ball        Supine Single Leg Knee to chest        Supine Double Knee to chest 3 x 2'       Clam shells  2 x 10 B GTB      Standing hip abduction  1 x 10 B      Lateral step ups   2 x 10 B  6\" step 2 x 10 B  6\" step 1 x 12 B  6\" step   Standing hip extensions   1 x 5 B     Side steps - monster walks   15 ft x 6  Red TB  3 laps // bars  Red TB   Dead bugs    2 x 8 B    Bird dogs    1 x 6    Open books    2 x 10 B    Monisha curl ups     Attempted x 5  Difficult to perform   Bridges on exercise ball with straight legs - HS     1 x 10  Difficult on back   Tall kneeling on foam pad while moving red ball with straight arms     3 minutes x 2  Glute sets  2 x 10 reps OH   Pallof presses - machine     2 x 10 B  7.5#                                     MANUAL THERAPY (0 minutes): Soft tissue mobilization was utilized and necessary because of the patient's restricted motion of soft tissue. - Soft tissue mobilization utilized with hands and thera-roller to lumbar spine and gluteals. - Lumbar PAs - mildly painful   -STM- to bilateral lumbar paraspinals w/ pin & stretch- reported decreased pain   - Long axis distraction of BLE - not performed   - Hip mobilizations - not performed    MODALITIES (0 minutes): Electrical Stimulation Therapy (IFC) was provided with intensity adjusted throughout treatment to patient tolerance. - Crossing electrodes at lumbar spine with hot pack applied for pain relief - Intensity at 28   - Large hot pack to low back for pain relief in prone position    MedBridge Portal    Supine LTR 3 x 10  Supine Bridge with Resistance Band - 2 x daily - 7 x weekly - 2 sets - 10 reps - 3 hold  Clamshell with Resistance - 2 x daily - 7 x weekly - 2 sets - 10 reps - 3 hold  Sidelying Thoracic Rotation with Open Book - 1 x daily - 7 x weekly - 3 sets - 10 reps - 3 hold  Dead Bugs - 1 x daily - 7 x weekly - 3 sets - 10 reps    Treatment/Session Summary:     · Response to Treatment: Patient tolerated therapy session well this afternoon. Patient asking about discharge since she's feeling good. PT discussed making sure that we can maintain these \"good days\" before we discharge to ensure she can sustain functional tolerance of activities.  Patient performed exercises well with minimal verbal, visual, and tactile cues provided by therapist. Patient reporting onset of mild pain/discomfort with some of the activities Patient encouraged to continue with HEP to maximize benefit. · Communication/Consultation: Patient encouraged to continue with HEP outside of direct supervision from PT.  · Equipment provided today: None today. · Recommendations/Intent for next treatment session: Next visit will focus on progressing to more challenging activities.     Total Treatment Billable Duration: 54 minutes  PT Patient Time In/Time Out  Time In: 1345  Time Out: [address], PT, DPT    Visit Approval Visit # Therapist initials Date A NS Cx Comments    1 VIANNEY 6-2-2021 x   Evaluation    2 VIANNEY 6-4-2021 x   1    3 PDE 6-8-2021 x   2    4 VIANNEY 6- x   3    5 VIANNEY 6- x   4    6 PDE 6- x   5     7 SJ 6- x   6    8 SJ 6- x   7    9 VIANNEY 6- x   8    10 VIANNEY 7-1-2021 x   PN     VIANNEY 7-6-2021   x Flat tire    11 VIANNEY 7-8-2021 x   1    12 VIANNEY 7- x   2                                                         Future Appointments   Date Time Provider Serenity Owens   7/15/2021  1:45 PM SFD PHYSICIAL THERAPIST ARAVINDRPT SFD   12/1/2021  1:30 PM Rossy Chicas NP SSA PP PP

## 2021-07-15 ENCOUNTER — HOSPITAL ENCOUNTER (OUTPATIENT)
Dept: PHYSICAL THERAPY | Age: 76
Discharge: HOME | End: 2021-07-15
Payer: MEDICARE

## 2021-07-15 PROCEDURE — 97110 THERAPEUTIC EXERCISES: CPT

## 2021-07-15 NOTE — PROGRESS NOTES
Dakotah Francisco  : 1945  Primary: [address]  Secondary:  [address] [facility]  [address], [address], [place], [address] [facility]  [phone]   [phone]         OUTPATIENT PHYSICAL THERAPY: Daily Treatment Note 7/15/2021  Visit Count: 13    ICD-10: Treatment Diagnosis:   Low back pain (M54.5)   Sciatica, right side (M54.31)            Sciatica, left side (M54.32)  Muscle weakness (generalized) (M62.81)    Pre-treatment Symptoms/Complaints: Pt reports she is feeling alright this morning. Patient reporting more knee pain than back pain today. Patient reports she has been performing her exercises at home. Says she did not sleep well last night. Pain: Initial: 2/10 low back   Post Session: 2/10 low back      Medications Last Reviewed:  7/15/2021     Updated Objective Findings:  N/A.    TREATMENT:     THERAPEUTIC EXERCISE (40 minutes):  Exercises per grid below to improve mobility and strength. Required minimal visual, verbal and tactile cues to promote proper body alignment and promote proper body posture. Progressed range and repetitions as indicated. See below for activities.      Date:  21 Date:  21 Date:  21 Date:  7-15-21    Activity/Exercise        HEP review / patient education        Supine LTR  1 x 10 B      Hamstring stretch         Bridges   2 x 15     SciFit  Level 2  8 minutes Level 2  8 minutes Level 2  7 minutes    Prone on elbows        Prone press ups        Resisted PNF - scapular/pelvic        Supine Ball Roll outs        Supine Lumbar Rotation on Ball        Supine Single Leg Knee to chest        Supine Double Knee to chest        Clam shells    2 x 10 B  RTB    Standing hip abduction        Lateral step ups 2 x 10 B  6\" step 2 x 10 B  6\" step 1 x 12 B  6\" step 2 x 15 B  6\" step    Standing hip extensions 1 x 5 B       Side steps - monster walks 15 ft x 6  Red TB  3 laps // bars  Red TB     Dead bugs  2 x 8 B      Bird dogs  1 x 6      Open books  2 x 10 B      Monisha curl ups   Attempted x 5  Difficult to perform     Bridges on exercise ball with straight legs - HS   1 x 10  Difficult on back     Tall kneeling on foam pad while moving red ball with straight arms   3 minutes x 2  Glute sets  2 x 10 reps OH     Pallof presses - machine   2 x 10 B  7.5# 2 x 10 B  7.5#    Sidelying hip abduction    Attempted, but said it hurt hips and knees    Wall squats with exercise ball                          MANUAL THERAPY (0 minutes): Soft tissue mobilization was utilized and necessary because of the patient's restricted motion of soft tissue. - Soft tissue mobilization utilized with hands and thera-roller to lumbar spine and gluteals. - Lumbar PAs - mildly painful   -STM- to bilateral lumbar paraspinals w/ pin & stretch- reported decreased pain   - Long axis distraction of BLE - not performed   - Hip mobilizations - not performed    MODALITIES (0 minutes): Electrical Stimulation Therapy (IFC) was provided with intensity adjusted throughout treatment to patient tolerance. - Crossing electrodes at lumbar spine with hot pack applied for pain relief - Intensity at 28   - Large hot pack to low back for pain relief in prone position    [employer]    Supine LTR 3 x 10  Supine Bridge with Resistance Band - 2 x daily - 7 x weekly - 2 sets - 10 reps - 3 hold  Clamshell with Resistance - 2 x daily - 7 x weekly - 2 sets - 10 reps - 3 hold  Sidelying Thoracic Rotation with Open Book - 1 x daily - 7 x weekly - 3 sets - 10 reps - 3 hold  Dead Bugs - 1 x daily - 7 x weekly - 3 sets - 10 reps    Treatment/Session Summary:     · Response to Treatment: Patient tolerated therapy session fairly well this afternoon. Patient with flatter affect today compared to normal. Patient reporting her knees are bothering her a lot more today. Patient required minimal verbal and visual cues for proper technique. · Communication/Consultation: Patient encouraged to continue with HEP outside of direct supervision from PT.  · Equipment provided today: None today. · Recommendations/Intent for next treatment session: Next visit will focus on progressing to more challenging activities.     Total Treatment Billable Duration: 40 minutes   PT Patient Time In/Time Out  Time In: 1345  Time Out: [address], PT, DPT    Visit Approval Visit # Therapist initials Date A NS Cx Comments    1 VIANNEY 6-2-2021 x   Evaluation    2 VIANNEY 6-4-2021 x   1    3 PDE 6-8-2021 x   2    4 VIANNEY 6- x   3    5 VIANNEY 6- x   4    6 PDE 6- x   5     7 SJ 6- x   6    8 SJ 6- x   7    9 VIANNEY 6- x   8    10 VIANNEY 7-1-2021 x   PN     VIANNEY 7-6-2021   x Flat tire    11 VIANNEY 7-8-2021 x   1    12 VIANNEY 7- x   2    13 VIANNEY 7- x   3                                               Future Appointments   Date Time Provider Serenity Owens   12/1/2021  1:30 PM Alida Parada NP [facility] PP PP

## 2021-07-23 ENCOUNTER — HOSPITAL ENCOUNTER (OUTPATIENT)
Dept: PHYSICAL THERAPY | Age: 76
Discharge: HOME | End: 2021-07-23
Payer: MEDICARE

## 2021-07-23 PROCEDURE — 97110 THERAPEUTIC EXERCISES: CPT

## 2021-07-23 NOTE — THERAPY DISCHARGE
Jennifer Ratliff  : 1945  Payor: [facility] MEDICARE / Plan: [address] / Product Type: Managed Care Medicare /  [facility]  at [facility]  [address], [address], [facility], [address] [facility]  [phone]   [phone]                OUTPATIENT PHYSICAL [address] [facility] 2021    ICD-10: Treatment Diagnosis:  Low back pain (M54.5)  Muscle weakness (generalized) (M62.81)        PRECAUTIONS/ALLERGIES:   Prilosec [omeprazole] and Penicillins     FALL RISK SCORE: 1 (? 5 = High Risk)    MD ORDERS: Eval and Treat MEDICAL/REFERRING DIAGNOSIS:  Pain in right arm [M79.601]  Low back pain [M54.5]     DATE OF ONSET: 2 years ago    REFERRING PHYSICIAN: DO LAE Seth PHYSICIAN APPOINTMENT: TBD      Ambulatory/Rehab Services H2 Model Falls Risk Assessment    Risk Factors:       No Risk Factors Identified Ability to Rise from Chair:       (1)  Pushes up, successful in one attempt    Falls Prevention Plan:       No modifications necessary   Total: (5 or greater = High Risk): 1     [facility] of [employer]. All Rights Reserved. [facility] States Patent #[id number]. Federal Law prohibits the replication, distribution or use without written permission from [facility] of [address] [place] [address]: Patient has participated in 15 PT sessions and demonstrated gains in strength, AROM, reduction in pain report, and functional tolerance. Patient's score on her objective outcome measures indicate improvement since her initial visit and per subjective report she is able to participate in her daily routine with less impairments. Patient reports she has improved a lot since coming and that she has returned to fishing, which was her initial goal for therapy. Her tolerance for activities has improved a lot, as well as the range of her pain.  Patient has met all short-term goals and 2/4 long-term goals secondary to not reaching 4+/5 on all LE MMT and the continued need for cues with mechanics. Patient encouraged to continue with HEP after discharge and to return if other needs arose in the future. Patient will be discharged from PT at this time and is agreeable to discharge. TREATMENT PLAN:  Effective Dates: 6/2/2021 TO 8/31/2021 (90 days). Frequency/Duration: 2 times a week for 90 Days    GOALS: (Goals have been discussed and agreed upon with patient.)  Short Term Goals 4 weeks   1. Dakotah Francisco will be independent with HEP to promote self-management of symptoms. MET 07/23/21  2. Dakotah Francisco will participate in core stabilization exercises to help with stabilization and improve posture during ADLs to help prevent future injuries. MET 07/01/21  3. Dakotah Francisco will participate in LE strengthening program with weights as appropriate to help with gait and elevations. MET 07/01/21  4. Dakotah Francisco will participate in static and dynamic balance activities to decrease the risk for falls and improve overall QOL. MET 07/01/21  5. Dakotah Francisco will tolerate manual therapy/joint mobilizations/soft tissue to increase ROM and decrease pain to improve functional mobility during ADLs. MET 07/01/21    Long Term Goals 12 weeks   1. Dakotah Francisco will demonstrate an 5 point improvement on the Oswestry to show improvement in function. MET 07/01/21  2. Dakotah Francisco will report <=4/10 pain at rest and during ADLs to improve QOL. MET 07/23/21  3. Dakotah Francisco will demonstrate >=4+/5 LE strength on manual muscle testing to improve functional mobility. [address] will be able to demonstrate safe lifting and transfer mechanics without cueing for improved safety with home, childcare, and community activities.     Rehabilitation Potential For Stated Goals: GOOD    Regarding Dakotah Francisco therapy, I certify that the treatment plan above will be carried out by a therapist or under their direction. Thank you for this referral,    Karis Bynum, PT, DPT    Referring Physician Signature: Florence Jaramillo, DO *No signature required for this document. HISTORY:   PATIENT GOAL FOR PHYSICAL THERAPY:   Reduce pain in her back and return to fishing    HISTORY OF PRESENT INJURY/ILLNESS (REASON FOR REFERRAL):  Patient reports she's suffered from pain her low back (right > left) with insidious KACEY. Pt reports she suffers from arthritis and attributes most of her impairments to that diagnosis. Pt reports that previous MD visits have resulted in additional attributions to arthritis causing her pain; however, pt reporting TTP along R latissimus and QL in addition to lumbar spine. Patient reports she has tried exercise in the past as a method to address her pain, but states that it hasn't been much help. Pt states she is unable to perform hobbies (fishing) or ADLs (self-care) secondary to decreased activity tolerance as a result of increased pain. Pt reports that she will occasionally have radicular symptoms, which result after standing or sitting for an extended period of time. Patient reports she only has the capacity to stand for 20-25 minutes before needing a seated rest break. Progress Note (7/1/21): Patient reports that her RLE pain has improved a lot over the past few weeks. She states her sacrum (\"tailbone\") is her primary impairment at this point. She said she is still unable to go fishing like she wants to secondary to her tailbone pain. Discharge Note (7/23/21): Patients reports that she continues to have some R-sided neuropathy upon occasion, but that it isn't frequent. She says she still notices her tailbone pain when sitting for longer periods of time. She says that her pain range has improved significantly.      PAIN AND NATURE OF CONDITION Date:   6/2/2021 Date:  7/1/21 Date:  7/23/21   Highest pain level 10/10 7/10 6/10   Lowest pain level 7/10 4/10 0/10 Aggravating factors Standing, walking     Alleviating factors/positions/motions Lying down     Irritability Significant     Depression, fear, anxiety None          EXAMINATION:      AROM/PROM       Joint: Date: 6/2/2021  Date:  7/1/21    Active LE ROM Right Left Right Left   Hip Flexion [facility]     Hip Extension [facility]     Hip ER [facility]     Hip IR [facility] WFL     Knee Extension [facility] WFL     Knee Flexion [facility] WFL     Knee Extension [facility] WFL     Lumbar Flexion WFL -- WFL --   Lumbar Extension Limited to 25% -- WFL; pain --   Lumbar Side-Bending WFL; pain WFL; pain WFL; pain WFL; pain   Lumbar Rotation Limited to 25%; pain Limited to 25%; pain WFL; pain WFL; pain     STRENGTH       Joint: Date: 6/2/2021  Date:  7/1/2021     Right Left Right Left   Hip Abduction 3+/5 4/5 3+/5 4/5   Hip Adduction 3+/5 4/5 4/5 4/5   Hip IR 4/5 4/5 4/5 4/5   Hip ER 3+/5 4/5 4/5 4/5   Hip Flexion 3+/5 4-/5 4/5 4/5   Knee Extension 3+/5 4+/5 4+/5 4+/5   Knee Flexion 3+/5 3+/5 4/5 4/5   Ankle DF 3+/5 5/5 4/5 5/5   Ankle PF 4/5 4/5 4/5 4/5   Ankle IV 3/5 4/5 4/5 4/5   Ankle EV 3/5 4/5 4/5 4/5     PALPATION Date:   6/2/2021 Date:  7/1/2021   TTP Mild discomfort along L3-L5 vertebrae; max discomfort S1-S4; R ASIS and PSIS Moderate discomfort L2-S3; mild over ASIS bilaterally   TONE Normal Normal   PA GLIDES Painful L3-S4 B NT   EDEMA No No        CLINICAL DECISION MAKING:   OUTCOME MEASURE USED:   Tool Used: Tool Used: Modified Oswestry Low Back Pain Questionnaire  Score:  Initial: 28/50 (Date: 6/2/21) Most Recent: 22/50 (Date: 07/01/21) Most Recent: 18/50 (Date: 7/23/21)   Interpretation of Score: Each section is scored on a 0-5 scale, 5 representing the greatest disability. The scores of each section are added together for a total score of 50.       Payor: [facility] MEDICARE / Plan: [address] / Product Type: Managed Care Medicare /     MEDICAL NECESSITY:  · Skilled intervention continues to be required due to above deficits affecting participation in basic ADLs and overall functional tolerance. REASON FOR SERVICES/OTHER COMMENTS:  · Patient continues to require skilled intervention due to  above deficits affecting participation in basic ADLs and overall functional tolerance.

## 2021-07-27 ENCOUNTER — APPOINTMENT (OUTPATIENT)
Dept: PHYSICAL THERAPY | Age: 76
End: 2021-07-27
Payer: MEDICARE

## 2021-07-29 ENCOUNTER — APPOINTMENT (OUTPATIENT)
Dept: PHYSICAL THERAPY | Age: 76
End: 2021-07-29
Payer: MEDICARE

## 2021-12-15 ENCOUNTER — TRANSCRIBE ORDER (OUTPATIENT)
Dept: SCHEDULING | Age: 76
End: 2021-12-15

## 2021-12-15 DIAGNOSIS — Z12.31 SCREENING MAMMOGRAM FOR HIGH-RISK PATIENT: Primary | ICD-10-CM

## 2021-12-21 ENCOUNTER — HOSPITAL ENCOUNTER (OUTPATIENT)
Dept: MAMMOGRAPHY | Age: 76
Discharge: HOME | End: 2021-12-21
Attending: FAMILY MEDICINE
Payer: MEDICARE

## 2021-12-21 DIAGNOSIS — Z12.31 SCREENING MAMMOGRAM FOR HIGH-RISK PATIENT: ICD-10-CM

## 2021-12-21 PROCEDURE — 77067 SCR MAMMO BI INCL CAD: CPT

## 2022-03-18 PROBLEM — R00.2 PALPITATIONS: Status: ACTIVE | Noted: 2018-04-02

## 2022-03-18 PROBLEM — R76.8 POSITIVE SM/RNP ANTIBODY: Status: ACTIVE | Noted: 2017-01-12

## 2022-03-19 PROBLEM — Z79.4 CONTROLLED TYPE 2 DIABETES MELLITUS WITHOUT COMPLICATION, WITH LONG-TERM CURRENT USE OF INSULIN (HCC): Status: ACTIVE | Noted: 2017-01-27

## 2022-03-19 PROBLEM — E11.9 CONTROLLED TYPE 2 DIABETES MELLITUS WITHOUT COMPLICATION, WITH LONG-TERM CURRENT USE OF INSULIN (HCC): Status: ACTIVE | Noted: 2017-01-27

## 2022-03-19 PROBLEM — L50.9 HIVES: Status: ACTIVE | Noted: 2019-01-26

## 2022-03-19 PROBLEM — E11.40 TYPE 2 DIABETES MELLITUS WITH DIABETIC NEUROPATHY (HCC): Status: ACTIVE | Noted: 2018-01-18

## 2022-03-19 PROBLEM — M35.00 SJOGREN'S SYNDROME (HCC): Status: ACTIVE | Noted: 2017-01-12

## 2022-08-24 ENCOUNTER — TELEPHONE (OUTPATIENT)
Dept: SLEEP MEDICINE | Age: 77
End: 2022-08-24

## 2022-08-24 NOTE — TELEPHONE ENCOUNTER
Patient nebulizer is not working. She was seen 6/2021. Next appointment is 10/17/22. Please call her at [phone].

## 2022-08-25 DIAGNOSIS — J45.909 UNCOMPLICATED ASTHMA, UNSPECIFIED ASTHMA SEVERITY, UNSPECIFIED WHETHER PERSISTENT: Primary | ICD-10-CM

## 2022-08-30 ENCOUNTER — TELEPHONE (OUTPATIENT)
Dept: PULMONOLOGY | Age: 77
End: 2022-08-30

## 2022-08-31 NOTE — TELEPHONE ENCOUNTER
This was previously handled on aug 25, new DME was placed. Attempted to call patient to make them aware of the order. LM to call back.  DME:[facility]

## 2022-08-31 NOTE — TELEPHONE ENCOUNTER
Called pt and made her aware that the DME order has been accepted. I also supplied pt with the phone number for [address] so that she can give them a call in the future if she would like.  Mary Dominguez MA

## 2022-10-10 ENCOUNTER — TELEPHONE (OUTPATIENT)
Dept: PULMONOLOGY | Age: 77
End: 2022-10-10

## 2022-10-10 NOTE — TELEPHONE ENCOUNTER
Pt states that when she calls [facility] it just keeps ringing and then the call disconnects. I called [facility] on behalf of the patient and spoke with a representative that states that they will give the patient a call In regards to sending out the nebulizer. Patient informed.  Heath Stringer MA

## 2022-10-25 ENCOUNTER — TELEPHONE (OUTPATIENT)
Dept: PULMONOLOGY | Age: 77
End: 2022-10-25

## 2022-10-25 NOTE — TELEPHONE ENCOUNTER
Patient says she is suppose to be getting a Nebulizer and [facility] told her that they do not have a prescription  from us .  So patient is asking to send one she has been waiting on this for along while

## 2022-11-02 ENCOUNTER — TELEPHONE (OUTPATIENT)
Dept: PULMONOLOGY | Age: 77
End: 2022-11-02

## 2022-11-02 NOTE — TELEPHONE ENCOUNTER
I CALLED PATIENT TO LET HER KNOW THAT I REACHED OUT TO [facility] THEY HAD FOUND HER ORDER AND WILL GET IT OUT ASAP. PATIENT UNDERSTOOD AND NO OTHER QUESTIONS. ..  LEWIS MCCLOUD
Patient needs a nebulizer asap hers broke and she has been waiting . She has her solution for it .  Please call her she will have someone go pick it up
100

## 2023-06-12 NOTE — PROGRESS NOTES
Name: Severiano Milelr MRN: [id number]  : 1945  Age:74 y.o.  female  Admit Date:  2020 LOS: 2      Hospitalist Progress Note    Severiano Miller is a 75 y.o. female with medical history significant for intermittent moderate persistent asthma, diabetes type 2, hypertension, hyperlipidemia,Sjogren's syndrome / Positive sm/RNP antibody who presented to the ED with worsening shortness of breath, productive cough x 2-3days. She normally uses O2 2L at night time but now has been requiring during the day time due to SOB. CXR notable for worsening infiltrate in the right midlung zone and rapid COVID-19 test is positive. Patient was admitted due to acute respiratory failure with hypoxia due to COVID-19 infection and possible right lung PNA. Subjective (20) :  Patient is seen and examined at bedside. No acute events reported overnight by nursing staff. Patient reported chest pain with cough and congestion overnight. EKG was neg for acute ischemia. Reports CP is worse with deep breaths. Currently on 3-4L O2 NC. Coughing has improved alittle this AM.    Patient denies fever, chills, chest pains, n/v, abdominal pain. Tolerating diet. ROS: 10 point review of systems is otherwise negative with the exception of the elements mentioned above.     Objective:    Patient Vitals for the past 24 hrs:   Temp Pulse Resp BP SpO2   20 1129 98.3 °F (36.8 °C) 75 28 125/74 91 %   20 0725 98.4 °F (36.9 °C) 72 18 105/57 91 %   20 0412 98.5 °F (36.9 °C) 70 18 102/64 91 %   20 2351 98.3 °F (36.8 °C) 80 18 99/62 95 %   20 2345     92 %   20 1952 98.9 °F (37.2 °C) 85 17 103/66 91 %   20 1936     92 %   20 1730    100/65    20 1654     96 %   20 1535 98.4 °F (36.9 °C) 68 18 98/63 97 %   20 1515 98 °F (36.7 °C) 69 18 97/61    20 1424 98 °F (36.7 °C) 70 18 90/59    20 1409 98.1 °F (36.7 °C) 69 18 (!) 88/56  Per discussion with Dr. Braun patient would be at high risk for kidney injury due to IV contrast.    Patient would need to weigh benefit vs risk   Oxygen Therapy  O2 Sat (%): 91 % (08/06/20 1129)  Pulse via Oximetry: 74 beats per minute (08/06/20 0412)  O2 Device: Nasal cannula (08/06/20 0412)  O2 Flow Rate (L/min): 2 l/min (08/06/20 0412)    Estimated body mass index is 34.87 kg/m² as calculated from the following:    Height as of this encounter: 4' 11\" (1.499 m). Weight as of this encounter: 78.3 kg (172 lb 9.9 oz). Intake/Output Summary (Last 24 hours) at 8/6/2020 1210  Last data filed at 8/5/2020 1845  Gross per 24 hour   Intake 604.2 ml   Output    Net 604.2 ml       *Note that automatically entered I/Os may not be accurate; dependent on patient compliance with collection and accurate  by techs. Physical Exam:   General:                     alert, awake, no acute distress. Well nourished  Head:                          normocephalic, atraumatic  Eyes, Ears, nose:      PERRL, EOMI. Normal Conjunctiva. Neck:                          supple, non-tender. Trachea midline. Lungs:                        Decreased air entry, slight wheezing  Cardiac:                      RRR, Normal S1 and S2. Abdomen:                  Soft, non distended, nontender, +BS  Extremities:               Warm, dry. No edema  Skin:                           No rashes, no jaundice  Neuro:             AAOx 3 .  No gross focal neurological deficit  Psychiatric:                No anxiety, calm, cooperative    Data Review:  I have reviewed all labs, meds, and studies from the last 24 hours:    Recent Results (from the past 24 hour(s))   GLUCOSE, POC    Collection Time: 08/05/20  4:05 PM   Result Value Ref Range    Glucose (POC) 220 (H) 65 - 100 mg/dL   GLUCOSE, POC    Collection Time: 08/05/20  9:08 PM   Result Value Ref Range    Glucose (POC) 208 (H) 65 - 229 mg/dL   METABOLIC PANEL, BASIC    Collection Time: 08/06/20  4:04 AM   Result Value Ref Range    Sodium 139 136 - 145 mmol/L    Potassium 3.7 3.5 - 5.1 mmol/L    Chloride 104 98 - 107 mmol/L    CO2 27 21 - 32 mmol/L    Anion gap 8 7 - 16 mmol/L    Glucose 136 (H) 65 - 100 mg/dL    BUN 22 8 - 23 MG/DL    Creatinine 1.11 (H) 0.6 - 1.0 MG/DL    GFR est AA >60 >60 ml/min/1.73m2    GFR est non-AA 51 (L) >60 ml/min/1.73m2    Calcium 8.2 (L) 8.3 - 10.4 MG/DL   CBC WITH AUTOMATED DIFF    Collection Time: 08/06/20  4:04 AM   Result Value Ref Range    WBC 8.3 4.3 - 11.1 K/uL    RBC 5.11 4.05 - 5.2 M/uL    HGB 13.6 11.7 - 15.4 g/dL    HCT 43.5 35.8 - 46.3 %    MCV 85.1 79.6 - 97.8 FL    MCH 26.6 26.1 - 32.9 PG    MCHC 31.3 (L) 31.4 - 35.0 g/dL    RDW 13.7 11.9 - 14.6 %    PLATELET 491 745 - 747 K/uL    MPV 11.6 9.4 - 12.3 FL    ABSOLUTE NRBC 0.00 0.0 - 0.2 K/uL    DF AUTOMATED      NEUTROPHILS 84 (H) 43 - 78 %    LYMPHOCYTES 9 (L) 13 - 44 %    MONOCYTES 6 4.0 - 12.0 %    EOSINOPHILS 0 (L) 0.5 - 7.8 %    BASOPHILS 0 0.0 - 2.0 %    IMMATURE GRANULOCYTES 1 0.0 - 5.0 %    ABS. NEUTROPHILS 7.0 1.7 - 8.2 K/UL    ABS. LYMPHOCYTES 0.8 0.5 - 4.6 K/UL    ABS. MONOCYTES 0.5 0.1 - 1.3 K/UL    ABS. EOSINOPHILS 0.0 0.0 - 0.8 K/UL    ABS. BASOPHILS 0.0 0.0 - 0.2 K/UL    ABS. IMM.  GRANS. 0.1 0.0 - 0.5 K/UL   GLUCOSE, POC    Collection Time: 08/06/20  6:08 AM   Result Value Ref Range    Glucose (POC) 158 (H) 65 - 100 mg/dL   TROPONIN-HIGH SENSITIVITY    Collection Time: 08/06/20  6:56 AM   Result Value Ref Range    Troponin-High Sensitivity 9.6 0 - 14 pg/mL   D DIMER    Collection Time: 08/06/20  6:56 AM   Result Value Ref Range    D DIMER 1.19 (HH) <0.56 ug/ml(FEU)   EKG, 12 LEAD, SUBSEQUENT    Collection Time: 08/06/20  7:03 AM   Result Value Ref Range    Ventricular Rate 79 BPM    Atrial Rate 79 BPM    P-R Interval 166 ms    QRS Duration 86 ms    Q-T Interval 396 ms    QTC Calculation (Bezet) 454 ms    Calculated P Axis 50 degrees    Calculated R Axis -30 degrees    Calculated T Axis -2 degrees    Diagnosis       Normal sinus rhythm  Left axis deviation  Nonspecific ST abnormality  When compared with ECG of 04-AUG-2020 09:57,  No significant change was found  Confirmed by [facility]  MD ()REBECA ([id number]) on 8/6/2020 8:04:15 AM     GLUCOSE, POC    Collection Time: 08/06/20 11:27 AM   Result Value Ref Range    Glucose (POC) 120 (H) 65 - 100 mg/dL        All Micro Results     Procedure Component Value Units Date/Time    CULTURE, BLOOD [[id number]] Collected:  08/04/20 0950    Order Status:  Completed Specimen:  Blood Updated:  08/06/20 0926     Special Requests: --        RIGHT  HAND       Culture result: NO GROWTH 2 DAYS       CULTURE, BLOOD [[id number]] Collected:  08/04/20 0951    Order Status:  Completed Specimen:  Blood Updated:  08/06/20 0926     Special Requests: --        LEFT  HAND       Culture result: NO GROWTH 2 DAYS       CULTURE, RESPIRATORY/SPUTUM/BRONCH Haze Ashley STAIN [[id number]] Collected:  08/05/20 0515    Order Status:  Completed Specimen:  Sputum Updated:  08/06/20 0659     Special Requests: NO SPECIAL REQUESTS        GRAM STAIN PENDING     Culture result:       MODERATE NORMAL RESPIRATORY SANDRA          CULTURE, BLOOD [[id number]]     Order Status:  Canceled Specimen:  Blood     CULTURE, BLOOD [[id number]]     Order Status:  Canceled Specimen:  Blood           Current Meds:  Current Facility-Administered Medications   Medication Dose Route Frequency    albuterol (PROVENTIL HFA, VENTOLIN HFA, PROAIR HFA) inhaler 1 Puff  1 Puff Inhalation Q4H    guaiFENesin ER (MUCINEX) tablet 600 mg  600 mg Oral Q12H    benzonatate (TESSALON) capsule 100 mg  100 mg Oral TID PRN    sodium chloride (NS) flush 5-40 mL  5-40 mL IntraVENous Q8H    sodium chloride (NS) flush 5-40 mL  5-40 mL IntraVENous PRN    acetaminophen (TYLENOL) tablet 650 mg  650 mg Oral Q6H PRN    Or    acetaminophen (TYLENOL) suppository 650 mg  650 mg Rectal Q6H PRN    polyethylene glycol (MIRALAX) packet 17 g  17 g Oral DAILY PRN    promethazine (PHENERGAN) tablet 12.5 mg  12.5 mg Oral Q6H PRN    Or    ondansetron (ZOFRAN) injection 4 mg  4 mg IntraVENous Q6H PRN    enoxaparin (LOVENOX) injection 40 mg  40 mg SubCUTAneous QHS    insulin lispro (HUMALOG) injection   SubCUTAneous AC&HS    ascorbic acid (vitamin C) (VITAMIN C) tablet 500 mg  500 mg Oral TID    zinc sulfate tablet 220 mg  220 mg Oral DAILY    famotidine (PEPCID) tablet 20 mg  20 mg Oral DAILY    dexAMETHasone (DECADRON) tablet 6 mg  6 mg Oral DAILY    aspirin delayed-release tablet 81 mg  81 mg Oral DAILY    [Held by provider] amLODIPine (NORVASC) tablet 10 mg  10 mg Oral DAILY    folic acid (FOLVITE) tablet 1 mg  1 mg Oral DAILY    hydrOXYchloroQUINE (PLAQUENIL) tablet 200 mg  200 mg Oral BIDPC    insulin glargine (LANTUS) injection 30 Units  30 Units SubCUTAneous QHS    [Held by provider] losartan (COZAAR) tablet 100 mg  100 mg Oral DAILY    montelukast (SINGULAIR) tablet 10 mg  10 mg Oral QHS    [Held by provider] nebivoloL (BYSTOLIC) tablet 5 mg  5 mg Oral DAILY    [Held by provider] hydroCHLOROthiazide (HYDRODIURIL) tablet 12.5 mg  12.5 mg Oral DAILY    0.9% sodium chloride infusion 250 mL  250 mL IntraVENous PRN         Other Studies:  Xr Chest Port    Result Date: 8/4/2020  CHEST X-RAY, one view. HISTORY:  Suspected COVID, results pending; cough. TECHNIQUE:  AP upright portable view. COMPARISON: August 2019     FINDINGS/IMPRESSION: Small focus of atelectasis or infiltrate right midlung zone. Mild interstitial prominence. Costophrenic angles are sharp. Heart and pulmonary vasculature is unremarkable.           Assessment:    Active Hospital Problems    Diagnosis Date Noted    Acute respiratory failure with hypoxia ([facility] 08/04/2020    COVID-19 virus infection 08/04/2020    Controlled type 2 diabetes mellitus without complication, with long-term current use of insulin ([facility] [address] 01/27/2017    Sjogren's syndrome ([facility] [address] 01/12/2017    Positive sm/RNP antibody 01/12/2017    Asthma 10/24/2016    GERD (gastroesophageal reflux disease) 07/23/2013    Hypertension 07/23/2013    Hypercholesterolemia 07/23/2013       Plan:    Acute Respiratory Failure due with hypoxia due to COVID-19  Sepsis due to COVID 19 Infection  COVID-19 Virus Infection  Met sepsis criteria on admission with tachycardia and hypoxia (88% on RA). CXR with possible data like this is worsening infiltrate in the right midlung zone. GINA-COV2 Rapid test (+) on 8/4. Convalescent plasma on 8/5.   - O2 supplement and wean as tolerated  - zinc and vitamin C supplement  - dexamethasone 6mg PO daily   - DVT PPx with lovenox  - follow up BCx, Sputum Cx     Moderate Persistence Asthma  - O2 supplement as above. - nebs PRN     T2DM  - ISS  - hold home trulicity     Sjogren's Syndrome // Positive sm/RNP antibody: continue with home medications     Hypertension // HLD : continue with home medications     GERD: pepcid BID    Larissa Degroot (Son): left voicemail with update. Diet:  DIET NUTRITIONAL SUPPLEMENTS  DIET DIABETIC CONSISTENT CARB  DVT PPx:   Code: Full Code  Dispo: likely home. PT order for DC rec  Estimated Discharge: 48-72hrs    Labs/Imaging Reviewed. Patient is moderate risk due to current condition and comorbid conditions as well as requiring frequent monitoring. Plan discussed with staff, patient/family and are in agreement. Time Spent: Greater than 45 minutes was spent in reviewing charts, physical exam, discussion with patient, and answered any questions.     Signed By: Taty Cobb MD     August 6, 2020

## 2023-06-26 ENCOUNTER — APPOINTMENT (OUTPATIENT)
Dept: GENERAL RADIOLOGY | Age: 78
End: 2023-06-26
Payer: MEDICARE

## 2023-06-26 ENCOUNTER — HOSPITAL ENCOUNTER (EMERGENCY)
Age: 78
Discharge: HOME | End: 2023-06-26
Attending: EMERGENCY MEDICINE
Payer: MEDICARE

## 2023-06-26 VITALS
SYSTOLIC BLOOD PRESSURE: 107 MMHG | WEIGHT: 152 LBS | RESPIRATION RATE: 18 BRPM | BODY MASS INDEX: 30.64 KG/M2 | HEART RATE: 73 BPM | TEMPERATURE: 98.8 F | DIASTOLIC BLOOD PRESSURE: 88 MMHG | HEIGHT: 59 IN | OXYGEN SATURATION: 97 %

## 2023-06-26 DIAGNOSIS — R42 DIZZINESS: ICD-10-CM

## 2023-06-26 DIAGNOSIS — R07.89 ATYPICAL CHEST PAIN: Primary | ICD-10-CM

## 2023-06-26 LAB
ALBUMIN SERPL-MCNC: 3.5 G/DL (ref 3.2–4.6)
ALBUMIN/GLOB SERPL: 0.9 (ref 0.4–1.6)
ALP SERPL-CCNC: 60 U/L (ref 50–136)
ALT SERPL-CCNC: 13 U/L (ref 12–65)
ANION GAP SERPL CALC-SCNC: 4 MMOL/L (ref 2–11)
APPEARANCE UR: CLEAR
AST SERPL-CCNC: 13 U/L (ref 15–37)
BASOPHILS # BLD: 0 K/UL (ref 0–0.2)
BASOPHILS NFR BLD: 1 % (ref 0–2)
BILIRUB SERPL-MCNC: 0.4 MG/DL (ref 0.2–1.1)
BILIRUB UR QL: NEGATIVE
BUN SERPL-MCNC: 9 MG/DL (ref 8–23)
CALCIUM SERPL-MCNC: 9 MG/DL (ref 8.3–10.4)
CHLORIDE SERPL-SCNC: 113 MMOL/L (ref 101–110)
CO2 SERPL-SCNC: 27 MMOL/L (ref 21–32)
COLOR UR: NORMAL
CREAT SERPL-MCNC: 0.7 MG/DL (ref 0.6–1)
DIFFERENTIAL METHOD BLD: ABNORMAL
EOSINOPHIL # BLD: 0.2 K/UL (ref 0–0.8)
EOSINOPHIL NFR BLD: 3 % (ref 0.5–7.8)
ERYTHROCYTE [DISTWIDTH] IN BLOOD BY AUTOMATED COUNT: 14 % (ref 11.9–14.6)
GLOBULIN SER CALC-MCNC: 3.9 G/DL (ref 2.8–4.5)
GLUCOSE SERPL-MCNC: 92 MG/DL (ref 65–100)
GLUCOSE UR STRIP.AUTO-MCNC: NEGATIVE MG/DL
HCT VFR BLD AUTO: 45.8 % (ref 35.8–46.3)
HGB BLD-MCNC: 14.4 G/DL (ref 11.7–15.4)
HGB UR QL STRIP: NEGATIVE
IMM GRANULOCYTES # BLD AUTO: 0 K/UL (ref 0–0.5)
IMM GRANULOCYTES NFR BLD AUTO: 0 % (ref 0–5)
KETONES UR QL STRIP.AUTO: NEGATIVE MG/DL
LEUKOCYTE ESTERASE UR QL STRIP.AUTO: NEGATIVE
LIPASE SERPL-CCNC: 96 U/L (ref 73–393)
LYMPHOCYTES # BLD: 2.2 K/UL (ref 0.5–4.6)
LYMPHOCYTES NFR BLD: 37 % (ref 13–44)
MCH RBC QN AUTO: 26.9 PG (ref 26.1–32.9)
MCHC RBC AUTO-ENTMCNC: 31.4 G/DL (ref 31.4–35)
MCV RBC AUTO: 85.6 FL (ref 82–102)
MONOCYTES # BLD: 0.5 K/UL (ref 0.1–1.3)
MONOCYTES NFR BLD: 8 % (ref 4–12)
NEUTS SEG # BLD: 3 K/UL (ref 1.7–8.2)
NEUTS SEG NFR BLD: 51 % (ref 43–78)
NITRITE UR QL STRIP.AUTO: NEGATIVE
NRBC # BLD: 0 K/UL (ref 0–0.2)
PH UR STRIP: 7.5 (ref 5–9)
PLATELET # BLD AUTO: 293 K/UL (ref 150–450)
PMV BLD AUTO: 11.1 FL (ref 9.4–12.3)
POTASSIUM SERPL-SCNC: 3.3 MMOL/L (ref 3.5–5.1)
PROT SERPL-MCNC: 7.4 G/DL (ref 6.3–8.2)
PROT UR STRIP-MCNC: NEGATIVE MG/DL
RBC # BLD AUTO: 5.35 M/UL (ref 4.05–5.2)
SODIUM SERPL-SCNC: 144 MMOL/L (ref 133–143)
SP GR UR REFRACTOMETRY: 1.01 (ref 1–1.02)
TROPONIN I SERPL HS-MCNC: 4.5 PG/ML (ref 0–14)
TROPONIN I SERPL HS-MCNC: 5.8 PG/ML (ref 0–14)
UROBILINOGEN UR QL STRIP.AUTO: 1 EU/DL (ref 0.2–1)
WBC # BLD AUTO: 5.9 K/UL (ref 4.3–11.1)

## 2023-06-26 PROCEDURE — 85025 COMPLETE CBC W/AUTO DIFF WBC: CPT

## 2023-06-26 PROCEDURE — 71046 X-RAY EXAM CHEST 2 VIEWS: CPT

## 2023-06-26 PROCEDURE — 93005 ELECTROCARDIOGRAM TRACING: CPT | Performed by: EMERGENCY MEDICINE

## 2023-06-26 PROCEDURE — 6370000000 HC RX 637 (ALT 250 FOR IP): Performed by: EMERGENCY MEDICINE

## 2023-06-26 PROCEDURE — 99285 EMERGENCY DEPT VISIT HI MDM: CPT

## 2023-06-26 PROCEDURE — 84484 ASSAY OF TROPONIN QUANT: CPT

## 2023-06-26 PROCEDURE — 83690 ASSAY OF LIPASE: CPT

## 2023-06-26 PROCEDURE — 81003 URINALYSIS AUTO W/O SCOPE: CPT

## 2023-06-26 PROCEDURE — 80053 COMPREHEN METABOLIC PANEL: CPT

## 2023-06-26 RX ORDER — MECLIZINE HYDROCHLORIDE 25 MG/1
25 TABLET ORAL 3 TIMES DAILY PRN
Qty: 30 TABLET | Refills: 0 | Status: SHIPPED | OUTPATIENT
Start: 2023-06-26 | End: 2023-07-06

## 2023-06-26 RX ORDER — MECLIZINE HYDROCHLORIDE 25 MG/1
25 TABLET ORAL
Status: COMPLETED | OUTPATIENT
Start: 2023-06-26 | End: 2023-06-26

## 2023-06-26 RX ADMIN — MECLIZINE HYDROCHLORIDE 25 MG: 25 TABLET ORAL at 18:12

## 2023-06-26 ASSESSMENT — ENCOUNTER SYMPTOMS
NAUSEA: 0
EYE REDNESS: 0
WHEEZING: 0
DIARRHEA: 0
COUGH: 0
COLOR CHANGE: 0
SHORTNESS OF BREATH: 0
SORE THROAT: 0
ABDOMINAL PAIN: 0
VOMITING: 0

## 2023-06-26 ASSESSMENT — LIFESTYLE VARIABLES
HOW OFTEN DO YOU HAVE A DRINK CONTAINING ALCOHOL: NEVER
HOW MANY STANDARD DRINKS CONTAINING ALCOHOL DO YOU HAVE ON A TYPICAL DAY: PATIENT DOES NOT DRINK

## 2023-06-26 ASSESSMENT — PAIN - FUNCTIONAL ASSESSMENT: PAIN_FUNCTIONAL_ASSESSMENT: NONE - DENIES PAIN

## 2023-06-27 LAB
EKG ATRIAL RATE: 67 BPM
EKG DIAGNOSIS: NORMAL
EKG P AXIS: 49 DEGREES
EKG P-R INTERVAL: 182 MS
EKG Q-T INTERVAL: 460 MS
EKG QRS DURATION: 84 MS
EKG QTC CALCULATION (BAZETT): 486 MS
EKG R AXIS: -27 DEGREES
EKG T AXIS: 13 DEGREES
EKG VENTRICULAR RATE: 67 BPM

## 2023-06-27 PROCEDURE — 93010 ELECTROCARDIOGRAM REPORT: CPT | Performed by: INTERNAL MEDICINE

## 2024-01-04 ENCOUNTER — TELEPHONE (OUTPATIENT)
Dept: PULMONOLOGY | Age: 79
End: 2024-01-04

## 2024-01-23 ENCOUNTER — OFFICE VISIT (OUTPATIENT)
Dept: PULMONOLOGY | Age: 79
End: 2024-01-23
Payer: MEDICARE

## 2024-01-23 VITALS
HEIGHT: 59 IN | WEIGHT: 157.4 LBS | RESPIRATION RATE: 18 BRPM | HEART RATE: 72 BPM | DIASTOLIC BLOOD PRESSURE: 82 MMHG | SYSTOLIC BLOOD PRESSURE: 134 MMHG | OXYGEN SATURATION: 95 % | TEMPERATURE: 97.1 F | BODY MASS INDEX: 31.73 KG/M2

## 2024-01-23 DIAGNOSIS — F43.21 GRIEF: ICD-10-CM

## 2024-01-23 DIAGNOSIS — J45.40 MODERATE PERSISTENT ASTHMA WITHOUT COMPLICATION: Primary | ICD-10-CM

## 2024-01-23 DIAGNOSIS — G47.34 NOCTURNAL HYPOXEMIA: ICD-10-CM

## 2024-01-23 DIAGNOSIS — R06.09 DYSPNEA ON EXERTION: ICD-10-CM

## 2024-01-23 DIAGNOSIS — G47.33 OSA (OBSTRUCTIVE SLEEP APNEA): ICD-10-CM

## 2024-01-23 LAB
EXPIRATORY TIME: NORMAL
FEF 25-75% %PRED-PRE: NORMAL
FEF 25-75% PRED: NORMAL
FEF 25-75-PRE: NORMAL
FENO: 13 PPB
FEV1 %PRED-PRE: 79 %
FEV1 PRED: NORMAL
FEV1/FVC %PRED-PRE: NORMAL
FEV1/FVC PRED: NORMAL
FEV1/FVC: 75 %
FEV1: 0.91 L
FVC %PRED-PRE: 80 %
FVC PRED: NORMAL
FVC: 1.23 L
PEF %PRED-PRE: NORMAL
PEF PRED: NORMAL
PEF-PRE: NORMAL

## 2024-01-23 PROCEDURE — 99215 OFFICE O/P EST HI 40 MIN: CPT | Performed by: NURSE PRACTITIONER

## 2024-01-23 PROCEDURE — 3075F SYST BP GE 130 - 139MM HG: CPT | Performed by: NURSE PRACTITIONER

## 2024-01-23 PROCEDURE — G8484 FLU IMMUNIZE NO ADMIN: HCPCS | Performed by: NURSE PRACTITIONER

## 2024-01-23 PROCEDURE — G8400 PT W/DXA NO RESULTS DOC: HCPCS | Performed by: NURSE PRACTITIONER

## 2024-01-23 PROCEDURE — 1036F TOBACCO NON-USER: CPT | Performed by: NURSE PRACTITIONER

## 2024-01-23 PROCEDURE — 1123F ACP DISCUSS/DSCN MKR DOCD: CPT | Performed by: NURSE PRACTITIONER

## 2024-01-23 PROCEDURE — G8427 DOCREV CUR MEDS BY ELIG CLIN: HCPCS | Performed by: NURSE PRACTITIONER

## 2024-01-23 PROCEDURE — G8417 CALC BMI ABV UP PARAM F/U: HCPCS | Performed by: NURSE PRACTITIONER

## 2024-01-23 PROCEDURE — 3079F DIAST BP 80-89 MM HG: CPT | Performed by: NURSE PRACTITIONER

## 2024-01-23 PROCEDURE — 1090F PRES/ABSN URINE INCON ASSESS: CPT | Performed by: NURSE PRACTITIONER

## 2024-01-23 RX ORDER — FLUTICASONE FUROATE, UMECLIDINIUM BROMIDE AND VILANTEROL TRIFENATATE 200; 62.5; 25 UG/1; UG/1; UG/1
1 POWDER RESPIRATORY (INHALATION) DAILY
Qty: 1 EACH | Refills: 11 | Status: SHIPPED | OUTPATIENT
Start: 2024-01-23

## 2024-01-23 ASSESSMENT — PULMONARY FUNCTION TESTS
FENO: 13
FEV1_PERCENT_PREDICTED_PRE: 79
FEV1/FVC: 75
FVC_PERCENT_PREDICTED_PRE: 80
FVC: 1.23
FEV1: 0.91

## 2024-01-23 NOTE — PATIENT INSTRUCTIONS
Check with your Judaism about grief support group.  Can also call [facility] on [address] in [place] at [phone]

## 2024-01-23 NOTE — PROGRESS NOTES
(noted 3/9/18); no meds    Stroke ([facility]) 1979    MINI STROKE    Systemic lupus erythematosus ([facility]) 07/23/2013    recent blood work negative (3/9/18)    Type 2 diabetes mellitus with hyperglycemia ([facility]) 10/24/2016        Tobacco Use      Smoking status: Former        Packs/day: 0.50        Years: 0.2 packs/day for 55.1 years (11.5 ttl pk-yrs)        Types: Cigarettes        Start date: 1/1/1969      Smokeless tobacco: Never    Allergies   Allergen Reactions    Omeprazole Rash    Omeprazole Magnesium Rash    Penicillins Rash     Current Outpatient Medications   Medication Instructions    acetaminophen (TYLENOL) 500 mg, Oral, PRN    albuterol (PROVENTIL) 2.5 mg, Inhalation, EVERY 4 HOURS PRN    amLODIPine (NORVASC) 10 MG tablet TAKE 1 TABLET BY MOUTH ONCE DAILY FOR 90 DAYS    aspirin 81 mg, Oral, DAILY    atorvastatin (LIPITOR) 40 mg, Oral, DAILY    Baclofen (LIORESAL) 5 MG tablet TAKE 1 TABLET BY MOUTH ONCE DAILY AS NEEDED FOR 30 DAYS    cetirizine (ZYRTEC) 10 mg, Oral, DAILY    docusate (COLACE, DULCOLAX) 100 mg, Oral, DAILY    Dulaglutide (TRULICITY) 1.5 MG/0.5ML SOPN Inject ONE syringe subcutaneously ONCE EVERY WEEK    EPINEPHrine (EPIPEN 2-MILADYS) 0.3 mg, ONCE PRN    famotidine (PEPCID) 20 mg, Oral, 2 TIMES DAILY    fluticasone (FLONASE) 50 MCG/ACT nasal spray 2 sprays, Nasal, DAILY    fluticasone-umeclidin-vilant (TRELEGY ELLIPTA) 200-62.5-25 MCG/ACT AEPB inhaler 1 puff, Inhalation, DAILY    folic acid (FOLVITE) 1 mg, Oral, DAILY    insulin glargine (1 unit dial) (TOUJEO) 35 Units, SubCUTAneous    losartan (COZAAR) 50 mg, Oral, DAILY    metoprolol tartrate (LOPRESSOR) 25 MG tablet TAKE 1 TABLET BY MOUTH TWICE DAILY WITH FOOD    montelukast (SINGULAIR) 10 MG tablet TAKE ONE TABLET BY MOUTH NIGHTLY    olopatadine (PATADAY) 0.2 % SOLN ophthalmic solution 1 drop, Ophthalmic, DAILY    ondansetron (ZOFRAN) 4 MG tablet TAKE 1 TABLET BY MOUTH EVERY 6 HOURS AS NEEDED FOR NAUSEA    pantoprazole (PROTONIX) 40 mg, Oral, DAILY

## 2024-02-09 ENCOUNTER — HOSPITAL ENCOUNTER (OUTPATIENT)
Dept: PULMONOLOGY | Age: 79
End: 2024-02-09
Payer: MEDICARE

## 2024-02-09 DIAGNOSIS — R06.09 DYSPNEA ON EXERTION: ICD-10-CM

## 2024-02-09 PROCEDURE — 94729 DIFFUSING CAPACITY: CPT

## 2024-02-09 PROCEDURE — 94060 EVALUATION OF WHEEZING: CPT

## 2024-02-09 PROCEDURE — 94726 PLETHYSMOGRAPHY LUNG VOLUMES: CPT

## 2024-07-29 NOTE — PROGRESS NOTES
days.      PFTs:       Latest Ref Rng & Units 1/23/2024    12:00 AM   Office Spirometry Results   FVC L 1.23    FEV1 L 0.91    FEV1 %Pred-Pre % 79    FVC %Pred-Pre % 80    FEV1/FVC % 75      No results found for this or any previous visit. No results found for this or any previous visit.    FeNO: No results found for this or any previous visit.  FeNO and Likelihood of Eosinophilic Asthma   Unlikely Intermediate Likely   <25 ppb 25-50 ppb >50ppb     Exercise Oximetry:    Echo:   TRANSTHORACIC ECHOCARDIOGRAM (TTE) COMPLETE (CONTRAST/BUBBLE/3D PRN) 06/24/2021    Narrative  This is a summary report. The complete report is available in the patient's medical record. If you cannot access the medical record, please contact the sending organization for a detailed fax or copy.    · LV: Estimated LVEF is 60 - 65%. Normal cavity size and systolic function (ejection fraction normal). Severely increased wall thickness. Inconclusive left ventricular diastolic function.      [facility] Reference Info:                                                                                                                  Immunization History   Administered Date(s) Administered    COVID-19, MODERNA BLUE border, Primary or Immunocompromised, (age 12y+), IM, 100 mcg/0.5mL 04/28/2021, 05/28/2021    Influenza Virus Vaccine 10/01/2012, 09/02/2016, 10/16/2017, 10/01/2018, 11/15/2019, 10/01/2020    Influenza, FLUARIX, FLULAVAL, FLUZONE (age 6 mo+) AND AFLURIA, (age 3 y+), PF, 0.5mL 10/04/2018    Influenza, FLUCELVAX, (age 6 mo+), MDCK, PF, 0.5mL 10/16/2017, 10/05/2021    Influenza, FLUZONE (age 65 y+), High Dose, 0.7mL 10/01/2023    Influenza, Intradermal, Preservative free 10/27/2020    Influenza, Triv, inactivated, subunit, adjuvanted, IM (Fluad 65 yrs and older) 11/15/2019    PPD Test 01/26/2019    Pneumococcal Vaccine 01/01/2009    Pneumococcal, PCV-13, PREVNAR 13, (age 6w+), IM, 0.5mL 10/27/2020    Pneumococcal, PPSV23, PNEUMOVAX 23, (age 2y+),

## 2024-07-30 ENCOUNTER — OFFICE VISIT (OUTPATIENT)
Dept: PULMONOLOGY | Age: 79
End: 2024-07-30
Payer: MEDICARE

## 2024-07-30 VITALS
HEART RATE: 63 BPM | HEIGHT: 71 IN | BODY MASS INDEX: 19.88 KG/M2 | OXYGEN SATURATION: 97 % | SYSTOLIC BLOOD PRESSURE: 122 MMHG | WEIGHT: 142 LBS | RESPIRATION RATE: 17 BRPM | DIASTOLIC BLOOD PRESSURE: 76 MMHG | TEMPERATURE: 97.2 F

## 2024-07-30 DIAGNOSIS — R60.0 LOWER EXTREMITY EDEMA: ICD-10-CM

## 2024-07-30 DIAGNOSIS — G47.33 OSA (OBSTRUCTIVE SLEEP APNEA): ICD-10-CM

## 2024-07-30 DIAGNOSIS — J45.40 MODERATE PERSISTENT ASTHMA WITHOUT COMPLICATION: Primary | ICD-10-CM

## 2024-07-30 DIAGNOSIS — G47.34 NOCTURNAL HYPOXEMIA: ICD-10-CM

## 2024-07-30 PROCEDURE — 99214 OFFICE O/P EST MOD 30 MIN: CPT | Performed by: NURSE PRACTITIONER

## 2024-07-30 PROCEDURE — G8427 DOCREV CUR MEDS BY ELIG CLIN: HCPCS | Performed by: NURSE PRACTITIONER

## 2024-07-30 PROCEDURE — 1123F ACP DISCUSS/DSCN MKR DOCD: CPT | Performed by: NURSE PRACTITIONER

## 2024-07-30 PROCEDURE — G8420 CALC BMI NORM PARAMETERS: HCPCS | Performed by: NURSE PRACTITIONER

## 2024-07-30 PROCEDURE — 3074F SYST BP LT 130 MM HG: CPT | Performed by: NURSE PRACTITIONER

## 2024-07-30 PROCEDURE — G2211 COMPLEX E/M VISIT ADD ON: HCPCS | Performed by: NURSE PRACTITIONER

## 2024-07-30 PROCEDURE — 1036F TOBACCO NON-USER: CPT | Performed by: NURSE PRACTITIONER

## 2024-07-30 PROCEDURE — 3078F DIAST BP <80 MM HG: CPT | Performed by: NURSE PRACTITIONER

## 2024-07-30 PROCEDURE — G8400 PT W/DXA NO RESULTS DOC: HCPCS | Performed by: NURSE PRACTITIONER

## 2024-07-30 PROCEDURE — 1090F PRES/ABSN URINE INCON ASSESS: CPT | Performed by: NURSE PRACTITIONER

## 2024-08-27 ENCOUNTER — HOSPITAL ENCOUNTER (OUTPATIENT)
Dept: SLEEP CENTER | Age: 79
Discharge: HOME | End: 2024-08-30
Payer: MEDICARE

## 2024-08-27 PROCEDURE — 95810 POLYSOM 6/> YRS 4/> PARAM: CPT

## 2024-09-24 ENCOUNTER — OFFICE VISIT (OUTPATIENT)
Dept: PRIMARY CARE CLINIC | Facility: CLINIC | Age: 79
End: 2024-09-24
Payer: MEDICARE

## 2024-09-24 VITALS
TEMPERATURE: 98.6 F | HEART RATE: 66 BPM | SYSTOLIC BLOOD PRESSURE: 134 MMHG | RESPIRATION RATE: 18 BRPM | OXYGEN SATURATION: 96 % | DIASTOLIC BLOOD PRESSURE: 67 MMHG | BODY MASS INDEX: 30.04 KG/M2 | HEIGHT: 59 IN | WEIGHT: 149 LBS

## 2024-09-24 DIAGNOSIS — M25.511 CHRONIC RIGHT SHOULDER PAIN: ICD-10-CM

## 2024-09-24 DIAGNOSIS — R60.0 BILATERAL LEG EDEMA: ICD-10-CM

## 2024-09-24 DIAGNOSIS — R80.9 ALBUMINURIA: ICD-10-CM

## 2024-09-24 DIAGNOSIS — I10 HYPERTENSION, UNSPECIFIED TYPE: ICD-10-CM

## 2024-09-24 DIAGNOSIS — Z76.89 ENCOUNTER TO ESTABLISH CARE: ICD-10-CM

## 2024-09-24 DIAGNOSIS — G47.33 OSA (OBSTRUCTIVE SLEEP APNEA): ICD-10-CM

## 2024-09-24 DIAGNOSIS — G89.29 CHRONIC RIGHT SHOULDER PAIN: ICD-10-CM

## 2024-09-24 DIAGNOSIS — J45.40 MODERATE PERSISTENT ASTHMA, UNSPECIFIED WHETHER COMPLICATED: ICD-10-CM

## 2024-09-24 DIAGNOSIS — E11.40 TYPE 2 DIABETES MELLITUS WITH DIABETIC NEUROPATHY, WITHOUT LONG-TERM CURRENT USE OF INSULIN (HCC): Primary | ICD-10-CM

## 2024-09-24 LAB
ALBUMIN, URINE, POC: 10 MG/L
CREATININE, URINE, POC: 50 MG/DL
HBA1C MFR BLD: 5.7 %
MICROALB/CREAT RATIO, POC: ABNORMAL MG/G

## 2024-09-24 PROCEDURE — 83036 HEMOGLOBIN GLYCOSYLATED A1C: CPT | Performed by: NURSE PRACTITIONER

## 2024-09-24 PROCEDURE — 1123F ACP DISCUSS/DSCN MKR DOCD: CPT | Performed by: NURSE PRACTITIONER

## 2024-09-24 PROCEDURE — G8427 DOCREV CUR MEDS BY ELIG CLIN: HCPCS | Performed by: NURSE PRACTITIONER

## 2024-09-24 PROCEDURE — 1090F PRES/ABSN URINE INCON ASSESS: CPT | Performed by: NURSE PRACTITIONER

## 2024-09-24 PROCEDURE — G8417 CALC BMI ABV UP PARAM F/U: HCPCS | Performed by: NURSE PRACTITIONER

## 2024-09-24 PROCEDURE — G8400 PT W/DXA NO RESULTS DOC: HCPCS | Performed by: NURSE PRACTITIONER

## 2024-09-24 PROCEDURE — 3075F SYST BP GE 130 - 139MM HG: CPT | Performed by: NURSE PRACTITIONER

## 2024-09-24 PROCEDURE — 1036F TOBACCO NON-USER: CPT | Performed by: NURSE PRACTITIONER

## 2024-09-24 PROCEDURE — 82044 UR ALBUMIN SEMIQUANTITATIVE: CPT | Performed by: NURSE PRACTITIONER

## 2024-09-24 PROCEDURE — 99204 OFFICE O/P NEW MOD 45 MIN: CPT | Performed by: NURSE PRACTITIONER

## 2024-09-24 PROCEDURE — 3078F DIAST BP <80 MM HG: CPT | Performed by: NURSE PRACTITIONER

## 2024-09-24 RX ORDER — CETIRIZINE HYDROCHLORIDE 10 MG/1
10 TABLET ORAL DAILY
Qty: 90 TABLET | Refills: 1 | Status: SHIPPED | OUTPATIENT
Start: 2024-09-24

## 2024-09-24 RX ORDER — FLUTICASONE PROPIONATE 50 MCG
2 SPRAY, SUSPENSION (ML) NASAL DAILY
Qty: 16 G | Refills: 1 | Status: SHIPPED | OUTPATIENT
Start: 2024-09-24

## 2024-09-24 RX ORDER — INSULIN LISPRO 100 [IU]/ML
INJECTION, SOLUTION INTRAVENOUS; SUBCUTANEOUS
COMMUNITY
Start: 2023-04-14 | End: 2024-09-24

## 2024-09-24 RX ORDER — ROSUVASTATIN CALCIUM 40 MG/1
40 TABLET, COATED ORAL DAILY
Qty: 90 TABLET | Refills: 1 | Status: SHIPPED | OUTPATIENT
Start: 2024-09-24

## 2024-09-24 RX ORDER — PIOGLITAZONEHYDROCHLORIDE 30 MG/1
30 TABLET ORAL DAILY
COMMUNITY
End: 2024-09-24 | Stop reason: ALTCHOICE

## 2024-09-24 RX ORDER — MELOXICAM 7.5 MG/1
7.5 TABLET ORAL DAILY
COMMUNITY
Start: 2024-08-27 | End: 2024-09-24 | Stop reason: SDUPTHER

## 2024-09-24 RX ORDER — PSEUDOEPHEDRINE HCL 30 MG
100 TABLET ORAL DAILY
Qty: 90 CAPSULE | Refills: 0 | Status: SHIPPED | OUTPATIENT
Start: 2024-09-24

## 2024-09-24 RX ORDER — LOSARTAN POTASSIUM 50 MG/1
100 TABLET ORAL DAILY
Qty: 90 TABLET | Refills: 1 | Status: SHIPPED | OUTPATIENT
Start: 2024-09-24

## 2024-09-24 RX ORDER — MELOXICAM 7.5 MG/1
7.5 TABLET ORAL DAILY PRN
Qty: 30 TABLET | Refills: 0 | Status: SHIPPED | OUTPATIENT
Start: 2024-09-24

## 2024-09-24 RX ORDER — ROSUVASTATIN CALCIUM 40 MG/1
40 TABLET, COATED ORAL DAILY
COMMUNITY
Start: 2024-08-31 | End: 2024-09-24 | Stop reason: SDUPTHER

## 2024-09-24 RX ORDER — AMLODIPINE BESYLATE 10 MG/1
10 TABLET ORAL DAILY
Qty: 90 TABLET | Refills: 1 | Status: SHIPPED | OUTPATIENT
Start: 2024-09-24

## 2024-09-24 SDOH — ECONOMIC STABILITY: FOOD INSECURITY: WITHIN THE PAST 12 MONTHS, YOU WORRIED THAT YOUR FOOD WOULD RUN OUT BEFORE YOU GOT MONEY TO BUY MORE.: NEVER TRUE

## 2024-09-24 SDOH — ECONOMIC STABILITY: INCOME INSECURITY: HOW HARD IS IT FOR YOU TO PAY FOR THE VERY BASICS LIKE FOOD, HOUSING, MEDICAL CARE, AND HEATING?: NOT HARD AT ALL

## 2024-09-24 SDOH — ECONOMIC STABILITY: FOOD INSECURITY: WITHIN THE PAST 12 MONTHS, THE FOOD YOU BOUGHT JUST DIDN'T LAST AND YOU DIDN'T HAVE MONEY TO GET MORE.: NEVER TRUE

## 2024-09-24 ASSESSMENT — PATIENT HEALTH QUESTIONNAIRE - PHQ9
SUM OF ALL RESPONSES TO PHQ QUESTIONS 1-9: 0
SUM OF ALL RESPONSES TO PHQ QUESTIONS 1-9: 0
2. FEELING DOWN, DEPRESSED OR HOPELESS: NOT AT ALL
SUM OF ALL RESPONSES TO PHQ9 QUESTIONS 1 & 2: 0
SUM OF ALL RESPONSES TO PHQ QUESTIONS 1-9: 0
1. LITTLE INTEREST OR PLEASURE IN DOING THINGS: NOT AT ALL
SUM OF ALL RESPONSES TO PHQ QUESTIONS 1-9: 0

## 2024-09-24 ASSESSMENT — ENCOUNTER SYMPTOMS
ABDOMINAL PAIN: 0
WHEEZING: 0
SHORTNESS OF BREATH: 1
COUGH: 0

## 2024-09-30 RX ORDER — MELOXICAM 7.5 MG/1
7.5 TABLET ORAL DAILY PRN
Qty: 30 TABLET | Refills: 0 | Status: SHIPPED | OUTPATIENT
Start: 2024-09-30

## 2024-09-30 RX ORDER — PSEUDOEPHEDRINE HCL 30 MG
100 TABLET ORAL DAILY
Qty: 90 CAPSULE | Refills: 0 | Status: SHIPPED | OUTPATIENT
Start: 2024-09-30
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
## 2024-10-02 RX ORDER — DOCUSATE SODIUM 100 MG
100 CAPSULE ORAL DAILY
Qty: 90 CAPSULE | Refills: 0 | OUTPATIENT
Start: 2024-10-02

## 2024-10-02 RX ORDER — MELOXICAM 7.5 MG/1
TABLET ORAL
Qty: 30 TABLET | Refills: 0 | OUTPATIENT
Start: 2024-10-02

## 2024-10-04 ENCOUNTER — TELEPHONE (OUTPATIENT)
Dept: SLEEP MEDICINE | Age: 79
End: 2024-10-04

## 2024-10-04 NOTE — TELEPHONE ENCOUNTER
Spoke to pt regarding sleep study results and recommendations to start CPAP. Pt does not want to start CPAP at this time. Pt would like appt with sleep provider to discuss options.

## 2024-10-07 DIAGNOSIS — Z79.4 CONTROLLED TYPE 2 DIABETES MELLITUS WITHOUT COMPLICATION, WITH LONG-TERM CURRENT USE OF INSULIN (HCC): Primary | ICD-10-CM

## 2024-10-07 DIAGNOSIS — E11.9 CONTROLLED TYPE 2 DIABETES MELLITUS WITHOUT COMPLICATION, WITH LONG-TERM CURRENT USE OF INSULIN (HCC): Primary | ICD-10-CM

## 2024-10-07 RX ORDER — BLOOD-GLUCOSE METER
1 KIT MISCELLANEOUS DAILY
Qty: 1 KIT | Refills: 0 | Status: SHIPPED | OUTPATIENT
Start: 2024-10-07

## 2024-10-30 ENCOUNTER — HOSPITAL ENCOUNTER (EMERGENCY)
Age: 79
Discharge: HOME | End: 2024-10-30
Attending: STUDENT IN AN ORGANIZED HEALTH CARE EDUCATION/TRAINING PROGRAM
Payer: MEDICARE

## 2024-10-30 ENCOUNTER — APPOINTMENT (OUTPATIENT)
Dept: GENERAL RADIOLOGY | Age: 79
End: 2024-10-30
Payer: MEDICARE

## 2024-10-30 VITALS
HEART RATE: 86 BPM | DIASTOLIC BLOOD PRESSURE: 88 MMHG | OXYGEN SATURATION: 98 % | SYSTOLIC BLOOD PRESSURE: 136 MMHG | TEMPERATURE: 97.9 F | RESPIRATION RATE: 19 BRPM

## 2024-10-30 DIAGNOSIS — M47.812 CERVICAL SPINE ARTHRITIS: ICD-10-CM

## 2024-10-30 DIAGNOSIS — M19.019 ARTHRITIS OF SHOULDER: Primary | ICD-10-CM

## 2024-10-30 LAB — STREP, MOLECULAR: NOT DETECTED

## 2024-10-30 PROCEDURE — 99284 EMERGENCY DEPT VISIT MOD MDM: CPT

## 2024-10-30 PROCEDURE — 87651 STREP A DNA AMP PROBE: CPT

## 2024-10-30 PROCEDURE — 72040 X-RAY EXAM NECK SPINE 2-3 VW: CPT

## 2024-10-30 PROCEDURE — 73030 X-RAY EXAM OF SHOULDER: CPT

## 2024-10-30 RX ORDER — NAPROXEN 500 MG/1
500 TABLET ORAL 2 TIMES DAILY WITH MEALS
Qty: 60 TABLET | Refills: 5 | Status: SHIPPED | OUTPATIENT
Start: 2024-10-30

## 2024-10-30 RX ORDER — PREDNISONE 20 MG/1
20 TABLET ORAL DAILY
Qty: 5 TABLET | Refills: 0 | Status: SHIPPED | OUTPATIENT
Start: 2024-10-30 | End: 2024-11-04

## 2024-10-30 ASSESSMENT — PAIN SCALES - GENERAL: PAINLEVEL_OUTOF10: 10

## 2024-10-30 ASSESSMENT — PAIN - FUNCTIONAL ASSESSMENT: PAIN_FUNCTIONAL_ASSESSMENT: 0-10

## 2024-10-30 NOTE — ED PROVIDER NOTES
Skin:     General: Skin is warm.      Capillary Refill: Capillary refill takes less than 2 seconds.   Neurological:      Mental Status: She is alert.   Psychiatric:         Mood and Affect: Mood normal.        Procedures     Procedures    Orders Placed This Encounter   Procedures    Group A Strep Screen By PCR    XR SHOULDER LEFT (MIN 2 VIEWS)    XR SHOULDER RIGHT (MIN 2 VIEWS)    XR CERVICAL SPINE (2-3 VIEWS)        Medications given during this emergency department visit:  Medications - No data to display    New Prescriptions    NAPROXEN (NAPROSYN) 500 MG TABLET    Take 1 tablet by mouth 2 times daily (with meals)    PREDNISONE (DELTASONE) 20 MG TABLET    Take 1 tablet by mouth daily for 5 days        Past Medical History:   Diagnosis Date    Arrhythmia 3 - 4 weeks ago (9/2010)    Dr. Mancilla? ([facility]) ran tests for heart speeding up/slowing down; (has occasional heart palpitations; no cardiologist 3/9/18)    Arthritis     Asthma     first told in 2000; inhaler daily; nebs prn    Bladder incontinence 10/24/2016    Chronic pain     COVID-19 08/2020    Dermatophytosis     Diabetes ([facility])     type 2; insulin meds; hasn't been checking bs lately; last A1C=6.8 on 2/19/18    Former smoker     GERD (gastroesophageal reflux disease) 7/23/2013    Heart failure ([facility])     no cardiologist     Hypercholesterolemia     Hypertension     on med for control     Hypertensive heart disease without HF (heart failure)     Lupus (systemic lupus erythematosus) ([facility]) 2005    recent blood work was negative (3/9/18)    Non compliance with medical treatment     Obesity     Obesity     LALO (obstructive sleep apnea) 07/23/2013    no c-pap; uses oxygen prn at HS 2L/NC    Seizures ([facility])     last one about 2.5 yrs ago (noted 3/9/18); no meds    Stroke ([facility]) 1979    MINI STROKE    Systemic lupus erythematosus ([facility]) 07/23/2013    recent blood work negative (3/9/18)    Type 2 diabetes mellitus with hyperglycemia ([facility]) 10/24/2016

## 2024-10-30 NOTE — ED NOTES
I have reviewed discharge instructions with the patient.  The patient verbalized understanding.    Patient left ED via Discharge Method: ambulatory to Home with female visitor.    Opportunity for questions and clarification provided.       Patient given 2 scripts. E-scribed to [facility].               Kinza Alvarado, LOUIE  10/30/24 1920

## 2024-12-06 ENCOUNTER — OFFICE VISIT (OUTPATIENT)
Dept: PRIMARY CARE CLINIC | Facility: CLINIC | Age: 79
End: 2024-12-06

## 2024-12-06 VITALS
HEIGHT: 59 IN | TEMPERATURE: 98.3 F | OXYGEN SATURATION: 97 % | DIASTOLIC BLOOD PRESSURE: 78 MMHG | BODY MASS INDEX: 28.83 KG/M2 | WEIGHT: 143 LBS | HEART RATE: 87 BPM | SYSTOLIC BLOOD PRESSURE: 145 MMHG | RESPIRATION RATE: 18 BRPM

## 2024-12-06 DIAGNOSIS — L20.9 ATOPIC DERMATITIS, UNSPECIFIED TYPE: Primary | ICD-10-CM

## 2024-12-06 DIAGNOSIS — M19.011 PRIMARY OSTEOARTHRITIS OF RIGHT SHOULDER: ICD-10-CM

## 2024-12-06 RX ORDER — PREDNISONE 20 MG/1
20 TABLET ORAL DAILY
Qty: 3 TABLET | Refills: 0 | Status: SHIPPED | OUTPATIENT
Start: 2024-12-06 | End: 2024-12-09

## 2024-12-06 RX ORDER — MELOXICAM 7.5 MG/1
7.5 TABLET ORAL DAILY
COMMUNITY

## 2024-12-06 RX ORDER — TRIAMCINOLONE ACETONIDE 1 MG/G
CREAM TOPICAL
Qty: 80 G | Refills: 0 | Status: SHIPPED | OUTPATIENT
Start: 2024-12-06

## 2024-12-06 RX ORDER — METOPROLOL SUCCINATE 50 MG/1
50 TABLET, EXTENDED RELEASE ORAL DAILY
COMMUNITY

## 2024-12-06 ASSESSMENT — ENCOUNTER SYMPTOMS
ROS SKIN COMMENTS: ITCHING
VOMITING: 0
SHORTNESS OF BREATH: 0
NAUSEA: 0
COUGH: 0
ABDOMINAL DISTENTION: 0
ABDOMINAL PAIN: 0
COLOR CHANGE: 0
DIARRHEA: 0

## 2024-12-06 NOTE — PROGRESS NOTES
12/6/2024) 90 tablet 1    montelukast (SINGULAIR) 10 MG tablet TAKE ONE TABLET BY MOUTH NIGHTLY (Patient not taking: Reported on 7/30/2024)       No current facility-administered medications for this visit.       Past Medical History:   Diagnosis Date    Arrhythmia 3 - 4 weeks ago (9/2010)    Dr. Mancilla? ([facility]) ran tests for heart speeding up/slowing down; (has occasional heart palpitations; no cardiologist 3/9/18)    Arthritis     Asthma     first told in 2000; inhaler daily; nebs prn    Bladder incontinence 10/24/2016    Chronic pain     COVID-19 08/2020    Dermatophytosis     Diabetes ([facility])     type 2; insulin meds; hasn't been checking bs lately; last A1C=6.8 on 2/19/18    Former smoker     GERD (gastroesophageal reflux disease) 7/23/2013    Heart failure ([facility])     no cardiologist     Hypercholesterolemia     Hypertension     on med for control     Hypertensive heart disease without HF (heart failure)     Lupus (systemic lupus erythematosus) ([facility]) 2005    recent blood work was negative (3/9/18)    Non compliance with medical treatment     Obesity     Obesity     LALO (obstructive sleep apnea) 07/23/2013    no c-pap; uses oxygen prn at HS 2L/NC    Seizures ([facility])     last one about 2.5 yrs ago (noted 3/9/18); no meds    Stroke ([facility]) 1979    MINI STROKE    Systemic lupus erythematosus ([facility]) 07/23/2013    recent blood work negative (3/9/18)    Type 2 diabetes mellitus with hyperglycemia ([facility]) 10/24/2016     Past Surgical History:   Procedure Laterality Date    APPENDECTOMY      BREAST BIOPSY Left     BREAST LUMPECTOMY  1998    benign    CARDIAC CATHETERIZATION  03/22/1999    [facility]:LV EF=75%.Normal coronary arteries.    HAMMER TOE SURGERY  2010    left    HEENT      cataract removal right eye and skin removal off of lids    HYSTERECTOMY (CERVIX STATUS UNKNOWN)  1974    OTHER SURGICAL HISTORY      had all teeth removed    OTHER SURGICAL HISTORY  03/15/2018    Throat    REV UPPER EYELID W EXCESS SKIN

## 2024-12-15 ENCOUNTER — APPOINTMENT (OUTPATIENT)
Dept: GENERAL RADIOLOGY | Age: 79
End: 2024-12-15
Payer: MEDICARE

## 2024-12-15 ENCOUNTER — HOSPITAL ENCOUNTER (EMERGENCY)
Age: 79
Discharge: HOME | End: 2024-12-15
Payer: MEDICARE

## 2024-12-15 ENCOUNTER — APPOINTMENT (OUTPATIENT)
Dept: CT IMAGING | Age: 79
End: 2024-12-15
Payer: MEDICARE

## 2024-12-15 VITALS
HEIGHT: 59 IN | TEMPERATURE: 98.9 F | SYSTOLIC BLOOD PRESSURE: 149 MMHG | BODY MASS INDEX: 29.03 KG/M2 | WEIGHT: 144 LBS | RESPIRATION RATE: 18 BRPM | OXYGEN SATURATION: 94 % | HEART RATE: 97 BPM | DIASTOLIC BLOOD PRESSURE: 89 MMHG

## 2024-12-15 DIAGNOSIS — J06.9 VIRAL UPPER RESPIRATORY ILLNESS: Primary | ICD-10-CM

## 2024-12-15 LAB
ALBUMIN SERPL-MCNC: 3.3 G/DL (ref 3.2–4.6)
ALBUMIN/GLOB SERPL: 0.9 (ref 1–1.9)
ALP SERPL-CCNC: 51 U/L (ref 35–104)
ALT SERPL-CCNC: 7 U/L (ref 8–45)
ANION GAP SERPL CALC-SCNC: 9 MMOL/L (ref 7–16)
AST SERPL-CCNC: 15 U/L (ref 15–37)
B PERT DNA SPEC QL NAA+PROBE: NOT DETECTED
BASOPHILS # BLD: 0 K/UL (ref 0–0.2)
BASOPHILS NFR BLD: 1 % (ref 0–2)
BILIRUB SERPL-MCNC: 0.5 MG/DL (ref 0–1.2)
BILIRUB UR QL: NEGATIVE
BORDETELLA PARAPERTUSSIS BY PCR: NOT DETECTED
BUN SERPL-MCNC: 10 MG/DL (ref 8–23)
C PNEUM DNA SPEC QL NAA+PROBE: NOT DETECTED
CALCIUM SERPL-MCNC: 9 MG/DL (ref 8.8–10.2)
CHLORIDE SERPL-SCNC: 107 MMOL/L (ref 98–107)
CO2 SERPL-SCNC: 26 MMOL/L (ref 20–29)
CREAT SERPL-MCNC: 0.68 MG/DL (ref 0.6–1.1)
DIFFERENTIAL METHOD BLD: NORMAL
EOSINOPHIL # BLD: 0.3 K/UL (ref 0–0.8)
EOSINOPHIL NFR BLD: 5 % (ref 0.5–7.8)
ERYTHROCYTE [DISTWIDTH] IN BLOOD BY AUTOMATED COUNT: 13.8 % (ref 11.9–14.6)
FLUAV SUBTYP SPEC NAA+PROBE: NOT DETECTED
FLUBV RNA SPEC QL NAA+PROBE: NOT DETECTED
GLOBULIN SER CALC-MCNC: 3.6 G/DL (ref 2.3–3.5)
GLUCOSE SERPL-MCNC: 108 MG/DL (ref 70–99)
GLUCOSE UR QL STRIP.AUTO: 500 MG/DL
HADV DNA SPEC QL NAA+PROBE: NOT DETECTED
HCOV 229E RNA SPEC QL NAA+PROBE: NOT DETECTED
HCOV HKU1 RNA SPEC QL NAA+PROBE: NOT DETECTED
HCOV NL63 RNA SPEC QL NAA+PROBE: NOT DETECTED
HCOV OC43 RNA SPEC QL NAA+PROBE: NOT DETECTED
HCT VFR BLD AUTO: 44.1 % (ref 35.8–46.3)
HGB BLD-MCNC: 13.9 G/DL (ref 11.7–15.4)
HMPV RNA SPEC QL NAA+PROBE: NOT DETECTED
HPIV1 RNA SPEC QL NAA+PROBE: NOT DETECTED
HPIV2 RNA SPEC QL NAA+PROBE: NOT DETECTED
HPIV3 RNA SPEC QL NAA+PROBE: NOT DETECTED
HPIV4 RNA SPEC QL NAA+PROBE: NOT DETECTED
IMM GRANULOCYTES # BLD AUTO: 0 K/UL (ref 0–0.5)
IMM GRANULOCYTES NFR BLD AUTO: 0 % (ref 0–5)
KETONES UR-MCNC: 15 MG/DL
LEUKOCYTE ESTERASE UR QL STRIP: NEGATIVE
LYMPHOCYTES # BLD: 2 K/UL (ref 0.5–4.6)
LYMPHOCYTES NFR BLD: 37 % (ref 13–44)
M PNEUMO DNA SPEC QL NAA+PROBE: NOT DETECTED
MAGNESIUM SERPL-MCNC: 2 MG/DL (ref 1.8–2.4)
MCH RBC QN AUTO: 26.8 PG (ref 26.1–32.9)
MCHC RBC AUTO-ENTMCNC: 31.5 G/DL (ref 31.4–35)
MCV RBC AUTO: 85.1 FL (ref 82–102)
MONOCYTES # BLD: 0.4 K/UL (ref 0.1–1.3)
MONOCYTES NFR BLD: 8 % (ref 4–12)
NEUTS SEG # BLD: 2.6 K/UL (ref 1.7–8.2)
NEUTS SEG NFR BLD: 49 % (ref 43–78)
NITRITE UR QL: NEGATIVE
NRBC # BLD: 0 K/UL (ref 0–0.2)
PH UR: 6 (ref 5–9)
PLATELET # BLD AUTO: 263 K/UL (ref 150–450)
PMV BLD AUTO: 10.2 FL (ref 9.4–12.3)
POTASSIUM SERPL-SCNC: 3.3 MMOL/L (ref 3.5–5.1)
PROT SERPL-MCNC: 6.9 G/DL (ref 6.3–8.2)
PROT UR QL: NEGATIVE MG/DL
RBC # BLD AUTO: 5.18 M/UL (ref 4.05–5.2)
RBC # UR STRIP: NEGATIVE
RSV RNA SPEC QL NAA+PROBE: NOT DETECTED
RV+EV RNA SPEC QL NAA+PROBE: NOT DETECTED
SARS-COV-2 RNA RESP QL NAA+PROBE: NOT DETECTED
SERVICE CMNT-IMP: ABNORMAL
SODIUM SERPL-SCNC: 142 MMOL/L (ref 136–145)
SP GR UR: 1.01 (ref 1–1.02)
STREP, MOLECULAR: NOT DETECTED
TROPONIN T SERPL HS-MCNC: 7 NG/L (ref 0–14)
UROBILINOGEN UR QL: 1 EU/DL (ref 0.2–1)
WBC # BLD AUTO: 5.3 K/UL (ref 4.3–11.1)

## 2024-12-15 PROCEDURE — 80053 COMPREHEN METABOLIC PANEL: CPT

## 2024-12-15 PROCEDURE — 87651 STREP A DNA AMP PROBE: CPT

## 2024-12-15 PROCEDURE — 85025 COMPLETE CBC W/AUTO DIFF WBC: CPT

## 2024-12-15 PROCEDURE — 0202U NFCT DS 22 TRGT SARS-COV-2: CPT

## 2024-12-15 PROCEDURE — 6360000002 HC RX W HCPCS: Performed by: PHYSICIAN ASSISTANT

## 2024-12-15 PROCEDURE — 94640 AIRWAY INHALATION TREATMENT: CPT

## 2024-12-15 PROCEDURE — 71046 X-RAY EXAM CHEST 2 VIEWS: CPT

## 2024-12-15 PROCEDURE — 81003 URINALYSIS AUTO W/O SCOPE: CPT

## 2024-12-15 PROCEDURE — 6370000000 HC RX 637 (ALT 250 FOR IP): Performed by: PHYSICIAN ASSISTANT

## 2024-12-15 PROCEDURE — 94760 N-INVAS EAR/PLS OXIMETRY 1: CPT

## 2024-12-15 PROCEDURE — 96374 THER/PROPH/DIAG INJ IV PUSH: CPT

## 2024-12-15 PROCEDURE — 84484 ASSAY OF TROPONIN QUANT: CPT

## 2024-12-15 PROCEDURE — 83735 ASSAY OF MAGNESIUM: CPT

## 2024-12-15 PROCEDURE — 71260 CT THORAX DX C+: CPT

## 2024-12-15 PROCEDURE — 99285 EMERGENCY DEPT VISIT HI MDM: CPT

## 2024-12-15 PROCEDURE — 6360000004 HC RX CONTRAST MEDICATION: Performed by: PHYSICIAN ASSISTANT

## 2024-12-15 RX ORDER — ALBUTEROL SULFATE 90 UG/1
2 INHALANT RESPIRATORY (INHALATION) 4 TIMES DAILY PRN
Qty: 18 G | Refills: 0 | Status: SHIPPED | OUTPATIENT
Start: 2024-12-15

## 2024-12-15 RX ORDER — IPRATROPIUM BROMIDE AND ALBUTEROL SULFATE 2.5; .5 MG/3ML; MG/3ML
1 SOLUTION RESPIRATORY (INHALATION)
Status: COMPLETED | OUTPATIENT
Start: 2024-12-15 | End: 2024-12-15

## 2024-12-15 RX ORDER — IOPAMIDOL 755 MG/ML
100 INJECTION, SOLUTION INTRAVASCULAR
Status: COMPLETED | OUTPATIENT
Start: 2024-12-15 | End: 2024-12-15

## 2024-12-15 RX ORDER — GUAIFENESIN 600 MG/1
1200 TABLET, EXTENDED RELEASE ORAL 2 TIMES DAILY
Qty: 40 TABLET | Refills: 0 | Status: SHIPPED | OUTPATIENT
Start: 2024-12-15 | End: 2024-12-25

## 2024-12-15 RX ORDER — METHYLPREDNISOLONE 4 MG/1
TABLET ORAL
Qty: 1 KIT | Refills: 0 | Status: SHIPPED | OUTPATIENT
Start: 2024-12-15 | End: 2024-12-21

## 2024-12-15 RX ORDER — DEXAMETHASONE SODIUM PHOSPHATE 10 MG/ML
10 INJECTION INTRAMUSCULAR; INTRAVENOUS ONCE
Status: COMPLETED | OUTPATIENT
Start: 2024-12-15 | End: 2024-12-15

## 2024-12-15 RX ADMIN — IOPAMIDOL 100 ML: 755 INJECTION, SOLUTION INTRAVENOUS at 15:21

## 2024-12-15 RX ADMIN — IPRATROPIUM BROMIDE AND ALBUTEROL SULFATE 1 DOSE: 2.5; .5 SOLUTION RESPIRATORY (INHALATION) at 14:02

## 2024-12-15 RX ADMIN — DEXAMETHASONE SODIUM PHOSPHATE 10 MG: 10 INJECTION INTRAMUSCULAR; INTRAVENOUS at 17:15

## 2024-12-15 ASSESSMENT — PAIN SCALES - GENERAL
PAINLEVEL_OUTOF10: 9
PAINLEVEL_OUTOF10: 0

## 2024-12-15 ASSESSMENT — PAIN - FUNCTIONAL ASSESSMENT: PAIN_FUNCTIONAL_ASSESSMENT: 0-10

## 2024-12-15 NOTE — ED TRIAGE NOTES
Pt to ED ambulatory to triage with c/o shortness of breath, cough, congestion, sore throat, for the past couple of days. Pt states inability to cough up mucus. Pt states stroke two weeks ago with dysarthria as a residual. Pt states no fever or chills.

## 2024-12-15 NOTE — DISCHARGE INSTRUCTIONS
The workup was reassuring today.  This does appear to be viral in origin and will take time to resolve.  I will send you with a Medrol Dosepak and inhaler.  This should help with the inflammation in your chest.  Drink plenty of fluids, rest, use over-the-counter Tylenol as directed if needed for pain.  Follow-up with your PCP for recheck.  You can also use Mucinex as directed.  Return to the ED if you are worsening in any way.  Use the O2 sat monitor at home and if your oxygen is staying below 90% consistently, return to the ED for further evaluation.

## 2024-12-15 NOTE — ED PROVIDER NOTES
6.9 6.3 - 8.2 g/dL    Albumin 3.3 3.2 - 4.6 g/dL    Globulin 3.6 (H) 2.3 - 3.5 g/dL    Albumin/Globulin Ratio 0.9 (L) 1.0 - 1.9     Magnesium   Result Value Ref Range    Magnesium 2.0 1.8 - 2.4 mg/dL   Troponin   Result Value Ref Range    Troponin T 7.0 0 - 14 ng/L   POCT Urinalysis no Micro   Result Value Ref Range    Specific Gravity, Urine, POC 1.010 1.001 - 1.023      pH, Urine, POC 6.0 5.0 - 9.0      Protein, Urine, POC Negative NEG mg/dL    Glucose, UA  (A) NEG mg/dL    Ketones, Urine, POC 15 (A) NEG mg/dL    Bilirubin, Urine, POC Negative NEG      Blood, UA POC Negative NEG      URINE UROBILINOGEN POC 1.0 0.2 - 1.0 EU/dL    Nitrite, Urine, POC Negative NEG      Leukocyte Est, UA POC Negative NEG      Performed by: Tiffany Farias          CT CHEST PULMONARY EMBOLISM W CONTRAST   Final Result   1.  No pulmonary embolus.   2.  No acute findings within the chest.            Electronically signed by Joshua Rivas      XR CHEST (2 VW)   Final Result   No acute findings in the chest         Electronically signed by Benton Stahl                   Recent Labs     12/15/24  1221   COVID19 NOT DETECTED       Voice dictation software was used during the making of this note.  This software is not perfect and grammatical and other typographical errors may be present.  This note has not been completely proofread for errors.     Valeria Cronin PA  12/15/24 1952       Valeria Cronin PA  12/15/24 1953

## 2024-12-16 DIAGNOSIS — E11.40 TYPE 2 DIABETES MELLITUS WITH DIABETIC NEUROPATHY, WITHOUT LONG-TERM CURRENT USE OF INSULIN (HCC): ICD-10-CM

## 2024-12-16 NOTE — ED NOTES
Patient mobility status  with mild difficulty.     I have reviewed discharge instructions with the patient.  The patient verbalized understanding.    Patient left ED via Discharge Method: ambulatory to Home with Extended Family:.    Opportunity for questions and clarification provided.     Patient given 3 scripts.       Escript     Patient given spo2 monitor     Emiliana Navas, RN  12/15/24 [id number]

## 2024-12-16 NOTE — ED NOTES
Pt 94% or greater on RA while ambulating, provider visually witnessed this as well. Pt is clear for dc at this time.     Emiliana Navas, RN  12/15/24 [id number]

## 2024-12-17 RX ORDER — LOSARTAN POTASSIUM 50 MG/1
100 TABLET ORAL DAILY
Qty: 90 TABLET | Refills: 0 | Status: SHIPPED | OUTPATIENT
Start: 2024-12-17 | End: 2024-12-30 | Stop reason: SDUPTHER

## 2024-12-17 RX ORDER — AMLODIPINE BESYLATE 10 MG/1
10 TABLET ORAL DAILY
Qty: 90 TABLET | Refills: 0 | Status: SHIPPED | OUTPATIENT
Start: 2024-12-17 | End: 2024-12-30

## 2024-12-17 RX ORDER — MELOXICAM 7.5 MG/1
TABLET ORAL
Qty: 30 TABLET | Refills: 0 | OUTPATIENT
Start: 2024-12-17

## 2024-12-17 RX ORDER — POTASSIUM CHLORIDE 750 MG/1
10 TABLET, EXTENDED RELEASE ORAL 2 TIMES DAILY
Qty: 6 TABLET | Refills: 0 | Status: SHIPPED | OUTPATIENT
Start: 2024-12-17 | End: 2024-12-30

## 2024-12-17 RX ORDER — EMPAGLIFLOZIN 10 MG/1
10 TABLET, FILM COATED ORAL DAILY
Qty: 90 TABLET | Refills: 0 | Status: SHIPPED | OUTPATIENT
Start: 2024-12-17 | End: 2024-12-30 | Stop reason: SDUPTHER

## 2024-12-30 ENCOUNTER — OFFICE VISIT (OUTPATIENT)
Dept: PRIMARY CARE CLINIC | Facility: CLINIC | Age: 79
End: 2024-12-30
Payer: MEDICARE

## 2024-12-30 VITALS
HEIGHT: 59 IN | RESPIRATION RATE: 18 BRPM | TEMPERATURE: 98.5 F | HEART RATE: 81 BPM | WEIGHT: 143 LBS | DIASTOLIC BLOOD PRESSURE: 82 MMHG | OXYGEN SATURATION: 95 % | SYSTOLIC BLOOD PRESSURE: 134 MMHG | BODY MASS INDEX: 28.83 KG/M2

## 2024-12-30 DIAGNOSIS — E11.40 TYPE 2 DIABETES MELLITUS WITH DIABETIC NEUROPATHY, WITHOUT LONG-TERM CURRENT USE OF INSULIN (HCC): ICD-10-CM

## 2024-12-30 DIAGNOSIS — H53.8 BLURRY VISION, BILATERAL: ICD-10-CM

## 2024-12-30 DIAGNOSIS — J01.40 ACUTE NON-RECURRENT PANSINUSITIS: Primary | ICD-10-CM

## 2024-12-30 DIAGNOSIS — M25.511 CHRONIC RIGHT SHOULDER PAIN: ICD-10-CM

## 2024-12-30 DIAGNOSIS — R13.10 DYSPHAGIA, UNSPECIFIED TYPE: ICD-10-CM

## 2024-12-30 DIAGNOSIS — I10 HYPERTENSION, UNSPECIFIED TYPE: ICD-10-CM

## 2024-12-30 DIAGNOSIS — G89.29 CHRONIC RIGHT SHOULDER PAIN: ICD-10-CM

## 2024-12-30 DIAGNOSIS — J45.31 MILD PERSISTENT ASTHMA WITH ACUTE EXACERBATION: ICD-10-CM

## 2024-12-30 LAB — HBA1C MFR BLD: 6.6 %

## 2024-12-30 PROCEDURE — 1123F ACP DISCUSS/DSCN MKR DOCD: CPT | Performed by: NURSE PRACTITIONER

## 2024-12-30 PROCEDURE — G8417 CALC BMI ABV UP PARAM F/U: HCPCS | Performed by: NURSE PRACTITIONER

## 2024-12-30 PROCEDURE — G8427 DOCREV CUR MEDS BY ELIG CLIN: HCPCS | Performed by: NURSE PRACTITIONER

## 2024-12-30 PROCEDURE — 1090F PRES/ABSN URINE INCON ASSESS: CPT | Performed by: NURSE PRACTITIONER

## 2024-12-30 PROCEDURE — 1160F RVW MEDS BY RX/DR IN RCRD: CPT | Performed by: NURSE PRACTITIONER

## 2024-12-30 PROCEDURE — 99214 OFFICE O/P EST MOD 30 MIN: CPT | Performed by: NURSE PRACTITIONER

## 2024-12-30 PROCEDURE — 3079F DIAST BP 80-89 MM HG: CPT | Performed by: NURSE PRACTITIONER

## 2024-12-30 PROCEDURE — G8484 FLU IMMUNIZE NO ADMIN: HCPCS | Performed by: NURSE PRACTITIONER

## 2024-12-30 PROCEDURE — 3075F SYST BP GE 130 - 139MM HG: CPT | Performed by: NURSE PRACTITIONER

## 2024-12-30 PROCEDURE — 1036F TOBACCO NON-USER: CPT | Performed by: NURSE PRACTITIONER

## 2024-12-30 PROCEDURE — G8400 PT W/DXA NO RESULTS DOC: HCPCS | Performed by: NURSE PRACTITIONER

## 2024-12-30 PROCEDURE — 1159F MED LIST DOCD IN RCRD: CPT | Performed by: NURSE PRACTITIONER

## 2024-12-30 PROCEDURE — 83036 HEMOGLOBIN GLYCOSYLATED A1C: CPT | Performed by: NURSE PRACTITIONER

## 2024-12-30 RX ORDER — METOPROLOL SUCCINATE 50 MG/1
50 TABLET, EXTENDED RELEASE ORAL DAILY
Qty: 90 TABLET | Refills: 1 | Status: SHIPPED | OUTPATIENT
Start: 2024-12-30

## 2024-12-30 RX ORDER — ROSUVASTATIN CALCIUM 40 MG/1
40 TABLET, COATED ORAL DAILY
Qty: 90 TABLET | Refills: 1 | Status: SHIPPED | OUTPATIENT
Start: 2024-12-30

## 2024-12-30 RX ORDER — MULTIVITAMIN/IRON/FOLIC ACID 18MG-0.4MG
TABLET ORAL
COMMUNITY

## 2024-12-30 RX ORDER — CETIRIZINE HYDROCHLORIDE 10 MG/1
10 TABLET ORAL DAILY
Qty: 90 TABLET | Refills: 1 | Status: SHIPPED | OUTPATIENT
Start: 2024-12-30

## 2024-12-30 RX ORDER — MELOXICAM 7.5 MG/1
7.5 TABLET ORAL DAILY
Qty: 15 TABLET | Refills: 0 | Status: SHIPPED | OUTPATIENT
Start: 2024-12-30

## 2024-12-30 RX ORDER — ASPIRIN 81 MG/1
81 TABLET ORAL DAILY
Qty: 90 TABLET | Refills: 1 | Status: SHIPPED | OUTPATIENT
Start: 2024-12-30

## 2024-12-30 RX ORDER — LOSARTAN POTASSIUM 50 MG/1
100 TABLET ORAL DAILY
Qty: 90 TABLET | Refills: 0 | Status: SHIPPED | OUTPATIENT
Start: 2024-12-30 | End: 2024-12-30

## 2024-12-30 RX ORDER — ALBUTEROL SULFATE 0.83 MG/ML
2.5 SOLUTION RESPIRATORY (INHALATION) EVERY 4 HOURS PRN
Qty: 120 EACH | Refills: 0 | Status: SHIPPED | OUTPATIENT
Start: 2024-12-30

## 2024-12-30 RX ORDER — LOSARTAN POTASSIUM 50 MG/1
100 TABLET ORAL DAILY
Qty: 180 TABLET | Refills: 1 | Status: SHIPPED | OUTPATIENT
Start: 2024-12-30

## 2024-12-30 RX ORDER — DOXYCYCLINE HYCLATE 100 MG
100 TABLET ORAL 2 TIMES DAILY
Qty: 10 TABLET | Refills: 0 | Status: SHIPPED | OUTPATIENT
Start: 2024-12-30 | End: 2025-01-04

## 2024-12-30 ASSESSMENT — ENCOUNTER SYMPTOMS
VOMITING: 0
NAUSEA: 0
SINUS PAIN: 1
SHORTNESS OF BREATH: 0
DIARRHEA: 0
COUGH: 1
SORE THROAT: 0
SINUS PRESSURE: 1
ABDOMINAL PAIN: 0

## 2024-12-30 NOTE — PROGRESS NOTES
Left eye: No discharge.      Pupils: Pupils are equal, round, and reactive to light.   Neck:      Vascular: No carotid bruit.   Cardiovascular:      Rate and Rhythm: Normal rate and regular rhythm.      Pulses: Normal pulses.      Heart sounds: Normal heart sounds.   Pulmonary:      Effort: Pulmonary effort is normal.      Breath sounds: Normal breath sounds.   Musculoskeletal:         General: Deformity present.      Right lower leg: No edema.      Left lower leg: No edema.   Skin:     General: Skin is warm and dry.   Neurological:      Mental Status: She is alert and oriented to person, place, and time.      Cranial Nerves: Dysarthria and facial asymmetry present.      Coordination: Coordination normal.      Gait: Gait is intact.   Psychiatric:         Mood and Affect: Mood normal.         Behavior: Behavior normal.          Results for orders placed or performed in visit on 12/30/24   AMB POC HEMOGLOBIN A1C   Result Value Ref Range    Hemoglobin A1C, POC 6.6 %        Assessment/Plan:  1. Acute non-recurrent pansinusitis  -     doxycycline hyclate (VIBRA-TABS) 100 MG tablet; Take 1 tablet by mouth 2 times daily for 5 days, Disp-10 tablet, R-0Normal  Reviewed treatment options, pt states she had bleeding w/ taking penicillins previously. Reviewed side effects, seek medical attention for tendon pain. Advise supportive care in addition. If symptoms persist or worsen contact office.  2. Type 2 diabetes mellitus with diabetic neuropathy, without long-term current use of insulin ([facility])  Comments:  A1C increased to 6.6%. Continue Jardiance. Suspect weight loss affected by dysphagia- follow up with GI. Advise DM diet, exercise.  Orders:  -     AMB POC HEMOGLOBIN A1C  -     [facility]  -     Comprehensive Metabolic Panel; Future  -     TSH reflex to FT4; Future  -     Lipid Panel; Future  -     empagliflozin (JARDIANCE) 10 MG tablet; Take 1 tablet by mouth daily, Disp-90 tablet, R-0Normal  3. Dysphagia,

## 2024-12-31 ENCOUNTER — OFFICE VISIT (OUTPATIENT)
Dept: SLEEP MEDICINE | Age: 79
End: 2024-12-31
Payer: MEDICARE

## 2024-12-31 ENCOUNTER — TELEPHONE (OUTPATIENT)
Dept: PULMONOLOGY | Age: 79
End: 2024-12-31

## 2024-12-31 VITALS
TEMPERATURE: 97.3 F | SYSTOLIC BLOOD PRESSURE: 138 MMHG | WEIGHT: 143 LBS | HEART RATE: 72 BPM | RESPIRATION RATE: 19 BRPM | BODY MASS INDEX: 28.83 KG/M2 | OXYGEN SATURATION: 97 % | HEIGHT: 59 IN | DIASTOLIC BLOOD PRESSURE: 83 MMHG

## 2024-12-31 DIAGNOSIS — G47.33 OSA (OBSTRUCTIVE SLEEP APNEA): Primary | ICD-10-CM

## 2024-12-31 DIAGNOSIS — G47.34 NOCTURNAL HYPOXEMIA: ICD-10-CM

## 2024-12-31 DIAGNOSIS — G47.00 PERSISTENT DISORDER OF INITIATING OR MAINTAINING SLEEP: ICD-10-CM

## 2024-12-31 PROCEDURE — 99214 OFFICE O/P EST MOD 30 MIN: CPT | Performed by: NURSE PRACTITIONER

## 2024-12-31 PROCEDURE — 1123F ACP DISCUSS/DSCN MKR DOCD: CPT | Performed by: NURSE PRACTITIONER

## 2024-12-31 PROCEDURE — 1159F MED LIST DOCD IN RCRD: CPT | Performed by: NURSE PRACTITIONER

## 2024-12-31 PROCEDURE — 1090F PRES/ABSN URINE INCON ASSESS: CPT | Performed by: NURSE PRACTITIONER

## 2024-12-31 PROCEDURE — G8400 PT W/DXA NO RESULTS DOC: HCPCS | Performed by: NURSE PRACTITIONER

## 2024-12-31 PROCEDURE — 3075F SYST BP GE 130 - 139MM HG: CPT | Performed by: NURSE PRACTITIONER

## 2024-12-31 PROCEDURE — 1160F RVW MEDS BY RX/DR IN RCRD: CPT | Performed by: NURSE PRACTITIONER

## 2024-12-31 PROCEDURE — G8427 DOCREV CUR MEDS BY ELIG CLIN: HCPCS | Performed by: NURSE PRACTITIONER

## 2024-12-31 PROCEDURE — G8417 CALC BMI ABV UP PARAM F/U: HCPCS | Performed by: NURSE PRACTITIONER

## 2024-12-31 PROCEDURE — G8482 FLU IMMUNIZE ORDER/ADMIN: HCPCS | Performed by: NURSE PRACTITIONER

## 2024-12-31 PROCEDURE — G2211 COMPLEX E/M VISIT ADD ON: HCPCS | Performed by: NURSE PRACTITIONER

## 2024-12-31 PROCEDURE — 1036F TOBACCO NON-USER: CPT | Performed by: NURSE PRACTITIONER

## 2024-12-31 PROCEDURE — 3079F DIAST BP 80-89 MM HG: CPT | Performed by: NURSE PRACTITIONER

## 2024-12-31 ASSESSMENT — SLEEP AND FATIGUE QUESTIONNAIRES
HOW LIKELY ARE YOU TO NOD OFF OR FALL ASLEEP WHILE LYING DOWN TO REST IN THE AFTERNOON WHEN CIRCUMSTANCES PERMIT: WOULD NEVER DOZE
HOW LIKELY ARE YOU TO NOD OFF OR FALL ASLEEP WHILE SITTING AND TALKING TO SOMEONE: WOULD NEVER DOZE
ESS TOTAL SCORE: 1
HOW LIKELY ARE YOU TO NOD OFF OR FALL ASLEEP WHILE SITTING INACTIVE IN A PUBLIC PLACE: WOULD NEVER DOZE
HOW LIKELY ARE YOU TO NOD OFF OR FALL ASLEEP WHILE SITTING QUIETLY AFTER LUNCH WITHOUT ALCOHOL: WOULD NEVER DOZE
HOW LIKELY ARE YOU TO NOD OFF OR FALL ASLEEP WHILE SITTING AND READING: WOULD NEVER DOZE
HOW LIKELY ARE YOU TO NOD OFF OR FALL ASLEEP WHEN YOU ARE A PASSENGER IN A CAR FOR AN HOUR WITHOUT A BREAK: WOULD NEVER DOZE
HOW LIKELY ARE YOU TO NOD OFF OR FALL ASLEEP IN A CAR, WHILE STOPPED FOR A FEW MINUTES IN TRAFFIC: WOULD NEVER DOZE
HOW LIKELY ARE YOU TO NOD OFF OR FALL ASLEEP WHILE WATCHING TV: SLIGHT CHANCE OF DOZING

## 2024-12-31 NOTE — PROGRESS NOTES
ability of nasal CPAP to correct these abnormalities through maintenance of a patent airway.  Therapeutic options including surgery, oral appliances, and weight loss were also reviewed.  Patient does not want to try wearing CPAP again.  She would prefer to focus on positional therapy and I did advise her to get a body pillow or a body which placed behind her back so she will not sleep supine.  Will check overnight oximetry while endorsing positional therapy to assure nocturnal hypoxemia is improved which would be indicated of improved mild LALO      2. Nocturnal hypoxemia  G47.34 Plan as above.  Continue 2 L oxygen via nasal cannula nightly      3. Persistent disorder of initiating or maintaining sleep  G47.00 Nighttime awakenings likely related to stress hormone release due to uncontrolled LALO.  Should reduce with positional therapy              PLAN:    Positional therapy for mild LALO  Will check overnight oximetry while endorsing positional therapy and wearing 2 L oxygen  Recommendations as above  Follow-up in 6 months or sooner if needed         Orders Placed This Encounter   Procedures    Pulse oximetry, overnight     Scheduling Instructions:      Please send out Overnight Oximetry on 2 lpm qhs while endorsing positional therapy for mild LALO.        Please Fax results to: [phone]              Collaborating Physician: Dr. Panda     Today's office visit was spent  reviewing test results, prognosis, importance of compliance, education about disease process, benefits of medications, instructions for management of acute flare-ups, and follow up plans.  Total time spent with patient was 35 minutes.    SISSY Bello - CNP  Electronically signed

## 2024-12-31 NOTE — PATIENT INSTRUCTIONS
Positional therapy for mild LALO  Will check overnight oximetry while endorsing positional therapy and wearing 2 L oxygen  Recommendations as above  Follow-up in 6 months or sooner if needed
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
## 2025-01-02 ENCOUNTER — TELEPHONE (OUTPATIENT)
Dept: SLEEP MEDICINE | Age: 80
End: 2025-01-02

## 2025-01-02 DIAGNOSIS — G47.34 NOCTURNAL HYPOXEMIA: Primary | ICD-10-CM
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
## 2025-01-06 ENCOUNTER — OFFICE VISIT (OUTPATIENT)
Age: 80
End: 2025-01-06

## 2025-01-06 VITALS — HEIGHT: 59 IN | BODY MASS INDEX: 28.83 KG/M2 | WEIGHT: 143 LBS

## 2025-01-06 DIAGNOSIS — M12.811 ROTATOR CUFF ARTHROPATHY, RIGHT: Primary | ICD-10-CM

## 2025-01-06 DIAGNOSIS — M25.511 RIGHT SHOULDER PAIN, UNSPECIFIED CHRONICITY: ICD-10-CM

## 2025-01-06 RX ORDER — METHYLPREDNISOLONE ACETATE 80 MG/ML
80 INJECTION, SUSPENSION INTRA-ARTICULAR; INTRALESIONAL; INTRAMUSCULAR; SOFT TISSUE ONCE
Status: COMPLETED | OUTPATIENT
Start: 2025-01-06 | End: 2025-01-06

## 2025-01-06 RX ADMIN — METHYLPREDNISOLONE ACETATE 80 MG: 80 INJECTION, SUSPENSION INTRA-ARTICULAR; INTRALESIONAL; INTRAMUSCULAR; SOFT TISSUE at 14:59

## 2025-01-06 NOTE — PROGRESS NOTES
[facility]          Patient ID:  Name: Areli Benavides  AGE/Gender: 79 y.o. female  MRN: [id number]  : 1945    Date of Consultation:  2025          ALLERGIES:   Allergies   Allergen Reactions    Omeprazole Rash    Omeprazole Magnesium Rash    Penicillins Rash            History:  The patient 79 y.o. right hand dominant female who presents today as a new patient complaining of right  shoulder pain.  This started several months ago but denies any injuries. She has an extensive list of co-morbidities listed below. She reports weakness and pain with the right shoulder localized globally about the right shoulder.  They have pain with movement. The patient is limited in their activities because of this pain. The patient has tried meloxicam with some relief. She would like to avoid any surgery.  They have no other complaints or concerns.    Review of Systems:  Pertinent items are noted in HPI.    Past Medical History Includes:   Past Medical History:   Diagnosis Date    Arrhythmia 3 - 4 weeks ago (2010)    Dr. Mancilla? ([facility]) ran tests for heart speeding up/slowing down; (has occasional heart palpitations; no cardiologist 3/9/18)    Arthritis     Asthma     first told in ; inhaler daily; nebs prn    Bladder incontinence 10/24/2016    Chronic pain     COVID-19 2020    Dermatophytosis     Diabetes (HCC)     type 2; insulin meds; hasn't been checking bs lately; last A1C=6.8 on 18    Former smoker     GERD (gastroesophageal reflux disease) 2013    Heart failure (HCC)     no cardiologist     Hypercholesterolemia     Hypertension     on med for control     Hypertensive heart disease without HF (heart failure)     Lupus (systemic lupus erythematosus) ([facility])     recent blood work was negative (3/9/18)    Non compliance with medical treatment     Obesity     Obesity     LALO (obstructive sleep apnea) 2013    no c-pap; uses oxygen prn at HS 2L/NC

## 2025-01-16 ENCOUNTER — TELEPHONE (OUTPATIENT)
Dept: FAMILY MEDICINE CLINIC | Age: 80
End: 2025-01-16

## 2025-01-16 DIAGNOSIS — G89.29 CHRONIC RIGHT SHOULDER PAIN: ICD-10-CM

## 2025-01-16 DIAGNOSIS — M25.511 CHRONIC RIGHT SHOULDER PAIN: ICD-10-CM

## 2025-01-16 RX ORDER — MELOXICAM 7.5 MG/1
7.5 TABLET ORAL DAILY
Qty: 30 TABLET | Refills: 0 | Status: SHIPPED | OUTPATIENT
Start: 2025-01-16

## 2025-01-16 NOTE — TELEPHONE ENCOUNTER
Patient called and said she was out of her meloxicam medication and asked if she could get a refill of that medication.

## 2025-02-11 NOTE — PROGRESS NOTES
Nebulization, EVERY 4 HOURS PRN    albuterol sulfate HFA (VENTOLIN HFA) 108 (90 Base) MCG/ACT inhaler 2 puffs, Inhalation, 4 TIMES DAILY PRN    aspirin 81 mg, Oral, DAILY    cetirizine (ZYRTEC) 10 mg, Oral, DAILY    docusate (COLACE, DULCOLAX) 100 mg, Oral, DAILY    empagliflozin (JARDIANCE) 10 mg, Oral, DAILY    EPINEPHrine (EPIPEN 2-MILADYS) 0.3 mg, IntraMUSCular, ONCE PRN    fluticasone (FLONASE) 50 MCG/ACT nasal spray 2 sprays, Nasal, DAILY    fluticasone-umeclidin-vilant (TRELEGY ELLIPTA) 200-62.5-25 MCG/ACT AEPB inhaler 1 puff, Inhalation, DAILY    glucose ([facility] GLUCOSE) 16 g, Oral, PRN    glucose monitoring kit 1 kit, Does not apply, DAILY    losartan (COZAAR) 100 mg, Oral, DAILY    meloxicam (MOBIC) 7.5 mg, Oral, DAILY    metoprolol succinate (TOPROL XL) 50 mg, Oral, DAILY    montelukast (SINGULAIR) 10 mg, Oral, DAILY    Multiple Vitamins-Minerals (CENTRUM ADULTS) TABS Oral    olopatadine (PATADAY) 0.2 % SOLN ophthalmic solution 1 drop, Ophthalmic, DAILY    OXYGEN Inhalation, 2 lpm qhs    potassium chloride (KLOR-CON M) 10 MEQ extended release tablet 10 mEq, Oral, 2 TIMES DAILY    rosuvastatin (CRESTOR) 40 mg, Oral, DAILY

## 2025-02-12 ENCOUNTER — OFFICE VISIT (OUTPATIENT)
Dept: PULMONOLOGY | Age: 80
End: 2025-02-12
Payer: MEDICARE

## 2025-02-12 VITALS
HEART RATE: 70 BPM | RESPIRATION RATE: 18 BRPM | SYSTOLIC BLOOD PRESSURE: 124 MMHG | DIASTOLIC BLOOD PRESSURE: 82 MMHG | WEIGHT: 145.3 LBS | HEIGHT: 59 IN | TEMPERATURE: 97.6 F | BODY MASS INDEX: 29.29 KG/M2 | OXYGEN SATURATION: 95 %

## 2025-02-12 DIAGNOSIS — G47.33 OSA (OBSTRUCTIVE SLEEP APNEA): ICD-10-CM

## 2025-02-12 DIAGNOSIS — J45.40 MODERATE PERSISTENT ASTHMA WITHOUT COMPLICATION: ICD-10-CM

## 2025-02-12 DIAGNOSIS — G47.34 NOCTURNAL HYPOXEMIA: Primary | ICD-10-CM

## 2025-02-12 PROCEDURE — 1126F AMNT PAIN NOTED NONE PRSNT: CPT | Performed by: NURSE PRACTITIONER

## 2025-02-12 PROCEDURE — 99214 OFFICE O/P EST MOD 30 MIN: CPT | Performed by: NURSE PRACTITIONER

## 2025-02-12 PROCEDURE — G8427 DOCREV CUR MEDS BY ELIG CLIN: HCPCS | Performed by: NURSE PRACTITIONER

## 2025-02-12 PROCEDURE — G8400 PT W/DXA NO RESULTS DOC: HCPCS | Performed by: NURSE PRACTITIONER

## 2025-02-12 PROCEDURE — 1090F PRES/ABSN URINE INCON ASSESS: CPT | Performed by: NURSE PRACTITIONER

## 2025-02-12 PROCEDURE — G2211 COMPLEX E/M VISIT ADD ON: HCPCS | Performed by: NURSE PRACTITIONER

## 2025-02-12 PROCEDURE — 3079F DIAST BP 80-89 MM HG: CPT | Performed by: NURSE PRACTITIONER

## 2025-02-12 PROCEDURE — 1123F ACP DISCUSS/DSCN MKR DOCD: CPT | Performed by: NURSE PRACTITIONER

## 2025-02-12 PROCEDURE — 1159F MED LIST DOCD IN RCRD: CPT | Performed by: NURSE PRACTITIONER

## 2025-02-12 PROCEDURE — G8417 CALC BMI ABV UP PARAM F/U: HCPCS | Performed by: NURSE PRACTITIONER

## 2025-02-12 PROCEDURE — 3074F SYST BP LT 130 MM HG: CPT | Performed by: NURSE PRACTITIONER

## 2025-02-12 PROCEDURE — 1036F TOBACCO NON-USER: CPT | Performed by: NURSE PRACTITIONER

## 2025-02-12 RX ORDER — FLUTICASONE FUROATE, UMECLIDINIUM BROMIDE AND VILANTEROL TRIFENATATE 200; 62.5; 25 UG/1; UG/1; UG/1
1 POWDER RESPIRATORY (INHALATION) DAILY
Qty: 1 EACH | Refills: 11 | Status: SHIPPED | OUTPATIENT
Start: 2025-02-12

## 2025-02-12 RX ORDER — MONTELUKAST SODIUM 10 MG/1
10 TABLET ORAL DAILY
Qty: 30 TABLET | Refills: 3 | Status: SHIPPED | OUTPATIENT
Start: 2025-02-12

## 2025-02-17 ENCOUNTER — OFFICE VISIT (OUTPATIENT)
Dept: ORTHOPEDIC SURGERY | Age: 80
End: 2025-02-17
Payer: MEDICARE

## 2025-02-17 DIAGNOSIS — M12.811 ROTATOR CUFF ARTHROPATHY, RIGHT: Primary | ICD-10-CM

## 2025-02-17 PROCEDURE — G8417 CALC BMI ABV UP PARAM F/U: HCPCS | Performed by: PHYSICIAN ASSISTANT

## 2025-02-17 PROCEDURE — 99212 OFFICE O/P EST SF 10 MIN: CPT | Performed by: PHYSICIAN ASSISTANT

## 2025-02-17 PROCEDURE — 1123F ACP DISCUSS/DSCN MKR DOCD: CPT | Performed by: PHYSICIAN ASSISTANT

## 2025-02-17 PROCEDURE — G8400 PT W/DXA NO RESULTS DOC: HCPCS | Performed by: PHYSICIAN ASSISTANT

## 2025-02-17 PROCEDURE — G8428 CUR MEDS NOT DOCUMENT: HCPCS | Performed by: PHYSICIAN ASSISTANT

## 2025-02-17 PROCEDURE — 1090F PRES/ABSN URINE INCON ASSESS: CPT | Performed by: PHYSICIAN ASSISTANT

## 2025-02-17 PROCEDURE — 1036F TOBACCO NON-USER: CPT | Performed by: PHYSICIAN ASSISTANT

## 2025-02-17 RX ORDER — LIDOCAINE 50 MG/G
1 PATCH TOPICAL DAILY
Qty: 30 PATCH | Refills: 0 | Status: SHIPPED | OUTPATIENT
Start: 2025-02-17

## 2025-02-17 NOTE — PROGRESS NOTES
[facility]          Patient ID:  Name: Areli Benavides  AGE/Gender: 79 y.o. female  MRN: [id number]  : 1945    Date of Consultation:  2025          ALLERGIES:   Allergies   Allergen Reactions    Omeprazole Rash    Omeprazole Magnesium Rash    Penicillins Rash        CC: recheck right shoulder pain     History:  The patient presents today for recheck of right  shoulder pain. The patient was seen for this 25.  She has chronic rotator cuff arthropathy of the right shoulder.  She did not get much relief with the steroid injection.  She has extensive list of comorbidities and is not an appropriate candidate for surgical treatment.  She reports that her pain is about a 9 out of 10 on the pain scale worse at night.. They deny any new injuries.  They have no other complaints or concerns.      Review of Systems:  Pertinent items are noted in HPI.    Past Medical History Includes:   Past Medical History:   Diagnosis Date    Arrhythmia 3 - 4 weeks ago (2010)    Dr. Mancilla? ([facility]) ran tests for heart speeding up/slowing down; (has occasional heart palpitations; no cardiologist 3/9/18)    Arthritis     Asthma     first told in ; inhaler daily; nebs prn    Bladder incontinence 10/24/2016    Chronic pain     COVID-19 2020    Dermatophytosis     Diabetes (HCC)     type 2; insulin meds; hasn't been checking bs lately; last A1C=6.8 on 18    Former smoker     GERD (gastroesophageal reflux disease) 2013    Heart failure (HCC)     no cardiologist     Hypercholesterolemia     Hypertension     on med for control     Hypertensive heart disease without HF (heart failure)     Lupus (systemic lupus erythematosus) ([facility])     recent blood work was negative (3/9/18)    Non compliance with medical treatment     Obesity     Obesity     LALO (obstructive sleep apnea) 2013    no c-pap; uses oxygen prn at HS 2L/NC    Seizures (HCC)     last one about 2.5 yrs ago

## 2025-03-04 DIAGNOSIS — G89.29 CHRONIC RIGHT SHOULDER PAIN: ICD-10-CM

## 2025-03-04 DIAGNOSIS — M25.511 CHRONIC RIGHT SHOULDER PAIN: ICD-10-CM

## 2025-03-04 RX ORDER — MELOXICAM 7.5 MG/1
7.5 TABLET ORAL DAILY
Qty: 30 TABLET | Refills: 0 | OUTPATIENT
Start: 2025-03-04

## 2025-04-07 ENCOUNTER — TELEPHONE (OUTPATIENT)
Dept: PRIMARY CARE CLINIC | Facility: CLINIC | Age: 80
End: 2025-04-07

## 2025-04-07 NOTE — TELEPHONE ENCOUNTER
We received a note for a continuous glucose monitoring. I do not have on my notes that you use a CGM. Do you use one?

## 2025-04-10 DIAGNOSIS — E11.40 TYPE 2 DIABETES MELLITUS WITH DIABETIC NEUROPATHY, WITHOUT LONG-TERM CURRENT USE OF INSULIN (HCC): Primary | ICD-10-CM

## 2025-04-10 DIAGNOSIS — M12.812 ROTATOR CUFF ARTHROPATHY OF LEFT SHOULDER: ICD-10-CM

## 2025-04-10 RX ORDER — LIDOCAINE 50 MG/G
1 PATCH TOPICAL DAILY
Qty: 30 PATCH | Refills: 0 | Status: SHIPPED | OUTPATIENT
Start: 2025-04-10

## 2025-04-29 ENCOUNTER — OFFICE VISIT (OUTPATIENT)
Dept: PRIMARY CARE CLINIC | Facility: CLINIC | Age: 80
End: 2025-04-29
Payer: MEDICARE

## 2025-04-29 VITALS
SYSTOLIC BLOOD PRESSURE: 142 MMHG | WEIGHT: 140 LBS | DIASTOLIC BLOOD PRESSURE: 83 MMHG | HEART RATE: 63 BPM | HEIGHT: 59 IN | OXYGEN SATURATION: 96 % | RESPIRATION RATE: 18 BRPM | BODY MASS INDEX: 28.22 KG/M2 | TEMPERATURE: 98.1 F

## 2025-04-29 DIAGNOSIS — Z91.81 HISTORY OF FALL: ICD-10-CM

## 2025-04-29 DIAGNOSIS — G47.33 OSA (OBSTRUCTIVE SLEEP APNEA): ICD-10-CM

## 2025-04-29 DIAGNOSIS — Z71.89 COUNSELING FOR LIVING WILL: ICD-10-CM

## 2025-04-29 DIAGNOSIS — Z23 NEED FOR SHINGLES VACCINE: ICD-10-CM

## 2025-04-29 DIAGNOSIS — I10 HYPERTENSION, UNSPECIFIED TYPE: ICD-10-CM

## 2025-04-29 DIAGNOSIS — Z86.73 HISTORY OF CVA (CEREBROVASCULAR ACCIDENT): ICD-10-CM

## 2025-04-29 DIAGNOSIS — Z00.00 MEDICARE ANNUAL WELLNESS VISIT, SUBSEQUENT: Primary | ICD-10-CM

## 2025-04-29 DIAGNOSIS — E11.40 TYPE 2 DIABETES MELLITUS WITH DIABETIC NEUROPATHY, WITHOUT LONG-TERM CURRENT USE OF INSULIN (HCC): ICD-10-CM

## 2025-04-29 DIAGNOSIS — J45.909 MODERATE ASTHMA WITHOUT COMPLICATION, UNSPECIFIED WHETHER PERSISTENT: ICD-10-CM

## 2025-04-29 DIAGNOSIS — M12.811 ROTATOR CUFF ARTHROPATHY OF RIGHT SHOULDER: ICD-10-CM

## 2025-04-29 LAB — HBA1C MFR BLD: 6.5 %

## 2025-04-29 PROCEDURE — 1123F ACP DISCUSS/DSCN MKR DOCD: CPT | Performed by: NURSE PRACTITIONER

## 2025-04-29 PROCEDURE — 3077F SYST BP >= 140 MM HG: CPT | Performed by: NURSE PRACTITIONER

## 2025-04-29 PROCEDURE — 3078F DIAST BP <80 MM HG: CPT | Performed by: NURSE PRACTITIONER

## 2025-04-29 PROCEDURE — G8427 DOCREV CUR MEDS BY ELIG CLIN: HCPCS | Performed by: NURSE PRACTITIONER

## 2025-04-29 PROCEDURE — G8400 PT W/DXA NO RESULTS DOC: HCPCS | Performed by: NURSE PRACTITIONER

## 2025-04-29 PROCEDURE — 1159F MED LIST DOCD IN RCRD: CPT | Performed by: NURSE PRACTITIONER

## 2025-04-29 PROCEDURE — 1036F TOBACCO NON-USER: CPT | Performed by: NURSE PRACTITIONER

## 2025-04-29 PROCEDURE — 83036 HEMOGLOBIN GLYCOSYLATED A1C: CPT | Performed by: NURSE PRACTITIONER

## 2025-04-29 PROCEDURE — 1090F PRES/ABSN URINE INCON ASSESS: CPT | Performed by: NURSE PRACTITIONER

## 2025-04-29 PROCEDURE — G0439 PPPS, SUBSEQ VISIT: HCPCS | Performed by: NURSE PRACTITIONER

## 2025-04-29 PROCEDURE — 1160F RVW MEDS BY RX/DR IN RCRD: CPT | Performed by: NURSE PRACTITIONER

## 2025-04-29 PROCEDURE — 99213 OFFICE O/P EST LOW 20 MIN: CPT | Performed by: NURSE PRACTITIONER

## 2025-04-29 PROCEDURE — 3044F HG A1C LEVEL LT 7.0%: CPT | Performed by: NURSE PRACTITIONER

## 2025-04-29 PROCEDURE — G8417 CALC BMI ABV UP PARAM F/U: HCPCS | Performed by: NURSE PRACTITIONER

## 2025-04-29 RX ORDER — MONTELUKAST SODIUM 10 MG/1
10 TABLET ORAL DAILY
Qty: 30 TABLET | Refills: 3 | Status: SHIPPED | OUTPATIENT
Start: 2025-04-29

## 2025-04-29 RX ORDER — ALBUTEROL SULFATE 0.83 MG/ML
2.5 SOLUTION RESPIRATORY (INHALATION) EVERY 4 HOURS PRN
Qty: 120 EACH | Refills: 0 | Status: SHIPPED | OUTPATIENT
Start: 2025-04-29

## 2025-04-29 RX ORDER — LOSARTAN POTASSIUM 50 MG/1
100 TABLET ORAL DAILY
Qty: 180 TABLET | Refills: 1 | Status: SHIPPED | OUTPATIENT
Start: 2025-04-29

## 2025-04-29 RX ORDER — ALBUTEROL SULFATE 90 UG/1
2 INHALANT RESPIRATORY (INHALATION) 4 TIMES DAILY PRN
Qty: 18 G | Refills: 3 | Status: SHIPPED | OUTPATIENT
Start: 2025-04-29

## 2025-04-29 RX ORDER — FLUTICASONE PROPIONATE 50 MCG
2 SPRAY, SUSPENSION (ML) NASAL DAILY
Qty: 16 G | Refills: 1 | Status: SHIPPED | OUTPATIENT
Start: 2025-04-29

## 2025-04-29 RX ORDER — CETIRIZINE HYDROCHLORIDE 10 MG/1
10 TABLET ORAL DAILY
Qty: 90 TABLET | Refills: 1 | Status: SHIPPED | OUTPATIENT
Start: 2025-04-29

## 2025-04-29 RX ORDER — ROSUVASTATIN CALCIUM 40 MG/1
40 TABLET, COATED ORAL DAILY
Qty: 90 TABLET | Refills: 1 | Status: SHIPPED | OUTPATIENT
Start: 2025-04-29

## 2025-04-29 RX ORDER — ASPIRIN 81 MG/1
81 TABLET ORAL DAILY
Qty: 90 TABLET | Refills: 1 | Status: SHIPPED | OUTPATIENT
Start: 2025-04-29

## 2025-04-29 RX ORDER — METOPROLOL SUCCINATE 50 MG/1
50 TABLET, EXTENDED RELEASE ORAL DAILY
Qty: 90 TABLET | Refills: 1 | Status: SHIPPED | OUTPATIENT
Start: 2025-04-29

## 2025-04-29 SDOH — ECONOMIC STABILITY: FOOD INSECURITY: WITHIN THE PAST 12 MONTHS, YOU WORRIED THAT YOUR FOOD WOULD RUN OUT BEFORE YOU GOT MONEY TO BUY MORE.: NEVER TRUE

## 2025-04-29 SDOH — ECONOMIC STABILITY: FOOD INSECURITY: WITHIN THE PAST 12 MONTHS, THE FOOD YOU BOUGHT JUST DIDN'T LAST AND YOU DIDN'T HAVE MONEY TO GET MORE.: NEVER TRUE

## 2025-04-29 ASSESSMENT — PATIENT HEALTH QUESTIONNAIRE - PHQ9
SUM OF ALL RESPONSES TO PHQ QUESTIONS 1-9: 0
SUM OF ALL RESPONSES TO PHQ QUESTIONS 1-9: 0
1. LITTLE INTEREST OR PLEASURE IN DOING THINGS: NOT AT ALL
SUM OF ALL RESPONSES TO PHQ QUESTIONS 1-9: 0
2. FEELING DOWN, DEPRESSED OR HOPELESS: NOT AT ALL
SUM OF ALL RESPONSES TO PHQ QUESTIONS 1-9: 0

## 2025-04-29 ASSESSMENT — ENCOUNTER SYMPTOMS
SHORTNESS OF BREATH: 0
CHOKING: 0
COUGH: 0
VOMITING: 0
SORE THROAT: 0
ABDOMINAL PAIN: 0
SINUS PRESSURE: 0
NAUSEA: 0
SINUS PAIN: 0
WHEEZING: 0

## 2025-04-29 ASSESSMENT — LIFESTYLE VARIABLES
HOW MANY STANDARD DRINKS CONTAINING ALCOHOL DO YOU HAVE ON A TYPICAL DAY: PATIENT DOES NOT DRINK
HOW OFTEN DO YOU HAVE A DRINK CONTAINING ALCOHOL: NEVER
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
## 2025-04-29 NOTE — PATIENT INSTRUCTIONS
Recommend to obtain updated shingles vaccine    Preventing Falls: Care Instructions  Injuries and health problems such as trouble walking or poor eyesight can increase your risk of falling. So can some medicines. But there are things you can do to help prevent falls. You can exercise to get stronger. You can also arrange your home to make it safer.    Talk to your doctor about the medicines you take. Ask if any of them increase the risk of falls and whether they can be changed or stopped.   Try to exercise regularly. It can help improve your strength and balance. This can help lower your risk of falling.         Practice fall safety and prevention.   Wear low-heeled shoes that fit well and give your feet good support. Talk to your doctor if you have foot problems that make this hard.  Carry a cellphone or wear a medical alert device that you can use to call for help.  Use stepladders instead of chairs to reach high objects. Don't climb if you're at risk for falls. Ask for help, if needed.  Wear the correct eyeglasses, if you need them.        Make your home safer.   Remove rugs, cords, clutter, and furniture from walkways.  Keep your house well lit. Use night-lights in hallways and bathrooms.  Install and use sturdy handrails on stairways.  Wear nonskid footwear, even inside. Don't walk barefoot or in socks without shoes.        Be safe outside.   Use handrails, curb cuts, and ramps whenever possible.  Keep your hands free by using a shoulder bag or backpack.  Try to walk in well-lit areas. Watch out for uneven ground, changes in pavement, and debris.  Be careful in the winter. Walk on the grass or gravel when sidewalks are slippery. Use de-icer on steps and walkways. Add non-slip devices to shoes.    Put grab bars and nonskid mats in your shower or tub and near the toilet. Try to use a shower chair or bath bench when bathing.   Get into a tub or shower by putting in your weaker leg first. Get out with your

## 2025-07-09 ENCOUNTER — OFFICE VISIT (OUTPATIENT)
Dept: SLEEP MEDICINE | Age: 80
End: 2025-07-09
Payer: MEDICARE

## 2025-07-09 VITALS
TEMPERATURE: 97.4 F | RESPIRATION RATE: 20 BRPM | HEIGHT: 59 IN | OXYGEN SATURATION: 94 % | HEART RATE: 95 BPM | BODY MASS INDEX: 29.23 KG/M2 | WEIGHT: 145 LBS | DIASTOLIC BLOOD PRESSURE: 82 MMHG | SYSTOLIC BLOOD PRESSURE: 135 MMHG

## 2025-07-09 DIAGNOSIS — G47.34 NOCTURNAL HYPOXEMIA: ICD-10-CM

## 2025-07-09 DIAGNOSIS — G47.33 OSA (OBSTRUCTIVE SLEEP APNEA): Primary | ICD-10-CM

## 2025-07-09 PROCEDURE — 1036F TOBACCO NON-USER: CPT | Performed by: NURSE PRACTITIONER

## 2025-07-09 PROCEDURE — 3075F SYST BP GE 130 - 139MM HG: CPT | Performed by: NURSE PRACTITIONER

## 2025-07-09 PROCEDURE — 1123F ACP DISCUSS/DSCN MKR DOCD: CPT | Performed by: NURSE PRACTITIONER

## 2025-07-09 PROCEDURE — G2211 COMPLEX E/M VISIT ADD ON: HCPCS | Performed by: NURSE PRACTITIONER

## 2025-07-09 PROCEDURE — G8417 CALC BMI ABV UP PARAM F/U: HCPCS | Performed by: NURSE PRACTITIONER

## 2025-07-09 PROCEDURE — 99213 OFFICE O/P EST LOW 20 MIN: CPT | Performed by: NURSE PRACTITIONER

## 2025-07-09 PROCEDURE — 1159F MED LIST DOCD IN RCRD: CPT | Performed by: NURSE PRACTITIONER

## 2025-07-09 PROCEDURE — G8400 PT W/DXA NO RESULTS DOC: HCPCS | Performed by: NURSE PRACTITIONER

## 2025-07-09 PROCEDURE — 1090F PRES/ABSN URINE INCON ASSESS: CPT | Performed by: NURSE PRACTITIONER

## 2025-07-09 PROCEDURE — G8427 DOCREV CUR MEDS BY ELIG CLIN: HCPCS | Performed by: NURSE PRACTITIONER

## 2025-07-09 PROCEDURE — 1160F RVW MEDS BY RX/DR IN RCRD: CPT | Performed by: NURSE PRACTITIONER

## 2025-07-09 PROCEDURE — 3079F DIAST BP 80-89 MM HG: CPT | Performed by: NURSE PRACTITIONER

## 2025-07-09 RX ORDER — MECLIZINE HYDROCHLORIDE 25 MG/1
TABLET ORAL
COMMUNITY
Start: 2025-07-03

## 2025-07-09 RX ORDER — ONDANSETRON 4 MG/1
4 TABLET, ORALLY DISINTEGRATING ORAL 3 TIMES DAILY PRN
COMMUNITY
Start: 2025-07-03

## 2025-07-09 ASSESSMENT — SLEEP AND FATIGUE QUESTIONNAIRES
HOW LIKELY ARE YOU TO NOD OFF OR FALL ASLEEP WHILE SITTING QUIETLY AFTER LUNCH WITHOUT ALCOHOL: WOULD NEVER DOZE
ESS TOTAL SCORE: 2
HOW LIKELY ARE YOU TO NOD OFF OR FALL ASLEEP WHEN YOU ARE A PASSENGER IN A CAR FOR AN HOUR WITHOUT A BREAK: WOULD NEVER DOZE
HOW LIKELY ARE YOU TO NOD OFF OR FALL ASLEEP WHILE SITTING INACTIVE IN A PUBLIC PLACE: WOULD NEVER DOZE
HOW LIKELY ARE YOU TO NOD OFF OR FALL ASLEEP WHILE SITTING AND READING: WOULD NEVER DOZE
HOW LIKELY ARE YOU TO NOD OFF OR FALL ASLEEP WHILE SITTING AND TALKING TO SOMEONE: WOULD NEVER DOZE
HOW LIKELY ARE YOU TO NOD OFF OR FALL ASLEEP WHILE WATCHING TV: WOULD NEVER DOZE
HOW LIKELY ARE YOU TO NOD OFF OR FALL ASLEEP IN A CAR, WHILE STOPPED FOR A FEW MINUTES IN TRAFFIC: WOULD NEVER DOZE
HOW LIKELY ARE YOU TO NOD OFF OR FALL ASLEEP WHILE LYING DOWN TO REST IN THE AFTERNOON WHEN CIRCUMSTANCES PERMIT: MODERATE CHANCE OF DOZING

## 2025-07-09 NOTE — PATIENT INSTRUCTIONS
Continue positional therapy for mild LALO  Overnight oximetry ordered while endorsing positional therapy and wearing 2 L of oxygen  Recommendations as above  Follow-up in 1 year or sooner if needed

## 2025-07-09 NOTE — PROGRESS NOTES
[facility]  [address]  [place] [address]  [phone]    Patient Name:  Areli Benavides  YOB: 1945      Office Visit 7/9/2025    CHIEF COMPLAINT:    Chief Complaint   Patient presents with    Follow-up    Sleep Apnea             History of Present Illness   Patient is a 79 y.o. female seen today for follow up of LALO.     She has been adhering to the recommended side-sleeping position and reports no current sleep disturbances.  States that she goes to sleep easily and awakens refreshed most mornings.  Denies any excessive daytime sleepiness or fatigue.  [place] score is 2/24.  However, she mentions that noise from her upstairs neighbors occasionally disrupts her sleep. She continues to use a 2 L oxygen concentrator at night. She is uncertain about her next appointment with the pulmonologist.    She has been experiencing vertigo, a condition she has had in the past.    She is currently trying to get a nebulizer, which was recently prescribed by her primary care physician.          [place] Sleepiness Scale      7/9/2025     1:17 PM 12/31/2024     1:45 PM   Sleep Medicine   Sitting and reading 0 0   Watching TV 0 1   Sitting, inactive in a public place (e.g. a theatre or a meeting) 0 0   As a passenger in a car for an hour without a break 0 0   Lying down to rest in the afternoon when circumstances permit 2 0   Sitting and talking to someone 0 0   Sitting quietly after a lunch without alcohol 0 0   In a car, while stopped for a few minutes in traffic 0 0   [place] Sleepiness Score 2 1          Past Medical History:   Diagnosis Date    Arrhythmia 3 - 4 weeks ago (9/2010)    Dr. Mancilla? ([facility]) ran tests for heart speeding up/slowing down; (has occasional heart palpitations; no cardiologist 3/9/18)    Arthritis     Asthma     first told in 2000; inhaler daily; nebs prn    Bladder incontinence 10/24/2016    Chronic pain     COVID-19 08/2020

## 2025-07-21 ENCOUNTER — TELEPHONE (OUTPATIENT)
Dept: SLEEP MEDICINE | Age: 80
End: 2025-07-21

## 2025-07-21 ENCOUNTER — TELEPHONE (OUTPATIENT)
Dept: PRIMARY CARE CLINIC | Facility: CLINIC | Age: 80
End: 2025-07-21

## 2025-07-21 NOTE — TELEPHONE ENCOUNTER
Pt called and left a voice message requesting a letter stating that she's disabled and can't work, she needs this letter for her to continue having Medicare. Can you advise? Pt next appointment is schedule for July 29.

## 2025-07-22 NOTE — TELEPHONE ENCOUNTER
Pt's friend Su Landrum per privacy form, called and asked for an update on this, she stated that patient got a medicare coverage denial letter and since she can't work due to her medication conditions and physical limitations, would like a disability letter stating that she's unable to work. [phone] and [phone]

## 2025-08-07 ENCOUNTER — OFFICE VISIT (OUTPATIENT)
Dept: PRIMARY CARE CLINIC | Facility: CLINIC | Age: 80
End: 2025-08-07
Payer: MEDICARE

## 2025-08-07 VITALS
WEIGHT: 147 LBS | HEIGHT: 59 IN | TEMPERATURE: 98.6 F | RESPIRATION RATE: 18 BRPM | SYSTOLIC BLOOD PRESSURE: 129 MMHG | HEART RATE: 73 BPM | BODY MASS INDEX: 29.64 KG/M2 | DIASTOLIC BLOOD PRESSURE: 74 MMHG | OXYGEN SATURATION: 96 %

## 2025-08-07 DIAGNOSIS — E11.40 TYPE 2 DIABETES MELLITUS WITH DIABETIC NEUROPATHY, WITHOUT LONG-TERM CURRENT USE OF INSULIN (HCC): Primary | ICD-10-CM

## 2025-08-07 DIAGNOSIS — M25.572 CHRONIC PAIN OF BOTH ANKLES: ICD-10-CM

## 2025-08-07 DIAGNOSIS — M25.571 CHRONIC PAIN OF BOTH ANKLES: ICD-10-CM

## 2025-08-07 DIAGNOSIS — G89.29 CHRONIC PAIN OF BOTH ANKLES: ICD-10-CM

## 2025-08-07 DIAGNOSIS — I10 PRIMARY HYPERTENSION: ICD-10-CM

## 2025-08-07 DIAGNOSIS — G89.29 CHRONIC PAIN OF RIGHT KNEE: ICD-10-CM

## 2025-08-07 DIAGNOSIS — Z86.73 HISTORY OF CVA (CEREBROVASCULAR ACCIDENT): ICD-10-CM

## 2025-08-07 DIAGNOSIS — M12.812 ROTATOR CUFF ARTHROPATHY OF LEFT SHOULDER: ICD-10-CM

## 2025-08-07 DIAGNOSIS — M25.561 CHRONIC PAIN OF RIGHT KNEE: ICD-10-CM

## 2025-08-07 DIAGNOSIS — K21.9 GASTROESOPHAGEAL REFLUX DISEASE, UNSPECIFIED WHETHER ESOPHAGITIS PRESENT: ICD-10-CM

## 2025-08-07 DIAGNOSIS — J45.909 MODERATE ASTHMA WITHOUT COMPLICATION, UNSPECIFIED WHETHER PERSISTENT: ICD-10-CM

## 2025-08-07 PROBLEM — G47.00 PERSISTENT DISORDER OF INITIATING OR MAINTAINING SLEEP: Status: RESOLVED | Noted: 2024-12-31 | Resolved: 2025-08-07

## 2025-08-07 PROBLEM — Z79.4 CONTROLLED TYPE 2 DIABETES MELLITUS WITHOUT COMPLICATION, WITH LONG-TERM CURRENT USE OF INSULIN (HCC): Status: RESOLVED | Noted: 2017-01-27 | Resolved: 2025-08-07

## 2025-08-07 PROBLEM — L50.9 HIVES: Status: RESOLVED | Noted: 2019-01-26 | Resolved: 2025-08-07

## 2025-08-07 PROBLEM — E11.9 CONTROLLED TYPE 2 DIABETES MELLITUS WITHOUT COMPLICATION, WITH LONG-TERM CURRENT USE OF INSULIN (HCC): Status: RESOLVED | Noted: 2017-01-27 | Resolved: 2025-08-07

## 2025-08-07 LAB — HBA1C MFR BLD: 6.9 %

## 2025-08-07 PROCEDURE — 1090F PRES/ABSN URINE INCON ASSESS: CPT | Performed by: NURSE PRACTITIONER

## 2025-08-07 PROCEDURE — G8400 PT W/DXA NO RESULTS DOC: HCPCS | Performed by: NURSE PRACTITIONER

## 2025-08-07 PROCEDURE — G8427 DOCREV CUR MEDS BY ELIG CLIN: HCPCS | Performed by: NURSE PRACTITIONER

## 2025-08-07 PROCEDURE — 3044F HG A1C LEVEL LT 7.0%: CPT | Performed by: NURSE PRACTITIONER

## 2025-08-07 PROCEDURE — 99214 OFFICE O/P EST MOD 30 MIN: CPT | Performed by: NURSE PRACTITIONER

## 2025-08-07 PROCEDURE — 1123F ACP DISCUSS/DSCN MKR DOCD: CPT | Performed by: NURSE PRACTITIONER

## 2025-08-07 PROCEDURE — 1160F RVW MEDS BY RX/DR IN RCRD: CPT | Performed by: NURSE PRACTITIONER

## 2025-08-07 PROCEDURE — 83036 HEMOGLOBIN GLYCOSYLATED A1C: CPT | Performed by: NURSE PRACTITIONER

## 2025-08-07 PROCEDURE — G8417 CALC BMI ABV UP PARAM F/U: HCPCS | Performed by: NURSE PRACTITIONER

## 2025-08-07 PROCEDURE — 1159F MED LIST DOCD IN RCRD: CPT | Performed by: NURSE PRACTITIONER

## 2025-08-07 PROCEDURE — 3074F SYST BP LT 130 MM HG: CPT | Performed by: NURSE PRACTITIONER

## 2025-08-07 PROCEDURE — 1036F TOBACCO NON-USER: CPT | Performed by: NURSE PRACTITIONER

## 2025-08-07 PROCEDURE — 3078F DIAST BP <80 MM HG: CPT | Performed by: NURSE PRACTITIONER

## 2025-08-07 RX ORDER — FLUTICASONE PROPIONATE 50 MCG
2 SPRAY, SUSPENSION (ML) NASAL DAILY
Qty: 16 G | Refills: 1 | Status: SHIPPED | OUTPATIENT
Start: 2025-08-07

## 2025-08-07 RX ORDER — ROSUVASTATIN CALCIUM 40 MG/1
40 TABLET, COATED ORAL DAILY
Qty: 90 TABLET | Refills: 1 | Status: SHIPPED | OUTPATIENT
Start: 2025-08-07

## 2025-08-07 RX ORDER — SENNOSIDES 8.6 MG
650 CAPSULE ORAL EVERY 8 HOURS PRN
COMMUNITY

## 2025-08-07 RX ORDER — LIDOCAINE 50 MG/G
1 PATCH TOPICAL DAILY
Qty: 30 PATCH | Refills: 0 | Status: SHIPPED | OUTPATIENT
Start: 2025-08-07

## 2025-08-07 RX ORDER — FAMOTIDINE 20 MG/1
20 TABLET, FILM COATED ORAL NIGHTLY PRN
Qty: 30 TABLET | Refills: 3 | Status: SHIPPED | OUTPATIENT
Start: 2025-08-07

## 2025-08-07 RX ORDER — PANTOPRAZOLE SODIUM 20 MG/1
20 TABLET, DELAYED RELEASE ORAL
Qty: 90 TABLET | Refills: 1 | Status: SHIPPED | OUTPATIENT
Start: 2025-08-07

## 2025-08-07 RX ORDER — ASPIRIN 81 MG/1
81 TABLET ORAL DAILY
Qty: 90 TABLET | Refills: 1 | Status: SHIPPED | OUTPATIENT
Start: 2025-08-07

## 2025-08-07 RX ORDER — CETIRIZINE HYDROCHLORIDE 10 MG/1
10 TABLET ORAL DAILY
Qty: 90 TABLET | Refills: 1 | Status: SHIPPED | OUTPATIENT
Start: 2025-08-07

## 2025-08-07 RX ORDER — LOSARTAN POTASSIUM 50 MG/1
100 TABLET ORAL DAILY
Qty: 180 TABLET | Refills: 1 | Status: SHIPPED | OUTPATIENT
Start: 2025-08-07

## 2025-08-07 RX ORDER — MONTELUKAST SODIUM 10 MG/1
10 TABLET ORAL DAILY
Qty: 30 TABLET | Refills: 3 | Status: SHIPPED | OUTPATIENT
Start: 2025-08-07

## (undated) DEVICE — SOLUTION IV 1000ML 0.9% SOD CHL

## (undated) DEVICE — SYRINGE NDL 25GA 1ML L5/8IN BLU PLAS NDL S STL SHLD HYPO

## (undated) DEVICE — PACKING 8004003 NEURAY 200PK 25X25MM: Brand: NEURAY ®

## (undated) DEVICE — 2000CC GUARDIAN II: Brand: GUARDIAN

## (undated) DEVICE — SOL ANTI-FOG 6ML MEDC -- MEDICHOICE - CONVERT TO 358427

## (undated) DEVICE — AMD ANTIMICROBIAL NON-ADHERENT PAD,0.2% POLYHEXAMETHYLENE BIGUANIDE HCI (PHMB): Brand: TELFA

## (undated) DEVICE — JELLY LUBRICATING 10GM PREFIL SYR LUBE

## (undated) DEVICE — T AND A ORAL: Brand: MEDLINE INDUSTRIES, INC.

## (undated) DEVICE — CONTAINER,SPECIMEN,O.R.STRL,4.5OZ: Brand: MEDLINE

## (undated) DEVICE — MEDI-VAC YANKAUER SUCTION HANDLE W/BULBOUS TIP: Brand: CARDINAL HEALTH